# Patient Record
Sex: FEMALE | Race: WHITE | Employment: OTHER | URBAN - METROPOLITAN AREA
[De-identification: names, ages, dates, MRNs, and addresses within clinical notes are randomized per-mention and may not be internally consistent; named-entity substitution may affect disease eponyms.]

---

## 2020-01-14 ENCOUNTER — HOSPITAL ENCOUNTER (OUTPATIENT)
Dept: GENERAL RADIOLOGY | Age: 65
Discharge: HOME | End: 2020-01-14
Payer: MEDICARE

## 2020-01-14 DIAGNOSIS — R52 PAIN OF RIGHT SIDE OF BODY: ICD-10-CM

## 2020-01-14 DIAGNOSIS — R06.2 WHEEZING: ICD-10-CM

## 2020-01-14 DIAGNOSIS — R06.02 SOB (SHORTNESS OF BREATH): ICD-10-CM

## 2020-01-14 PROCEDURE — 71046 X-RAY EXAM CHEST 2 VIEWS: CPT

## 2020-01-14 NOTE — PROGRESS NOTES
Chest x-ray did not show pneumonia or pleurisy. I am sending her in prednisone to take since she had wheezing and crackling in her lungs. It could be bronchitis.

## 2020-03-03 LAB — HBA1C MFR BLD HPLC: 6.7 %

## 2020-11-12 ENCOUNTER — TELEPHONE (OUTPATIENT)
Dept: PHARMACY | Age: 65
End: 2020-11-12

## 2020-11-12 NOTE — TELEPHONE ENCOUNTER
Dominick Dorsey MD - would patient benefit from statin therapy?  - Diabetic patient high risk ASCVD risk 26%  - consider moderate-high intensity statin per ADA/AHA guidelines? Thank you,  Rebeca Chamberlain, PharmD, [address] [facility] Pharmacist  Direct: [phone], Ext [phone]  ==============================================================  CLINICAL PHARMACY: STATIN REVIEW    SUBJECTIVE:   Identified as DM care gap for [employer]: statin therapy. OBJECTIVE:  Allergies   Allergen Reactions    No Known Drug Allergies Not Reported This Time       Medications per current medication list:  Current Outpatient Medications   Medication Sig Dispense Refill    metFORMIN (GLUCOPHAGE) 500 mg tablet Take 1 Tab by mouth two (2) times daily (with meals). 180 Tab 1    canagliflozin (Invokana) 300 mg tablet Take 1 Tab by mouth Daily (before breakfast). 90 Tab 1    albuterol sulfate (PROVENTIL;VENTOLIN) 2.5 mg/0.5 mL nebu nebulizer solution 0.5 mL by Nebulization route five (5) times daily. 20 mL 5    glimepiride (AMARYL) 2 mg tablet Take 1 Tab by mouth every morning. 90 Tab 1    levothyroxine (SYNTHROID) 150 mcg tablet Take 1 Tab by mouth daily. 90 Tab 1    escitalopram oxalate (LEXAPRO) 20 mg tablet Take 1 Tab by mouth daily. 90 Tab 1    methocarbamoL (ROBAXIN) 500 mg tablet Take 1 Tab by mouth three (3) times daily. 30 Tab 0    montelukast (SINGULAIR) 10 mg tablet Take 1 Tab by mouth daily. 30 Tab 5    fluticasone (FLONASE) 50 mcg/actuation nasal spray 2 Sprays by Both Nostrils route daily. 1 Bottle 3    aspirin delayed-release 81 mg tablet Take  by mouth daily.  albuterol (PROVENTIL, VENTOLIN) 90 mcg/Actuation inhaler Take 2 Puffs by inhalation every six (6) hours as needed.        Labs:  Lab Results   Component Value Date/Time    Cholesterol, total 192 12/04/2019 02:24 PM    HDL Cholesterol 56 12/04/2019 02:24 PM    LDL, calculated 114 (H) 12/04/2019 02:24 PM    VLDL, calculated 22 12/04/2019 02:24 PM    Triglyceride 109 12/04/2019 02:24 PM     ALT (SGPT)   Date Value Ref Range Status   12/04/2019 28 0 - 32 IU/L Final     AST (SGOT)   Date Value Ref Range Status   12/04/2019 34 0 - 40 IU/L Final       ASCVD risk:  WHITE OR     BP Readings from Last 1 Encounters:   02/04/20 144/72       Social History     Tobacco Use    Smoking status: Current Every Day Smoker     Packs/day: 1.00     Years: 30.00     Pack years: 30.00     Types: Cigarettes    Smokeless tobacco: Never Used   Substance Use Topics    Alcohol use: No     ASSESSMENT:    ADA Guidelines:    >/= 36years old:   o Calculated ASCVD risk 26%  o History of ASCVD or 10-year ASCVD risk > 20% - high-intensity statin is recommended.      PLAN:  Consider moderate-high intensity statin     Thank you,    Jabari Luna, PharmD, [address] [facility] Clinical Pharmacist  Direct: [phone], Ext 7

## 2020-11-18 NOTE — TELEPHONE ENCOUNTER
No response from provider.      Chinedu Zafar, PharmD, [address] [facility] Clinical Pharmacist  Direct: [phone], Ext [phone]  ===============================================  CLINICAL PHARMACY CONSULT: MED RECONCILIATION/REVIEW ADDENDUM    For Pharmacy Admin Tracking Only    PHSO: PHSO Patient?: Yes  Total # of Interventions Recommended: Count: 1  - New Order #: 1 New Medication Order Reason(s): Needs Additional Medication Therapy  Recommended intervention potential cost savings: 0  Total Interventions Accepted: 0  Time Spent (min): 15    Cleopatra Subramanian, [facility], PharmD  [address]

## 2021-01-01 ENCOUNTER — TELEPHONE (OUTPATIENT)
Dept: PHARMACY | Age: 66
End: 2021-01-01

## 2021-05-19 NOTE — TELEPHONE ENCOUNTER
Carlos Singh MD - would patient benefit from statin therapy?  - identified has having DM and not on statin by her insurance  - DM with ASCVD risk over 20%, LDL above 70 mg/dL  - consider moderate intensity statin such as atorvastatin 20 mg or rosuvastatin 10 mg? Please discuss with patient at [address] later today or advise as appropriate. Thank you,  Kalee Mcginnis, PharmD, [facility]  Department, toll free: [phone], option 7  ==============================================================  POPULATION HEALTH CLINICAL PHARMACY: STATIN THERAPY REVIEW  Identified statin use in persons with diabetes care gap per [facility] IDENTIFIED    Medication history per chart review:  - no statins noted in this EMR med list    Component      Latest Ref Rng & Units 5/3/2021 12/4/2019 2/5/2019          11:41 AM  2:24 PM  2:42 PM   Cholesterol, total      100 - 199 mg/dL 161 192 193   Triglyceride      0 - 149 mg/dL 68 109 107   HDL Cholesterol      >39 mg/dL 66 56 54   VLDL, calculated      5 - 40 mg/dL 13 22 21   LDL, calculated      0 - 99 mg/dL 82 114 (H) 118 (H)     Component      Latest Ref Rng & Units 5/3/2021 12/4/2019 2/5/2019          11:41 AM  2:24 PM  2:42 PM   AST      0 - 40 IU/L 34 34 24   ALT      0 - 32 IU/L 30 28 26     The 10-year ASCVD risk score (Gisele Shone., et al., 2013) is: 25.4%    Values used to calculate the score:      Age: 74 years      Sex: Female      Is Non- : No      Diabetic: Yes      Tobacco smoker: Yes      Systolic Blood Pressure: 001 mmHg      Is BP treated: No      HDL Cholesterol: 66 mg/dL      Total Cholesterol: 161 mg/dL       ASSESSMENT/CONSIDERATIONS  Hyperlipidemia Goal: Patient has a 10-yr ASCVD risk of >20% with DM and is therefore a candidate for moderate- to high-intensity statin therapy based on updated guidelines.  Noted last LDL less than 100 mg/dL but baseline over 70 mg/dL with high cardiovascular risk and may benefit from statin for primary prevention  - consider atorvastatin 20 mg daily or rosuvastatin 10 mg daily, can adjust as needed/tolerated      Future Appointments   Date Time Provider Esperanza Card   5/19/2021  2:40 PM Padmini Hall MD [facility] P[facility]       René Brown, PharmD, [facility]  Department, toll free: [phone], option 7

## 2021-05-26 NOTE — TELEPHONE ENCOUNTER
No response noted, does not appear statin started at visit (at time of writing).     For Josh Mendes in place: No   Recommendation Provided To: Provider: 1 via Note to Provider    Gap Closed?: No   Total # of Interventions Recommended: 1   Total # of Interventions Accepted: 0   Intervention Accepted By: Provider: 0   Time Spent (min): 15

## 2022-01-01 ENCOUNTER — HOME CARE VISIT (OUTPATIENT)
Dept: SCHEDULING | Facility: HOME HEALTH | Age: 67
End: 2022-01-01
Payer: MEDICARE

## 2022-01-01 ENCOUNTER — NURSE TRIAGE (OUTPATIENT)
Dept: OTHER | Facility: CLINIC | Age: 67
End: 2022-01-01

## 2022-01-01 ENCOUNTER — HOSPITAL ENCOUNTER (OUTPATIENT)
Dept: CT IMAGING | Age: 67
Discharge: HOME | End: 2022-02-11
Attending: STUDENT IN AN ORGANIZED HEALTH CARE EDUCATION/TRAINING PROGRAM
Payer: MEDICARE

## 2022-01-01 ENCOUNTER — HOSPITAL ENCOUNTER (INPATIENT)
Age: 67
LOS: 4 days | Discharge: HOME HEALTH | DRG: 682 | End: 2022-03-28
Attending: EMERGENCY MEDICINE | Admitting: INTERNAL MEDICINE
Payer: MEDICARE

## 2022-01-01 ENCOUNTER — APPOINTMENT (OUTPATIENT)
Dept: INTERVENTIONAL RADIOLOGY/VASCULAR | Age: 67
DRG: 682 | End: 2022-01-01
Attending: INTERNAL MEDICINE
Payer: MEDICARE

## 2022-01-01 ENCOUNTER — HOME CARE VISIT (OUTPATIENT)
Dept: HOSPICE | Facility: HOSPICE | Age: 67
End: 2022-01-01
Payer: MEDICARE

## 2022-01-01 ENCOUNTER — HOSPITAL ENCOUNTER (OUTPATIENT)
Dept: CT IMAGING | Age: 67
Discharge: HOME | DRG: 682 | End: 2022-03-23
Attending: STUDENT IN AN ORGANIZED HEALTH CARE EDUCATION/TRAINING PROGRAM
Payer: MEDICARE

## 2022-01-01 ENCOUNTER — APPOINTMENT (OUTPATIENT)
Dept: GENERAL RADIOLOGY | Age: 67
DRG: 682 | End: 2022-01-01
Attending: INTERNAL MEDICINE
Payer: MEDICARE

## 2022-01-01 ENCOUNTER — APPOINTMENT (OUTPATIENT)
Dept: GENERAL RADIOLOGY | Age: 67
DRG: 682 | End: 2022-01-01
Attending: EMERGENCY MEDICINE
Payer: MEDICARE

## 2022-01-01 ENCOUNTER — APPOINTMENT (OUTPATIENT)
Dept: NON INVASIVE DIAGNOSTICS | Age: 67
DRG: 682 | End: 2022-01-01
Attending: FAMILY MEDICINE
Payer: MEDICARE

## 2022-01-01 ENCOUNTER — ANESTHESIA EVENT (OUTPATIENT)
Dept: ENDOSCOPY | Age: 67
DRG: 682 | End: 2022-01-01
Payer: MEDICARE

## 2022-01-01 ENCOUNTER — ANESTHESIA (OUTPATIENT)
Dept: ENDOSCOPY | Age: 67
DRG: 682 | End: 2022-01-01
Payer: MEDICARE

## 2022-01-01 ENCOUNTER — APPOINTMENT (OUTPATIENT)
Dept: ULTRASOUND IMAGING | Age: 67
DRG: 682 | End: 2022-01-01
Attending: STUDENT IN AN ORGANIZED HEALTH CARE EDUCATION/TRAINING PROGRAM
Payer: MEDICARE

## 2022-01-01 ENCOUNTER — HOSPITAL ENCOUNTER (OUTPATIENT)
Dept: LAB | Age: 67
Discharge: HOME | DRG: 682 | End: 2022-03-24
Payer: MEDICARE

## 2022-01-01 ENCOUNTER — APPOINTMENT (OUTPATIENT)
Dept: ULTRASOUND IMAGING | Age: 67
DRG: 682 | End: 2022-01-01
Attending: INTERNAL MEDICINE
Payer: MEDICARE

## 2022-01-01 ENCOUNTER — APPOINTMENT (OUTPATIENT)
Dept: GENERAL RADIOLOGY | Age: 67
DRG: 682 | End: 2022-01-01
Attending: STUDENT IN AN ORGANIZED HEALTH CARE EDUCATION/TRAINING PROGRAM
Payer: MEDICARE

## 2022-01-01 ENCOUNTER — HOSPICE ADMISSION (OUTPATIENT)
Dept: HOSPICE | Facility: HOSPICE | Age: 67
End: 2022-01-01
Payer: MEDICARE

## 2022-01-01 ENCOUNTER — HOSPITAL ENCOUNTER (INPATIENT)
Age: 67
LOS: 7 days | Discharge: HOSPICE | DRG: 682 | End: 2022-04-12
Attending: EMERGENCY MEDICINE | Admitting: INTERNAL MEDICINE
Payer: MEDICARE

## 2022-01-01 VITALS
TEMPERATURE: 97.5 F | WEIGHT: 271 LBS | BODY MASS INDEX: 46.26 KG/M2 | OXYGEN SATURATION: 94 % | HEART RATE: 80 BPM | HEIGHT: 64 IN | RESPIRATION RATE: 20 BRPM | DIASTOLIC BLOOD PRESSURE: 71 MMHG | SYSTOLIC BLOOD PRESSURE: 142 MMHG

## 2022-01-01 VITALS
TEMPERATURE: 97.9 F | HEART RATE: 76 BPM | BODY MASS INDEX: 42.99 KG/M2 | HEIGHT: 65 IN | SYSTOLIC BLOOD PRESSURE: 106 MMHG | WEIGHT: 258 LBS | RESPIRATION RATE: 20 BRPM | DIASTOLIC BLOOD PRESSURE: 60 MMHG

## 2022-01-01 VITALS
WEIGHT: 267.1 LBS | SYSTOLIC BLOOD PRESSURE: 132 MMHG | OXYGEN SATURATION: 98 % | BODY MASS INDEX: 45.6 KG/M2 | TEMPERATURE: 98 F | DIASTOLIC BLOOD PRESSURE: 70 MMHG | HEIGHT: 64 IN | RESPIRATION RATE: 16 BRPM | HEART RATE: 91 BPM

## 2022-01-01 DIAGNOSIS — J96.22 ACUTE ON CHRONIC RESPIRATORY FAILURE WITH HYPOXIA AND HYPERCAPNIA (HCC): ICD-10-CM

## 2022-01-01 DIAGNOSIS — J44.9 CHRONIC OBSTRUCTIVE PULMONARY DISEASE, UNSPECIFIED COPD TYPE (HCC): Chronic | ICD-10-CM

## 2022-01-01 DIAGNOSIS — R42 DIZZINESS: ICD-10-CM

## 2022-01-01 DIAGNOSIS — I34.0 NONRHEUMATIC MITRAL VALVE REGURGITATION: Chronic | ICD-10-CM

## 2022-01-01 DIAGNOSIS — H21.569 ABNORMAL SIZE OF PUPIL: ICD-10-CM

## 2022-01-01 DIAGNOSIS — K74.3 PRIMARY BILIARY CHOLANGITIS (HCC): ICD-10-CM

## 2022-01-01 DIAGNOSIS — Z51.5 ENCOUNTER FOR PALLIATIVE CARE: ICD-10-CM

## 2022-01-01 DIAGNOSIS — K75.81 LIVER CIRRHOSIS SECONDARY TO NASH (HCC): Chronic | ICD-10-CM

## 2022-01-01 DIAGNOSIS — R53.83 FATIGUE, UNSPECIFIED TYPE: ICD-10-CM

## 2022-01-01 DIAGNOSIS — K74.60 LIVER CIRRHOSIS SECONDARY TO NASH (HCC): Chronic | ICD-10-CM

## 2022-01-01 DIAGNOSIS — R93.1 ABNORMAL ECHOCARDIOGRAM: ICD-10-CM

## 2022-01-01 DIAGNOSIS — N17.9 AKI (ACUTE KIDNEY INJURY) (HCC): ICD-10-CM

## 2022-01-01 DIAGNOSIS — Z51.5 END OF LIFE CARE: ICD-10-CM

## 2022-01-01 DIAGNOSIS — H57.02 ANISOCORIA: ICD-10-CM

## 2022-01-01 DIAGNOSIS — J96.21 ACUTE ON CHRONIC RESPIRATORY FAILURE WITH HYPOXIA AND HYPERCAPNIA (HCC): ICD-10-CM

## 2022-01-01 DIAGNOSIS — Z71.89 ADVANCED CARE PLANNING/COUNSELING DISCUSSION: ICD-10-CM

## 2022-01-01 DIAGNOSIS — R11.2 NON-INTRACTABLE VOMITING WITH NAUSEA, UNSPECIFIED VOMITING TYPE: ICD-10-CM

## 2022-01-01 DIAGNOSIS — D62 ABLA (ACUTE BLOOD LOSS ANEMIA): ICD-10-CM

## 2022-01-01 DIAGNOSIS — R17 JAUNDICE: ICD-10-CM

## 2022-01-01 DIAGNOSIS — R06.00 DYSPNEA, UNSPECIFIED TYPE: ICD-10-CM

## 2022-01-01 DIAGNOSIS — N17.9 AKI (ACUTE KIDNEY INJURY) (HCC): Primary | ICD-10-CM

## 2022-01-01 DIAGNOSIS — I50.32 CHRONIC DIASTOLIC CONGESTIVE HEART FAILURE (HCC): Chronic | ICD-10-CM

## 2022-01-01 DIAGNOSIS — I85.10 SECONDARY ESOPHAGEAL VARICES WITHOUT BLEEDING (HCC): Chronic | ICD-10-CM

## 2022-01-01 DIAGNOSIS — R11.0 NAUSEA: ICD-10-CM

## 2022-01-01 DIAGNOSIS — R74.8 ELEVATED LIVER ENZYMES: ICD-10-CM

## 2022-01-01 DIAGNOSIS — I25.2 HISTORY OF ST ELEVATION MYOCARDIAL INFARCTION (STEMI): Chronic | ICD-10-CM

## 2022-01-01 DIAGNOSIS — I25.10 ATHEROSCLEROSIS OF NATIVE CORONARY ARTERY OF NATIVE HEART WITHOUT ANGINA PECTORIS: ICD-10-CM

## 2022-01-01 DIAGNOSIS — I05.8 MITRAL VALVE MASS: ICD-10-CM

## 2022-01-01 LAB
ABO + RH BLD: NORMAL
ABO + RH BLD: NORMAL
ALBUMIN SERPL-MCNC: 1.6 G/DL (ref 3.2–4.6)
ALBUMIN SERPL-MCNC: 1.7 G/DL (ref 3.2–4.6)
ALBUMIN SERPL-MCNC: 2.2 G/DL (ref 3.2–4.6)
ALBUMIN SERPL-MCNC: 2.4 G/DL (ref 3.2–4.6)
ALBUMIN SERPL-MCNC: 2.6 G/DL (ref 3.2–4.6)
ALBUMIN SERPL-MCNC: 2.6 G/DL (ref 3.2–4.6)
ALBUMIN SERPL-MCNC: 2.9 G/DL (ref 3.2–4.6)
ALBUMIN/GLOB SERPL: 0.3 {RATIO} (ref 1.2–3.5)
ALBUMIN/GLOB SERPL: 0.4 {RATIO} (ref 1.2–3.5)
ALBUMIN/GLOB SERPL: 0.5 {RATIO} (ref 1.2–3.5)
ALBUMIN/GLOB SERPL: 0.5 {RATIO} (ref 1.2–3.5)
ALBUMIN/GLOB SERPL: 0.6 {RATIO} (ref 1.2–3.5)
ALBUMIN/GLOB SERPL: 0.6 {RATIO} (ref 1.2–3.5)
ALP SERPL-CCNC: 351 U/L (ref 50–136)
ALP SERPL-CCNC: 354 U/L (ref 50–136)
ALP SERPL-CCNC: 363 U/L (ref 50–136)
ALP SERPL-CCNC: 383 U/L (ref 50–136)
ALP SERPL-CCNC: 488 U/L (ref 50–136)
ALP SERPL-CCNC: 499 U/L (ref 50–136)
ALP SERPL-CCNC: 534 U/L (ref 50–136)
ALP SERPL-CCNC: 544 U/L (ref 50–136)
ALP SERPL-CCNC: 559 U/L (ref 50–136)
ALP SERPL-CCNC: 561 U/L (ref 50–136)
ALP SERPL-CCNC: 568 U/L (ref 50–136)
ALP SERPL-CCNC: 602 U/L (ref 50–136)
ALT SERPL-CCNC: 117 U/L (ref 12–65)
ALT SERPL-CCNC: 117 U/L (ref 12–65)
ALT SERPL-CCNC: 123 U/L (ref 12–65)
ALT SERPL-CCNC: 130 U/L (ref 12–65)
ALT SERPL-CCNC: 136 U/L (ref 12–65)
ALT SERPL-CCNC: 137 U/L (ref 12–65)
ALT SERPL-CCNC: 144 U/L (ref 12–65)
ALT SERPL-CCNC: 144 U/L (ref 12–65)
ALT SERPL-CCNC: 146 U/L (ref 12–65)
ALT SERPL-CCNC: 148 U/L (ref 12–65)
ALT SERPL-CCNC: 151 U/L (ref 12–65)
ALT SERPL-CCNC: 159 U/L (ref 12–65)
ANA SER QL: NEGATIVE
ANION GAP SERPL CALC-SCNC: 10 MMOL/L (ref 7–16)
ANION GAP SERPL CALC-SCNC: 11 MMOL/L (ref 7–16)
ANION GAP SERPL CALC-SCNC: 6 MMOL/L (ref 7–16)
ANION GAP SERPL CALC-SCNC: 7 MMOL/L (ref 7–16)
ANION GAP SERPL CALC-SCNC: 7 MMOL/L (ref 7–16)
ANION GAP SERPL CALC-SCNC: 8 MMOL/L (ref 7–16)
ANION GAP SERPL CALC-SCNC: 9 MMOL/L (ref 7–16)
ANION GAP SERPL CALC-SCNC: 9 MMOL/L (ref 7–16)
APPEARANCE UR: ABNORMAL
APPEARANCE UR: ABNORMAL
APTT PPP: 39.1 SEC (ref 24.1–35.1)
ARTERIAL PATENCY WRIST A: POSITIVE
ARTERIAL PATENCY WRIST A: POSITIVE
AST SERPL-CCNC: 206 U/L (ref 15–37)
AST SERPL-CCNC: 214 U/L (ref 15–37)
AST SERPL-CCNC: 221 U/L (ref 15–37)
AST SERPL-CCNC: 222 U/L (ref 15–37)
AST SERPL-CCNC: 236 U/L (ref 15–37)
AST SERPL-CCNC: 239 U/L (ref 15–37)
AST SERPL-CCNC: 255 U/L (ref 15–37)
AST SERPL-CCNC: 263 U/L (ref 15–37)
AST SERPL-CCNC: 275 U/L (ref 15–37)
AST SERPL-CCNC: 287 U/L (ref 15–37)
AST SERPL-CCNC: 305 U/L (ref 15–37)
AST SERPL-CCNC: ABNORMAL U/L (ref 15–37)
BACTERIA SPEC CULT: ABNORMAL
BACTERIA SPEC CULT: ABNORMAL
BACTERIA SPEC CULT: NORMAL
BACTERIA SPEC CULT: NORMAL
BACTERIA URNS QL MICRO: ABNORMAL /HPF
BACTERIA URNS QL MICRO: ABNORMAL /HPF
BACTERIA URNS QL MICRO: NORMAL /HPF
BASE DEFICIT BLD-SCNC: 5.8 MMOL/L
BASE EXCESS BLD CALC-SCNC: 4.5 MMOL/L
BASOPHILS # BLD: 0 K/UL (ref 0–0.2)
BASOPHILS NFR BLD: 0 % (ref 0–2)
BDY SITE: ABNORMAL
BDY SITE: ABNORMAL
BILIRUB DIRECT SERPL-MCNC: 2.4 MG/DL
BILIRUB SERPL-MCNC: 2.5 MG/DL (ref 0.2–1.1)
BILIRUB SERPL-MCNC: 2.5 MG/DL (ref 0.2–1.1)
BILIRUB SERPL-MCNC: 2.7 MG/DL (ref 0.2–1.1)
BILIRUB SERPL-MCNC: 2.8 MG/DL (ref 0.2–1.1)
BILIRUB SERPL-MCNC: 2.9 MG/DL (ref 0.2–1.1)
BILIRUB SERPL-MCNC: 3 MG/DL (ref 0.2–1.1)
BILIRUB SERPL-MCNC: 3 MG/DL (ref 0.2–1.1)
BILIRUB SERPL-MCNC: 3.1 MG/DL (ref 0.2–1.1)
BILIRUB SERPL-MCNC: 3.3 MG/DL (ref 0.2–1.1)
BILIRUB SERPL-MCNC: 3.6 MG/DL (ref 0.2–1.1)
BILIRUB UR QL: ABNORMAL
BILIRUB UR QL: NEGATIVE
BILIRUB UR QL: NEGATIVE
BLD PROD TYP BPU: NORMAL
BLOOD GROUP ANTIBODIES SERPL: NORMAL
BLOOD GROUP ANTIBODIES SERPL: NORMAL
BNP SERPL-MCNC: 2796 PG/ML (ref 5–125)
BNP SERPL-MCNC: ABNORMAL PG/ML (ref 5–125)
BPU ID: NORMAL
BUN SERPL-MCNC: 23 MG/DL (ref 8–23)
BUN SERPL-MCNC: 30 MG/DL (ref 8–23)
BUN SERPL-MCNC: 31 MG/DL (ref 8–23)
BUN SERPL-MCNC: 31 MG/DL (ref 8–23)
BUN SERPL-MCNC: 32 MG/DL (ref 8–23)
BUN SERPL-MCNC: 33 MG/DL (ref 8–23)
BUN SERPL-MCNC: 34 MG/DL (ref 8–23)
BUN SERPL-MCNC: 37 MG/DL (ref 8–23)
BUN SERPL-MCNC: 38 MG/DL (ref 8–23)
BUN SERPL-MCNC: 78 MG/DL (ref 8–23)
BUN SERPL-MCNC: 80 MG/DL (ref 8–23)
BUN SERPL-MCNC: 80 MG/DL (ref 8–23)
BUN SERPL-MCNC: 81 MG/DL (ref 8–23)
BUN SERPL-MCNC: 82 MG/DL (ref 8–23)
CALCIUM SERPL-MCNC: 8.2 MG/DL (ref 8.3–10.4)
CALCIUM SERPL-MCNC: 8.3 MG/DL (ref 8.3–10.4)
CALCIUM SERPL-MCNC: 8.3 MG/DL (ref 8.3–10.4)
CALCIUM SERPL-MCNC: 8.4 MG/DL (ref 8.3–10.4)
CALCIUM SERPL-MCNC: 8.6 MG/DL (ref 8.3–10.4)
CALCIUM SERPL-MCNC: 8.6 MG/DL (ref 8.3–10.4)
CALCIUM SERPL-MCNC: 8.7 MG/DL (ref 8.3–10.4)
CALCIUM SERPL-MCNC: 8.8 MG/DL (ref 8.3–10.4)
CALCIUM SERPL-MCNC: 8.9 MG/DL (ref 8.3–10.4)
CALCIUM SERPL-MCNC: 9.1 MG/DL (ref 8.3–10.4)
CALCIUM SERPL-MCNC: 9.2 MG/DL (ref 8.3–10.4)
CALCIUM SERPL-MCNC: 9.4 MG/DL (ref 8.3–10.4)
CASTS URNS QL MICRO: ABNORMAL /LPF
CASTS URNS QL MICRO: ABNORMAL /LPF
CASTS URNS QL MICRO: NORMAL /LPF
CHLORIDE SERPL-SCNC: 100 MMOL/L (ref 98–107)
CHLORIDE SERPL-SCNC: 100 MMOL/L (ref 98–107)
CHLORIDE SERPL-SCNC: 101 MMOL/L (ref 98–107)
CHLORIDE SERPL-SCNC: 101 MMOL/L (ref 98–107)
CHLORIDE SERPL-SCNC: 103 MMOL/L (ref 98–107)
CHLORIDE SERPL-SCNC: 104 MMOL/L (ref 98–107)
CHLORIDE SERPL-SCNC: 105 MMOL/L (ref 98–107)
CHLORIDE SERPL-SCNC: 96 MMOL/L (ref 98–107)
CHLORIDE SERPL-SCNC: 97 MMOL/L (ref 98–107)
CHLORIDE UR-SCNC: 12 MMOL/L
CO2 SERPL-SCNC: 23 MMOL/L (ref 21–32)
CO2 SERPL-SCNC: 24 MMOL/L (ref 21–32)
CO2 SERPL-SCNC: 25 MMOL/L (ref 21–32)
CO2 SERPL-SCNC: 25 MMOL/L (ref 21–32)
CO2 SERPL-SCNC: 26 MMOL/L (ref 21–32)
CO2 SERPL-SCNC: 26 MMOL/L (ref 21–32)
CO2 SERPL-SCNC: 27 MMOL/L (ref 21–32)
CO2 SERPL-SCNC: 29 MMOL/L (ref 21–32)
COLOR UR: ABNORMAL
COLOR UR: YELLOW
CREAT BLD-MCNC: 1.63 MG/DL (ref 0.8–1.5)
CREAT SERPL-MCNC: 1.3 MG/DL (ref 0.6–1)
CREAT SERPL-MCNC: 1.5 MG/DL (ref 0.6–1)
CREAT SERPL-MCNC: 1.5 MG/DL (ref 0.6–1)
CREAT SERPL-MCNC: 1.6 MG/DL (ref 0.6–1)
CREAT SERPL-MCNC: 1.6 MG/DL (ref 0.6–1)
CREAT SERPL-MCNC: 1.7 MG/DL (ref 0.6–1)
CREAT SERPL-MCNC: 1.9 MG/DL (ref 0.6–1)
CREAT SERPL-MCNC: 2.1 MG/DL (ref 0.6–1)
CREAT SERPL-MCNC: 2.2 MG/DL (ref 0.6–1)
CREAT SERPL-MCNC: 2.5 MG/DL (ref 0.6–1)
CREAT SERPL-MCNC: 2.5 MG/DL (ref 0.6–1)
CREAT SERPL-MCNC: 2.6 MG/DL (ref 0.6–1)
CREAT SERPL-MCNC: 2.7 MG/DL (ref 0.6–1)
CREAT SERPL-MCNC: 2.8 MG/DL (ref 0.6–1)
CREAT UR-MCNC: 102 MG/DL
CROSSMATCH RESULT,%XM: NORMAL
CRP SERPL-MCNC: 10.8 MG/DL (ref 0–0.9)
CRYSTALS URNS QL MICRO: 0 /LPF
CRYSTALS URNS QL MICRO: 0 /LPF
DIFFERENTIAL METHOD BLD: ABNORMAL
ECHO AO ASC DIAM: 3.5 CM
ECHO AO ASCENDING AORTA INDEX: 1.57 CM/M2
ECHO AO ROOT DIAM: 3.1 CM
ECHO AO ROOT INDEX: 1.39 CM/M2
ECHO AV AREA PEAK VELOCITY: 2.7 CM2
ECHO AV AREA VTI: 3 CM2
ECHO AV AREA/BSA PEAK VELOCITY: 1.2 CM2/M2
ECHO AV AREA/BSA VTI: 1.3 CM2/M2
ECHO AV MEAN GRADIENT: 7 MMHG
ECHO AV MEAN VELOCITY: 1.2 M/S
ECHO AV PEAK GRADIENT: 16 MMHG
ECHO AV PEAK VELOCITY: 2 M/S
ECHO AV VELOCITY RATIO: 0.75
ECHO AV VTI: 35.4 CM
ECHO EST RA PRESSURE: 15 MMHG
ECHO LA AREA 2C: 17.9 CM2
ECHO LA AREA 4C: 23.3 CM2
ECHO LA DIAMETER INDEX: 2.06 CM/M2
ECHO LA DIAMETER: 4.6 CM
ECHO LA MAJOR AXIS: 6.1 CM
ECHO LA MINOR AXIS: 5.6 CM
ECHO LA TO AORTIC ROOT RATIO: 1.48
ECHO LA VOL 2C: 47 ML (ref 22–52)
ECHO LA VOL 4C: 71 ML (ref 22–52)
ECHO LA VOL BP: 61 ML (ref 22–52)
ECHO LA VOL/BSA BIPLANE: 27 ML/M2 (ref 16–34)
ECHO LA VOLUME INDEX A2C: 21 ML/M2 (ref 16–34)
ECHO LA VOLUME INDEX A4C: 32 ML/M2 (ref 16–34)
ECHO LV E' LATERAL VELOCITY: 7 CM/S
ECHO LV E' SEPTAL VELOCITY: 6 CM/S
ECHO LV EDV A2C: 77 ML
ECHO LV EDV A4C: 87 ML
ECHO LV EDV INDEX A4C: 39 ML/M2
ECHO LV EDV NDEX A2C: 35 ML/M2
ECHO LV EJECTION FRACTION A2C: 53 %
ECHO LV EJECTION FRACTION A4C: 67 %
ECHO LV EJECTION FRACTION BIPLANE: 61 % (ref 55–100)
ECHO LV ESV A2C: 37 ML
ECHO LV ESV A4C: 29 ML
ECHO LV ESV INDEX A2C: 17 ML/M2
ECHO LV ESV INDEX A4C: 13 ML/M2
ECHO LV FRACTIONAL SHORTENING: 30 % (ref 28–44)
ECHO LV INTERNAL DIMENSION DIASTOLE INDEX: 2.38 CM/M2
ECHO LV INTERNAL DIMENSION DIASTOLIC: 5.3 CM (ref 3.9–5.3)
ECHO LV INTERNAL DIMENSION SYSTOLIC INDEX: 1.66 CM/M2
ECHO LV INTERNAL DIMENSION SYSTOLIC: 3.7 CM
ECHO LV IVSD: 1.2 CM (ref 0.6–0.9)
ECHO LV MASS 2D: 256.6 G (ref 67–162)
ECHO LV MASS INDEX 2D: 115.1 G/M2 (ref 43–95)
ECHO LV POSTERIOR WALL DIASTOLIC: 1.2 CM (ref 0.6–0.9)
ECHO LV RELATIVE WALL THICKNESS RATIO: 0.45
ECHO LVOT AREA: 3.5 CM2
ECHO LVOT AV VTI INDEX: 0.86
ECHO LVOT DIAM: 2.1 CM
ECHO LVOT MEAN GRADIENT: 4 MMHG
ECHO LVOT PEAK GRADIENT: 9 MMHG
ECHO LVOT PEAK VELOCITY: 1.5 M/S
ECHO LVOT STROKE VOLUME INDEX: 47.2 ML/M2
ECHO LVOT SV: 105.2 ML
ECHO LVOT VTI: 30.4 CM
ECHO MV A VELOCITY: 1.78 M/S
ECHO MV AREA VTI: 2.2 CM2
ECHO MV E DECELERATION TIME (DT): 243 MS
ECHO MV E VELOCITY: 1.9 M/S
ECHO MV E/A RATIO: 1.07
ECHO MV E/E' LATERAL: 27.14
ECHO MV E/E' RATIO (AVERAGED): 29.4
ECHO MV E/E' SEPTAL: 31.67
ECHO MV EROA PISA: 14.6 CM2
ECHO MV LVOT VTI INDEX: 1.57
ECHO MV MAX VELOCITY: 2.3 M/S
ECHO MV MEAN GRADIENT: 12 MMHG
ECHO MV MEAN VELOCITY: 1.6 M/S
ECHO MV PEAK GRADIENT: 22 MMHG
ECHO MV REGURGITANT ALIASING (NYQUIST) VELOCITY: 31 CM/S
ECHO MV REGURGITANT PEAK GRADIENT: 92 MMHG
ECHO MV REGURGITANT PEAK VELOCITY: 4.8 M/S
ECHO MV REGURGITANT RADIUS PISA: 0.6 CM
ECHO MV REGURGITANT VOLUME PISA: 2014.94 ML
ECHO MV REGURGITANT VTIA: 138 CM
ECHO MV VTI: 47.8 CM
ECHO RIGHT VENTRICULAR SYSTOLIC PRESSURE (RVSP): 50 MMHG
ECHO RV INTERNAL DIMENSION: 3.1 CM
ECHO RV TAPSE: 2.5 CM (ref 1.7–?)
ECHO TV REGURGITANT MAX VELOCITY: 2.94 M/S
ECHO TV REGURGITANT PEAK GRADIENT: 35 MMHG
EOSINOPHIL # BLD: 0 K/UL (ref 0–0.8)
EOSINOPHIL NFR BLD: 0 % (ref 0.5–7.8)
EPI CELLS #/AREA URNS HPF: ABNORMAL /HPF
EPI CELLS #/AREA URNS HPF: ABNORMAL /HPF
EPI CELLS #/AREA URNS HPF: NORMAL /HPF
ERYTHROCYTE [DISTWIDTH] IN BLOOD BY AUTOMATED COUNT: 17.5 % (ref 11.9–14.6)
ERYTHROCYTE [DISTWIDTH] IN BLOOD BY AUTOMATED COUNT: 17.5 % (ref 11.9–14.6)
ERYTHROCYTE [DISTWIDTH] IN BLOOD BY AUTOMATED COUNT: 17.6 % (ref 11.9–14.6)
ERYTHROCYTE [DISTWIDTH] IN BLOOD BY AUTOMATED COUNT: 17.7 % (ref 11.9–14.6)
ERYTHROCYTE [DISTWIDTH] IN BLOOD BY AUTOMATED COUNT: 17.8 % (ref 11.9–14.6)
ERYTHROCYTE [DISTWIDTH] IN BLOOD BY AUTOMATED COUNT: 19.9 % (ref 11.9–14.6)
ERYTHROCYTE [DISTWIDTH] IN BLOOD BY AUTOMATED COUNT: 20.5 % (ref 11.9–14.6)
ERYTHROCYTE [DISTWIDTH] IN BLOOD BY AUTOMATED COUNT: 20.5 % (ref 11.9–14.6)
ERYTHROCYTE [DISTWIDTH] IN BLOOD BY AUTOMATED COUNT: 20.7 % (ref 11.9–14.6)
ERYTHROCYTE [DISTWIDTH] IN BLOOD BY AUTOMATED COUNT: 20.7 % (ref 11.9–14.6)
ERYTHROCYTE [DISTWIDTH] IN BLOOD BY AUTOMATED COUNT: 20.9 % (ref 11.9–14.6)
ERYTHROCYTE [DISTWIDTH] IN BLOOD BY AUTOMATED COUNT: 20.9 % (ref 11.9–14.6)
ERYTHROCYTE [DISTWIDTH] IN BLOOD BY AUTOMATED COUNT: 21.2 % (ref 11.9–14.6)
ERYTHROCYTE [DISTWIDTH] IN BLOOD BY AUTOMATED COUNT: 21.5 % (ref 11.9–14.6)
EST. AVERAGE GLUCOSE BLD GHB EST-MCNC: 126 MG/DL
FERRITIN SERPL-MCNC: 300 NG/ML (ref 8–388)
FERRITIN SERPL-MCNC: 471 NG/ML (ref 8–388)
FIBRINOGEN PPP-MCNC: 264 MG/DL (ref 190–501)
FIO2, L/MIN - FIO2P: 6
FIO2, L/MIN - FIO2P: 8
GAS FLOW.O2 O2 DELIVERY SYS: ABNORMAL L/MIN
GAS FLOW.O2 O2 DELIVERY SYS: ABNORMAL L/MIN
GLOBULIN SER CALC-MCNC: 4.3 G/DL (ref 2.3–3.5)
GLOBULIN SER CALC-MCNC: 4.4 G/DL (ref 2.3–3.5)
GLOBULIN SER CALC-MCNC: 4.5 G/DL (ref 2.3–3.5)
GLOBULIN SER CALC-MCNC: 5 G/DL (ref 2.3–3.5)
GLOBULIN SER CALC-MCNC: 5 G/DL (ref 2.3–3.5)
GLOBULIN SER CALC-MCNC: 5.2 G/DL (ref 2.3–3.5)
GLOBULIN SER CALC-MCNC: 5.3 G/DL (ref 2.3–3.5)
GLOBULIN SER CALC-MCNC: 5.4 G/DL (ref 2.3–3.5)
GLOBULIN SER CALC-MCNC: 5.5 G/DL (ref 2.3–3.5)
GLOBULIN SER CALC-MCNC: 5.5 G/DL (ref 2.3–3.5)
GLOBULIN SER CALC-MCNC: 5.7 G/DL (ref 2.3–3.5)
GLOBULIN SER CALC-MCNC: 5.8 G/DL (ref 2.3–3.5)
GLUCOSE BLD STRIP.AUTO-MCNC: 111 MG/DL (ref 65–100)
GLUCOSE BLD STRIP.AUTO-MCNC: 118 MG/DL (ref 65–100)
GLUCOSE BLD STRIP.AUTO-MCNC: 118 MG/DL (ref 65–100)
GLUCOSE BLD STRIP.AUTO-MCNC: 119 MG/DL (ref 65–100)
GLUCOSE BLD STRIP.AUTO-MCNC: 120 MG/DL (ref 65–100)
GLUCOSE BLD STRIP.AUTO-MCNC: 124 MG/DL (ref 65–100)
GLUCOSE BLD STRIP.AUTO-MCNC: 124 MG/DL (ref 65–100)
GLUCOSE BLD STRIP.AUTO-MCNC: 126 MG/DL (ref 65–100)
GLUCOSE BLD STRIP.AUTO-MCNC: 128 MG/DL (ref 65–100)
GLUCOSE BLD STRIP.AUTO-MCNC: 129 MG/DL (ref 65–100)
GLUCOSE BLD STRIP.AUTO-MCNC: 131 MG/DL (ref 65–100)
GLUCOSE BLD STRIP.AUTO-MCNC: 133 MG/DL (ref 65–100)
GLUCOSE BLD STRIP.AUTO-MCNC: 135 MG/DL (ref 65–100)
GLUCOSE BLD STRIP.AUTO-MCNC: 135 MG/DL (ref 65–100)
GLUCOSE BLD STRIP.AUTO-MCNC: 138 MG/DL (ref 65–100)
GLUCOSE BLD STRIP.AUTO-MCNC: 138 MG/DL (ref 65–100)
GLUCOSE BLD STRIP.AUTO-MCNC: 144 MG/DL (ref 65–100)
GLUCOSE BLD STRIP.AUTO-MCNC: 150 MG/DL (ref 65–100)
GLUCOSE BLD STRIP.AUTO-MCNC: 151 MG/DL (ref 65–100)
GLUCOSE BLD STRIP.AUTO-MCNC: 154 MG/DL (ref 65–100)
GLUCOSE BLD STRIP.AUTO-MCNC: 155 MG/DL (ref 65–100)
GLUCOSE BLD STRIP.AUTO-MCNC: 155 MG/DL (ref 65–100)
GLUCOSE BLD STRIP.AUTO-MCNC: 159 MG/DL (ref 65–100)
GLUCOSE BLD STRIP.AUTO-MCNC: 161 MG/DL (ref 65–100)
GLUCOSE BLD STRIP.AUTO-MCNC: 162 MG/DL (ref 65–100)
GLUCOSE BLD STRIP.AUTO-MCNC: 164 MG/DL (ref 65–100)
GLUCOSE BLD STRIP.AUTO-MCNC: 169 MG/DL (ref 65–100)
GLUCOSE BLD STRIP.AUTO-MCNC: 174 MG/DL (ref 65–100)
GLUCOSE BLD STRIP.AUTO-MCNC: 179 MG/DL (ref 65–100)
GLUCOSE BLD STRIP.AUTO-MCNC: 184 MG/DL (ref 65–100)
GLUCOSE BLD STRIP.AUTO-MCNC: 187 MG/DL (ref 65–100)
GLUCOSE BLD STRIP.AUTO-MCNC: 189 MG/DL (ref 65–100)
GLUCOSE BLD STRIP.AUTO-MCNC: 204 MG/DL (ref 65–100)
GLUCOSE BLD STRIP.AUTO-MCNC: 223 MG/DL (ref 65–100)
GLUCOSE BLD STRIP.AUTO-MCNC: 95 MG/DL (ref 65–100)
GLUCOSE SERPL-MCNC: 105 MG/DL (ref 65–100)
GLUCOSE SERPL-MCNC: 109 MG/DL (ref 65–100)
GLUCOSE SERPL-MCNC: 114 MG/DL (ref 65–100)
GLUCOSE SERPL-MCNC: 115 MG/DL (ref 65–100)
GLUCOSE SERPL-MCNC: 122 MG/DL (ref 65–100)
GLUCOSE SERPL-MCNC: 123 MG/DL (ref 65–100)
GLUCOSE SERPL-MCNC: 132 MG/DL (ref 65–100)
GLUCOSE SERPL-MCNC: 133 MG/DL (ref 65–100)
GLUCOSE SERPL-MCNC: 148 MG/DL (ref 65–100)
GLUCOSE SERPL-MCNC: 148 MG/DL (ref 65–100)
GLUCOSE SERPL-MCNC: 155 MG/DL (ref 65–100)
GLUCOSE SERPL-MCNC: 162 MG/DL (ref 65–100)
GLUCOSE SERPL-MCNC: 167 MG/DL (ref 65–100)
GLUCOSE SERPL-MCNC: 176 MG/DL (ref 65–100)
GLUCOSE UR QL STRIP.AUTO: NEGATIVE MG/DL
GLUCOSE UR STRIP.AUTO-MCNC: NEGATIVE MG/DL
GLUCOSE UR STRIP.AUTO-MCNC: NEGATIVE MG/DL
HAV IGM SER QL: NONREACTIVE
HBA1C MFR BLD: 6 % (ref 4.2–6.3)
HBV CORE IGM SER QL: NONREACTIVE
HBV SURFACE AG SER QL: NONREACTIVE
HCO3 BLD-SCNC: 22.1 MMOL/L (ref 22–26)
HCO3 BLD-SCNC: 29.4 MMOL/L (ref 22–26)
HCT VFR BLD AUTO: 22.2 % (ref 35.8–46.3)
HCT VFR BLD AUTO: 22.4 % (ref 35.8–46.3)
HCT VFR BLD AUTO: 22.4 % (ref 35.8–46.3)
HCT VFR BLD AUTO: 22.6 % (ref 35.8–46.3)
HCT VFR BLD AUTO: 23.6 % (ref 35.8–46.3)
HCT VFR BLD AUTO: 24.4 % (ref 35.8–46.3)
HCT VFR BLD AUTO: 24.6 % (ref 35.8–46.3)
HCT VFR BLD AUTO: 25.2 % (ref 35.8–46.3)
HCT VFR BLD AUTO: 25.5 % (ref 35.8–46.3)
HCT VFR BLD AUTO: 25.6 % (ref 35.8–46.3)
HCT VFR BLD AUTO: 25.6 % (ref 35.8–46.3)
HCT VFR BLD AUTO: 25.7 % (ref 35.8–46.3)
HCT VFR BLD AUTO: 26.2 % (ref 35.8–46.3)
HCT VFR BLD AUTO: 27.4 % (ref 35.8–46.3)
HCT VFR BLD AUTO: 28.1 % (ref 35.8–46.3)
HCT VFR BLD AUTO: 28.2 % (ref 35.8–46.3)
HCT VFR BLD AUTO: 29.8 % (ref 35.8–46.3)
HCV AB SER QL: NONREACTIVE
HGB BLD-MCNC: 6.8 G/DL (ref 11.7–15.4)
HGB BLD-MCNC: 7 G/DL (ref 11.7–15.4)
HGB BLD-MCNC: 7.3 G/DL (ref 11.7–15.4)
HGB BLD-MCNC: 7.4 G/DL (ref 11.7–15.4)
HGB BLD-MCNC: 7.8 G/DL (ref 11.7–15.4)
HGB BLD-MCNC: 7.8 G/DL (ref 11.7–15.4)
HGB BLD-MCNC: 8 G/DL (ref 11.7–15.4)
HGB BLD-MCNC: 8 G/DL (ref 11.7–15.4)
HGB BLD-MCNC: 8.1 G/DL (ref 11.7–15.4)
HGB BLD-MCNC: 8.1 G/DL (ref 11.7–15.4)
HGB BLD-MCNC: 8.2 G/DL (ref 11.7–15.4)
HGB BLD-MCNC: 8.6 G/DL (ref 11.7–15.4)
HGB BLD-MCNC: 8.7 G/DL (ref 11.7–15.4)
HGB BLD-MCNC: 8.8 G/DL (ref 11.7–15.4)
HGB BLD-MCNC: 9.2 G/DL (ref 11.7–15.4)
HGB UR QL STRIP: ABNORMAL
HGB UR QL STRIP: ABNORMAL
HISTORY CHECKED?,CKHIST: NORMAL
HIV 1+2 AB+HIV1 P24 AG SERPL QL IA: NONREACTIVE
HIV12 RESULT COMMENT, HHIVC: ABNORMAL
IGG SERPL-MCNC: 1838 MG/DL (ref 586–1602)
IMM GRANULOCYTES # BLD AUTO: 0 K/UL (ref 0–0.5)
IMM GRANULOCYTES # BLD AUTO: 0.1 K/UL (ref 0–0.5)
IMM GRANULOCYTES # BLD AUTO: 0.2 K/UL (ref 0–0.5)
IMM GRANULOCYTES # BLD AUTO: 0.4 K/UL (ref 0–0.5)
IMM GRANULOCYTES NFR BLD AUTO: 0 % (ref 0–5)
IMM GRANULOCYTES NFR BLD AUTO: 1 % (ref 0–5)
IMM GRANULOCYTES NFR BLD AUTO: 2 % (ref 0–5)
INR PPP: 1
INR PPP: 1.2
INR PPP: 1.3
IRON SATN MFR SERPL: 26 %
IRON SATN MFR SERPL: 36 %
IRON SERPL-MCNC: 49 UG/DL (ref 35–150)
IRON SERPL-MCNC: 79 UG/DL (ref 35–150)
IRON SERPL-MCNC: 85 UG/DL (ref 35–150)
KETONES UR QL STRIP.AUTO: NEGATIVE MG/DL
KETONES UR QL STRIP.AUTO: NEGATIVE MG/DL
KETONES UR-MCNC: NEGATIVE MG/DL
LACTATE SERPL-SCNC: 1.4 MMOL/L (ref 0.4–2)
LACTATE SERPL-SCNC: 2.1 MMOL/L (ref 0.4–2)
LEUKOCYTE ESTERASE UR QL STRIP.AUTO: ABNORMAL
LEUKOCYTE ESTERASE UR QL STRIP.AUTO: NEGATIVE
LEUKOCYTE ESTERASE UR QL STRIP: NEGATIVE
LIPASE SERPL-CCNC: 360 U/L (ref 73–393)
LKM-1 AB SER-ACNC: 1.6 UNITS (ref 0–20)
LYMPHOCYTES # BLD: 0.5 K/UL (ref 0.5–4.6)
LYMPHOCYTES # BLD: 0.5 K/UL (ref 0.5–4.6)
LYMPHOCYTES # BLD: 0.6 K/UL (ref 0.5–4.6)
LYMPHOCYTES # BLD: 0.7 K/UL (ref 0.5–4.6)
LYMPHOCYTES # BLD: 0.8 K/UL (ref 0.5–4.6)
LYMPHOCYTES NFR BLD: 12 % (ref 13–44)
LYMPHOCYTES NFR BLD: 3 % (ref 13–44)
LYMPHOCYTES NFR BLD: 4 % (ref 13–44)
LYMPHOCYTES NFR BLD: 4 % (ref 13–44)
LYMPHOCYTES NFR BLD: 5 % (ref 13–44)
LYMPHOCYTES NFR BLD: 6 % (ref 13–44)
LYMPHOCYTES NFR BLD: 6 % (ref 13–44)
LYMPHOCYTES NFR BLD: 7 % (ref 13–44)
MAGNESIUM SERPL-MCNC: 1.7 MG/DL (ref 1.8–2.4)
MAGNESIUM SERPL-MCNC: 1.9 MG/DL (ref 1.8–2.4)
MAGNESIUM SERPL-MCNC: 2 MG/DL (ref 1.8–2.4)
MAGNESIUM SERPL-MCNC: 2.1 MG/DL (ref 1.8–2.4)
MAGNESIUM SERPL-MCNC: 2.1 MG/DL (ref 1.8–2.4)
MAGNESIUM SERPL-MCNC: 2.2 MG/DL (ref 1.8–2.4)
MAGNESIUM SERPL-MCNC: 2.7 MG/DL (ref 1.8–2.4)
MCH RBC QN AUTO: 26.7 PG (ref 26.1–32.9)
MCH RBC QN AUTO: 27.4 PG (ref 26.1–32.9)
MCH RBC QN AUTO: 27.5 PG (ref 26.1–32.9)
MCH RBC QN AUTO: 27.7 PG (ref 26.1–32.9)
MCH RBC QN AUTO: 27.8 PG (ref 26.1–32.9)
MCH RBC QN AUTO: 27.8 PG (ref 26.1–32.9)
MCH RBC QN AUTO: 28 PG (ref 26.1–32.9)
MCH RBC QN AUTO: 28.1 PG (ref 26.1–32.9)
MCH RBC QN AUTO: 28.2 PG (ref 26.1–32.9)
MCH RBC QN AUTO: 28.4 PG (ref 26.1–32.9)
MCHC RBC AUTO-ENTMCNC: 30.3 G/DL (ref 31.4–35)
MCHC RBC AUTO-ENTMCNC: 30.5 G/DL (ref 31.4–35)
MCHC RBC AUTO-ENTMCNC: 30.9 G/DL (ref 31.4–35)
MCHC RBC AUTO-ENTMCNC: 30.9 G/DL (ref 31.4–35)
MCHC RBC AUTO-ENTMCNC: 31 G/DL (ref 31.4–35)
MCHC RBC AUTO-ENTMCNC: 31 G/DL (ref 31.4–35)
MCHC RBC AUTO-ENTMCNC: 31.3 G/DL (ref 31.4–35)
MCHC RBC AUTO-ENTMCNC: 31.4 G/DL (ref 31.4–35)
MCHC RBC AUTO-ENTMCNC: 31.5 G/DL (ref 31.4–35)
MCHC RBC AUTO-ENTMCNC: 31.5 G/DL (ref 31.4–35)
MCHC RBC AUTO-ENTMCNC: 32.5 G/DL (ref 31.4–35)
MCV RBC AUTO: 85.4 FL (ref 79.6–97.8)
MCV RBC AUTO: 87.1 FL (ref 79.6–97.8)
MCV RBC AUTO: 87.5 FL (ref 79.6–97.8)
MCV RBC AUTO: 87.7 FL (ref 79.6–97.8)
MCV RBC AUTO: 87.7 FL (ref 79.6–97.8)
MCV RBC AUTO: 87.8 FL (ref 79.6–97.8)
MCV RBC AUTO: 89.3 FL (ref 79.6–97.8)
MCV RBC AUTO: 89.5 FL (ref 79.6–97.8)
MCV RBC AUTO: 89.6 FL (ref 79.6–97.8)
MCV RBC AUTO: 90 FL (ref 79.6–97.8)
MCV RBC AUTO: 91 FL (ref 79.6–97.8)
MCV RBC AUTO: 91.1 FL (ref 79.6–97.8)
MCV RBC AUTO: 91.7 FL (ref 79.6–97.8)
MCV RBC AUTO: 91.8 FL (ref 79.6–97.8)
MITOCHONDRIA M2 IGG SER-ACNC: 157.3 UNITS (ref 0–20)
MM INDURATION POC: 0 MM (ref 0–5)
MONOCYTES # BLD: 0.6 K/UL (ref 0.1–1.3)
MONOCYTES # BLD: 0.7 K/UL (ref 0.1–1.3)
MONOCYTES # BLD: 0.7 K/UL (ref 0.1–1.3)
MONOCYTES # BLD: 0.8 K/UL (ref 0.1–1.3)
MONOCYTES # BLD: 0.9 K/UL (ref 0.1–1.3)
MONOCYTES # BLD: 0.9 K/UL (ref 0.1–1.3)
MONOCYTES # BLD: 1 K/UL (ref 0.1–1.3)
MONOCYTES # BLD: 1.2 K/UL (ref 0.1–1.3)
MONOCYTES # BLD: 1.3 K/UL (ref 0.1–1.3)
MONOCYTES # BLD: 1.9 K/UL (ref 0.1–1.3)
MONOCYTES NFR BLD: 15 % (ref 4–12)
MONOCYTES NFR BLD: 4 % (ref 4–12)
MONOCYTES NFR BLD: 6 % (ref 4–12)
MONOCYTES NFR BLD: 8 % (ref 4–12)
MONOCYTES NFR BLD: 9 % (ref 4–12)
MUCOUS THREADS URNS QL MICRO: 0 /LPF
MUCOUS THREADS URNS QL MICRO: 0 /LPF
NEUTS SEG # BLD: 10.6 K/UL (ref 1.7–8.2)
NEUTS SEG # BLD: 12 K/UL (ref 1.7–8.2)
NEUTS SEG # BLD: 14 K/UL (ref 1.7–8.2)
NEUTS SEG # BLD: 16 K/UL (ref 1.7–8.2)
NEUTS SEG # BLD: 16 K/UL (ref 1.7–8.2)
NEUTS SEG # BLD: 20.8 K/UL (ref 1.7–8.2)
NEUTS SEG # BLD: 3.5 K/UL (ref 1.7–8.2)
NEUTS SEG # BLD: 8.8 K/UL (ref 1.7–8.2)
NEUTS SEG # BLD: 8.9 K/UL (ref 1.7–8.2)
NEUTS SEG # BLD: 9.8 K/UL (ref 1.7–8.2)
NEUTS SEG NFR BLD: 73 % (ref 43–78)
NEUTS SEG NFR BLD: 84 % (ref 43–78)
NEUTS SEG NFR BLD: 84 % (ref 43–78)
NEUTS SEG NFR BLD: 86 % (ref 43–78)
NEUTS SEG NFR BLD: 87 % (ref 43–78)
NEUTS SEG NFR BLD: 88 % (ref 43–78)
NEUTS SEG NFR BLD: 90 % (ref 43–78)
NEUTS SEG NFR BLD: 92 % (ref 43–78)
NITRITE UR QL STRIP.AUTO: NEGATIVE
NITRITE UR QL STRIP.AUTO: NEGATIVE
NITRITE UR QL: NEGATIVE
NRBC # BLD: 0 K/UL (ref 0–0.2)
NRBC # BLD: 0.02 K/UL (ref 0–0.2)
NRBC # BLD: 0.02 K/UL (ref 0–0.2)
NRBC # BLD: 0.03 K/UL (ref 0–0.2)
NRBC # BLD: 0.04 K/UL (ref 0–0.2)
NRBC # BLD: 0.05 K/UL (ref 0–0.2)
OSMOLALITY UR: 406 MOSM/KG H2O (ref 50–1400)
OTHER OBSERVATIONS,UCOM: ABNORMAL
OTHER OBSERVATIONS,UCOM: NORMAL
OTHER OBSERVATIONS,UCOM: NORMAL
PATH REV BLD -IMP: NORMAL
PCO2 BLD: 44.8 MMHG (ref 35–45)
PCO2 BLD: 55.9 MMHG (ref 35–45)
PH BLD: 7.21 [PH] (ref 7.35–7.45)
PH BLD: 7.43 [PH] (ref 7.35–7.45)
PH UR STRIP: 5.5 [PH] (ref 5–9)
PH UR STRIP: 6 [PH] (ref 5–9)
PH UR: 5.5 [PH] (ref 5–9)
PHOSPHATE SERPL-MCNC: 2.7 MG/DL (ref 2.3–3.7)
PHOSPHATE SERPL-MCNC: 3.1 MG/DL (ref 2.3–3.7)
PHOSPHATE SERPL-MCNC: 3.3 MG/DL (ref 2.3–3.7)
PHOSPHATE SERPL-MCNC: 3.4 MG/DL (ref 2.3–3.7)
PHOSPHATE SERPL-MCNC: 4.2 MG/DL (ref 2.3–3.7)
PHOSPHATE SERPL-MCNC: 4.4 MG/DL (ref 2.3–3.7)
PLATELET # BLD AUTO: 101 K/UL (ref 150–450)
PLATELET # BLD AUTO: 107 K/UL (ref 150–450)
PLATELET # BLD AUTO: 110 K/UL (ref 150–450)
PLATELET # BLD AUTO: 118 K/UL (ref 150–450)
PLATELET # BLD AUTO: 126 K/UL (ref 150–450)
PLATELET # BLD AUTO: 129 K/UL (ref 150–450)
PLATELET # BLD AUTO: 130 K/UL (ref 150–450)
PLATELET # BLD AUTO: 137 K/UL (ref 150–450)
PLATELET # BLD AUTO: 144 K/UL (ref 150–450)
PLATELET # BLD AUTO: 61 K/UL (ref 150–450)
PLATELET # BLD AUTO: 69 K/UL (ref 150–450)
PLATELET # BLD AUTO: 93 K/UL (ref 150–450)
PLATELET # BLD AUTO: 95 K/UL (ref 150–450)
PLATELET # BLD AUTO: 95 K/UL (ref 150–450)
PLATELET COMMENTS,PCOM: ADEQUATE
PMV BLD AUTO: 10 FL (ref 9.4–12.3)
PMV BLD AUTO: 10 FL (ref 9.4–12.3)
PMV BLD AUTO: 10.1 FL (ref 9.4–12.3)
PMV BLD AUTO: 10.2 FL (ref 9.4–12.3)
PMV BLD AUTO: 10.2 FL (ref 9.4–12.3)
PMV BLD AUTO: 10.3 FL (ref 9.4–12.3)
PMV BLD AUTO: 10.3 FL (ref 9.4–12.3)
PMV BLD AUTO: 10.8 FL (ref 9.4–12.3)
PMV BLD AUTO: 11 FL (ref 9.4–12.3)
PMV BLD AUTO: 11.4 FL (ref 9.4–12.3)
PMV BLD AUTO: 9.3 FL (ref 9.4–12.3)
PMV BLD AUTO: 9.6 FL (ref 9.4–12.3)
PMV BLD AUTO: 9.8 FL (ref 9.4–12.3)
PMV BLD AUTO: 9.9 FL (ref 9.4–12.3)
PO2 BLD: 107 MMHG (ref 75–100)
PO2 BLD: 72 MMHG (ref 75–100)
POTASSIUM SERPL-SCNC: 2.9 MMOL/L (ref 3.5–5.1)
POTASSIUM SERPL-SCNC: 3 MMOL/L (ref 3.5–5.1)
POTASSIUM SERPL-SCNC: 3.2 MMOL/L (ref 3.5–5.1)
POTASSIUM SERPL-SCNC: 3.3 MMOL/L (ref 3.5–5.1)
POTASSIUM SERPL-SCNC: 3.8 MMOL/L (ref 3.5–5.1)
POTASSIUM SERPL-SCNC: 3.9 MMOL/L (ref 3.5–5.1)
POTASSIUM SERPL-SCNC: 3.9 MMOL/L (ref 3.5–5.1)
POTASSIUM SERPL-SCNC: 4 MMOL/L (ref 3.5–5.1)
POTASSIUM SERPL-SCNC: 4.3 MMOL/L (ref 3.5–5.1)
POTASSIUM SERPL-SCNC: 4.3 MMOL/L (ref 3.5–5.1)
POTASSIUM SERPL-SCNC: 4.7 MMOL/L (ref 3.5–5.1)
POTASSIUM SERPL-SCNC: ABNORMAL MMOL/L (ref 3.5–5.1)
PPD POC: NEGATIVE NEGATIVE
PROCALCITONIN SERPL-MCNC: 2.31 NG/ML (ref 0–0.49)
PROT SERPL-MCNC: 6.6 G/DL (ref 6.3–8.2)
PROT SERPL-MCNC: 6.8 G/DL (ref 6.3–8.2)
PROT SERPL-MCNC: 6.9 G/DL (ref 6.3–8.2)
PROT SERPL-MCNC: 7 G/DL (ref 6.3–8.2)
PROT SERPL-MCNC: 7.1 G/DL (ref 6.3–8.2)
PROT SERPL-MCNC: 7.2 G/DL (ref 6.3–8.2)
PROT SERPL-MCNC: 7.2 G/DL (ref 6.3–8.2)
PROT SERPL-MCNC: 7.4 G/DL (ref 6.3–8.2)
PROT SERPL-MCNC: 7.5 G/DL (ref 6.3–8.2)
PROT SERPL-MCNC: 7.6 G/DL (ref 6.3–8.2)
PROT UR QL: ABNORMAL MG/DL
PROT UR STRIP-MCNC: 30 MG/DL
PROT UR STRIP-MCNC: ABNORMAL MG/DL
PROT UR-MCNC: 85 MG/DL
PROT/CREAT UR-RTO: 0.8
PROTHROMBIN TIME: 13.6 SEC (ref 12.6–14.5)
PROTHROMBIN TIME: 15.4 SEC (ref 12.6–14.5)
PROTHROMBIN TIME: 15.9 SEC (ref 12.6–14.5)
PROTHROMBIN TIME: 16 SEC (ref 12.6–14.5)
PROTHROMBIN TIME: 16.2 SEC (ref 12.6–14.5)
RBC # BLD AUTO: 2.48 M/UL (ref 4.05–5.2)
RBC # BLD AUTO: 2.5 M/UL (ref 4.05–5.2)
RBC # BLD AUTO: 2.53 M/UL (ref 4.05–5.2)
RBC # BLD AUTO: 2.57 M/UL (ref 4.05–5.2)
RBC # BLD AUTO: 2.66 M/UL (ref 4.05–5.2)
RBC # BLD AUTO: 2.77 M/UL (ref 4.05–5.2)
RBC # BLD AUTO: 2.88 M/UL (ref 4.05–5.2)
RBC # BLD AUTO: 2.91 M/UL (ref 4.05–5.2)
RBC # BLD AUTO: 2.92 M/UL (ref 4.05–5.2)
RBC # BLD AUTO: 2.92 M/UL (ref 4.05–5.2)
RBC # BLD AUTO: 2.95 M/UL (ref 4.05–5.2)
RBC # BLD AUTO: 3.13 M/UL (ref 4.05–5.2)
RBC # BLD AUTO: 3.2 M/UL (ref 4.05–5.2)
RBC # BLD AUTO: 3.27 M/UL (ref 4.05–5.2)
RBC # UR STRIP: ABNORMAL /UL
RBC #/AREA URNS HPF: 0 /HPF
RBC #/AREA URNS HPF: ABNORMAL /HPF
RBC #/AREA URNS HPF: ABNORMAL /HPF
RBC MORPH BLD: ABNORMAL
RBC MORPH BLD: ABNORMAL
SAO2 % BLD: 90 % (ref 95–98)
SAO2 % BLD: 98.2 % (ref 95–98)
SERVICE CMNT-IMP: ABNORMAL
SERVICE CMNT-IMP: NORMAL
SMA IGG SER-ACNC: 36 UNITS (ref 0–19)
SODIUM SERPL-SCNC: 131 MMOL/L (ref 136–145)
SODIUM SERPL-SCNC: 133 MMOL/L (ref 136–145)
SODIUM SERPL-SCNC: 134 MMOL/L (ref 136–145)
SODIUM SERPL-SCNC: 135 MMOL/L (ref 136–145)
SODIUM SERPL-SCNC: 135 MMOL/L (ref 136–145)
SODIUM SERPL-SCNC: 136 MMOL/L (ref 136–145)
SODIUM SERPL-SCNC: 137 MMOL/L (ref 136–145)
SODIUM SERPL-SCNC: 138 MMOL/L (ref 136–145)
SODIUM SERPL-SCNC: 139 MMOL/L (ref 136–145)
SODIUM SERPL-SCNC: 139 MMOL/L (ref 136–145)
SODIUM UR-SCNC: 6 MMOL/L
SP GR UR REFRACTOMETRY: 1.01 (ref 1–1.02)
SP GR UR REFRACTOMETRY: 1.02 (ref 1–1.02)
SP GR UR: 1.02 (ref 1–1.02)
SPECIMEN EXP DATE BLD: NORMAL
SPECIMEN EXP DATE BLD: NORMAL
SPECIMEN TYPE: ABNORMAL
SPECIMEN TYPE: ABNORMAL
STATUS OF UNIT,%ST: NORMAL
TIBC SERPL-MCNC: 189 UG/DL (ref 250–450)
TIBC SERPL-MCNC: 217 UG/DL (ref 250–450)
TSH SERPL DL<=0.005 MIU/L-ACNC: 3.75 UIU/ML (ref 0.36–3.74)
UNIT DIVISION, %UDIV: 0
UROBILINOGEN UR QL STRIP.AUTO: 1 EU/DL (ref 0.2–1)
UROBILINOGEN UR QL STRIP.AUTO: 1 EU/DL (ref 0.2–1)
UROBILINOGEN UR QL: 1 EU/DL (ref 0.2–1)
WBC # BLD AUTO: 10.2 K/UL (ref 4.3–11.1)
WBC # BLD AUTO: 10.5 K/UL (ref 4.3–11.1)
WBC # BLD AUTO: 11.7 K/UL (ref 4.3–11.1)
WBC # BLD AUTO: 12.2 K/UL (ref 4.3–11.1)
WBC # BLD AUTO: 13.4 K/UL (ref 4.3–11.1)
WBC # BLD AUTO: 16.3 K/UL (ref 4.3–11.1)
WBC # BLD AUTO: 17.4 K/UL (ref 4.3–11.1)
WBC # BLD AUTO: 18.1 K/UL (ref 4.3–11.1)
WBC # BLD AUTO: 23.9 K/UL (ref 4.3–11.1)
WBC # BLD AUTO: 3.3 K/UL (ref 4.3–11.1)
WBC # BLD AUTO: 4.1 K/UL (ref 4.3–11.1)
WBC # BLD AUTO: 4.4 K/UL (ref 4.3–11.1)
WBC # BLD AUTO: 4.8 K/UL (ref 4.3–11.1)
WBC # BLD AUTO: 4.8 K/UL (ref 4.3–11.1)
WBC MORPH BLD: ABNORMAL
WBC URNS QL MICRO: ABNORMAL /HPF
WBC URNS QL MICRO: ABNORMAL /HPF
WBC URNS QL MICRO: NORMAL /HPF

## 2022-01-01 PROCEDURE — 82728 ASSAY OF FERRITIN: CPT

## 2022-01-01 PROCEDURE — 74011250636 HC RX REV CODE- 250/636: Performed by: NURSE PRACTITIONER

## 2022-01-01 PROCEDURE — 86038 ANTINUCLEAR ANTIBODIES: CPT

## 2022-01-01 PROCEDURE — 74011000250 HC RX REV CODE- 250: Performed by: EMERGENCY MEDICINE

## 2022-01-01 PROCEDURE — 84300 ASSAY OF URINE SODIUM: CPT

## 2022-01-01 PROCEDURE — 82962 GLUCOSE BLOOD TEST: CPT

## 2022-01-01 PROCEDURE — 74011636637 HC RX REV CODE- 636/637: Performed by: INTERNAL MEDICINE

## 2022-01-01 PROCEDURE — 74011250636 HC RX REV CODE- 250/636: Performed by: STUDENT IN AN ORGANIZED HEALTH CARE EDUCATION/TRAINING PROGRAM

## 2022-01-01 PROCEDURE — 76040000026: Performed by: INTERNAL MEDICINE

## 2022-01-01 PROCEDURE — 74011250636 HC RX REV CODE- 250/636: Performed by: INTERNAL MEDICINE

## 2022-01-01 PROCEDURE — 36415 COLL VENOUS BLD VENIPUNCTURE: CPT

## 2022-01-01 PROCEDURE — 77010033678 HC OXYGEN DAILY

## 2022-01-01 PROCEDURE — 85018 HEMOGLOBIN: CPT

## 2022-01-01 PROCEDURE — 80053 COMPREHEN METABOLIC PANEL: CPT

## 2022-01-01 PROCEDURE — 71045 X-RAY EXAM CHEST 1 VIEW: CPT

## 2022-01-01 PROCEDURE — 83690 ASSAY OF LIPASE: CPT

## 2022-01-01 PROCEDURE — 74011000250 HC RX REV CODE- 250: Performed by: STUDENT IN AN ORGANIZED HEALTH CARE EDUCATION/TRAINING PROGRAM

## 2022-01-01 PROCEDURE — 74011250637 HC RX REV CODE- 250/637: Performed by: NURSE PRACTITIONER

## 2022-01-01 PROCEDURE — 74011000250 HC RX REV CODE- 250: Performed by: HOSPITALIST

## 2022-01-01 PROCEDURE — 36600 WITHDRAWAL OF ARTERIAL BLOOD: CPT

## 2022-01-01 PROCEDURE — 74011636637 HC RX REV CODE- 636/637: Performed by: STUDENT IN AN ORGANIZED HEALTH CARE EDUCATION/TRAINING PROGRAM

## 2022-01-01 PROCEDURE — 96365 THER/PROPH/DIAG IV INF INIT: CPT

## 2022-01-01 PROCEDURE — 86022 PLATELET ANTIBODIES: CPT

## 2022-01-01 PROCEDURE — 97162 PT EVAL MOD COMPLEX 30 MIN: CPT

## 2022-01-01 PROCEDURE — 74011000258 HC RX REV CODE- 258: Performed by: STUDENT IN AN ORGANIZED HEALTH CARE EDUCATION/TRAINING PROGRAM

## 2022-01-01 PROCEDURE — 94640 AIRWAY INHALATION TREATMENT: CPT

## 2022-01-01 PROCEDURE — 82803 BLOOD GASES ANY COMBINATION: CPT

## 2022-01-01 PROCEDURE — 82565 ASSAY OF CREATININE: CPT

## 2022-01-01 PROCEDURE — 74011250637 HC RX REV CODE- 250/637: Performed by: INTERNAL MEDICINE

## 2022-01-01 PROCEDURE — 99232 SBSQ HOSP IP/OBS MODERATE 35: CPT | Performed by: INTERNAL MEDICINE

## 2022-01-01 PROCEDURE — 83735 ASSAY OF MAGNESIUM: CPT

## 2022-01-01 PROCEDURE — P9016 RBC LEUKOCYTES REDUCED: HCPCS

## 2022-01-01 PROCEDURE — 76775 US EXAM ABDO BACK WALL LIM: CPT

## 2022-01-01 PROCEDURE — 74011000250 HC RX REV CODE- 250: Performed by: INTERNAL MEDICINE

## 2022-01-01 PROCEDURE — 81015 MICROSCOPIC EXAM OF URINE: CPT

## 2022-01-01 PROCEDURE — 97168 OT RE-EVAL EST PLAN CARE: CPT

## 2022-01-01 PROCEDURE — 97535 SELF CARE MNGMENT TRAINING: CPT

## 2022-01-01 PROCEDURE — P9047 ALBUMIN (HUMAN), 25%, 50ML: HCPCS | Performed by: NURSE PRACTITIONER

## 2022-01-01 PROCEDURE — 85027 COMPLETE CBC AUTOMATED: CPT

## 2022-01-01 PROCEDURE — 74011250637 HC RX REV CODE- 250/637: Performed by: STUDENT IN AN ORGANIZED HEALTH CARE EDUCATION/TRAINING PROGRAM

## 2022-01-01 PROCEDURE — 2709999900 HC NON-CHARGEABLE SUPPLY

## 2022-01-01 PROCEDURE — 97164 PT RE-EVAL EST PLAN CARE: CPT

## 2022-01-01 PROCEDURE — HOSPICE MEDICATION HC HH HOSPICE MEDICATION

## 2022-01-01 PROCEDURE — 77030014008 HC SPNG HEMSTAT J&J -C

## 2022-01-01 PROCEDURE — 74011636637 HC RX REV CODE- 636/637: Performed by: NURSE PRACTITIONER

## 2022-01-01 PROCEDURE — 74011250636 HC RX REV CODE- 250/636: Performed by: EMERGENCY MEDICINE

## 2022-01-01 PROCEDURE — 74177 CT ABD & PELVIS W/CONTRAST: CPT

## 2022-01-01 PROCEDURE — 74011000258 HC RX REV CODE- 258: Performed by: INTERNAL MEDICINE

## 2022-01-01 PROCEDURE — 77030040393 HC DRSG OPTIFOAM GENT MDII -B

## 2022-01-01 PROCEDURE — 36430 TRANSFUSION BLD/BLD COMPNT: CPT

## 2022-01-01 PROCEDURE — 5A09457 ASSISTANCE WITH RESPIRATORY VENTILATION, 24-96 CONSECUTIVE HOURS, CONTINUOUS POSITIVE AIRWAY PRESSURE: ICD-10-PCS | Performed by: INTERNAL MEDICINE

## 2022-01-01 PROCEDURE — 94760 N-INVAS EAR/PLS OXIMETRY 1: CPT

## 2022-01-01 PROCEDURE — 83036 HEMOGLOBIN GLYCOSYLATED A1C: CPT

## 2022-01-01 PROCEDURE — 85025 COMPLETE CBC W/AUTO DIFF WBC: CPT

## 2022-01-01 PROCEDURE — 0651 HSPC ROUTINE HOME CARE

## 2022-01-01 PROCEDURE — 83880 ASSAY OF NATRIURETIC PEPTIDE: CPT

## 2022-01-01 PROCEDURE — 90945 DIALYSIS ONE EVALUATION: CPT

## 2022-01-01 PROCEDURE — C1894 INTRO/SHEATH, NON-LASER: HCPCS

## 2022-01-01 PROCEDURE — G0299 HHS/HOSPICE OF RN EA 15 MIN: HCPCS

## 2022-01-01 PROCEDURE — 97530 THERAPEUTIC ACTIVITIES: CPT

## 2022-01-01 PROCEDURE — 36558 INSERT TUNNELED CV CATH: CPT

## 2022-01-01 PROCEDURE — 83540 ASSAY OF IRON: CPT

## 2022-01-01 PROCEDURE — 65270000029 HC RM PRIVATE

## 2022-01-01 PROCEDURE — 99285 EMERGENCY DEPT VISIT HI MDM: CPT

## 2022-01-01 PROCEDURE — C9113 INJ PANTOPRAZOLE SODIUM, VIA: HCPCS | Performed by: STUDENT IN AN ORGANIZED HEALTH CARE EDUCATION/TRAINING PROGRAM

## 2022-01-01 PROCEDURE — 77030002916 HC SUT ETHLN J&J -A

## 2022-01-01 PROCEDURE — 84100 ASSAY OF PHOSPHORUS: CPT

## 2022-01-01 PROCEDURE — 85730 THROMBOPLASTIN TIME PARTIAL: CPT

## 2022-01-01 PROCEDURE — 65660000000 HC RM CCU STEPDOWN

## 2022-01-01 PROCEDURE — C1752 CATH,HEMODIALYSIS,SHORT-TERM: HCPCS

## 2022-01-01 PROCEDURE — 86900 BLOOD TYPING SEROLOGIC ABO: CPT

## 2022-01-01 PROCEDURE — 87088 URINE BACTERIA CULTURE: CPT

## 2022-01-01 PROCEDURE — 80076 HEPATIC FUNCTION PANEL: CPT

## 2022-01-01 PROCEDURE — 99233 SBSQ HOSP IP/OBS HIGH 50: CPT | Performed by: INTERNAL MEDICINE

## 2022-01-01 PROCEDURE — 65610000001 HC ROOM ICU GENERAL

## 2022-01-01 PROCEDURE — 94660 CPAP INITIATION&MGMT: CPT

## 2022-01-01 PROCEDURE — 86580 TB INTRADERMAL TEST: CPT | Performed by: INTERNAL MEDICINE

## 2022-01-01 PROCEDURE — 81003 URINALYSIS AUTO W/O SCOPE: CPT

## 2022-01-01 PROCEDURE — 51798 US URINE CAPACITY MEASURE: CPT

## 2022-01-01 PROCEDURE — 83605 ASSAY OF LACTIC ACID: CPT

## 2022-01-01 PROCEDURE — 74011250636 HC RX REV CODE- 250/636: Performed by: REGISTERED NURSE

## 2022-01-01 PROCEDURE — 77030040361 HC SLV COMPR DVT MDII -B

## 2022-01-01 PROCEDURE — 86376 MICROSOMAL ANTIBODY EACH: CPT

## 2022-01-01 PROCEDURE — 74011000250 HC RX REV CODE- 250: Performed by: REGISTERED NURSE

## 2022-01-01 PROCEDURE — 86381 MITOCHONDRIAL ANTIBODY EACH: CPT

## 2022-01-01 PROCEDURE — 30233N1 TRANSFUSION OF NONAUTOLOGOUS RED BLOOD CELLS INTO PERIPHERAL VEIN, PERCUTANEOUS APPROACH: ICD-10-PCS | Performed by: INTERNAL MEDICINE

## 2022-01-01 PROCEDURE — G0156 HHCP-SVS OF AIDE,EA 15 MIN: HCPCS

## 2022-01-01 PROCEDURE — 82784 ASSAY IGA/IGD/IGG/IGM EACH: CPT

## 2022-01-01 PROCEDURE — 87389 HIV-1 AG W/HIV-1&-2 AB AG IA: CPT

## 2022-01-01 PROCEDURE — 3336500001 HSPC ELECTION

## 2022-01-01 PROCEDURE — 74011250636 HC RX REV CODE- 250/636: Performed by: FAMILY MEDICINE

## 2022-01-01 PROCEDURE — 99497 ADVNCD CARE PLAN 30 MIN: CPT | Performed by: INTERNAL MEDICINE

## 2022-01-01 PROCEDURE — 76937 US GUIDE VASCULAR ACCESS: CPT

## 2022-01-01 PROCEDURE — 97166 OT EVAL MOD COMPLEX 45 MIN: CPT

## 2022-01-01 PROCEDURE — 84145 PROCALCITONIN (PCT): CPT

## 2022-01-01 PROCEDURE — 87186 SC STD MICRODIL/AGAR DIL: CPT

## 2022-01-01 PROCEDURE — 80048 BASIC METABOLIC PNL TOTAL CA: CPT

## 2022-01-01 PROCEDURE — 84156 ASSAY OF PROTEIN URINE: CPT

## 2022-01-01 PROCEDURE — 94664 DEMO&/EVAL PT USE INHALER: CPT

## 2022-01-01 PROCEDURE — 74011250637 HC RX REV CODE- 250/637: Performed by: EMERGENCY MEDICINE

## 2022-01-01 PROCEDURE — G0155 HHCP-SVS OF CSW,EA 15 MIN: HCPCS

## 2022-01-01 PROCEDURE — 99223 1ST HOSP IP/OBS HIGH 75: CPT | Performed by: INTERNAL MEDICINE

## 2022-01-01 PROCEDURE — 97112 NEUROMUSCULAR REEDUCATION: CPT

## 2022-01-01 PROCEDURE — 85610 PROTHROMBIN TIME: CPT

## 2022-01-01 PROCEDURE — 77030018719 HC DRSG PTCH ANTIMIC J&J -A

## 2022-01-01 PROCEDURE — 97165 OT EVAL LOW COMPLEX 30 MIN: CPT

## 2022-01-01 PROCEDURE — 74011000636 HC RX REV CODE- 636: Performed by: STUDENT IN AN ORGANIZED HEALTH CARE EDUCATION/TRAINING PROGRAM

## 2022-01-01 PROCEDURE — 80074 ACUTE HEPATITIS PANEL: CPT

## 2022-01-01 PROCEDURE — 3331090004 HSPC SERVICE INTENSITY ADD-ON

## 2022-01-01 PROCEDURE — 87086 URINE CULTURE/COLONY COUNT: CPT

## 2022-01-01 PROCEDURE — 82436 ASSAY OF URINE CHLORIDE: CPT

## 2022-01-01 PROCEDURE — 2709999900 HC NON-CHARGEABLE SUPPLY: Performed by: INTERNAL MEDICINE

## 2022-01-01 PROCEDURE — 85384 FIBRINOGEN ACTIVITY: CPT

## 2022-01-01 PROCEDURE — 93306 TTE W/DOPPLER COMPLETE: CPT

## 2022-01-01 PROCEDURE — 05HM33Z INSERTION OF INFUSION DEVICE INTO RIGHT INTERNAL JUGULAR VEIN, PERCUTANEOUS APPROACH: ICD-10-PCS | Performed by: RADIOLOGY

## 2022-01-01 PROCEDURE — 86015 ACTIN ANTIBODY EACH: CPT

## 2022-01-01 PROCEDURE — 0DJ08ZZ INSPECTION OF UPPER INTESTINAL TRACT, VIA NATURAL OR ARTIFICIAL OPENING ENDOSCOPIC: ICD-10-PCS | Performed by: INTERNAL MEDICINE

## 2022-01-01 PROCEDURE — 93005 ELECTROCARDIOGRAM TRACING: CPT | Performed by: EMERGENCY MEDICINE

## 2022-01-01 PROCEDURE — 76450000000

## 2022-01-01 PROCEDURE — 86923 COMPATIBILITY TEST ELECTRIC: CPT

## 2022-01-01 PROCEDURE — 70450 CT HEAD/BRAIN W/O DYE: CPT

## 2022-01-01 PROCEDURE — 84443 ASSAY THYROID STIM HORMONE: CPT

## 2022-01-01 PROCEDURE — 0DJD8ZZ INSPECTION OF LOWER INTESTINAL TRACT, VIA NATURAL OR ARTIFICIAL OPENING ENDOSCOPIC: ICD-10-PCS | Performed by: INTERNAL MEDICINE

## 2022-01-01 PROCEDURE — 71046 X-RAY EXAM CHEST 2 VIEWS: CPT

## 2022-01-01 PROCEDURE — 83935 ASSAY OF URINE OSMOLALITY: CPT

## 2022-01-01 PROCEDURE — 87040 BLOOD CULTURE FOR BACTERIA: CPT

## 2022-01-01 PROCEDURE — 76770 US EXAM ABDO BACK WALL COMP: CPT

## 2022-01-01 PROCEDURE — 76060000032 HC ANESTHESIA 0.5 TO 1 HR: Performed by: INTERNAL MEDICINE

## 2022-01-01 PROCEDURE — 74011000258 HC RX REV CODE- 258: Performed by: EMERGENCY MEDICINE

## 2022-01-01 PROCEDURE — 86140 C-REACTIVE PROTEIN: CPT

## 2022-01-01 RX ORDER — SODIUM CHLORIDE 0.9 % (FLUSH) 0.9 %
5-40 SYRINGE (ML) INJECTION AS NEEDED
Status: DISCONTINUED | OUTPATIENT
Start: 2022-01-01 | End: 2022-01-01 | Stop reason: HOSPADM

## 2022-01-01 RX ORDER — AMOXICILLIN 250 MG
2 CAPSULE ORAL
Status: DISCONTINUED | OUTPATIENT
Start: 2022-01-01 | End: 2022-01-01 | Stop reason: HOSPADM

## 2022-01-01 RX ORDER — DULOXETIN HYDROCHLORIDE 30 MG/1
30 CAPSULE, DELAYED RELEASE ORAL DAILY
Status: DISCONTINUED | OUTPATIENT
Start: 2022-01-01 | End: 2022-01-01

## 2022-01-01 RX ORDER — ACETAMINOPHEN 325 MG/1
650 TABLET ORAL
Status: DISCONTINUED | OUTPATIENT
Start: 2022-01-01 | End: 2022-01-01 | Stop reason: HOSPADM

## 2022-01-01 RX ORDER — SODIUM CHLORIDE 0.9 % (FLUSH) 0.9 %
5-40 SYRINGE (ML) INJECTION EVERY 8 HOURS
Status: DISCONTINUED | OUTPATIENT
Start: 2022-01-01 | End: 2022-01-01 | Stop reason: HOSPADM

## 2022-01-01 RX ORDER — ALBUTEROL SULFATE 0.83 MG/ML
2.5 SOLUTION RESPIRATORY (INHALATION)
Status: DISCONTINUED | OUTPATIENT
Start: 2022-01-01 | End: 2022-01-01 | Stop reason: HOSPADM

## 2022-01-01 RX ORDER — URSODIOL 300 MG/1
900 CAPSULE ORAL 2 TIMES DAILY WITH MEALS
Status: DISCONTINUED | OUTPATIENT
Start: 2022-01-01 | End: 2022-01-01

## 2022-01-01 RX ORDER — POTASSIUM CHLORIDE 20 MEQ/1
40 TABLET, EXTENDED RELEASE ORAL 2 TIMES DAILY
Status: COMPLETED | OUTPATIENT
Start: 2022-01-01 | End: 2022-01-01

## 2022-01-01 RX ORDER — SODIUM CHLORIDE 0.9 % (FLUSH) 0.9 %
5-10 SYRINGE (ML) INJECTION AS NEEDED
Status: DISCONTINUED | OUTPATIENT
Start: 2022-01-01 | End: 2022-01-01 | Stop reason: HOSPADM

## 2022-01-01 RX ORDER — MIDODRINE HYDROCHLORIDE 5 MG/1
5 TABLET ORAL
Status: DISCONTINUED | OUTPATIENT
Start: 2022-01-01 | End: 2022-01-01

## 2022-01-01 RX ORDER — SODIUM CHLORIDE 9 MG/ML
250 INJECTION, SOLUTION INTRAVENOUS AS NEEDED
Status: DISCONTINUED | OUTPATIENT
Start: 2022-01-01 | End: 2022-01-01 | Stop reason: HOSPADM

## 2022-01-01 RX ORDER — PROPOFOL 10 MG/ML
INJECTION, EMULSION INTRAVENOUS AS NEEDED
Status: DISCONTINUED | OUTPATIENT
Start: 2022-01-01 | End: 2022-01-01 | Stop reason: HOSPADM

## 2022-01-01 RX ORDER — MAGNESIUM SULFATE HEPTAHYDRATE 40 MG/ML
2 INJECTION, SOLUTION INTRAVENOUS 2 TIMES DAILY
Status: COMPLETED | OUTPATIENT
Start: 2022-01-01 | End: 2022-01-01

## 2022-01-01 RX ORDER — SUCRALFATE 1 G/1
1 TABLET ORAL
Status: DISCONTINUED | OUTPATIENT
Start: 2022-01-01 | End: 2022-01-01 | Stop reason: HOSPADM

## 2022-01-01 RX ORDER — HYDRALAZINE HYDROCHLORIDE 20 MG/ML
20 INJECTION INTRAMUSCULAR; INTRAVENOUS
Status: DISCONTINUED | OUTPATIENT
Start: 2022-01-01 | End: 2022-01-01 | Stop reason: HOSPADM

## 2022-01-01 RX ORDER — SODIUM CHLORIDE 9 MG/ML
75 INJECTION, SOLUTION INTRAVENOUS CONTINUOUS
Status: DISPENSED | OUTPATIENT
Start: 2022-01-01 | End: 2022-01-01

## 2022-01-01 RX ORDER — FACIAL-BODY WIPES
10 EACH TOPICAL DAILY PRN
Status: DISCONTINUED | OUTPATIENT
Start: 2022-01-01 | End: 2022-01-01 | Stop reason: HOSPADM

## 2022-01-01 RX ORDER — METOPROLOL TARTRATE 25 MG/1
12.5 TABLET, FILM COATED ORAL 2 TIMES DAILY
Status: DISCONTINUED | OUTPATIENT
Start: 2022-01-01 | End: 2022-01-01 | Stop reason: HOSPADM

## 2022-01-01 RX ORDER — POLYETHYLENE GLYCOL 3350 17 G/17G
238 POWDER, FOR SOLUTION ORAL ONCE
Status: COMPLETED | OUTPATIENT
Start: 2022-01-01 | End: 2022-01-01

## 2022-01-01 RX ORDER — SODIUM CHLORIDE 9 MG/ML
250 INJECTION, SOLUTION INTRAVENOUS AS NEEDED
Status: DISCONTINUED | OUTPATIENT
Start: 2022-01-01 | End: 2022-01-01

## 2022-01-01 RX ORDER — HYDROMORPHONE HYDROCHLORIDE 1 MG/ML
0.5 INJECTION, SOLUTION INTRAMUSCULAR; INTRAVENOUS; SUBCUTANEOUS
Status: DISCONTINUED | OUTPATIENT
Start: 2022-01-01 | End: 2022-01-01 | Stop reason: HOSPADM

## 2022-01-01 RX ORDER — ASPIRIN 81 MG/1
81 TABLET ORAL DAILY
Status: DISCONTINUED | OUTPATIENT
Start: 2022-01-01 | End: 2022-01-01 | Stop reason: HOSPADM

## 2022-01-01 RX ORDER — SODIUM CHLORIDE 0.9 % (FLUSH) 0.9 %
5-10 SYRINGE (ML) INJECTION EVERY 8 HOURS
Status: DISCONTINUED | OUTPATIENT
Start: 2022-01-01 | End: 2022-01-01 | Stop reason: HOSPADM

## 2022-01-01 RX ORDER — FERROUS GLUCONATE 324(38)MG
1 TABLET ORAL
Status: DISCONTINUED | OUTPATIENT
Start: 2022-01-01 | End: 2022-01-01 | Stop reason: HOSPADM

## 2022-01-01 RX ORDER — ONDANSETRON 4 MG/1
8 TABLET, ORALLY DISINTEGRATING ORAL
Status: DISCONTINUED | OUTPATIENT
Start: 2022-01-01 | End: 2022-01-01 | Stop reason: HOSPADM

## 2022-01-01 RX ORDER — SODIUM CHLORIDE 9 MG/ML
100 INJECTION, SOLUTION INTRAVENOUS CONTINUOUS
Status: DISPENSED | OUTPATIENT
Start: 2022-01-01 | End: 2022-01-01

## 2022-01-01 RX ORDER — SODIUM CHLORIDE FOR INHALATION 3 %
4 VIAL, NEBULIZER (ML) INHALATION
Status: DISCONTINUED | OUTPATIENT
Start: 2022-01-01 | End: 2022-01-01

## 2022-01-01 RX ORDER — LORAZEPAM 2 MG/ML
1 INJECTION INTRAMUSCULAR
Status: DISCONTINUED | OUTPATIENT
Start: 2022-01-01 | End: 2022-01-01 | Stop reason: HOSPADM

## 2022-01-01 RX ORDER — INSULIN LISPRO 100 [IU]/ML
INJECTION, SOLUTION INTRAVENOUS; SUBCUTANEOUS
Status: DISCONTINUED | OUTPATIENT
Start: 2022-01-01 | End: 2022-01-01 | Stop reason: HOSPADM

## 2022-01-01 RX ORDER — FAMOTIDINE 20 MG/1
20 TABLET, FILM COATED ORAL DAILY PRN
Status: DISCONTINUED | OUTPATIENT
Start: 2022-01-01 | End: 2022-01-01 | Stop reason: HOSPADM

## 2022-01-01 RX ORDER — GUAIFENESIN 600 MG/1
1200 TABLET, EXTENDED RELEASE ORAL 2 TIMES DAILY
Status: DISCONTINUED | OUTPATIENT
Start: 2022-01-01 | End: 2022-01-01 | Stop reason: HOSPADM

## 2022-01-01 RX ORDER — SODIUM CHLORIDE, SODIUM LACTATE, POTASSIUM CHLORIDE, CALCIUM CHLORIDE 600; 310; 30; 20 MG/100ML; MG/100ML; MG/100ML; MG/100ML
100 INJECTION, SOLUTION INTRAVENOUS CONTINUOUS
Status: DISCONTINUED | OUTPATIENT
Start: 2022-01-01 | End: 2022-01-01

## 2022-01-01 RX ORDER — FERROUS SULFATE 325(65) MG
325 TABLET, DELAYED RELEASE (ENTERIC COATED) ORAL
Qty: 30 TABLET | Refills: 2 | Status: SHIPPED | OUTPATIENT
Start: 2022-01-01 | End: 2022-01-01

## 2022-01-01 RX ORDER — CHOLECALCIFEROL (VITAMIN D3) 125 MCG
5 CAPSULE ORAL
Status: DISCONTINUED | OUTPATIENT
Start: 2022-01-01 | End: 2022-01-01 | Stop reason: HOSPADM

## 2022-01-01 RX ORDER — ALBUMIN HUMAN 250 G/1000ML
12.5 SOLUTION INTRAVENOUS EVERY 6 HOURS
Status: COMPLETED | OUTPATIENT
Start: 2022-01-01 | End: 2022-01-01

## 2022-01-01 RX ORDER — DULOXETIN HYDROCHLORIDE 30 MG/1
30 CAPSULE, DELAYED RELEASE ORAL DAILY
Status: DISCONTINUED | OUTPATIENT
Start: 2022-01-01 | End: 2022-01-01 | Stop reason: HOSPADM

## 2022-01-01 RX ORDER — ALBUTEROL SULFATE 0.83 MG/ML
2.5 SOLUTION RESPIRATORY (INHALATION)
Status: DISCONTINUED | OUTPATIENT
Start: 2022-01-01 | End: 2022-01-01

## 2022-01-01 RX ORDER — ACETAMINOPHEN 650 MG/1
650 SUPPOSITORY RECTAL
Status: DISCONTINUED | OUTPATIENT
Start: 2022-01-01 | End: 2022-01-01 | Stop reason: HOSPADM

## 2022-01-01 RX ORDER — OCTREOTIDE ACETATE 100 UG/ML
100 INJECTION, SOLUTION INTRAVENOUS; SUBCUTANEOUS 3 TIMES DAILY
Status: DISCONTINUED | OUTPATIENT
Start: 2022-01-01 | End: 2022-01-01

## 2022-01-01 RX ORDER — ALPRAZOLAM 0.5 MG/1
0.5 TABLET ORAL ONCE
Status: ACTIVE | OUTPATIENT
Start: 2022-01-01 | End: 2022-01-01

## 2022-01-01 RX ORDER — SUCRALFATE 1 G/1
1 TABLET ORAL
Status: DISCONTINUED | OUTPATIENT
Start: 2022-01-01 | End: 2022-01-01

## 2022-01-01 RX ORDER — PROPOFOL 10 MG/ML
INJECTION, EMULSION INTRAVENOUS
Status: DISCONTINUED | OUTPATIENT
Start: 2022-01-01 | End: 2022-01-01 | Stop reason: HOSPADM

## 2022-01-01 RX ORDER — METOPROLOL TARTRATE 25 MG/1
12.5 TABLET, FILM COATED ORAL 2 TIMES DAILY
Status: DISCONTINUED | OUTPATIENT
Start: 2022-01-01 | End: 2022-01-01

## 2022-01-01 RX ORDER — FUROSEMIDE 40 MG/1
40 TABLET ORAL DAILY
Status: DISCONTINUED | OUTPATIENT
Start: 2022-01-01 | End: 2022-01-01

## 2022-01-01 RX ORDER — OXYCODONE HYDROCHLORIDE 5 MG/1
5 TABLET ORAL
Status: DISCONTINUED | OUTPATIENT
Start: 2022-01-01 | End: 2022-01-01 | Stop reason: HOSPADM

## 2022-01-01 RX ORDER — MIDODRINE HYDROCHLORIDE 5 MG/1
10 TABLET ORAL
Status: DISCONTINUED | OUTPATIENT
Start: 2022-01-01 | End: 2022-01-01

## 2022-01-01 RX ORDER — FUROSEMIDE 10 MG/ML
40 INJECTION INTRAMUSCULAR; INTRAVENOUS ONCE
Status: COMPLETED | OUTPATIENT
Start: 2022-01-01 | End: 2022-01-01

## 2022-01-01 RX ORDER — OXYCODONE HYDROCHLORIDE 5 MG/1
5 TABLET ORAL
Qty: 18 TABLET | Refills: 0 | Status: SHIPPED | OUTPATIENT
Start: 2022-01-01 | End: 2022-01-01

## 2022-01-01 RX ORDER — SODIUM CHLORIDE 0.9 % (FLUSH) 0.9 %
10 SYRINGE (ML) INJECTION
Status: COMPLETED | OUTPATIENT
Start: 2022-01-01 | End: 2022-01-01

## 2022-01-01 RX ORDER — GUAIFENESIN/DEXTROMETHORPHAN 100-10MG/5
10 SYRUP ORAL
Status: DISCONTINUED | OUTPATIENT
Start: 2022-01-01 | End: 2022-01-01 | Stop reason: HOSPADM

## 2022-01-01 RX ORDER — LANOLIN ALCOHOL/MO/W.PET/CERES
1 CREAM (GRAM) TOPICAL 2 TIMES DAILY WITH MEALS
Status: DISCONTINUED | OUTPATIENT
Start: 2022-01-01 | End: 2022-01-01

## 2022-01-01 RX ORDER — PANTOPRAZOLE SODIUM 40 MG/1
40 TABLET, DELAYED RELEASE ORAL
Status: DISCONTINUED | OUTPATIENT
Start: 2022-01-01 | End: 2022-01-01 | Stop reason: HOSPADM

## 2022-01-01 RX ORDER — HYDROXYZINE PAMOATE 25 MG/1
25 CAPSULE ORAL
Status: DISCONTINUED | OUTPATIENT
Start: 2022-01-01 | End: 2022-01-01 | Stop reason: HOSPADM

## 2022-01-01 RX ORDER — LIDOCAINE HYDROCHLORIDE 20 MG/ML
INJECTION, SOLUTION EPIDURAL; INFILTRATION; INTRACAUDAL; PERINEURAL AS NEEDED
Status: DISCONTINUED | OUTPATIENT
Start: 2022-01-01 | End: 2022-01-01 | Stop reason: HOSPADM

## 2022-01-01 RX ORDER — PANTOPRAZOLE SODIUM 40 MG/1
40 TABLET, DELAYED RELEASE ORAL
Status: DISCONTINUED | OUTPATIENT
Start: 2022-01-01 | End: 2022-01-01

## 2022-01-01 RX ORDER — FUROSEMIDE 10 MG/ML
40 INJECTION INTRAMUSCULAR; INTRAVENOUS 2 TIMES DAILY
Status: DISCONTINUED | OUTPATIENT
Start: 2022-01-01 | End: 2022-01-01

## 2022-01-01 RX ORDER — PANTOPRAZOLE SODIUM 40 MG/1
40 TABLET, DELAYED RELEASE ORAL
Qty: 90 TABLET | Refills: 1 | Status: SHIPPED | OUTPATIENT
Start: 2022-01-01 | End: 2022-01-01

## 2022-01-01 RX ORDER — ONDANSETRON 2 MG/ML
4 INJECTION INTRAMUSCULAR; INTRAVENOUS
Status: DISCONTINUED | OUTPATIENT
Start: 2022-01-01 | End: 2022-01-01 | Stop reason: HOSPADM

## 2022-01-01 RX ORDER — SODIUM CHLORIDE FOR INHALATION 3 %
4 VIAL, NEBULIZER (ML) INHALATION 2 TIMES DAILY
Status: DISCONTINUED | OUTPATIENT
Start: 2022-01-01 | End: 2022-01-01

## 2022-01-01 RX ORDER — POLYETHYLENE GLYCOL 3350 17 G/17G
17 POWDER, FOR SOLUTION ORAL DAILY PRN
Status: DISCONTINUED | OUTPATIENT
Start: 2022-01-01 | End: 2022-01-01 | Stop reason: HOSPADM

## 2022-01-01 RX ORDER — SODIUM CHLORIDE 9 MG/ML
75 INJECTION, SOLUTION INTRAVENOUS CONTINUOUS
Status: DISCONTINUED | OUTPATIENT
Start: 2022-01-01 | End: 2022-01-01

## 2022-01-01 RX ORDER — ONDANSETRON 4 MG/1
4 TABLET, ORALLY DISINTEGRATING ORAL
Status: CANCELLED | OUTPATIENT
Start: 2022-01-01

## 2022-01-01 RX ORDER — LORAZEPAM 0.5 MG/1
0.5 TABLET ORAL
Status: DISCONTINUED | OUTPATIENT
Start: 2022-01-01 | End: 2022-01-01 | Stop reason: HOSPADM

## 2022-01-01 RX ORDER — ATORVASTATIN CALCIUM 40 MG/1
80 TABLET, FILM COATED ORAL
Status: DISCONTINUED | OUTPATIENT
Start: 2022-01-01 | End: 2022-01-01

## 2022-01-01 RX ORDER — FAMOTIDINE 20 MG/1
20 TABLET, FILM COATED ORAL
Status: DISCONTINUED | OUTPATIENT
Start: 2022-01-01 | End: 2022-01-01

## 2022-01-01 RX ORDER — NYSTATIN 100000 [USP'U]/G
POWDER TOPICAL 2 TIMES DAILY
Status: DISCONTINUED | OUTPATIENT
Start: 2022-01-01 | End: 2022-01-01 | Stop reason: HOSPADM

## 2022-01-01 RX ORDER — BUDESONIDE 0.5 MG/2ML
500 INHALANT ORAL
Status: DISCONTINUED | OUTPATIENT
Start: 2022-01-01 | End: 2022-01-01

## 2022-01-01 RX ORDER — METOPROLOL TARTRATE 5 MG/5ML
5 INJECTION INTRAVENOUS ONCE
Status: COMPLETED | OUTPATIENT
Start: 2022-01-01 | End: 2022-01-01

## 2022-01-01 RX ORDER — ASPIRIN 81 MG/1
81 TABLET ORAL DAILY
Status: DISCONTINUED | OUTPATIENT
Start: 2022-01-01 | End: 2022-01-01

## 2022-01-01 RX ORDER — ATORVASTATIN CALCIUM 40 MG/1
80 TABLET, FILM COATED ORAL
Status: DISCONTINUED | OUTPATIENT
Start: 2022-01-01 | End: 2022-01-01 | Stop reason: HOSPADM

## 2022-01-01 RX ORDER — FLUTICASONE PROPIONATE 50 MCG
2 SPRAY, SUSPENSION (ML) NASAL DAILY
Status: DISCONTINUED | OUTPATIENT
Start: 2022-01-01 | End: 2022-01-01 | Stop reason: HOSPADM

## 2022-01-01 RX ORDER — ONDANSETRON 2 MG/ML
4 INJECTION INTRAMUSCULAR; INTRAVENOUS
Status: CANCELLED | OUTPATIENT
Start: 2022-01-01

## 2022-01-01 RX ORDER — POTASSIUM CHLORIDE 20 MEQ/1
40 TABLET, EXTENDED RELEASE ORAL
Status: COMPLETED | OUTPATIENT
Start: 2022-01-01 | End: 2022-01-01

## 2022-01-01 RX ORDER — LIDOCAINE HYDROCHLORIDE 20 MG/ML
20-300 INJECTION, SOLUTION INFILTRATION; PERINEURAL
Status: DISCONTINUED | OUTPATIENT
Start: 2022-01-01 | End: 2022-01-01 | Stop reason: HOSPADM

## 2022-01-01 RX ORDER — MAG HYDROX/ALUMINUM HYD/SIMETH 200-200-20
15 SUSPENSION, ORAL (FINAL DOSE FORM) ORAL
Status: DISCONTINUED | OUTPATIENT
Start: 2022-01-01 | End: 2022-01-01 | Stop reason: HOSPADM

## 2022-01-01 RX ORDER — HEPARIN SODIUM 1000 [USP'U]/ML
5000 INJECTION, SOLUTION INTRAVENOUS; SUBCUTANEOUS
Status: DISCONTINUED | OUTPATIENT
Start: 2022-01-01 | End: 2022-01-01 | Stop reason: HOSPADM

## 2022-01-01 RX ORDER — ONDANSETRON 4 MG/1
4 TABLET, ORALLY DISINTEGRATING ORAL
Qty: 8 TABLET | Refills: 0 | Status: SHIPPED | OUTPATIENT
Start: 2022-01-01 | End: 2022-01-01

## 2022-01-01 RX ORDER — IPRATROPIUM BROMIDE AND ALBUTEROL SULFATE 2.5; .5 MG/3ML; MG/3ML
3 SOLUTION RESPIRATORY (INHALATION)
Status: ACTIVE | OUTPATIENT
Start: 2022-01-01 | End: 2022-01-01

## 2022-01-01 RX ORDER — SODIUM CHLORIDE 9 MG/ML
500 INJECTION, SOLUTION INTRAVENOUS ONCE
Status: COMPLETED | OUTPATIENT
Start: 2022-01-01 | End: 2022-01-01

## 2022-01-01 RX ORDER — INSULIN LISPRO 100 [IU]/ML
INJECTION, SOLUTION INTRAVENOUS; SUBCUTANEOUS
Status: DISCONTINUED | OUTPATIENT
Start: 2022-01-01 | End: 2022-01-01

## 2022-01-01 RX ORDER — SODIUM CHLORIDE 9 MG/ML
250 INJECTION, SOLUTION INTRAVENOUS CONTINUOUS
Status: DISCONTINUED | OUTPATIENT
Start: 2022-01-01 | End: 2022-01-01

## 2022-01-01 RX ADMIN — HEPARIN SODIUM 5000 UNITS: 1000 INJECTION, SOLUTION INTRAVENOUS; SUBCUTANEOUS at 16:41

## 2022-01-01 RX ADMIN — ONDANSETRON 8 MG: 4 TABLET, ORALLY DISINTEGRATING ORAL at 13:46

## 2022-01-01 RX ADMIN — SUCRALFATE 1 G: 1 TABLET ORAL at 05:42

## 2022-01-01 RX ADMIN — FUROSEMIDE 40 MG: 10 INJECTION, SOLUTION INTRAMUSCULAR; INTRAVENOUS at 07:46

## 2022-01-01 RX ADMIN — DULOXETINE HYDROCHLORIDE 30 MG: 30 CAPSULE, DELAYED RELEASE ORAL at 08:40

## 2022-01-01 RX ADMIN — SODIUM CHLORIDE 40 MG: 9 INJECTION INTRAMUSCULAR; INTRAVENOUS; SUBCUTANEOUS at 08:55

## 2022-01-01 RX ADMIN — ATORVASTATIN CALCIUM 80 MG: 40 TABLET, FILM COATED ORAL at 21:49

## 2022-01-01 RX ADMIN — SODIUM CHLORIDE, PRESERVATIVE FREE 10 ML: 5 INJECTION INTRAVENOUS at 05:42

## 2022-01-01 RX ADMIN — ALBUTEROL SULFATE 2.5 MG: 2.5 SOLUTION RESPIRATORY (INHALATION) at 08:59

## 2022-01-01 RX ADMIN — SODIUM CHLORIDE, PRESERVATIVE FREE 10 ML: 5 INJECTION INTRAVENOUS at 14:28

## 2022-01-01 RX ADMIN — ALBUMIN (HUMAN) 12.5 G: 0.25 INJECTION, SOLUTION INTRAVENOUS at 13:36

## 2022-01-01 RX ADMIN — GUAIFENESIN 1200 MG: 600 TABLET ORAL at 17:06

## 2022-01-01 RX ADMIN — Medication 10 ML: at 14:32

## 2022-01-01 RX ADMIN — SUCRALFATE 1 G: 1 TABLET ORAL at 17:00

## 2022-01-01 RX ADMIN — SUCRALFATE 1 G: 1 TABLET ORAL at 06:42

## 2022-01-01 RX ADMIN — URSODIOL 900 MG: 300 CAPSULE ORAL at 17:26

## 2022-01-01 RX ADMIN — ATORVASTATIN CALCIUM 80 MG: 40 TABLET, FILM COATED ORAL at 21:02

## 2022-01-01 RX ADMIN — ATORVASTATIN CALCIUM 80 MG: 40 TABLET, FILM COATED ORAL at 21:59

## 2022-01-01 RX ADMIN — NYSTATIN: 100000 POWDER TOPICAL at 08:47

## 2022-01-01 RX ADMIN — PANTOPRAZOLE SODIUM 40 MG: 40 TABLET, DELAYED RELEASE ORAL at 17:59

## 2022-01-01 RX ADMIN — FLUTICASONE PROPIONATE 2 SPRAY: 50 SPRAY, METERED NASAL at 09:00

## 2022-01-01 RX ADMIN — MAGNESIUM SULFATE HEPTAHYDRATE 2 G: 40 INJECTION, SOLUTION INTRAVENOUS at 17:36

## 2022-01-01 RX ADMIN — TICAGRELOR 90 MG: 90 TABLET ORAL at 09:19

## 2022-01-01 RX ADMIN — METHYLPREDNISOLONE SODIUM SUCCINATE 40 MG: 40 INJECTION, POWDER, FOR SOLUTION INTRAMUSCULAR; INTRAVENOUS at 22:20

## 2022-01-01 RX ADMIN — DULOXETINE HYDROCHLORIDE 30 MG: 30 CAPSULE, DELAYED RELEASE ORAL at 09:31

## 2022-01-01 RX ADMIN — ALBUTEROL SULFATE 2.5 MG: 2.5 SOLUTION RESPIRATORY (INHALATION) at 14:17

## 2022-01-01 RX ADMIN — ALBUTEROL SULFATE 2.5 MG: 2.5 SOLUTION RESPIRATORY (INHALATION) at 13:36

## 2022-01-01 RX ADMIN — ALBUTEROL SULFATE 2.5 MG: 2.5 SOLUTION RESPIRATORY (INHALATION) at 02:45

## 2022-01-01 RX ADMIN — Medication 2 UNITS: at 11:31

## 2022-01-01 RX ADMIN — SODIUM CHLORIDE 40 MG: 9 INJECTION INTRAMUSCULAR; INTRAVENOUS; SUBCUTANEOUS at 08:01

## 2022-01-01 RX ADMIN — LIDOCAINE HYDROCHLORIDE 100 MG: 20 INJECTION, SOLUTION EPIDURAL; INFILTRATION; INTRACAUDAL; PERINEURAL at 08:26

## 2022-01-01 RX ADMIN — ALBUTEROL SULFATE 2.5 MG: 2.5 SOLUTION RESPIRATORY (INHALATION) at 07:42

## 2022-01-01 RX ADMIN — ALBUMIN (HUMAN) 12.5 G: 0.25 INJECTION, SOLUTION INTRAVENOUS at 23:49

## 2022-01-01 RX ADMIN — METHYLPREDNISOLONE SODIUM SUCCINATE 20 MG: 40 INJECTION, POWDER, FOR SOLUTION INTRAMUSCULAR; INTRAVENOUS at 05:16

## 2022-01-01 RX ADMIN — METOPROLOL TARTRATE 12.5 MG: 25 TABLET, FILM COATED ORAL at 18:00

## 2022-01-01 RX ADMIN — SODIUM CHLORIDE, PRESERVATIVE FREE 10 ML: 5 INJECTION INTRAVENOUS at 22:19

## 2022-01-01 RX ADMIN — MIDODRINE HYDROCHLORIDE 10 MG: 5 TABLET ORAL at 12:34

## 2022-01-01 RX ADMIN — SUCRALFATE 1 G: 1 TABLET ORAL at 17:26

## 2022-01-01 RX ADMIN — PANTOPRAZOLE SODIUM 40 MG: 40 TABLET, DELAYED RELEASE ORAL at 06:42

## 2022-01-01 RX ADMIN — SUCRALFATE 1 G: 1 TABLET ORAL at 21:49

## 2022-01-01 RX ADMIN — FERROUS SULFATE TAB 325 MG (65 MG ELEMENTAL FE) 325 MG: 325 (65 FE) TAB at 08:01

## 2022-01-01 RX ADMIN — PANTOPRAZOLE SODIUM 40 MG: 40 TABLET, DELAYED RELEASE ORAL at 16:58

## 2022-01-01 RX ADMIN — METOPROLOL TARTRATE 12.5 MG: 25 TABLET, FILM COATED ORAL at 09:29

## 2022-01-01 RX ADMIN — BUDESONIDE 500 MCG: 0.5 INHALANT RESPIRATORY (INHALATION) at 19:37

## 2022-01-01 RX ADMIN — TICAGRELOR 90 MG: 90 TABLET ORAL at 08:40

## 2022-01-01 RX ADMIN — SODIUM CHLORIDE, PRESERVATIVE FREE 10 ML: 5 INJECTION INTRAVENOUS at 21:41

## 2022-01-01 RX ADMIN — URSODIOL 900 MG: 300 CAPSULE ORAL at 08:40

## 2022-01-01 RX ADMIN — ALBUTEROL SULFATE 2.5 MG: 2.5 SOLUTION RESPIRATORY (INHALATION) at 13:25

## 2022-01-01 RX ADMIN — OXYCODONE 5 MG: 5 TABLET ORAL at 13:54

## 2022-01-01 RX ADMIN — ATORVASTATIN CALCIUM 80 MG: 40 TABLET, FILM COATED ORAL at 21:08

## 2022-01-01 RX ADMIN — TUBERCULIN PURIFIED PROTEIN DERIVATIVE 5 UNITS: 5 INJECTION, SOLUTION INTRADERMAL at 23:04

## 2022-01-01 RX ADMIN — SUCRALFATE 1 G: 1 TABLET ORAL at 12:34

## 2022-01-01 RX ADMIN — ALBUTEROL SULFATE 2.5 MG: 2.5 SOLUTION RESPIRATORY (INHALATION) at 01:27

## 2022-01-01 RX ADMIN — Medication 2 UNITS: at 11:30

## 2022-01-01 RX ADMIN — SODIUM CHLORIDE, PRESERVATIVE FREE 10 ML: 5 INJECTION INTRAVENOUS at 21:09

## 2022-01-01 RX ADMIN — SUCRALFATE 1 G: 1 TABLET ORAL at 17:59

## 2022-01-01 RX ADMIN — CEFTRIAXONE 1 G: 1 INJECTION, POWDER, FOR SOLUTION INTRAMUSCULAR; INTRAVENOUS at 08:23

## 2022-01-01 RX ADMIN — Medication 2 UNITS: at 16:30

## 2022-01-01 RX ADMIN — MIDODRINE HYDROCHLORIDE 5 MG: 5 TABLET ORAL at 12:11

## 2022-01-01 RX ADMIN — Medication 2 UNITS: at 21:50

## 2022-01-01 RX ADMIN — PANTOPRAZOLE SODIUM 40 MG: 40 TABLET, DELAYED RELEASE ORAL at 16:46

## 2022-01-01 RX ADMIN — POTASSIUM CHLORIDE 40 MEQ: 20 TABLET, EXTENDED RELEASE ORAL at 13:36

## 2022-01-01 RX ADMIN — FERROUS SULFATE TAB 325 MG (65 MG ELEMENTAL FE) 325 MG: 325 (65 FE) TAB at 17:26

## 2022-01-01 RX ADMIN — PANTOPRAZOLE SODIUM 40 MG: 40 TABLET, DELAYED RELEASE ORAL at 06:08

## 2022-01-01 RX ADMIN — ASPIRIN 81 MG: 81 TABLET ORAL at 08:49

## 2022-01-01 RX ADMIN — ONDANSETRON 4 MG: 2 INJECTION INTRAMUSCULAR; INTRAVENOUS at 09:38

## 2022-01-01 RX ADMIN — SUCRALFATE 1 G: 1 TABLET ORAL at 11:21

## 2022-01-01 RX ADMIN — ALBUTEROL SULFATE 2.5 MG: 2.5 SOLUTION RESPIRATORY (INHALATION) at 16:48

## 2022-01-01 RX ADMIN — LEVOTHYROXINE SODIUM 175 MCG: 0.1 TABLET ORAL at 06:23

## 2022-01-01 RX ADMIN — SUCRALFATE 1 G: 1 TABLET ORAL at 11:36

## 2022-01-01 RX ADMIN — SUCRALFATE 1 G: 1 TABLET ORAL at 16:57

## 2022-01-01 RX ADMIN — NYSTATIN: 100000 POWDER TOPICAL at 09:28

## 2022-01-01 RX ADMIN — PROPOFOL 30 MG: 10 INJECTION, EMULSION INTRAVENOUS at 08:26

## 2022-01-01 RX ADMIN — ALBUTEROL SULFATE 2.5 MG: 2.5 SOLUTION RESPIRATORY (INHALATION) at 19:44

## 2022-01-01 RX ADMIN — NYSTATIN: 100000 POWDER TOPICAL at 18:45

## 2022-01-01 RX ADMIN — MIDODRINE HYDROCHLORIDE 10 MG: 5 TABLET ORAL at 16:22

## 2022-01-01 RX ADMIN — PHENYLEPHRINE HYDROCHLORIDE 100 MCG: 10 INJECTION INTRAVENOUS at 08:59

## 2022-01-01 RX ADMIN — SODIUM CHLORIDE, PRESERVATIVE FREE 5 ML: 5 INJECTION INTRAVENOUS at 05:52

## 2022-01-01 RX ADMIN — DULOXETINE HYDROCHLORIDE 30 MG: 30 CAPSULE, DELAYED RELEASE ORAL at 09:23

## 2022-01-01 RX ADMIN — PANTOPRAZOLE SODIUM 40 MG: 40 TABLET, DELAYED RELEASE ORAL at 06:22

## 2022-01-01 RX ADMIN — SUCRALFATE 1 G: 1 TABLET ORAL at 21:27

## 2022-01-01 RX ADMIN — TICAGRELOR 90 MG: 90 TABLET ORAL at 21:41

## 2022-01-01 RX ADMIN — SODIUM CHLORIDE, PRESERVATIVE FREE 10 ML: 5 INJECTION INTRAVENOUS at 21:13

## 2022-01-01 RX ADMIN — SODIUM CHLORIDE, PRESERVATIVE FREE 5 ML: 5 INJECTION INTRAVENOUS at 21:14

## 2022-01-01 RX ADMIN — PANTOPRAZOLE SODIUM 40 MG: 40 TABLET, DELAYED RELEASE ORAL at 05:43

## 2022-01-01 RX ADMIN — FERROUS SULFATE TAB 325 MG (65 MG ELEMENTAL FE) 325 MG: 325 (65 FE) TAB at 09:23

## 2022-01-01 RX ADMIN — POLYETHYLENE GLYCOL 3350 238 G: 17 POWDER, FOR SOLUTION ORAL at 18:00

## 2022-01-01 RX ADMIN — SUCRALFATE 1 G: 1 TABLET ORAL at 08:40

## 2022-01-01 RX ADMIN — ALBUTEROL SULFATE 2.5 MG: 2.5 SOLUTION RESPIRATORY (INHALATION) at 08:01

## 2022-01-01 RX ADMIN — SUCRALFATE 1 G: 1 TABLET ORAL at 08:54

## 2022-01-01 RX ADMIN — NYSTATIN: 100000 POWDER TOPICAL at 08:55

## 2022-01-01 RX ADMIN — OCTREOTIDE ACETATE 100 MCG: 100 INJECTION, SOLUTION INTRAVENOUS; SUBCUTANEOUS at 09:28

## 2022-01-01 RX ADMIN — OCTREOTIDE ACETATE 100 MCG: 100 INJECTION, SOLUTION INTRAVENOUS; SUBCUTANEOUS at 08:03

## 2022-01-01 RX ADMIN — SODIUM CHLORIDE, PRESERVATIVE FREE 10 ML: 5 INJECTION INTRAVENOUS at 06:08

## 2022-01-01 RX ADMIN — ALBUTEROL SULFATE 2.5 MG: 2.5 SOLUTION RESPIRATORY (INHALATION) at 20:31

## 2022-01-01 RX ADMIN — METOPROLOL TARTRATE 5 MG: 1 INJECTION, SOLUTION INTRAVENOUS at 23:50

## 2022-01-01 RX ADMIN — SODIUM CHLORIDE, PRESERVATIVE FREE 10 ML: 5 INJECTION INTRAVENOUS at 13:56

## 2022-01-01 RX ADMIN — SUCRALFATE 1 G: 1 TABLET ORAL at 21:14

## 2022-01-01 RX ADMIN — ALBUTEROL SULFATE 2.5 MG: 2.5 SOLUTION RESPIRATORY (INHALATION) at 14:21

## 2022-01-01 RX ADMIN — FERROUS SULFATE TAB 325 MG (65 MG ELEMENTAL FE) 325 MG: 325 (65 FE) TAB at 08:40

## 2022-01-01 RX ADMIN — FUROSEMIDE 40 MG: 10 INJECTION, SOLUTION INTRAMUSCULAR; INTRAVENOUS at 17:06

## 2022-01-01 RX ADMIN — HYDROMORPHONE HYDROCHLORIDE 0.5 MG: 1 INJECTION, SOLUTION INTRAMUSCULAR; INTRAVENOUS; SUBCUTANEOUS at 13:38

## 2022-01-01 RX ADMIN — ALBUTEROL SULFATE 2.5 MG: 2.5 SOLUTION RESPIRATORY (INHALATION) at 03:30

## 2022-01-01 RX ADMIN — METHYLPREDNISOLONE SODIUM SUCCINATE 20 MG: 40 INJECTION, POWDER, FOR SOLUTION INTRAMUSCULAR; INTRAVENOUS at 13:55

## 2022-01-01 RX ADMIN — OCTREOTIDE ACETATE 100 MCG: 100 INJECTION, SOLUTION INTRAVENOUS; SUBCUTANEOUS at 15:45

## 2022-01-01 RX ADMIN — SODIUM CHLORIDE, PRESERVATIVE FREE 10 ML: 5 INJECTION INTRAVENOUS at 21:03

## 2022-01-01 RX ADMIN — ALBUTEROL SULFATE 2.5 MG: 2.5 SOLUTION RESPIRATORY (INHALATION) at 01:55

## 2022-01-01 RX ADMIN — NYSTATIN: 100000 POWDER TOPICAL at 17:26

## 2022-01-01 RX ADMIN — BUDESONIDE 500 MCG: 0.5 INHALANT RESPIRATORY (INHALATION) at 07:10

## 2022-01-01 RX ADMIN — LEVOTHYROXINE SODIUM 175 MCG: 0.1 TABLET ORAL at 05:52

## 2022-01-01 RX ADMIN — SODIUM CHLORIDE 40 MG: 9 INJECTION INTRAMUSCULAR; INTRAVENOUS; SUBCUTANEOUS at 08:48

## 2022-01-01 RX ADMIN — SUCRALFATE 1 G: 1 TABLET ORAL at 09:23

## 2022-01-01 RX ADMIN — SODIUM CHLORIDE, PRESERVATIVE FREE 5 ML: 5 INJECTION INTRAVENOUS at 21:03

## 2022-01-01 RX ADMIN — ALBUTEROL SULFATE 2.5 MG: 2.5 SOLUTION RESPIRATORY (INHALATION) at 09:46

## 2022-01-01 RX ADMIN — OCTREOTIDE ACETATE 100 MCG: 100 INJECTION, SOLUTION INTRAVENOUS; SUBCUTANEOUS at 22:20

## 2022-01-01 RX ADMIN — BUDESONIDE 500 MCG: 0.5 INHALANT RESPIRATORY (INHALATION) at 07:59

## 2022-01-01 RX ADMIN — SODIUM CHLORIDE 500 ML: 900 INJECTION, SOLUTION INTRAVENOUS at 17:59

## 2022-01-01 RX ADMIN — FERROUS SULFATE TAB 325 MG (65 MG ELEMENTAL FE) 325 MG: 325 (65 FE) TAB at 08:55

## 2022-01-01 RX ADMIN — CEFEPIME HYDROCHLORIDE 2 G: 2 INJECTION, POWDER, FOR SOLUTION INTRAVENOUS at 16:51

## 2022-01-01 RX ADMIN — SODIUM CHLORIDE 500 ML: 9 INJECTION, SOLUTION INTRAVENOUS at 17:28

## 2022-01-01 RX ADMIN — ALBUTEROL SULFATE 2.5 MG: 2.5 SOLUTION RESPIRATORY (INHALATION) at 20:08

## 2022-01-01 RX ADMIN — SODIUM CHLORIDE, PRESERVATIVE FREE 10 ML: 5 INJECTION INTRAVENOUS at 05:20

## 2022-01-01 RX ADMIN — MIDODRINE HYDROCHLORIDE 5 MG: 5 TABLET ORAL at 09:23

## 2022-01-01 RX ADMIN — FERROUS SULFATE TAB 325 MG (65 MG ELEMENTAL FE) 325 MG: 325 (65 FE) TAB at 16:46

## 2022-01-01 RX ADMIN — SODIUM CHLORIDE 100 ML/HR: 9 INJECTION, SOLUTION INTRAVENOUS at 21:00

## 2022-01-01 RX ADMIN — SODIUM CHLORIDE, SODIUM LACTATE, POTASSIUM CHLORIDE, AND CALCIUM CHLORIDE 100 ML/HR: 600; 310; 30; 20 INJECTION, SOLUTION INTRAVENOUS at 07:28

## 2022-01-01 RX ADMIN — NYSTATIN: 100000 POWDER TOPICAL at 17:06

## 2022-01-01 RX ADMIN — OXYCODONE 5 MG: 5 TABLET ORAL at 22:02

## 2022-01-01 RX ADMIN — SODIUM CHLORIDE 75 ML/HR: 9 INJECTION, SOLUTION INTRAVENOUS at 15:45

## 2022-01-01 RX ADMIN — SODIUM CHLORIDE, PRESERVATIVE FREE 10 ML: 5 INJECTION INTRAVENOUS at 05:53

## 2022-01-01 RX ADMIN — Medication 2 UNITS: at 07:30

## 2022-01-01 RX ADMIN — CEFEPIME HYDROCHLORIDE 2 G: 2 INJECTION, POWDER, FOR SOLUTION INTRAVENOUS at 09:26

## 2022-01-01 RX ADMIN — URSODIOL 900 MG: 300 CAPSULE ORAL at 08:54

## 2022-01-01 RX ADMIN — POTASSIUM CHLORIDE 40 MEQ: 20 TABLET, EXTENDED RELEASE ORAL at 09:29

## 2022-01-01 RX ADMIN — FUROSEMIDE 40 MG: 10 INJECTION, SOLUTION INTRAMUSCULAR; INTRAVENOUS at 17:26

## 2022-01-01 RX ADMIN — ALBUTEROL SULFATE 2.5 MG: 2.5 SOLUTION RESPIRATORY (INHALATION) at 20:25

## 2022-01-01 RX ADMIN — SUCRALFATE 1 G: 1 TABLET ORAL at 16:46

## 2022-01-01 RX ADMIN — METHYLPREDNISOLONE SODIUM SUCCINATE 40 MG: 40 INJECTION, POWDER, FOR SOLUTION INTRAMUSCULAR; INTRAVENOUS at 06:34

## 2022-01-01 RX ADMIN — SUCRALFATE 1 G: 1 TABLET ORAL at 11:30

## 2022-01-01 RX ADMIN — ATORVASTATIN CALCIUM 80 MG: 40 TABLET, FILM COATED ORAL at 21:41

## 2022-01-01 RX ADMIN — SODIUM CHLORIDE, PRESERVATIVE FREE 10 ML: 5 INJECTION INTRAVENOUS at 19:15

## 2022-01-01 RX ADMIN — OCTREOTIDE ACETATE 100 MCG: 100 INJECTION, SOLUTION INTRAVENOUS; SUBCUTANEOUS at 22:04

## 2022-01-01 RX ADMIN — ALBUTEROL SULFATE 2.5 MG: 2.5 SOLUTION RESPIRATORY (INHALATION) at 07:17

## 2022-01-01 RX ADMIN — ALBUTEROL SULFATE 2.5 MG: 2.5 SOLUTION RESPIRATORY (INHALATION) at 13:17

## 2022-01-01 RX ADMIN — SODIUM CHLORIDE, PRESERVATIVE FREE 10 ML: 5 INJECTION INTRAVENOUS at 05:17

## 2022-01-01 RX ADMIN — PROPOFOL 100 MCG/KG/MIN: 10 INJECTION, EMULSION INTRAVENOUS at 08:42

## 2022-01-01 RX ADMIN — ALBUTEROL SULFATE 2.5 MG: 2.5 SOLUTION RESPIRATORY (INHALATION) at 07:28

## 2022-01-01 RX ADMIN — LEVOTHYROXINE SODIUM 175 MCG: 0.1 TABLET ORAL at 05:07

## 2022-01-01 RX ADMIN — METOPROLOL TARTRATE 12.5 MG: 25 TABLET, FILM COATED ORAL at 17:00

## 2022-01-01 RX ADMIN — MIDODRINE HYDROCHLORIDE 10 MG: 5 TABLET ORAL at 08:01

## 2022-01-01 RX ADMIN — URSODIOL 900 MG: 300 CAPSULE ORAL at 09:23

## 2022-01-01 RX ADMIN — ONDANSETRON 4 MG: 2 INJECTION INTRAMUSCULAR; INTRAVENOUS at 14:27

## 2022-01-01 RX ADMIN — ALBUMIN (HUMAN) 12.5 G: 0.25 INJECTION, SOLUTION INTRAVENOUS at 05:52

## 2022-01-01 RX ADMIN — SUCRALFATE 1 G: 1 TABLET ORAL at 10:40

## 2022-01-01 RX ADMIN — DULOXETINE HYDROCHLORIDE 30 MG: 30 CAPSULE, DELAYED RELEASE ORAL at 09:29

## 2022-01-01 RX ADMIN — TICAGRELOR 90 MG: 90 TABLET ORAL at 08:23

## 2022-01-01 RX ADMIN — ASPIRIN 81 MG: 81 TABLET ORAL at 09:19

## 2022-01-01 RX ADMIN — SUCRALFATE 1 G: 1 TABLET ORAL at 12:20

## 2022-01-01 RX ADMIN — LEVOTHYROXINE SODIUM 175 MCG: 0.1 TABLET ORAL at 05:44

## 2022-01-01 RX ADMIN — SODIUM CHLORIDE, PRESERVATIVE FREE 10 ML: 5 INJECTION INTRAVENOUS at 13:36

## 2022-01-01 RX ADMIN — SODIUM CHLORIDE, PRESERVATIVE FREE 10 ML: 5 INJECTION INTRAVENOUS at 22:04

## 2022-01-01 RX ADMIN — SODIUM CHLORIDE 100 ML: 9 INJECTION, SOLUTION INTRAVENOUS at 14:32

## 2022-01-01 RX ADMIN — METHYLPREDNISOLONE SODIUM SUCCINATE 40 MG: 40 INJECTION, POWDER, FOR SOLUTION INTRAMUSCULAR; INTRAVENOUS at 07:47

## 2022-01-01 RX ADMIN — SODIUM CHLORIDE, PRESERVATIVE FREE 10 ML: 5 INJECTION INTRAVENOUS at 05:44

## 2022-01-01 RX ADMIN — SUCRALFATE 1 G: 1 TABLET ORAL at 16:22

## 2022-01-01 RX ADMIN — FERROUS SULFATE TAB 325 MG (65 MG ELEMENTAL FE) 325 MG: 325 (65 FE) TAB at 16:13

## 2022-01-01 RX ADMIN — FLUTICASONE PROPIONATE 2 SPRAY: 50 SPRAY, METERED NASAL at 09:19

## 2022-01-01 RX ADMIN — OCTREOTIDE ACETATE 100 MCG: 100 INJECTION, SOLUTION INTRAVENOUS; SUBCUTANEOUS at 16:47

## 2022-01-01 RX ADMIN — LEVOTHYROXINE SODIUM 175 MCG: 0.1 TABLET ORAL at 05:59

## 2022-01-01 RX ADMIN — URSODIOL 900 MG: 300 CAPSULE ORAL at 16:46

## 2022-01-01 RX ADMIN — ATORVASTATIN CALCIUM 80 MG: 40 TABLET, FILM COATED ORAL at 21:14

## 2022-01-01 RX ADMIN — SUCRALFATE 1 G: 1 TABLET ORAL at 11:27

## 2022-01-01 RX ADMIN — SODIUM CHLORIDE, PRESERVATIVE FREE 10 ML: 5 INJECTION INTRAVENOUS at 21:59

## 2022-01-01 RX ADMIN — ALBUMIN (HUMAN) 12.5 G: 0.25 INJECTION, SOLUTION INTRAVENOUS at 00:27

## 2022-01-01 RX ADMIN — FUROSEMIDE 40 MG: 10 INJECTION, SOLUTION INTRAMUSCULAR; INTRAVENOUS at 19:43

## 2022-01-01 RX ADMIN — OCTREOTIDE ACETATE 100 MCG: 100 INJECTION, SOLUTION INTRAVENOUS; SUBCUTANEOUS at 21:08

## 2022-01-01 RX ADMIN — NYSTATIN: 100000 POWDER TOPICAL at 08:23

## 2022-01-01 RX ADMIN — ASPIRIN 81 MG: 81 TABLET ORAL at 08:23

## 2022-01-01 RX ADMIN — MIDODRINE HYDROCHLORIDE 10 MG: 5 TABLET ORAL at 17:26

## 2022-01-01 RX ADMIN — ALBUMIN (HUMAN) 12.5 G: 0.25 INJECTION, SOLUTION INTRAVENOUS at 12:11

## 2022-01-01 RX ADMIN — FUROSEMIDE 40 MG: 10 INJECTION, SOLUTION INTRAMUSCULAR; INTRAVENOUS at 11:15

## 2022-01-01 RX ADMIN — SODIUM CHLORIDE, PRESERVATIVE FREE 10 ML: 5 INJECTION INTRAVENOUS at 05:36

## 2022-01-01 RX ADMIN — LEVOTHYROXINE SODIUM 175 MCG: 0.1 TABLET ORAL at 05:42

## 2022-01-01 RX ADMIN — NYSTATIN: 100000 POWDER TOPICAL at 17:00

## 2022-01-01 RX ADMIN — MAGNESIUM SULFATE HEPTAHYDRATE 2 G: 40 INJECTION, SOLUTION INTRAVENOUS at 09:28

## 2022-01-01 RX ADMIN — ALBUTEROL SULFATE 2.5 MG: 2.5 SOLUTION RESPIRATORY (INHALATION) at 07:59

## 2022-01-01 RX ADMIN — SODIUM CHLORIDE, PRESERVATIVE FREE 5 ML: 5 INJECTION INTRAVENOUS at 15:45

## 2022-01-01 RX ADMIN — TICAGRELOR 90 MG: 90 TABLET ORAL at 21:27

## 2022-01-01 RX ADMIN — SUCRALFATE 1 G: 1 TABLET ORAL at 16:13

## 2022-01-01 RX ADMIN — DULOXETINE HYDROCHLORIDE 30 MG: 30 CAPSULE, DELAYED RELEASE ORAL at 08:54

## 2022-01-01 RX ADMIN — TICAGRELOR 90 MG: 90 TABLET ORAL at 23:02

## 2022-01-01 RX ADMIN — SUCRALFATE 1 G: 1 TABLET ORAL at 08:23

## 2022-01-01 RX ADMIN — PANTOPRAZOLE SODIUM 40 MG: 40 TABLET, DELAYED RELEASE ORAL at 16:30

## 2022-01-01 RX ADMIN — IOPAMIDOL 100 ML: 755 INJECTION, SOLUTION INTRAVENOUS at 14:31

## 2022-01-01 RX ADMIN — ALBUMIN (HUMAN) 12.5 G: 0.25 INJECTION, SOLUTION INTRAVENOUS at 18:32

## 2022-01-01 RX ADMIN — DULOXETINE HYDROCHLORIDE 30 MG: 30 CAPSULE, DELAYED RELEASE ORAL at 10:40

## 2022-01-01 RX ADMIN — SODIUM CHLORIDE 10 MG: 9 INJECTION INTRAMUSCULAR; INTRAVENOUS; SUBCUTANEOUS at 22:33

## 2022-01-01 RX ADMIN — ATORVASTATIN CALCIUM 80 MG: 40 TABLET, FILM COATED ORAL at 23:02

## 2022-01-01 RX ADMIN — SUCRALFATE 1 G: 1 TABLET ORAL at 08:02

## 2022-01-01 RX ADMIN — FUROSEMIDE 40 MG: 10 INJECTION, SOLUTION INTRAMUSCULAR; INTRAVENOUS at 17:08

## 2022-01-01 RX ADMIN — BUDESONIDE 500 MCG: 0.5 INHALANT RESPIRATORY (INHALATION) at 20:43

## 2022-01-01 RX ADMIN — OCTREOTIDE ACETATE 100 MCG: 100 INJECTION, SOLUTION INTRAVENOUS; SUBCUTANEOUS at 17:26

## 2022-01-01 RX ADMIN — SODIUM CHLORIDE, PRESERVATIVE FREE 10 ML: 5 INJECTION INTRAVENOUS at 05:12

## 2022-01-01 RX ADMIN — Medication 2 UNITS: at 17:08

## 2022-01-01 RX ADMIN — METOPROLOL TARTRATE 12.5 MG: 25 TABLET, FILM COATED ORAL at 10:40

## 2022-01-01 RX ADMIN — SODIUM CHLORIDE, PRESERVATIVE FREE 10 ML: 5 INJECTION INTRAVENOUS at 16:50

## 2022-01-01 RX ADMIN — URSODIOL 900 MG: 300 CAPSULE ORAL at 17:00

## 2022-01-01 RX ADMIN — ONDANSETRON 4 MG: 2 INJECTION INTRAMUSCULAR; INTRAVENOUS at 17:08

## 2022-01-01 RX ADMIN — GUAIFENESIN 1200 MG: 600 TABLET ORAL at 08:54

## 2022-01-01 RX ADMIN — Medication 2 UNITS: at 21:41

## 2022-01-01 RX ADMIN — SODIUM CHLORIDE, PRESERVATIVE FREE 10 ML: 5 INJECTION INTRAVENOUS at 05:52

## 2022-01-01 RX ADMIN — TICAGRELOR 90 MG: 90 TABLET ORAL at 12:29

## 2022-01-01 RX ADMIN — ASPIRIN 81 MG: 81 TABLET ORAL at 08:40

## 2022-01-01 RX ADMIN — OXYCODONE 5 MG: 5 TABLET ORAL at 03:49

## 2022-01-01 RX ADMIN — SODIUM CHLORIDE, PRESERVATIVE FREE 10 ML: 5 INJECTION INTRAVENOUS at 05:15

## 2022-01-01 RX ADMIN — ALBUTEROL SULFATE 2.5 MG: 2.5 SOLUTION RESPIRATORY (INHALATION) at 02:16

## 2022-01-01 RX ADMIN — SODIUM CHLORIDE, PRESERVATIVE FREE 10 ML: 5 INJECTION INTRAVENOUS at 22:16

## 2022-01-01 RX ADMIN — SODIUM CHLORIDE 10 MG: 9 INJECTION INTRAMUSCULAR; INTRAVENOUS; SUBCUTANEOUS at 12:55

## 2022-01-01 RX ADMIN — SODIUM CHLORIDE, PRESERVATIVE FREE 10 ML: 5 INJECTION INTRAVENOUS at 21:50

## 2022-01-01 RX ADMIN — FUROSEMIDE 40 MG: 10 INJECTION, SOLUTION INTRAMUSCULAR; INTRAVENOUS at 08:01

## 2022-01-01 RX ADMIN — SODIUM CHLORIDE SOLN NEBU 3% 4 ML: 3 NEBU SOLN at 09:38

## 2022-01-01 RX ADMIN — ALBUTEROL SULFATE 2.5 MG: 2.5 SOLUTION RESPIRATORY (INHALATION) at 19:37

## 2022-01-01 RX ADMIN — DULOXETINE HYDROCHLORIDE 30 MG: 30 CAPSULE, DELAYED RELEASE ORAL at 09:19

## 2022-01-01 RX ADMIN — ALBUTEROL SULFATE 2.5 MG: 2.5 SOLUTION RESPIRATORY (INHALATION) at 05:16

## 2022-01-01 RX ADMIN — SODIUM CHLORIDE 10 MG: 9 INJECTION INTRAMUSCULAR; INTRAVENOUS; SUBCUTANEOUS at 09:58

## 2022-01-01 RX ADMIN — ASPIRIN 81 MG: 81 TABLET ORAL at 08:01

## 2022-01-01 RX ADMIN — LEVOTHYROXINE SODIUM 175 MCG: 0.1 TABLET ORAL at 05:38

## 2022-01-01 RX ADMIN — METHYLPREDNISOLONE SODIUM SUCCINATE 40 MG: 40 INJECTION, POWDER, FOR SOLUTION INTRAMUSCULAR; INTRAVENOUS at 13:30

## 2022-01-01 RX ADMIN — SODIUM CHLORIDE, PRESERVATIVE FREE 10 ML: 5 INJECTION INTRAVENOUS at 13:52

## 2022-01-01 RX ADMIN — OCTREOTIDE ACETATE 100 MCG: 100 INJECTION, SOLUTION INTRAVENOUS; SUBCUTANEOUS at 08:54

## 2022-01-01 RX ADMIN — URSODIOL 900 MG: 300 CAPSULE ORAL at 16:22

## 2022-01-01 RX ADMIN — NYSTATIN: 100000 POWDER TOPICAL at 08:03

## 2022-01-01 RX ADMIN — PANTOPRAZOLE SODIUM 40 MG: 40 TABLET, DELAYED RELEASE ORAL at 05:56

## 2022-01-01 RX ADMIN — PANTOPRAZOLE SODIUM 40 MG: 40 TABLET, DELAYED RELEASE ORAL at 05:52

## 2022-01-01 RX ADMIN — ALBUTEROL SULFATE 2.5 MG: 2.5 SOLUTION RESPIRATORY (INHALATION) at 07:01

## 2022-01-01 RX ADMIN — ONDANSETRON 4 MG: 2 INJECTION INTRAMUSCULAR; INTRAVENOUS at 13:47

## 2022-01-01 RX ADMIN — ASPIRIN 81 MG: 81 TABLET ORAL at 09:22

## 2022-01-01 RX ADMIN — FUROSEMIDE 40 MG: 10 INJECTION, SOLUTION INTRAMUSCULAR; INTRAVENOUS at 08:49

## 2022-01-01 RX ADMIN — MIDODRINE HYDROCHLORIDE 5 MG: 5 TABLET ORAL at 08:39

## 2022-01-01 RX ADMIN — MIDODRINE HYDROCHLORIDE 10 MG: 5 TABLET ORAL at 16:13

## 2022-01-01 RX ADMIN — PANTOPRAZOLE SODIUM 40 MG: 40 TABLET, DELAYED RELEASE ORAL at 15:41

## 2022-01-01 RX ADMIN — METOPROLOL TARTRATE 12.5 MG: 25 TABLET, FILM COATED ORAL at 21:27

## 2022-01-01 RX ADMIN — DULOXETINE HYDROCHLORIDE 30 MG: 30 CAPSULE, DELAYED RELEASE ORAL at 08:01

## 2022-01-01 RX ADMIN — SODIUM CHLORIDE, PRESERVATIVE FREE 10 ML: 5 INJECTION INTRAVENOUS at 14:00

## 2022-01-01 RX ADMIN — ONDANSETRON 4 MG: 2 INJECTION INTRAMUSCULAR; INTRAVENOUS at 08:24

## 2022-01-01 RX ADMIN — METOPROLOL TARTRATE 12.5 MG: 25 TABLET, FILM COATED ORAL at 09:19

## 2022-01-01 RX ADMIN — MIDODRINE HYDROCHLORIDE 5 MG: 5 TABLET ORAL at 16:46

## 2022-01-01 RX ADMIN — OXYCODONE 5 MG: 5 TABLET ORAL at 10:30

## 2022-01-01 RX ADMIN — PHENYLEPHRINE HYDROCHLORIDE 50 MCG: 10 INJECTION INTRAVENOUS at 08:56

## 2022-01-01 RX ADMIN — SODIUM CHLORIDE, PRESERVATIVE FREE 10 ML: 5 INJECTION INTRAVENOUS at 16:16

## 2022-01-01 RX ADMIN — ALBUMIN (HUMAN) 12.5 G: 0.25 INJECTION, SOLUTION INTRAVENOUS at 17:04

## 2022-01-01 RX ADMIN — SODIUM CHLORIDE, PRESERVATIVE FREE 10 ML: 5 INJECTION INTRAVENOUS at 06:35

## 2022-01-01 RX ADMIN — METHYLPREDNISOLONE SODIUM SUCCINATE 20 MG: 40 INJECTION, POWDER, FOR SOLUTION INTRAMUSCULAR; INTRAVENOUS at 22:15

## 2022-01-01 RX ADMIN — MIDODRINE HYDROCHLORIDE 10 MG: 5 TABLET ORAL at 11:21

## 2022-01-01 RX ADMIN — ALBUTEROL SULFATE 2.5 MG: 2.5 SOLUTION RESPIRATORY (INHALATION) at 23:30

## 2022-01-01 RX ADMIN — SODIUM CHLORIDE, PRESERVATIVE FREE 10 ML: 5 INJECTION INTRAVENOUS at 16:15

## 2022-01-01 RX ADMIN — URSODIOL 900 MG: 300 CAPSULE ORAL at 16:13

## 2022-01-01 RX ADMIN — Medication 2 UNITS: at 21:15

## 2022-01-01 RX ADMIN — MIDODRINE HYDROCHLORIDE 5 MG: 5 TABLET ORAL at 17:00

## 2022-01-01 RX ADMIN — ASPIRIN 81 MG: 81 TABLET ORAL at 09:31

## 2022-01-01 RX ADMIN — BUDESONIDE 500 MCG: 0.5 INHALANT RESPIRATORY (INHALATION) at 07:17

## 2022-01-01 RX ADMIN — ALBUTEROL SULFATE 2.5 MG: 2.5 SOLUTION RESPIRATORY (INHALATION) at 07:09

## 2022-01-01 RX ADMIN — OCTREOTIDE ACETATE 100 MCG: 100 INJECTION, SOLUTION INTRAVENOUS; SUBCUTANEOUS at 21:57

## 2022-01-01 RX ADMIN — ALBUTEROL SULFATE 2.5 MG: 2.5 SOLUTION RESPIRATORY (INHALATION) at 18:42

## 2022-01-01 RX ADMIN — NYSTATIN: 100000 POWDER TOPICAL at 08:41

## 2022-01-01 RX ADMIN — FERROUS SULFATE TAB 325 MG (65 MG ELEMENTAL FE) 325 MG: 325 (65 FE) TAB at 17:00

## 2022-01-01 RX ADMIN — PROPOFOL 160 MCG/KG/MIN: 10 INJECTION, EMULSION INTRAVENOUS at 08:26

## 2022-01-01 RX ADMIN — SUCRALFATE 1 G: 1 TABLET ORAL at 17:36

## 2022-01-01 RX ADMIN — SUCRALFATE 1 G: 1 TABLET ORAL at 05:56

## 2022-01-01 RX ADMIN — SODIUM CHLORIDE, PRESERVATIVE FREE 5 ML: 5 INJECTION INTRAVENOUS at 06:00

## 2022-01-01 RX ADMIN — FERROUS SULFATE TAB 325 MG (65 MG ELEMENTAL FE) 325 MG: 325 (65 FE) TAB at 16:23

## 2022-01-01 RX ADMIN — SUCRALFATE 1 G: 1 TABLET ORAL at 06:23

## 2022-01-01 RX ADMIN — SUCRALFATE 1 G: 1 TABLET ORAL at 21:02

## 2022-01-01 RX ADMIN — OCTREOTIDE ACETATE 100 MCG: 100 INJECTION, SOLUTION INTRAVENOUS; SUBCUTANEOUS at 15:41

## 2022-01-01 RX ADMIN — ALBUTEROL SULFATE 2.5 MG: 2.5 SOLUTION RESPIRATORY (INHALATION) at 22:52

## 2022-01-01 RX ADMIN — ASPIRIN 81 MG: 81 TABLET ORAL at 09:29

## 2022-01-01 RX ADMIN — FLUTICASONE PROPIONATE 2 SPRAY: 50 SPRAY, METERED NASAL at 09:32

## 2022-01-01 RX ADMIN — OCTREOTIDE ACETATE 100 MCG: 100 INJECTION, SOLUTION INTRAVENOUS; SUBCUTANEOUS at 22:35

## 2022-01-01 RX ADMIN — Medication 6 UNITS: at 22:01

## 2022-01-01 RX ADMIN — Medication 4 UNITS: at 21:02

## 2022-01-01 RX ADMIN — OCTREOTIDE ACETATE 100 MCG: 100 INJECTION, SOLUTION INTRAVENOUS; SUBCUTANEOUS at 10:04

## 2022-01-01 RX ADMIN — ALBUTEROL SULFATE 2.5 MG: 2.5 SOLUTION RESPIRATORY (INHALATION) at 19:41

## 2022-01-01 RX ADMIN — URSODIOL 900 MG: 300 CAPSULE ORAL at 08:02

## 2022-01-01 RX ADMIN — SODIUM CHLORIDE, PRESERVATIVE FREE 10 ML: 5 INJECTION INTRAVENOUS at 05:16

## 2022-01-01 RX ADMIN — PANTOPRAZOLE SODIUM 40 MG: 40 TABLET, DELAYED RELEASE ORAL at 05:42

## 2022-01-01 RX ADMIN — DIATRIZOATE MEGLUMINE AND DIATRIZOATE SODIUM 15 ML: 660; 100 LIQUID ORAL; RECTAL at 14:32

## 2022-01-01 RX ADMIN — SODIUM CHLORIDE 75 ML/HR: 9 INJECTION, SOLUTION INTRAVENOUS at 10:00

## 2022-01-01 RX ADMIN — FERROUS SULFATE TAB 325 MG (65 MG ELEMENTAL FE) 325 MG: 325 (65 FE) TAB at 08:23

## 2022-01-01 RX ADMIN — ONDANSETRON 4 MG: 2 INJECTION INTRAMUSCULAR; INTRAVENOUS at 09:15

## 2022-01-01 RX ADMIN — GUAIFENESIN 1200 MG: 600 TABLET ORAL at 17:08

## 2022-01-01 RX ADMIN — SODIUM CHLORIDE SOLN NEBU 3% 4 ML: 3 NEBU SOLN at 08:02

## 2022-01-01 RX ADMIN — SODIUM CHLORIDE, PRESERVATIVE FREE 5 ML: 5 INJECTION INTRAVENOUS at 15:15

## 2022-01-01 RX ADMIN — DULOXETINE HYDROCHLORIDE 30 MG: 30 CAPSULE, DELAYED RELEASE ORAL at 08:23

## 2022-01-01 RX ADMIN — GUAIFENESIN 1200 MG: 600 TABLET ORAL at 11:21

## 2022-01-01 RX ADMIN — BUDESONIDE 500 MCG: 0.5 INHALANT RESPIRATORY (INHALATION) at 19:44

## 2022-01-01 RX ADMIN — TUBERCULIN PURIFIED PROTEIN DERIVATIVE 5 UNITS: 5 INJECTION INTRADERMAL at 21:00

## 2022-01-01 RX ADMIN — Medication 2 UNITS: at 16:58

## 2022-01-01 RX ADMIN — ASPIRIN 81 MG: 81 TABLET ORAL at 08:55

## 2022-01-01 RX ADMIN — ALBUTEROL SULFATE 2.5 MG: 2.5 SOLUTION RESPIRATORY (INHALATION) at 14:51

## 2022-01-01 RX ADMIN — ALBUTEROL SULFATE 2.5 MG: 2.5 SOLUTION RESPIRATORY (INHALATION) at 20:43

## 2022-01-01 RX ADMIN — ALBUMIN (HUMAN) 12.5 G: 0.25 INJECTION, SOLUTION INTRAVENOUS at 06:08

## 2022-01-01 RX ADMIN — ASPIRIN 81 MG: 81 TABLET ORAL at 10:40

## 2022-01-01 RX ADMIN — LEVOTHYROXINE SODIUM 175 MCG: 0.1 TABLET ORAL at 06:08

## 2022-01-01 RX ADMIN — METOPROLOL TARTRATE 12.5 MG: 25 TABLET, FILM COATED ORAL at 09:31

## 2022-01-01 RX ADMIN — ATORVASTATIN CALCIUM 80 MG: 40 TABLET, FILM COATED ORAL at 21:27

## 2022-01-01 RX ADMIN — NYSTATIN: 100000 POWDER TOPICAL at 17:16

## 2022-01-01 RX ADMIN — FUROSEMIDE 40 MG: 10 INJECTION, SOLUTION INTRAMUSCULAR; INTRAVENOUS at 08:55

## 2022-01-01 RX ADMIN — OCTREOTIDE ACETATE 100 MCG: 100 INJECTION, SOLUTION INTRAVENOUS; SUBCUTANEOUS at 08:53

## 2022-01-01 RX ADMIN — MIDODRINE HYDROCHLORIDE 5 MG: 5 TABLET ORAL at 11:36

## 2022-01-01 RX ADMIN — HEPARIN SODIUM 5000 UNITS: 1000 INJECTION, SOLUTION INTRAVENOUS; SUBCUTANEOUS at 12:57

## 2022-01-01 RX ADMIN — POTASSIUM CHLORIDE 40 MEQ: 20 TABLET, EXTENDED RELEASE ORAL at 17:36

## 2022-01-01 RX ADMIN — SODIUM CHLORIDE, PRESERVATIVE FREE 10 ML: 5 INJECTION INTRAVENOUS at 16:51

## 2022-01-01 RX ADMIN — OCTREOTIDE ACETATE 100 MCG: 100 INJECTION, SOLUTION INTRAVENOUS; SUBCUTANEOUS at 16:16

## 2022-01-01 RX ADMIN — CEFTRIAXONE 1 G: 1 INJECTION, POWDER, FOR SOLUTION INTRAMUSCULAR; INTRAVENOUS at 08:39

## 2022-01-01 RX ADMIN — SODIUM CHLORIDE, PRESERVATIVE FREE 10 ML: 5 INJECTION INTRAVENOUS at 23:08

## 2022-01-01 RX ADMIN — MIDODRINE HYDROCHLORIDE 10 MG: 5 TABLET ORAL at 08:55

## 2022-01-01 RX ADMIN — MIDODRINE HYDROCHLORIDE 5 MG: 5 TABLET ORAL at 12:20

## 2022-01-01 RX ADMIN — SODIUM CHLORIDE 125 ML/HR: 900 INJECTION, SOLUTION INTRAVENOUS at 19:14

## 2022-01-01 RX ADMIN — SODIUM CHLORIDE, PRESERVATIVE FREE 10 ML: 5 INJECTION INTRAVENOUS at 23:03

## 2022-02-04 NOTE — TELEPHONE ENCOUNTER
Received call from Chance Onofre at [facility] with [employer] Complaint. Subjective: Caller states had F/U appt scheduled for today but needs to cxl because not feeling well,not sure if has covid    Current Symptoms: nose runny, sinus congestion/ pressure/H/A,feeling rough,lethargic    Onset: Yesterday    Associated Symptoms: NA    Pain Severity: 6/10    Temperature:Denies    What has been tried: Tylenol    LMP: NA Pregnant: NA    Recommended disposition: See PCP today or tomorrow. Care advice provided, patient verbalizes understanding; denies any other questions or concerns; instructed to call back for any new or worsening symptoms. Writer provided warm transfer to  at [facility] for appointment scheduling. Pt. Requesting VV in place of regularly scheduled F/U appt. Today. Attention Provider: Thank you for allowing me to participate in the care of your patient. The patient was connected to triage in response to information provided to the [facility]. Please do not respond through this encounter as the response is not directed to a shared pool.     Reason for Disposition   Patient wants to be seen    Protocols used: SINUS PAIN OR CONGESTION-ADULT-[place]

## 2022-02-11 PROBLEM — I25.10 ATHEROSCLEROSIS OF CORONARY ARTERY: Status: ACTIVE | Noted: 2022-01-01

## 2022-02-11 PROBLEM — I34.0 MITRAL REGURGITATION: Status: ACTIVE | Noted: 2022-01-01

## 2022-02-11 PROBLEM — I21.3 STEMI (ST ELEVATION MYOCARDIAL INFARCTION) (HCC): Status: ACTIVE | Noted: 2022-01-01

## 2022-03-10 NOTE — TELEPHONE ENCOUNTER
Received call from Jennifer at [facility] with [employer] Complaint. Subjective: Caller states \"Weakness and diarrhea\"     Current Symptoms: Nausea and diarrhea with weakness onset over a week ago, seen by PCP via Virtual appointment and was sent to ED on 03-07-22. Referred to GI, next appointment isn't until . No new or worsening sx's, calling to discuss symptom management with PCP until GI appt. Onset: over a week. Recommended disposition: Call PCP When Office is Open Kate Stoddard from [facility] FP will call patient back with further recommendations after speaking with MD. PT made aware of this and is agreeable. Care advice provided, patient verbalizes understanding; denies any other questions or concerns; instructed to call back for any new or worsening symptoms. Patient/caller agrees to follow-up with PCP     Attention Provider: Thank you for allowing me to participate in the care of your patient. The patient was connected to triage in response to information provided to the [facility]. Please do not respond through this encounter as the response is not directed to a shared pool.         Reason for Disposition   [1] Caller requesting NON-URGENT health information AND [2] PCP's office is the best resource    Protocols used: INFORMATION ONLY CALL - NO TRIAGE-ADULT-

## 2022-03-24 PROBLEM — N17.9 AKI (ACUTE KIDNEY INJURY) (HCC): Status: ACTIVE | Noted: 2022-01-01

## 2022-03-24 NOTE — ED PROVIDER NOTES
Chief complaint : Renal insufficiency    HISTORY OF PRESENT ILLNESS :  Location : Glomerular    Quality : Rising kidney functions    Quantity : Constant    Timing : Less than 2 weeks ago, as her kidney function looks good Tali on     Severity : Moderate    Context : Diabetic, newly diagnosed with cirrhosis, just underwent a CT scan of the abdomen a day or 2 ago  Has congestive heart failure and got 2 new heart stents in January, and had a spell of CHF thereafter    Alleviating / exacerbating factors : None    Associated Symptoms : Dyspnea, fatigue, weakness,             Past Medical History:   Diagnosis Date    Asthma     20 yrs    Atherosclerosis of coronary artery 2022    COPD     8 years    Diabetes ([facility] [address]     Endocrine disease     hypothyroid    Thyroid disease        Past Surgical History:   Procedure Laterality Date    HX  SECTION  1977    [address] [facility] [address]         Family History:   Problem Relation Age of Onset    Heart Attack Father     No Known Problems Maternal Grandmother     No Known Problems Maternal Grandfather     No Known Problems Paternal Grandmother     No Known Problems Paternal Grandfather        Social History     Socioeconomic History    Marital status:      Spouse name: Not on file    Number of children: Not on file    Years of education: Not on file    Highest education level: Not on file   Occupational History    Not on file   Tobacco Use    Smoking status: Former Smoker     Packs/day: 1.00     Years: 30.00     Pack years: 30.00     Types: Cigarettes    Smokeless tobacco: Never Used   Vaping Use    Vaping Use: Never used   Substance and Sexual Activity    Alcohol use: No    Drug use: Yes     Types: Prescription, OTC    Sexual activity: Not Currently   Other Topics Concern    Not on file   Social History Narrative    Lives with friends. Works as a nanny.      Social Determinants of Health     Financial Resource Strain:     Difficulty of Paying Living Expenses: Not on file   Food Insecurity:     Worried About Running Out of Food in the Last Year: Not on file    Gela of Food in the Last Year: Not on file   Transportation Needs:     Lack of Transportation (Medical): Not on file    Lack of Transportation (Non-Medical): Not on file   Physical Activity:     Days of Exercise per Week: Not on file    Minutes of Exercise per Session: Not on file   Stress:     Feeling of Stress : Not on file   Social Connections:     Frequency of Communication with Friends and Family: Not on file    Frequency of Social Gatherings with Friends and Family: Not on file    Attends [employer] Services: Not on file    Active Member of [address] [place] [address] [employer] or Organizations: Not on file    Attends Club or Organization Meetings: Not on file    Marital Status: Not on file   Intimate Partner Violence:     Fear of Current or Ex-Partner: Not on file    Emotionally Abused: Not on file    Physically Abused: Not on file    Sexually Abused: Not on file   Housing Stability:     Unable to Pay for Housing in the Last Year: Not on file    Number of [place] in the Last Year: Not on file    Unstable Housing in the Last Year: Not on file         ALLERGIES: No known drug allergies    Review of Systems   Constitutional: Positive for fatigue. Negative for chills and fever. HENT: Negative for rhinorrhea and sore throat. Eyes: Negative for discharge and redness. Respiratory: Positive for shortness of breath. Negative for cough. Cardiovascular: Negative for chest pain, palpitations and leg swelling. Gastrointestinal: Positive for abdominal pain, nausea and vomiting. Negative for diarrhea. Genitourinary: Negative for difficulty urinating and dysuria. Musculoskeletal: Negative for arthralgias and back pain. Skin: Negative for rash. Neurological: Negative for dizziness and headaches.    All other systems reviewed and are negative. Vitals:    03/24/22 1538 03/24/22 1556   BP: (!) 111/58    Pulse: 86    Resp: 16    Temp: 98.4 °F (36.9 °C)    SpO2: 99% 98%   Weight: 109.8 kg (242 lb)    Height: 5' 4\" (1.626 m)             Physical Exam  Vitals and nursing note reviewed. Constitutional:       General: She is not in acute distress. Appearance: Normal appearance. She is well-developed. She is not ill-appearing, toxic-appearing or diaphoretic. HENT:      Head: Normocephalic and atraumatic. Right Ear: External ear normal.      Left Ear: External ear normal.      Mouth/Throat:      Mouth: Mucous membranes are moist.      Pharynx: Oropharynx is clear. No oropharyngeal exudate or posterior oropharyngeal erythema. Eyes:      General: No scleral icterus. Right eye: No discharge. Left eye: No discharge. Extraocular Movements: Extraocular movements intact. Conjunctiva/sclera: Conjunctivae normal.      Pupils: Pupils are equal, round, and reactive to light. Neck:      Thyroid: No thyromegaly. Trachea: Trachea normal.   Cardiovascular:      Rate and Rhythm: Normal rate and regular rhythm. Heart sounds: Normal heart sounds. No murmur heard. No gallop. Pulmonary:      Effort: Pulmonary effort is normal. No respiratory distress. Breath sounds: Normal breath sounds. No wheezing or rales. Abdominal:      General: Bowel sounds are normal.      Palpations: Abdomen is soft. There is hepatomegaly. There is no splenomegaly or pulsatile mass. Tenderness: There is no abdominal tenderness. There is no guarding. Musculoskeletal:         General: Normal range of motion. Cervical back: Normal range of motion and neck supple. Normal range of motion. Lymphadenopathy:      Cervical: No cervical adenopathy. Skin:     General: Skin is warm and dry. Neurological:      General: No focal deficit present. Mental Status: She is alert and oriented to person, place, and time.  Mental status is at baseline. Motor: No abnormal muscle tone. Comments: cni 2-12 grossly   Psychiatric:         Mood and Affect: Mood normal.         Behavior: Behavior normal.          MDM  Number of Diagnoses or Management Options  Diagnosis management comments: Medical decision making note:  64-year-old female with acute kidney injury, has CHF and COPD and somewhat short of breath, suspect some intravascular volume depletion with pulmonary third spacing. We will give half a liter of saline for her kidney sake, admit to the hospitalist for careful rehydration without overhydration. Newly diagnosed cirrhosis, is due to see GI in May, may be able to hopefully get a GI consult sooner, while inpatient for her kidneys. This concludes the \"medical decision making note\" part of this emergency department visit note.          Amount and/or Complexity of Data Reviewed  Clinical lab tests: reviewed and ordered (Results Include:    Recent Results (from the past 24 hour(s))  -METABOLIC PANEL, BASIC:   Collection Time: 03/24/22 10:50 AM       Result                      Value             Ref Range           Sodium                      133 (L)           136 - 145 mm*       Potassium                   3.2 (L)           3.5 - 5.1 mm*       Chloride                    96 (L)            98 - 107 mmo*       CO2                         27                21 - 32 mmol*       Anion gap                   10                7 - 16 mmol/L       Glucose                     162 (H)           65 - 100 mg/*       BUN                         33 (H)            8 - 23 MG/DL        Creatinine                  1.90 (H)          0.6 - 1.0 MG*       GFR est AA                  34 (L)            >60 ml/min/1*       GFR est non-AA              28 (L)            >60 ml/min/1*       Calcium                     8.6               8.3 - 10.4 M*  -CBC WITH AUTOMATED DIFF:   Collection Time: 03/24/22  3:45 PM       Result                      Value Ref Range           WBC                         4.8               4.3 - 11.1 K*       RBC                         3.13 (L)          4.05 - 5.2 M*       HGB                         8.6 (L)           11.7 - 15.4 *       HCT                         27.4 (L)          35.8 - 46.3 %       MCV                         87.5              79.6 - 97.8 *       MCH                         27.5              26.1 - 32.9 *       MCHC                        31.4              31.4 - 35.0 *       RDW                         17.7 (H)          11.9 - 14.6 %       PLATELET                    129 (L)           150 - 450 K/*       MPV                         11.4              9.4 - 12.3 FL       ABSOLUTE NRBC               0.00              0.0 - 0.2 K/*       NEUTROPHILS                 73                43 - 78 %           LYMPHOCYTES                 12 (L)            13 - 44 %           MONOCYTES                   15 (H)            4.0 - 12.0 %        EOSINOPHILS                 0 (L)             0.5 - 7.8 %         BASOPHILS                   0                 0.0 - 2.0 %         IMMATURE GRANULOCYTES       0                 0.0 - 5.0 %         ABS. NEUTROPHILS            3.5               1.7 - 8.2 K/*       ABS. LYMPHOCYTES            0.6               0.5 - 4.6 K/*       ABS. MONOCYTES              0.7               0.1 - 1.3 K/*       ABS. EOSINOPHILS            0.0               0.0 - 0.8 K/*       ABS. BASOPHILS              0.0               0.0 - 0.2 K/*       ABS. IMM.  GRANS.            0.0               0.0 - 0.5 K/*       RBC COMMENTS                                                  SLIGHT ANISOCYTOSIS + POIKILOCYTOSIS        RBC COMMENTS                                                  SLIGHT HYPOCHROMIA        WBC COMMENTS                                                  Result Confirmed By Smear       PLATELET COMMENTS           ADEQUATE                              DF                          AUTOMATED -METABOLIC PANEL, COMPREHENSIVE:   Collection Time: 03/24/22  3:45 PM       Result                      Value             Ref Range           Sodium                      131 (L)           136 - 145 mm*       Potassium                                     3.5 - 5.1 mm*   HEMOLYZED,RECOLLECT REQUESTED       Chloride                    97 (L)            98 - 107 mmo*       CO2                         24                21 - 32 mmol*       Anion gap                   10                7 - 16 mmol/L       Glucose                     176 (H)           65 - 100 mg/*       BUN                         34 (H)            8 - 23 MG/DL        Creatinine                  2.10 (H)          0.6 - 1.0 MG*       GFR est AA                  30 (L)            >60 ml/min/1*       GFR est non-AA              25 (L)            >60 ml/min/1*       Calcium                     8.3               8.3 - 10.4 M*       Bilirubin, total            3.6 (H)           0.2 - 1.1 MG*       ALT (SGPT)                  148 (H)           12 - 65 U/L         AST (SGOT)                                    15 - 37 U/L     HEMOLYZED,RECOLLECT REQUESTED       Alk.  phosphatase            561 (H)           50 - 136 U/L        Protein, total              7.2               6.3 - 8.2 g/*       Albumin                     1.7 (L)           3.2 - 4.6 g/*       Globulin                    5.5 (H)           2.3 - 3.5 g/*       A-G Ratio                   0.3 (L)           1.2 - 3.5      -NT-PRO BNP:   Collection Time: 03/24/22  5:30 PM       Result                      Value             Ref Range           NT pro-BNP                  2,796 (H)         5 - 125 PG/ML  -POC URINE MACROSCOPIC:   Collection Time: 03/24/22  5:51 PM       Result                      Value             Ref Range           Spec. gravity (POC)         1.020             1.001 - 1.02*       pH, urine  (POC)            5.5               5.0 - 9.0           Protein (POC)               TRACE (A) NEG mg/dL           Glucose, urine (POC)        Negative          NEG mg/dL           Ketones (POC)               Negative          NEG mg/dL           Bilirubin (POC)             Negative          NEG                 Blood (POC)                                   NEG             Trace Intact (A)       Urobilinogen (POC)          1.0               0.2 - 1.0 EU*       Nitrite (POC)               Negative          NEG                 Leukocyte esterase (PO*     Negative          NEG                 Performed by                Carol Mejia                      )  Tests in the radiology section of CPT®: (Results Include:    Recent Results (from the past 24 hour(s))  -METABOLIC PANEL, BASIC:   Collection Time: 03/24/22 10:50 AM      Result                      Value             Ref Range           Sodium                      133 (L)           136 - 145 mm*       Potassium                   3.2 (L)           3.5 - 5.1 mm*       Chloride                    96 (L)            98 - 107 mmo*       CO2                         27                21 - 32 mmol*       Anion gap                   10                7 - 16 mmol/L       Glucose                     162 (H)           65 - 100 mg/*       BUN                         33 (H)            8 - 23 MG/DL        Creatinine                  1.90 (H)          0.6 - 1.0 MG*       GFR est AA                  34 (L)            >60 ml/min/1*       GFR est non-AA              28 (L)            >60 ml/min/1*       Calcium                     8.6               8.3 - 10.4 M*  -CBC WITH AUTOMATED DIFF:   Collection Time: 03/24/22  3:45 PM       Result                      Value             Ref Range           WBC                         4.8               4.3 - 11.1 K*       RBC                         3.13 (L)          4.05 - 5.2 M*       HGB                         8.6 (L)           11.7 - 15.4 *       HCT                         27.4 (L)          35.8 - 46.3 %       MCV 87.5              79.6 - 97.8 *       MCH                         27.5              26.1 - 32.9 *       MCHC                        31.4              31.4 - 35.0 *       RDW                         17.7 (H)          11.9 - 14.6 %       PLATELET                    129 (L)           150 - 450 K/*       MPV                         11.4              9.4 - 12.3 FL       ABSOLUTE NRBC               0.00              0.0 - 0.2 K/*       NEUTROPHILS                 73                43 - 78 %           LYMPHOCYTES                 12 (L)            13 - 44 %           MONOCYTES                   15 (H)            4.0 - 12.0 %        EOSINOPHILS                 0 (L)             0.5 - 7.8 %         BASOPHILS                   0                 0.0 - 2.0 %         IMMATURE GRANULOCYTES       0                 0.0 - 5.0 %         ABS. NEUTROPHILS            3.5               1.7 - 8.2 K/*       ABS. LYMPHOCYTES            0.6               0.5 - 4.6 K/*       ABS. MONOCYTES              0.7               0.1 - 1.3 K/*       ABS. EOSINOPHILS            0.0               0.0 - 0.8 K/*       ABS. BASOPHILS              0.0               0.0 - 0.2 K/*       ABS. IMM.  GRANS.            0.0               0.0 - 0.5 K/*       RBC COMMENTS                                                  SLIGHT ANISOCYTOSIS + POIKILOCYTOSIS        RBC COMMENTS                                                  SLIGHT HYPOCHROMIA        WBC COMMENTS                                                  Result Confirmed By Smear       PLATELET COMMENTS           ADEQUATE                              DF                          AUTOMATED                        -METABOLIC PANEL, COMPREHENSIVE:   Collection Time: 03/24/22  3:45 PM       Result                      Value             Ref Range           Sodium                      131 (L)           136 - 145 mm*       Potassium                                     3.5 - 5.1 mm*   HEMOLYZED,RECOLLECT REQUESTED Chloride                    97 (L)            98 - 107 mmo*       CO2                         24                21 - 32 mmol*       Anion gap                   10                7 - 16 mmol/L       Glucose                     176 (H)           65 - 100 mg/*       BUN                         34 (H)            8 - 23 MG/DL        Creatinine                  2.10 (H)          0.6 - 1.0 MG*       GFR est AA                  30 (L)            >60 ml/min/1*       GFR est non-AA              25 (L)            >60 ml/min/1*       Calcium                     8.3               8.3 - 10.4 M*       Bilirubin, total            3.6 (H)           0.2 - 1.1 MG*       ALT (SGPT)                  148 (H)           12 - 65 U/L         AST (SGOT)                                    15 - 37 U/L     HEMOLYZED,RECOLLECT REQUESTED       Alk.  phosphatase            561 (H)           50 - 136 U/L        Protein, total              7.2               6.3 - 8.2 g/*       Albumin                     1.7 (L)           3.2 - 4.6 g/*       Globulin                    5.5 (H)           2.3 - 3.5 g/*       A-G Ratio                   0.3 (L)           1.2 - 3.5      -NT-PRO BNP:   Collection Time: 03/24/22  5:30 PM       Result                      Value             Ref Range           NT pro-BNP                  2,796 (H)         5 - 125 PG/ML  -POC URINE MACROSCOPIC:   Collection Time: 03/24/22  5:51 PM       Result                      Value             Ref Range           Spec. gravity (POC)         1.020             1.001 - 1.02*       pH, urine  (POC)            5.5               5.0 - 9.0           Protein (POC)               TRACE (A)         NEG mg/dL           Glucose, urine (POC)        Negative          NEG mg/dL           Ketones (POC)               Negative          NEG mg/dL           Bilirubin (POC)             Negative          NEG                 Blood (POC)                                   NEG             Trace Intact (A) Urobilinogen (POC)          1.0               0.2 - 1.0 EU*       Nitrite (POC)               Negative          NEG                 Leukocyte esterase (PO*     Negative          NEG                 Performed by                Marlon Mathis                      )  Decide to obtain previous medical records or to obtain history from someone other than the patient: yes  Discuss the patient with other providers: yes (Hospitalist)    Risk of Complications, Morbidity, and/or Mortality  Presenting problems: moderate  Diagnostic procedures: low  Management options: low    Patient Progress  Patient progress: improved         Procedures

## 2022-03-24 NOTE — ED TRIAGE NOTES
Pt arrives via [facility] to triage. Pt reports having blood work done today that showed elevated kidney tests. Recent diagnosis of cirrhosis. Has a CT scan dont yesterday and states creatinine was elevated and had it rechecked today and it was still elevated. States fatigued. NAd. Masked.

## 2022-03-24 NOTE — ED NOTES
Patient soiled brief, changed and clean chux pad placed under patient.  Purewick placed per patient request

## 2022-03-24 NOTE — ED NOTES
Patient returning call.  Would like a call back if patient was unable to speak with nurse  Call connected to acc   Routed to acc pool Patient tolerated straight catheter procedure. Approximately 350ML yellow urine noted.

## 2022-03-24 NOTE — H&P
MANJUL[facility] HOSPITALIST H&P      Patient ID:  NAME:  Martha Jacobson   Age/Sex: 69 y.o. female  :   1955   MRN:   [id number]    Admission Date: 3/24/2022  Chief Complaint: Shortness of Breath and generalized weakness  Reason for Admission: ISABELLA (acute kidney injury) ([facility]    Assessment/Plan (MDM):     Principal Problem:    ISABELLA (acute kidney injury) ([facility] (3/24/2022)  - Patient with rising creatinine over past month  - Likely due to dehydration with N/V and Lasix vs early HRS  - 22 Cr 0.87  - 3/7/22 Cr 0.91  - 3/24/22 (earlier) Cr 1.90  - 3/24/22 (later) Cr 2.10  - BUN 34/Cr 2.1 = 16.1 which could be prerenal or intrarenal  - UA unremarkable  - Check urine sodium  - Check urine prot/creat  - Check renal U/S  - Will give normal saline @ 100 ml/hr x 1L  - Hold Lasix  - Strict I/O  - Check CMP daily    Active Problems:    Cirrhosis of liver ([facility] ()  - Newly diagnosed  - No ETOH use, no know h/o fatty liver  - 3/23 CTAP with cirrhosis and multiple varices present suggesting portal hypertension  - Has GI OP appt on May 31 --> will consult inpatient  - Check hepatitis panel  - Check autoimmune serologies  - Check HIV  - Check INR  - Check anti-alphatrypsin antibody  - May need EGD to assess varices pending Brilinta/ASA status      Hyponatremia (2010)  - Chronic in nature  - May be worse due to cirrhosis  - Check urine sodium  - Check CMP daily      Elevated bilirubin ()  - Due to cirrhosis  - Consult GI for assistance      Normocytic anemia ()  - Chronic but slowly worsening over past month  - Due to kidney failure vs varices vs LILIAN  - Check iron studies  - Consult GI for assistance      Diabetes mellitus ([facility] (2010)  - No acute issues  - Hold home Metformin  - Start Humalog SSI      Thrombocytopenia (HCC) ()  - Slowly dropping  - Likely due to cirrhosis  - Check CBC daily  - Continue Brilinta/ASA but consult Cardiology for recs      Hypoxia, home O2 at night 3L (2010) Hypothyroidism (1/13/2010)  - Continue Levothyroxine      Atherosclerosis of coronary artery (1/12/2022)  - STEMI with OSWALDO to RCA in 1/2022  - Continue Metoprolol  - Continue Brilinta + ASA  - Consult Cardiology to assist with [address] with recent STEMI and possible procedures/thrombocytopenia      Abnormal echocardiogram  - ECHO 1/2022 with 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  - Elevated LVEDP so Lasix was started  - May need Cardiology's clearance prior to procedures      Chronic obstructive pulmonary disease (HCC) ()  - Chronic in nature  - No acute hypoxia or wheezing  - Albuterol PRN  - Check anti-alphatrypsin antibody      Morbid obesity ([facility] (1/14/2010)    Disposition: Home in 2-3 days  Diet: Diabetic, low sodium  VTE ppx: SCDs  GI ppx: PPI  CODE STATUS: FULL CODE    PCP: Zofia Driver MD    Attending MD: Enedelia Weems DO    Treatment Team: Attending Provider: Jerson Rosario DO; Primary Nurse: Maicol Koenig RN; Primary Nurse: Luis Green RN    HPI:     Radha Arnold is a 69year old CF with a PMH of CAD s/p STEMI with OSWALDO to RCA in 1/2022, DM2, COPD with nocturnal hypoxia, hypothyroidism, and recent diagnosis of cirrhosis who presented to the ER with shortness of breath over the previous 24 hours, nausea with occasional vomiting and generalized weakness over the past week to the point of being unable to stand up from a sitting position. She states that she had a heart attack in January 2022 and has had intermittent nausea since that time. Then about 2-3 weeks prior she had persistent nausea and has not been able to eat or drink much. She went to see her PCP who checked some labs and noticed her liver enzymes were off so ordered a CTAP. CTAP on 3/23 showed new cirrhosis with possible varices. He repeated labs and noticed her kidney function had worsened so sent her to the ER to be evaluated. When he called, she had SOB, but this is improved in the ER.  Denies CP. Denies cough. Denies edema. Denies F/C. Denies diarrhea. Denies dysuria/frequency. Complete ROS done and is as stated in HPI or otherwise negative    Past Medical History:   Diagnosis Date    Asthma     20 yrs    Atherosclerosis of coronary artery 2022    Cirrhosis of liver ([facility]     COPD     10 years    Diabetes ([facility] 2005    Endocrine disease     hypothyroid    Thyroid disease         Past Surgical History:   Procedure Laterality Date    HX  SECTION  1975, 0     [facility] [place] [facility]        Prior to Admission Medications   Prescriptions Last Dose Informant Patient Reported? Taking? Brilinta 90 mg tablet   Yes No   Sig: Take 90 mg by mouth every twelve (12) hours. DULoxetine (CYMBALTA) 60 mg capsule   No No   Sig: Take 1 Capsule by mouth daily. albuterol (PROVENTIL HFA, VENTOLIN HFA, PROAIR HFA) 90 mcg/actuation inhaler   No No   Sig: Take 2 Puffs by inhalation every four (4) hours as needed for Wheezing or Shortness of Breath. albuterol (PROVENTIL VENTOLIN) 2.5 mg /3 mL (0.083 %) nebu   No No   Sig: INHALE 1 VIAL VIA NEBULIZER EVERY 6 HOURS AS NEEDED FOR WHEEZING OR SHORTNESS OF BREATH   ascorbic acid (VITAMIN C) 500 mg chewable tablet   Yes No   Sig: Take 500 mg by mouth. aspirin delayed-release 81 mg tablet   Yes No   Sig: Take  by mouth daily. atorvastatin (LIPITOR) 80 mg tablet   Yes No   Sig: Take 80 mg by mouth nightly. fluticasone propionate (FLONASE) 50 mcg/actuation nasal spray   No No   Si Sprays by Both Nostrils route daily. furosemide (LASIX) 40 mg tablet   No No   Sig: Take 1 Tablet by mouth daily. guaiFENesin ER (MUCINEX) 600 mg ER tablet   No No   Sig: Take 1 Tablet by mouth two (2) times a day.   hydrOXYzine pamoate (VISTARIL) 25 mg capsule   No No   Sig: Take 1 Capsule by mouth two (2) times daily as needed for Anxiety.    levothyroxine (SYNTHROID) 175 mcg tablet   No No   Sig: Take 1 Tablet by mouth Daily (before breakfast). metFORMIN (GLUCOPHAGE) 1,000 mg tablet   No No   Sig: Take 1 Tablet by mouth two (2) times daily (with meals). metoprolol tartrate (LOPRESSOR) 25 mg tablet   No No   Sig: Take 0.5 Tablets by mouth two (2) times a day. nitroglycerin (NITROSTAT) 0.4 mg SL tablet   Yes No   Si.4 mg by SubLINGual route. ondansetron (ZOFRAN ODT) 4 mg disintegrating tablet   No No   Sig: Take 1 Tablet by mouth every eight (8) hours as needed for Nausea or Vomiting. pantoprazole (PROTONIX) 40 mg tablet   No No   Sig: Take 1 Tablet by mouth daily. Before breakfast   sucralfate (CARAFATE) 100 mg/mL suspension   No No   Sig: Take 10 mL by mouth three (3) times daily. Facility-Administered Medications: None       Allergies   Allergen Reactions    No Known Drug Allergies Not Reported This Time        Social History     Tobacco Use    Smoking status: Former Smoker     Packs/day: 1.00     Years: 30.00     Pack years: 30.00     Types: Cigarettes    Smokeless tobacco: Never Used   Substance Use Topics    Alcohol use: No        Family History   Problem Relation Age of Onset    Heart Attack Father     No Known Problems Maternal Grandmother     No Known Problems Maternal Grandfather     No Known Problems Paternal Grandmother     No Known Problems Paternal Grandfather         Objective:       Visit Vitals  BP (!) 109/59   Pulse 90   Temp 98.4 °F (36.9 °C)   Resp 18   Ht 5' 4\" (1.626 m)   Wt 109.8 kg (242 lb)   SpO2 98%   BMI 41.54 kg/m²        Temp (24hrs), Av.4 °F (36.9 °C), Min:98.4 °F (36.9 °C), Max:98.4 °F (36.9 °C)      Oxygen Therapy  O2 Sat (%): 98 % (22)  Pulse via Oximetry: 82 beats per minute (22)  O2 Device: None (Room air) (22 913)      Physical Exam:    General:    Alert, cooperative, appears stated age. Eyes:   PERRLA EOMI Anicteric  Head:   Normocephalic, without obvious abnormality, atraumatic. ENT:  Nares normal. No drainage.   Lungs:   Clear to auscultation bilaterally. No Wheezing or Rhonchi. No rales. Heart:   Regular rate and rhythm,  no murmur, rub or gallop. No JVD. Abdomen:   Soft, non-tender. Not distended. Bowel sounds normal.   MSK:  No edema. No clubbing or cyanosis. No deformities/lesions. Skin:     Texture, turgor normal. No rashes or lesions. No Jaundice. Neurologic: Alert and oriented x 3, no focal deficits   Psychiatry:      No depression/anxiety. Mood congruent for context. Heme/Lymph/Immune: No echymoses or overt signs of bleeding. Lab/Data Review:  Recent Results (from the past 24 hour(s))   METABOLIC PANEL, BASIC    Collection Time: 03/24/22 10:50 AM   Result Value Ref Range    Sodium 133 (L) 136 - 145 mmol/L    Potassium 3.2 (L) 3.5 - 5.1 mmol/L    Chloride 96 (L) 98 - 107 mmol/L    CO2 27 21 - 32 mmol/L    Anion gap 10 7 - 16 mmol/L    Glucose 162 (H) 65 - 100 mg/dL    BUN 33 (H) 8 - 23 MG/DL    Creatinine 1.90 (H) 0.6 - 1.0 MG/DL    GFR est AA 34 (L) >60 ml/min/1.73m2    GFR est non-AA 28 (L) >60 ml/min/1.73m2    Calcium 8.6 8.3 - 10.4 MG/DL   CBC WITH AUTOMATED DIFF    Collection Time: 03/24/22  3:45 PM   Result Value Ref Range    WBC 4.8 4.3 - 11.1 K/uL    RBC 3.13 (L) 4.05 - 5.2 M/uL    HGB 8.6 (L) 11.7 - 15.4 g/dL    HCT 27.4 (L) 35.8 - 46.3 %    MCV 87.5 79.6 - 97.8 FL    MCH 27.5 26.1 - 32.9 PG    MCHC 31.4 31.4 - 35.0 g/dL    RDW 17.7 (H) 11.9 - 14.6 %    PLATELET 725 (L) 680 - 450 K/uL    MPV 11.4 9.4 - 12.3 FL    ABSOLUTE NRBC 0.00 0.0 - 0.2 K/uL    NEUTROPHILS 73 43 - 78 %    LYMPHOCYTES 12 (L) 13 - 44 %    MONOCYTES 15 (H) 4.0 - 12.0 %    EOSINOPHILS 0 (L) 0.5 - 7.8 %    BASOPHILS 0 0.0 - 2.0 %    IMMATURE GRANULOCYTES 0 0.0 - 5.0 %    ABS. NEUTROPHILS 3.5 1.7 - 8.2 K/UL    ABS. LYMPHOCYTES 0.6 0.5 - 4.6 K/UL    ABS. MONOCYTES 0.7 0.1 - 1.3 K/UL    ABS. EOSINOPHILS 0.0 0.0 - 0.8 K/UL    ABS. BASOPHILS 0.0 0.0 - 0.2 K/UL    ABS. IMM.  GRANS. 0.0 0.0 - 0.5 K/UL    RBC COMMENTS SLIGHT  ANISOCYTOSIS + POIKILOCYTOSIS        RBC COMMENTS SLIGHT  HYPOCHROMIA        WBC COMMENTS Result Confirmed By Smear      PLATELET COMMENTS ADEQUATE      DF AUTOMATED     METABOLIC PANEL, COMPREHENSIVE    Collection Time: 03/24/22  3:45 PM   Result Value Ref Range    Sodium 131 (L) 136 - 145 mmol/L    Potassium HEMOLYZED,RECOLLECT REQUESTED 3.5 - 5.1 mmol/L    Chloride 97 (L) 98 - 107 mmol/L    CO2 24 21 - 32 mmol/L    Anion gap 10 7 - 16 mmol/L    Glucose 176 (H) 65 - 100 mg/dL    BUN 34 (H) 8 - 23 MG/DL    Creatinine 2.10 (H) 0.6 - 1.0 MG/DL    GFR est AA 30 (L) >60 ml/min/1.73m2    GFR est non-AA 25 (L) >60 ml/min/1.73m2    Calcium 8.3 8.3 - 10.4 MG/DL    Bilirubin, total 3.6 (H) 0.2 - 1.1 MG/DL    ALT (SGPT) 148 (H) 12 - 65 U/L    AST (SGOT) HEMOLYZED,RECOLLECT REQUESTED 15 - 37 U/L    Alk.  phosphatase 561 (H) 50 - 136 U/L    Protein, total 7.2 6.3 - 8.2 g/dL    Albumin 1.7 (L) 3.2 - 4.6 g/dL    Globulin 5.5 (H) 2.3 - 3.5 g/dL    A-G Ratio 0.3 (L) 1.2 - 3.5     NT-PRO BNP    Collection Time: 03/24/22  5:30 PM   Result Value Ref Range    NT pro-BNP 2,796 (H) 5 - 125 PG/ML   POC URINE MACROSCOPIC    Collection Time: 03/24/22  5:51 PM   Result Value Ref Range    Spec. gravity (POC) 1.020 1.001 - 1.023      pH, urine  (POC) 5.5 5.0 - 9.0      Protein (POC) TRACE (A) NEG mg/dL    Glucose, urine (POC) Negative NEG mg/dL    Ketones (POC) Negative NEG mg/dL    Bilirubin (POC) Negative NEG      Blood (POC) Trace Intact (A) NEG      Urobilinogen (POC) 1.0 0.2 - 1.0 EU/dL    Nitrite (POC) Negative NEG      Leukocyte esterase (POC) Negative NEG      Performed by Julia Mckeon    URINALYSIS W/ RFLX MICROSCOPIC    Collection Time: 03/24/22  6:16 PM   Result Value Ref Range    Color YELLOW      Appearance CLOUDY      Specific gravity 1.015 1.001 - 1.023      pH (UA) 5.5 5.0 - 9.0      Protein TRACE (A) NEG mg/dL    Glucose Negative mg/dL    Ketone Negative NEG mg/dL    Bilirubin Negative NEG      Blood TRACE (A) NEG      Urobilinogen 1.0 0.2 - 1.0 EU/dL    Nitrites Negative NEG      Leukocyte Esterase Negative NEG     URINE MICROSCOPIC    Collection Time: 03/24/22  6:16 PM   Result Value Ref Range    WBC 0-3 0 /hpf    RBC 0 0 /hpf    Epithelial cells 0-3 0 /hpf    Bacteria TRACE 0 /hpf    Casts 0-3 0 /lpf    Crystals, urine 0 0 /LPF    Mucus 0 0 /lpf    Other observations RESULTS VERIFIED MANUALLY         Imaging:  CT ABD PELV W CONT    Result Date: 3/23/2022  CT OF THE ABDOMEN AND PELVIS INDICATION: Persistent nausea and vomiting Multiple axial images were obtained through the abdomen and pelvis. Oral contrast was used for bowel opacification. 100mL of Isovue 370 intravenous contrast was used for better evaluation of solid organs and vascular structures. Radiation dose reduction techniques were used for this study. All CT scans performed at this facility use one or all of the following: Automated exposure control, adjustment of the mA and/or kVp according to patient's size, iterative reconstruction. COMPARISON: None FINDINGS: - LUNG BASES: No infiltrates or masses. - LIVER: Liver has a cirrhotic appearance, 16 cm in length. No discrete liver mass. Portal vein is patent. Small gastroesophageal varices are present. - GALLBLADDER/BILE DUCTS: Postcholecystectomy. No bile duct dilatation. - PANCREAS: Normal. - SPLEEN: 18 cm in length. No discrete splenic mass. - ADRENALS: Normal. - KIDNEYS/URETERS: No hydronephrosis or significant mass. - BLADDER: Normal. - REPRODUCTIVE ORGANS: Post hysterectomy. No pelvic masses. - BOWEL: Normal caliber. No inflammatory changes. - LYMPH NODES: Multiple small periportal and gastrohepatic lymph nodes. No significant mesenteric or pelvic adenopathy. - BONES: There are degenerative changes throughout the lumbar spine. No fracture or significant bone lesion. - VASCULATURE: Normal - OTHER: 2 ventral hernias adjacent to the umbilicus with omentum involvements and several enlarged vessels. No bowel involvement.      1. Cirrhosis. Multiple varices present suggesting portal hypertension. 2.  2 ventral hernias with omental involvement. If there are any questions about this report, I can be reached on [employer] or at [phone]       Cardiac Studies:  EKG Results     None          No results found for this visit on 03/24/22. Cultures: All Micro Results     None          Active Problems:     Principal Diagnosis ISABELLA (acute kidney injury) [facility])    Hospital Problems as of 3/24/2022 Date Reviewed: 3/14/2022          Codes Class Noted - Resolved POA    * (Principal) ISABELLA (acute kidney injury) ([facility] ICD-10-CM: N17.9  ICD-9-CM: 584.9  3/24/2022 - Present Yes        Cirrhosis of liver ([facility] ICD-10-CM: K74.60  ICD-9-CM: 571.5  Unknown - Present Yes        Elevated bilirubin ICD-10-CM: R17  ICD-9-CM: 277.4  Unknown - Present Yes        Normocytic anemia ICD-10-CM: D64.9  ICD-9-CM: 275. 9  Unknown - Present Yes        Hyponatremia ICD-10-CM: E87.1  ICD-9-CM: 276.1  1/13/2010 - Present Yes        Thrombocytopenia ([facility] ICD-10-CM: D69.6  ICD-9-CM: 287.5  Unknown - Present Yes        Diabetes mellitus ([facility] (Chronic) ICD-10-CM: E11.9  ICD-9-CM: 250.00  1/13/2010 - Present Yes        Chronic obstructive pulmonary disease ([facility] ICD-10-CM: J44.9  ICD-9-CM: 562  Unknown - Present Yes    Overview Signed 3/24/2022  7:32 PM by Matthew Gamez DO     10 years             Atherosclerosis of coronary artery ICD-10-CM: I25.10  ICD-9-CM: 414.00  1/12/2022 - Present Yes    Overview Signed 2/11/2022  8:13 AM by Stefanie Aquino MD     Last Assessment & Plan:   Formatting of this note is different from the original.  1-PRESENTATION WITH INFERIOR STEMI  2-TOTAL MID VESSEL OCCLUSION OF THE RCA WITH LARGE AMOUNT OF THROMBUS  3-RELATIVELY MILD LEFT SIDED CAD (40-50% proximal LAD)  4-INFERIOR WALL HYPOKINESIS, EF 50% WITH MR  5-SUCCESSFUL ASPIRATION THROMBECTOMY+ OSWALDO RCA (one distal branch fills slowly)     - ASA, Brilinta, Lipitor, lopressor Hypoxia, home O2 at night 3L (Chronic) ICD-10-CM: R09.02  ICD-9-CM: 799.02  1/13/2010 - Present Yes        Hypothyroidism (Chronic) ICD-10-CM: E03.9  ICD-9-CM: 244.9  1/13/2010 - Present Yes        Morbid obesity ([facility] (Chronic) ICD-10-CM: E66.01  ICD-9-CM: 278.01  1/14/2010 - Present Yes              Anticipated discharge: 3-4 days to home vs SNF    Bob Nam DO  [address] [facility]  3/24/2022 7:37 PM

## 2022-03-25 PROBLEM — I50.32 CHRONIC DIASTOLIC CONGESTIVE HEART FAILURE (HCC): Status: ACTIVE | Noted: 2022-01-01

## 2022-03-25 PROBLEM — I25.10 ATHEROSCLEROSIS OF CORONARY ARTERY: Chronic | Status: ACTIVE | Noted: 2022-01-01

## 2022-03-25 PROBLEM — I85.11 SECONDARY ESOPHAGEAL VARICES WITH BLEEDING (HCC): Status: ACTIVE | Noted: 2022-01-01

## 2022-03-25 PROBLEM — I85.10 SECONDARY ESOPHAGEAL VARICES WITHOUT BLEEDING (HCC): Status: ACTIVE | Noted: 2022-01-01

## 2022-03-25 PROBLEM — D61.818 PANCYTOPENIA (HCC): Status: ACTIVE | Noted: 2022-01-01

## 2022-03-25 NOTE — CONSULTS
Nephrology consult    Admission Date:  3/24/2022    Admission Diagnosis  ISABELLA (acute kidney injury) ([facility] [N17.9]    We are asked by Dr. Miracle Carrasco    History of Present Illness: this is a 70 y/o female with nausea and vomiting for several weeks.  She has a h/o RUDD    Past Medical History:   Diagnosis Date    Asthma     20 yrs    Atherosclerosis of coronary artery 2022    Cirrhosis of liver ([facility]     COPD     10 years    Diabetes ([facility]     Endocrine disease     hypothyroid    Thyroid disease       Past Surgical History:   Procedure Laterality Date    HX  SECTION  1977    HX CHOLECYSTECTOMY      HX GYN      Hyst, C Section      Current Facility-Administered Medications   Medication Dose Route Frequency    albuterol (PROVENTIL VENTOLIN) nebulizer solution 2.5 mg  2.5 mg Nebulization Q4H PRN    aspirin delayed-release tablet 81 mg  81 mg Oral DAILY    atorvastatin (LIPITOR) tablet 80 mg  80 mg Oral QHS    ticagrelor (BRILINTA) tablet 90 mg  90 mg Oral Q12H    DULoxetine (CYMBALTA) capsule 30 mg  30 mg Oral DAILY    fluticasone propionate (FLONASE) 50 mcg/actuation nasal spray 2 Spray  2 Spray Both Nostrils DAILY    hydrOXYzine pamoate (VISTARIL) capsule 25 mg  25 mg Oral BID PRN    levothyroxine (SYNTHROID) tablet 175 mcg  175 mcg Oral ACB    metoprolol tartrate (LOPRESSOR) tablet 12.5 mg  12.5 mg Oral BID    pantoprazole (PROTONIX) tablet 40 mg  40 mg Oral ACB&D    sucralfate (CARAFATE) tablet 1 g  1 g Oral AC&HS    sodium chloride (NS) flush 5-40 mL  5-40 mL IntraVENous Q8H    sodium chloride (NS) flush 5-40 mL  5-40 mL IntraVENous PRN    tuberculin injection 5 Units  5 Units IntraDERMal ONCE    insulin lispro (HUMALOG) injection   SubCUTAneous AC&HS     Allergies   Allergen Reactions    No Known Drug Allergies Not Reported This Time      Social History     Tobacco Use    Smoking status: Former Smoker     Packs/day: 1.00     Years: 30.00     Pack years: 30.00 Types: Cigarettes    Smokeless tobacco: Never Used   Substance Use Topics    Alcohol use: No      Family History   Problem Relation Age of Onset    Heart Attack Father     No Known Problems Maternal Grandmother     No Known Problems Maternal Grandfather     No Known Problems Paternal Grandmother     No Known Problems Paternal Grandfather         Review of Systems  Gen - no fever, no chills, appetite poor  HEENT - no sore throat, no decreased vision, no hearing loss  Neck - no neck mass  CV - no chest pain, no palpitation orthopnea  Lung - no shortness of breath,  cough, no hemoptysis  Abd - no tenderness,  nausea/vomiting, no bloody stool  Ext - no edema, no clubbing, no cyanosis  Musculoskeletal - no joint pain, no back pain  Neurologic - no headaches, no dizziness, no seizures  Psychiatric - no anxiety, no depression  Skin - no rashes, no pupura  Genitourinary - no decreased urine output, no hematuria, no foamy urine    Objective:     Vitals:    03/25/22 0400 03/25/22 0743 03/25/22 0946 03/25/22 1100   BP:  (!) 120/52  117/68   Pulse:  78  78   Resp:  20  20   Temp:  97.7 °F (36.5 °C)  97.9 °F (36.6 °C)   SpO2:  98% 99% 99%   Weight: 113.8 kg (250 lb 12.8 oz)      Height:           Intake/Output Summary (Last 24 hours) at 3/25/2022 1120  Last data filed at 3/25/2022 1010  Gross per 24 hour   Intake 120 ml   Output 350 ml   Net -230 ml       Physical Exam  GEN :in no distress, alert and oriented  HEENT: anicteric sclerae, eomi. Oropharynx without lesions. Mucous membranes are moist.  Neck - supple without JVD, no thyromegaly. No lymphadenopathy.   CV - regular rate and rhythm, no murmur, no rub  Lung - clear bilaterally, lungs very little air movement    Chest wall - normal appearance  Abd - soft, nontender, bowel sounds present, no hepatosplenomegaly  Ext - no clubbing, no cyanosis, no edema  Neurologic - nonfocal  Genitourinary - bladder nonpalpable  Skin - no rashes, no purpura, no ecchymoses  Psychiatric: Normal mood and affect. Data Review:   Recent Labs     03/25/22  0515 03/24/22  1545   WBC 3.3* 4.8   HGB 7.8* 8.6*   HCT 25.6* 27.4*   * 129*     Recent Labs     03/25/22  0515 03/24/22  2118 03/24/22  1545 03/24/22  1050     --  131* 133*   K 3.0*  --  HEMOLYZED,RECOLLECT REQUESTED 3.2*     --  97* 96*   CO2 27 --  24 27   BUN 32*  --  34* 33*   CREA 1.70*  --  2.10* 1.90*   *  --  176* 162*   CA 8.2*  --  8.3 8.6   MG  --  1.7*  --   --    PHOS  --  3.3  --   --      No results for input(s): PH, PCO2, PO2, PCO2 in the last 72 hours. Problem List:     Patient Active Problem List    Diagnosis Date Noted    ISABELLA (acute kidney injury) ([facility] 03/24/2022    Chronic obstructive pulmonary disease (HCC)     Cirrhosis of liver (HCC)     Elevated bilirubin     Normocytic anemia     Thrombocytopenia (HCC)     Abnormal echocardiogram     Atherosclerosis of coronary artery 01/12/2022    Mitral regurgitation 01/10/2022    STEMI (ST elevation myocardial infarction) ([address] 01/09/2022    Morbid obesity ([address] 01/14/2010    Pneumonia, Organism Unspecified, right 01/13/2010    COPD exacerbation ([address] 01/13/2010    Hypoxia, home O2 at night 3L 01/13/2010    Diabetes mellitus ([facility] 01/13/2010    Hypothyroidism 01/13/2010    Snoring, likely JANY 01/13/2010    Hyponatremia 01/13/2010       Impression:    Plan: 1. Nausea and Vomiting  2. Volume depletion  3. RUDD with cirrhosis and varices  4. MI and stents in 1/2022  5. ISABELLA prerenal azotemia improving with hydration. I agree with current treatment regimen. Since renal function is reversing with minimal treatment I will sign off, thanks for the consult.

## 2022-03-25 NOTE — PROGRESS NOTES
Bedside report received from night nurse Bessy Alanis. Assessment done as noted  Respiration even and unlabored 20/min; denies pain or nausea at present. Encouraged to call with needs.

## 2022-03-25 NOTE — PROGRESS NOTES
RT requesting to have scheduled inhaler Albuterol 2.5mg  Q12h per pt's request. Spoke with Dr. July Oates. New orders received. Oncoming nurse updated and to assume care.

## 2022-03-25 NOTE — PROGRESS NOTES
ACUTE PHYSICAL THERAPY GOALS:  (Developed with and agreed upon by patient and/or caregiver. )  LTG:  (1.)Ms. Yesenia Solano will move from supine to sit and sit to supine , scoot up and down and roll side to side in bed with INDEPENDENCE within 7 treatment day(s). (2.)Ms. Yesenia Solano will transfer from bed to chair and chair to bed with MODIFIED INDEPENDENCE using the least restrictive device within 7 treatment day(s). (3.)Ms. Yesenia Solano will ambulate with MODIFIED INDEPENDENCE for a minimum of 150 feet with the least restrictive device within 7 treatment day(s). ________________________________________________________________________________________________    PHYSICAL THERAPY ASSESSMENT: Initial Assessment and Daily Note PT Treatment Day # 1      Flower Barrett is a 69 y.o. female   PRIMARY DIAGNOSIS: ISABELLA (acute kidney injury) ([facility] [address]  ISABELLA (acute kidney injury) ([facility] [address] [N17.9]       Reason for Referral:    ICD-10: Treatment Diagnosis: Generalized Muscle Weakness (M62.81)  Other abnormalities of gait and mobility (R26.89)  History of falling (Z91.81)  INPATIENT: Payor: [employer] MEDICARE COMPLETE / Plan: Alexandr Hunter / Product Type: Managed Care Medicare /     ASSESSMENT:     REHAB RECOMMENDATIONS:   Recommendation to date pending progress:  [address] [place] [address] Therapy  Equipment:    To Be Determined     PRIOR LEVEL OF FUNCTION:  (Prior to Hospitalization) INITIAL/CURRENT LEVEL OF FUNCTION:  (Most Recently Demonstrated)   Bed Mobility:   Independent  Sit to Stand:   Independent  Transfers:   Independent  Gait/Mobility:   Modified Independent Bed Mobility:   Not tested  Sit to Stand:  [employer] Assistance  Transfers:   Not tested  Gait/Mobility:  [employer] Assistance     ASSESSMENT:  Ms. Yesenia Solano presents with a diagnosis of ISABELLA and increased weakness. She lives with her family and has recently been having greater difficulty with stairs and has noticed more weakness.  She has been seeing a HHPT since her last hospitalization and states she was getting ready to be d/c when this episode occurred. Today she is able to ambulate 22' with CGA and use of the RW with increased lateral trunk sway and a wide DOUGLSA with a slow gait speed. She is on O2 at baseline for sleeping and has been needing it during the day while in the hospital due to increased SOB. She demonstrates decreased strength and mobility and would continue to benefit from skilled PT to facilitate return to baseline. SUBJECTIVE:   Ms. Mckenna Cotto states, \"I was just about to finish my therapy when all this happened\"    SOCIAL HISTORY/LIVING ENVIRONMENT: Pt lives in a mobile home with her family, 5 steps to enter with RHR, uses SPC in home, RW for community and w/c for longer distances. She is independent with ADLs and transfers and endorses a history of falling. Recent heart attack in January that has impacted her stability.    Home Environment: Trailer/mobile home  One/Two Story Residence: One story  Living Alone: No  Support Systems: Spouse/Significant Other  OBJECTIVE:     PAIN: VITAL SIGNS: LINES/DRAINS:   Pre Treatment: Pain Screen  Pain Scale 1: Numeric (0 - 10)  Pain Intensity 1: 0  Post Treatment: 0   Purewick  O2 Device: Nasal cannula     GROSS EVALUATION:   Within Functional Limits Abnormal/ Functional Abnormal/ Non-Functional (see comments) Not Tested Comments:   AROM [] [x] [] []    PROM [] [] [] []    Strength [] [x] [] []    Balance [] [x] [] []    Posture [] [] [] []    Sensation [] [x] [] [] Neuropathy in toes   Coordination [] [] [] []    Tone [] [] [] []    Edema [] [] [] []    Activity Tolerance [] [x] [] []     [] [] [] []      COGNITION/  PERCEPTION: Intact Impaired   (see comments) Comments:   Orientation [x] []    Vision [x] []    Hearing [x] []    Command Following [x] []    Safety Awareness [x] []     [] []      MOBILITY: I Mod I S SBA CGA Min Mod Max Total  NT x2 Comments:   Bed Mobility    Rolling [] [] [] [] [] [] [] [] [] [x] [] Supine to Sit [] [] [] [] [] [] [] [] [] [x] []    Scooting [] [] [] [] [] [] [] [] [] [x] []    Sit to Supine [] [] [] [] [] [] [] [] [] [x] []    Transfers    Sit to Stand [] [] [] [x] [] [] [] [] [] [] []    Bed to Chair [] [] [] [] [] [] [] [] [] [x] []    Stand to Sit [] [] [] [x] [] [] [] [] [] [] []    I=Independent, Mod I=Modified Independent, S=Supervision, SBA=Standby Assistance, CGA=Contact Guard Assistance,   Min=Minimal Assistance, Mod=Moderate Assistance, Max=Maximal Assistance, Total=Total Assistance, NT=Not Tested  GAIT: I Mod I S SBA CGA Min Mod Max Total  NT x2 Comments:   Level of Assistance [] [] [] [] [x] [] [] [] [] [] []    Distance 25'    DME Rolling Walker    Gait Quality Lateral trunk sway, wide DOUGLAS, decreased step length    Weightbearing Status N/A     I=Independent, Mod I=Modified Independent, S=Supervision, SBA=Standby Assistance, CGA=Contact Guard Assistance,   Min=Minimal Assistance, Mod=Moderate Assistance, Max=Maximal Assistance, Total=Total Assistance, NT=Not Tested    Kr[facility] Form       How much difficulty does the patient currently have. .. Unable A Lot A Little None   1. Turning over in bed (including adjusting bedclothes, sheets and blankets)? [] 1   [] 2   [] 3   [x] 4   2. Sitting down on and standing up from a chair with arms ( e.g., wheelchair, bedside commode, etc.)   [] 1   [] 2   [] 3   [x] 4   3. Moving from lying on back to sitting on the side of the bed? [] 1   [] 2   [x] 3   [] 4   How much help from another person does the patient currently need. .. Total A Lot A Little None   4. Moving to and from a bed to a chair (including a wheelchair)? [] 1   [] 2   [x] 3   [] 4   5. Need to walk in hospital room? [] 1   [] 2   [x] 3   [] 4   6. Climbing 3-5 steps with a railing? [] 1   [x] 2   [] 3   [] 4   © 2007, Trustees of [employer], under license to [employer].  All rights reserved Score:  Initial: 19 Most Recent: X (Date: -- )    Interpretation of Tool:  Represents activities that are increasingly more difficult (i.e. Bed mobility, Transfers, Gait). PLAN:   FREQUENCY/DURATION: PT Plan of Care: 3 times/week for duration of hospital stay or until stated goals are met, whichever comes first.    PROBLEM LIST:   (Skilled intervention is medically necessary to address:)  1. Decreased ADL/Functional Activities  2. Decreased Activity Tolerance  3. Decreased AROM/PROM  4. Decreased Balance  5. Decreased Gait Ability  6. Decreased Strength  7. Decreased Transfer Abilities   INTERVENTIONS PLANNED:   (Benefits and precautions of physical therapy have been discussed with the patient.)  1. Therapeutic Activity  2. Therapeutic Exercise/HEP  3. Neuromuscular Re-education  4. Gait Training  5. Manual Therapy  6. Education     TREATMENT:     EVALUATION: Moderate Complexity : (Untimed Charge)    TREATMENT:   ($$ Therapeutic Activity: 8-22 mins    )  Therapeutic Activity (23 Minutes): Therapeutic activity included Transfer Training, Ambulation on level ground, Sitting balance  and Standing balance to improve functional Mobility, Strength and Activity tolerance.     TREATMENT GRID:  N/A    AFTER TREATMENT POSITION/PRECAUTIONS:  Chair and Needs within reach    INTERDISCIPLINARY COLLABORATION:  RN/PCT and PT/PTA    TOTAL TREATMENT DURATION:  PT Patient Time In/Time Out  Time In: 1057  Time Out: 602 [facility]

## 2022-03-25 NOTE — CONSULTS
Comprehensive Nutrition Assessment    Type and Reason for Visit: Initial,Consult,Patient education  Diet education (Hospitialist)    Nutrition Recommendations/Plan:   Meals and Snacks:  Continue current diet. Diet progression per MD  Nutrition Supplement Therapy:   Medical food supplement therapy:  Deferred due to CHO provisions on CLD   Nutrition education: Provided patient and family with written and verbal instruction on low sodium diet. Handouts provided: Take control of your sodium (6682-6166 mg/d). Patient verbalizes  understanding of diet. Expect good compliance. Malnutrition Assessment:  Malnutrition Status: At risk for malnutrition (specify) (prolonged poor PO d/t N/V)    Nutrition Assessment:   Nutrition History: Patient seen with family. She reports poor PO since heart event in January. She states initially eating okay at home but then developed nausea and vomiting with most PO. She states she has been eating cheerios or yogurt for breakfast and then just drinking the broth from chicken noodle soup for lunch and dinner. She states prior to that she was not really a breakfast eater. She states usual lunch and dinner meals were meat and three, burgers and fries, BLT sandwich. Nutrition Background: Patient with PMH significant for COPD, DM, CAD with STEMI, HTN, HLD, recent cirrhosis per outpt workup. She was sent to ER per PCP with worsening kidney and liver function on repeat labs. Nutrition Interval:  Patient seen up to recliner. She denies current nausea. Education as above. All questions answered.      Nutrition Related Findings:   No javed wasting      Current Nutrition Therapies:  ADULT DIET Clear Liquid    Current Intake:   Average Meal Intake:  (CLD)        Anthropometric Measures:  Height: 5' 4\" (162.6 cm)  Current Body Wt: 113.8 kg (250 lb 14.1 oz) (3/25), Weight source: Bed scale  BMI: 43, Obese class 3 (BMI 40.0 or greater)  Admission Body Weight: 250 lb 14.1 oz  Ideal Body Weight (lbs) (Calculated): 120 lbs (55 kg), 209.1 %  Usual Body Wt: 122 kg (268 lb 15.4 oz) (5/2021), Percent weight change: -6.7        Edema: No data recorded  Estimated Daily Nutrient Needs:  Energy (kcal/day): 1600-2214 (Kcal/kg (12-15), Weight Used: Current (113.8 kg (3/25)))  Protein (g/day): 68-85 (20% kcal) Weight Used: (Current)  Fluid (ml/day):   (1 ml/kcal)    Nutrition Diagnosis:   · Inadequate oral intake related to altered GI function as evidenced by NPO or clear liquid status due to medical condition (recent cirrhosis, GI workup)    · Food & nutrition-related knowledge deficit related to  (low sodium diet) as evidenced by  (no previous exposure to recommendations)    Nutrition Interventions:   Food and/or Nutrient Delivery: Continue current diet  Nutrition Education/Counseling: Education initiated,Education completed  Coordination of Nutrition Care: Continue to monitor while inpatient    Goals:       Active Goal: Advance diet to meet at least 75% needs by RD follow-up    Nutrition Monitoring and Evaluation:   Behavioral-Environmental Outcomes: Knowledge or skill  Food/Nutrient Intake Outcomes: Diet advancement/tolerance,Food and nutrient intake  Physical Signs/Symptoms Outcomes: Nausea/vomiting,Meal time behavior,Weight    Discharge Planning:     (Low sodium diet)    Gillis Babinski RD, , LD on 3/25/2022 at 12:39 PM  Contact: [phone]

## 2022-03-25 NOTE — PROGRESS NOTES
Discussed with cardiology  Will hold anticoagulation starting tomorrow  Will scope on Monday to evaluate varices  If no varices are present, would be ok with resuming anticoagulation until safe to stop   If varices are present, will need to discuss with cards about best option as it will require multiple endoscopies to eradicate varices

## 2022-03-25 NOTE — PROGRESS NOTES
Hospitalist Progress Note   Admit Date:  3/24/2022  3:48 PM   Name:  Karen Marcano   Age:  69 y.o. Sex:  female  :  1955   MRN:  [id number]   Room:  [facility]/    Presenting Complaint: Abnormal Lab Results    Reason(s) for Admission: ISABELLA (acute kidney injury) [facility]) [N17.9]     Hospital Course & Interval History:   Tutu Couch is a 69year old CF with a PMH of CAD s/p STEMI with OSAWLDO to RCA in 2022, DM2, COPD with nocturnal hypoxia, hypothyroidism, and recent diagnosis of cirrhosis who presented to the ER with shortness of breath over the previous 24 hours, nausea with occasional vomiting and generalized weakness over the past week to the point of being unable to stand up from a sitting position. She states that she had a heart attack in 2022 and has had intermittent nausea since that time. Then about 2-3 weeks prior she had persistent nausea and has not been able to eat or drink much. She went to see her PCP who checked some labs and noticed her liver enzymes were off so ordered a CTAP. CTAP on 3/23 showed new cirrhosis with possible varices. He repeated labs and noticed her kidney function had worsened so sent her to the ER to be evaluated. Admitted for cirrhosis, ISABELLA. Nephrology and GI consulted. Subjective/24hr Events (22): Pt feeling OK. Denies complaints currently. Anemia slightly worse. ISABELLA improved after IVF overnight. resp status stable on 3L baseline. DM controlled    Assessment & Plan:       ISABELLA (acute kidney injury) ([facility] [address]  -Cr improved today.    -Cont IVF for 1 more liter, gently.    Appreciate nephro recs  -BMP in AM      Anemia likely from variceal bleeding  -hb down from baseline 10-12 in past  -defer antithrombotics to GI and cardio  -continued chronic blood loss will also put heart at risk, so I would endeavor if at all possible, to hold brilinta for a few days to do endoscopy Monday; correct the varices that need immediate correction, then resume brilinta and repeat endoscopy as outpatient in future when safer to be off brilinta  -CBC in AM  -check iron studies in AM.  IV iron if severe deficiency  -start PO iron when done with inpatient endoscopy  -PPI BID      Cirrhosis of liver (HCC)   -likely RUDD  -hepatitis panel neg  -LFTs in AM      Diastolic CHF  -cont toprol  -home lasix on hold currently; may need to resume at lower dose when kidney function at baseline. May have been overdiuresed as outpt      Hypoxia, home O2 at night 3L   -Charted O2 Flow Rate (L/min): 3 l/min. Baseline is 3L      Diabetes mellitus with hyperglycemia (HCC)   -cont ISS  -check a1c in AM      Hypothyroidism   -cont synthroid  -TSH in AM      Hyponatremia   -resolved. Recheck BMP in AM      Morbid obesity ([facility] [address]   -complicates care      CAD, recent MI and stenting Jan 2022  -cont ASA, brilinta, or as per GI/cardio  -cont toprol      Chronic obstructive pulmonary disease ([facility] [address]   -home PRN albuterol. Pancytopenia ([facility] [address]   -from cirrhosis. -Daily CBC      Dispo/Discharge Planning:    -PT/OT consulted. Home health recommended    I spent 35 minutes of time caring for this patient at bedside or nearby on the unit, more than 50 percent of which was spent on coordination of care and/or counseling regarding the disease process, treatment options, and treatment plan.       Diet:  ADULT DIET Regular; Low Fat/Low Chol/High Fiber/2 gm Na  DVT PPx: SCD  Code status: Full Code    Hospital Problems as of 3/25/2022 Date Reviewed: 3/14/2022          Codes Class Noted - Resolved POA    Pancytopenia ([facility] [address] ICD-10-CM: Q41.949  ICD-9-CM: 284.19  3/25/2022 - Present Yes        Secondary esophageal varices with bleeding ([facility] ICD-10-CM: I85.11  ICD-9-CM: 456.20  3/25/2022 - Present Yes        Chronic diastolic congestive heart failure (HCC) ICD-10-CM: I50.32  ICD-9-CM: 428.32, 428.0  3/25/2022 - Present Yes        * (Principal) ISABELLA (acute kidney injury) ([facility] [address] ICD-10-CM: N17.9  ICD-9-CM: 584.9  3/24/2022 - Present Yes        Chronic obstructive pulmonary disease (HCC) (Chronic) ICD-10-CM: J44.9  ICD-9-CM: 496  Unknown - Present Yes    Overview Signed 3/24/2022  7:32 PM by Zarina Christine,      10 years             Cirrhosis of liver ([address] ICD-10-CM: K65.62  ICD-9-CM: 571.5  Unknown - Present Yes        ABLA (acute blood loss anemia) ICD-10-CM: D62  ICD-9-CM: 285.1  Unknown - Present Yes        Thrombocytopenia (HCC) (Chronic) ICD-10-CM: D69.6  ICD-9-CM: 287.5  Unknown - Present Yes        Atherosclerosis of coronary artery (Chronic) ICD-10-CM: I25.10  ICD-9-CM: 414.00  1/12/2022 - Present Yes    Overview Signed 2/11/2022  8:13 AM by Chasidy Pfeiffer MD     Last Assessment & Plan:   Formatting of this note is different from the original.  1-PRESENTATION WITH INFERIOR STEMI  2-TOTAL MID VESSEL OCCLUSION OF THE RCA WITH LARGE AMOUNT OF THROMBUS  3-RELATIVELY MILD LEFT SIDED CAD (40-50% proximal LAD)  4-INFERIOR WALL HYPOKINESIS, EF 50% WITH MR  5-SUCCESSFUL ASPIRATION THROMBECTOMY+ OSWALDO RCA (one distal branch fills slowly)     - ASA, Brilinta, Lipitor, lopressor             Morbid obesity (HCC) (Chronic) ICD-10-CM: E66.01  ICD-9-CM: 278.01  1/14/2010 - Present Yes        Hypoxia, home O2 at night 3L (Chronic) ICD-10-CM: R09.02  ICD-9-CM: 799.02  1/13/2010 - Present Yes        Diabetes mellitus (HCC) (Chronic) ICD-10-CM: E11.9  ICD-9-CM: 250.00  1/13/2010 - Present Yes        Hypothyroidism (Chronic) ICD-10-CM: E03.9  ICD-9-CM: 244.9  1/13/2010 - Present Yes        Hyponatremia ICD-10-CM: E87.1  ICD-9-CM: 276.1  1/13/2010 - Present Yes              Objective:     Patient Vitals for the past 24 hrs:   Temp Pulse Resp BP SpO2   03/25/22 1100 97.9 °F (36.6 °C) 78 20 117/68 99 %   03/25/22 0946 -- -- -- -- 99 %   03/25/22 0743 97.7 °F (36.5 °C) 78 20 (!) 120/52 98 %   03/25/22 0352 98.3 °F (36.8 °C) 75 18 (!) 109/58 98 %   03/24/22 2352 97.9 °F (36.6 °C) 77 18 111/70 100 %   03/24/22 2024 98.2 °F (36.8 °C) 83 20 (!) 125/57 97 %   03/24/22 1915 -- 90 18 (!) 109/59 98 %   03/24/22 1828 -- 86 18 (!) 100/54 98 %   03/24/22 1813 -- 83 19 94/78 99 %   03/24/22 1748 -- 92 21 117/61 97 %   03/24/22 1556 -- -- -- -- 98 %   03/24/22 1538 98.4 °F (36.9 °C) 86 16 (!) 111/58 99 %     Oxygen Therapy  O2 Sat (%): 99 % (03/25/22 1100)  Pulse via Oximetry: 85 beats per minute (03/25/22 0946)  O2 Device: Nasal cannula (03/25/22 0946)  O2 Flow Rate (L/min): 3 l/min (03/25/22 0946)    Estimated body mass index is 43.05 kg/m² as calculated from the following:    Height as of this encounter: 5' 4\" (1.626 m). Weight as of this encounter: 113.8 kg (250 lb 12.8 oz). Intake/Output Summary (Last 24 hours) at 3/25/2022 1327  Last data filed at 3/25/2022 1010  Gross per 24 hour   Intake 120 ml   Output 350 ml   Net -230 ml         Physical Exam:     Blood pressure 117/68, pulse 78, temperature 97.9 °F (36.6 °C), resp. rate 20, height 5' 4\" (1.626 m), weight 113.8 kg (250 lb 12.8 oz), SpO2 99 %. General:    Well nourished. No overt distress  Head:  Normocephalic, atraumatic  Eyes:  Sclerae appear normal.  Pupils equally round. ENT:  Nares appear normal, no drainage. Moist oral mucosa  Neck:  No restricted ROM. Trachea midline   CV:   RRR. No jugular venous distension. Lungs:   CTAB. Respirations even, unlabored  Abdomen:   nondistended. Extremities: No cyanosis or clubbing. No edema  Skin:     No rashes and normal coloration. Warm and dry. Neuro:  CN II-XII grossly intact. Sensation intact. A&Ox3  Psych:  Normal mood and affect.       I have reviewed ordered lab tests and independently visualized imaging below:    Recent Labs:  Recent Results (from the past 48 hour(s))   CREATININE, POC    Collection Time: 03/23/22  2:17 PM   Result Value Ref Range    Creatinine (POC) 1.63 (H) 0.8 - 1.5 mg/dL    GFRAA, POC 40 (L) >60 ml/min/1.73m2    GFRNA, POC 33 (L) >60 QZ/RTM/0.49T4   METABOLIC PANEL, BASIC Collection Time: 03/24/22 10:50 AM   Result Value Ref Range    Sodium 133 (L) 136 - 145 mmol/L    Potassium 3.2 (L) 3.5 - 5.1 mmol/L    Chloride 96 (L) 98 - 107 mmol/L    CO2 27 21 - 32 mmol/L    Anion gap 10 7 - 16 mmol/L    Glucose 162 (H) 65 - 100 mg/dL    BUN 33 (H) 8 - 23 MG/DL    Creatinine 1.90 (H) 0.6 - 1.0 MG/DL    GFR est AA 34 (L) >60 ml/min/1.73m2    GFR est non-AA 28 (L) >60 ml/min/1.73m2    Calcium 8.6 8.3 - 10.4 MG/DL   CBC WITH AUTOMATED DIFF    Collection Time: 03/24/22  3:45 PM   Result Value Ref Range    WBC 4.8 4.3 - 11.1 K/uL    RBC 3.13 (L) 4.05 - 5.2 M/uL    HGB 8.6 (L) 11.7 - 15.4 g/dL    HCT 27.4 (L) 35.8 - 46.3 %    MCV 87.5 79.6 - 97.8 FL    MCH 27.5 26.1 - 32.9 PG    MCHC 31.4 31.4 - 35.0 g/dL    RDW 17.7 (H) 11.9 - 14.6 %    PLATELET 949 (L) 149 - 450 K/uL    MPV 11.4 9.4 - 12.3 FL    ABSOLUTE NRBC 0.00 0.0 - 0.2 K/uL    NEUTROPHILS 73 43 - 78 %    LYMPHOCYTES 12 (L) 13 - 44 %    MONOCYTES 15 (H) 4.0 - 12.0 %    EOSINOPHILS 0 (L) 0.5 - 7.8 %    BASOPHILS 0 0.0 - 2.0 %    IMMATURE GRANULOCYTES 0 0.0 - 5.0 %    ABS. NEUTROPHILS 3.5 1.7 - 8.2 K/UL    ABS. LYMPHOCYTES 0.6 0.5 - 4.6 K/UL    ABS. MONOCYTES 0.7 0.1 - 1.3 K/UL    ABS. EOSINOPHILS 0.0 0.0 - 0.8 K/UL    ABS. BASOPHILS 0.0 0.0 - 0.2 K/UL    ABS. IMM.  GRANS. 0.0 0.0 - 0.5 K/UL    RBC COMMENTS SLIGHT  ANISOCYTOSIS + POIKILOCYTOSIS        RBC COMMENTS SLIGHT  HYPOCHROMIA        WBC COMMENTS Result Confirmed By Smear      PLATELET COMMENTS ADEQUATE      DF AUTOMATED     METABOLIC PANEL, COMPREHENSIVE    Collection Time: 03/24/22  3:45 PM   Result Value Ref Range    Sodium 131 (L) 136 - 145 mmol/L    Potassium HEMOLYZED,RECOLLECT REQUESTED 3.5 - 5.1 mmol/L    Chloride 97 (L) 98 - 107 mmol/L    CO2 24 21 - 32 mmol/L    Anion gap 10 7 - 16 mmol/L    Glucose 176 (H) 65 - 100 mg/dL    BUN 34 (H) 8 - 23 MG/DL    Creatinine 2.10 (H) 0.6 - 1.0 MG/DL    GFR est AA 30 (L) >60 ml/min/1.73m2    GFR est non-AA 25 (L) >60 ml/min/1.73m2    Calcium 8.3 8.3 - 10.4 MG/DL    Bilirubin, total 3.6 (H) 0.2 - 1.1 MG/DL    ALT (SGPT) 148 (H) 12 - 65 U/L    AST (SGOT) HEMOLYZED,RECOLLECT REQUESTED 15 - 37 U/L    Alk.  phosphatase 561 (H) 50 - 136 U/L    Protein, total 7.2 6.3 - 8.2 g/dL    Albumin 1.7 (L) 3.2 - 4.6 g/dL    Globulin 5.5 (H) 2.3 - 3.5 g/dL    A-G Ratio 0.3 (L) 1.2 - 3.5     NT-PRO BNP    Collection Time: 03/24/22  5:30 PM   Result Value Ref Range    NT pro-BNP 2,796 (H) 5 - 125 PG/ML   POC URINE MACROSCOPIC    Collection Time: 03/24/22  5:51 PM   Result Value Ref Range    Spec. gravity (POC) 1.020 1.001 - 1.023      pH, urine  (POC) 5.5 5.0 - 9.0      Protein (POC) TRACE (A) NEG mg/dL    Glucose, urine (POC) Negative NEG mg/dL    Ketones (POC) Negative NEG mg/dL    Bilirubin (POC) Negative NEG      Blood (POC) Trace Intact (A) NEG      Urobilinogen (POC) 1.0 0.2 - 1.0 EU/dL    Nitrite (POC) Negative NEG      Leukocyte esterase (POC) Negative NEG      Performed by Heike Meléndez    URINALYSIS W/ RFLX MICROSCOPIC    Collection Time: 03/24/22  6:16 PM   Result Value Ref Range    Color YELLOW      Appearance CLOUDY      Specific gravity 1.015 1.001 - 1.023      pH (UA) 5.5 5.0 - 9.0      Protein TRACE (A) NEG mg/dL    Glucose Negative mg/dL    Ketone Negative NEG mg/dL    Bilirubin Negative NEG      Blood TRACE (A) NEG      Urobilinogen 1.0 0.2 - 1.0 EU/dL    Nitrites Negative NEG      Leukocyte Esterase Negative NEG     URINE MICROSCOPIC    Collection Time: 03/24/22  6:16 PM   Result Value Ref Range    WBC 0-3 0 /hpf    RBC 0 0 /hpf    Epithelial cells 0-3 0 /hpf    Bacteria TRACE 0 /hpf    Casts 0-3 0 /lpf    Crystals, urine 0 0 /LPF    Mucus 0 0 /lpf    Other observations RESULTS VERIFIED MANUALLY     GLUCOSE, POC    Collection Time: 03/24/22  8:37 PM   Result Value Ref Range    Glucose (POC) 124 (H) 65 - 100 mg/dL    Performed by Eric    MAGNESIUM    Collection Time: 03/24/22  9:18 PM   Result Value Ref Range    Magnesium 1.7 (L) 1.8 - 2.4 mg/dL PHOSPHORUS    Collection Time: 03/24/22  9:18 PM   Result Value Ref Range    Phosphorus 3.3 2.3 - 3.7 MG/DL   IRON    Collection Time: 03/24/22  9:18 PM   Result Value Ref Range    Iron 85 35 - 150 ug/dL   HIV 1/2 AG/AB, 4TH GENERATION,W RFLX CONFIRM    Collection Time: 03/24/22 10:55 PM   Result Value Ref Range    HIV 1/2 Interpretation NONREACTIVE NR      HIV 1/2 result comment SEE NOTE (A) NR     FERRITIN    Collection Time: 03/24/22 10:55 PM   Result Value Ref Range    Ferritin 300 8 - 388 NG/ML   HEPATITIS PANEL, ACUTE    Collection Time: 03/24/22 10:55 PM   Result Value Ref Range    Hepatitis A, IgM NONREACTIVE NR      Hepatitis B core, IgM NONREACTIVE NR      Hep B Surface Ag NONREACTIVE NR      Hepatitis C virus Ab NONREACTIVE NR     TRANSFERRIN SATURATION    Collection Time: 03/24/22 10:55 PM   Result Value Ref Range    Iron 79 35 - 150 ug/dL    TIBC 217 (L) 250 - 450 ug/dL    Transferrin Saturation 36 >32 %   METABOLIC PANEL, COMPREHENSIVE    Collection Time: 03/25/22  5:15 AM   Result Value Ref Range    Sodium 136 136 - 145 mmol/L    Potassium 3.0 (L) 3.5 - 5.1 mmol/L    Chloride 101 98 - 107 mmol/L    CO2 27 21 - 32 mmol/L    Anion gap 8 7 - 16 mmol/L    Glucose 114 (H) 65 - 100 mg/dL    BUN 32 (H) 8 - 23 MG/DL    Creatinine 1.70 (H) 0.6 - 1.0 MG/DL    GFR est AA 38 (L) >60 ml/min/1.73m2    GFR est non-AA 32 (L) >60 ml/min/1.73m2    Calcium 8.2 (L) 8.3 - 10.4 MG/DL    Bilirubin, total 3.1 (H) 0.2 - 1.1 MG/DL    ALT (SGPT) 130 (H) 12 - 65 U/L    AST (SGOT) 206 (H) 15 - 37 U/L    Alk.  phosphatase 499 (H) 50 - 136 U/L    Protein, total 6.6 6.3 - 8.2 g/dL    Albumin 1.6 (L) 3.2 - 4.6 g/dL    Globulin 5.0 (H) 2.3 - 3.5 g/dL    A-G Ratio 0.3 (L) 1.2 - 3.5     CBC W/O DIFF    Collection Time: 03/25/22  5:15 AM   Result Value Ref Range    WBC 3.3 (L) 4.3 - 11.1 K/uL    RBC 2.92 (L) 4.05 - 5.2 M/uL    HGB 7.8 (L) 11.7 - 15.4 g/dL    HCT 25.6 (L) 35.8 - 46.3 %    MCV 87.7 79.6 - 97.8 FL    MCH 26.7 26.1 - 32.9 PG    MCHC 30.5 (L) 31.4 - 35.0 g/dL    RDW 17.6 (H) 11.9 - 14.6 %    PLATELET 904 (L) 913 - 450 K/uL    MPV 11.0 9.4 - 12.3 FL    ABSOLUTE NRBC 0.00 0.0 - 0.2 K/uL   GLUCOSE, POC    Collection Time: 03/25/22  7:17 AM   Result Value Ref Range    Glucose (POC) 120 (H) 65 - 100 mg/dL    Performed by ToneyRitaPCT    GLUCOSE, POC    Collection Time: 03/25/22 11:10 AM   Result Value Ref Range    Glucose (POC) 118 (H) 65 - 100 mg/dL    Performed by ToneyRitaPCT        All Micro Results     None          Other Studies:  XR CHEST PA LAT    Result Date: 3/24/2022  Chest 2 view CLINICAL INDICATION: Shortness of breath acute on chronic, moderate to severe; history of diabetes, COPD, asthma, cirrhosis COMPARISON: 1/14/2020 TECHNIQUE: Upright PA  and lateral views of the chest FINDINGS: Lung volumes are again hyperinflated, with upper lobe predominant emphysema, COPD changes. Cardiomediastinal silhouette and hilar contours are stable. The lungs are unchanged demonstrating no consolidation, pneumothorax, pleural effusion or CHF. No acute osseous abnormalities are seen. No acute airspace disease. COPD. US RETROPERITONEUM LTD    Result Date: 3/25/2022  Clinical history: Acute kidney injury TECHNIQUE: Grayscale renal ultrasound. FINDINGS: Kidneys are normal in echogenicity, contour and size. Right kidney measures 10 cm and the left measures 10.8 cm. No hydronephrosis. Urinary bladder is normal in contour. Incidental note is made of splenomegaly, measuring up to 19 cm. 1. No hydronephrosis. 2. Incidental note is made of splenomegaly.       Current Meds:  Current Facility-Administered Medications   Medication Dose Route Frequency    0.9% sodium chloride infusion  75 mL/hr IntraVENous CONTINUOUS    albuterol (PROVENTIL VENTOLIN) nebulizer solution 2.5 mg  2.5 mg Nebulization Q4H PRN    aspirin delayed-release tablet 81 mg  81 mg Oral DAILY    atorvastatin (LIPITOR) tablet 80 mg  80 mg Oral QHS    ticagrelor (BRILINTA) tablet 90 mg  90 mg Oral Q12H    DULoxetine (CYMBALTA) capsule 30 mg  30 mg Oral DAILY    fluticasone propionate (FLONASE) 50 mcg/actuation nasal spray 2 Spray  2 Spray Both Nostrils DAILY    hydrOXYzine pamoate (VISTARIL) capsule 25 mg  25 mg Oral BID PRN    levothyroxine (SYNTHROID) tablet 175 mcg  175 mcg Oral ACB    metoprolol tartrate (LOPRESSOR) tablet 12.5 mg  12.5 mg Oral BID    pantoprazole (PROTONIX) tablet 40 mg  40 mg Oral ACB&D    sucralfate (CARAFATE) tablet 1 g  1 g Oral AC&HS    sodium chloride (NS) flush 5-40 mL  5-40 mL IntraVENous Q8H    sodium chloride (NS) flush 5-40 mL  5-40 mL IntraVENous PRN    tuberculin injection 5 Units  5 Units IntraDERMal ONCE    insulin lispro (HUMALOG) injection   SubCUTAneous AC&HS       Signed:  Blaise Timmons MD    Part of this note may have been written by using a voice dictation software. The note has been proof read but may still contain some grammatical/other typographical errors.

## 2022-03-25 NOTE — PROGRESS NOTES
ACUTE OT GOALS:  (Developed with and agreed upon by patient and/or caregiver.)  1. Pt will toilet with SBA   2. Pt will complete functional mobility for ADLs with SBA using AD as needed  3. Pt will complete lower body dressing with SBA using AE as needed  4. Pt will complete grooming and hygiene at sink with SBA  5. Pt will demonstrate independence with HEP to promote increased BUE strength and functional use for ADLs  6. Pt will tolerate 23 minutes functional activity with min or fewer rest breaks to promote increased endurance for ADLs  7. Pt will independently demonstrate/ verbalize 2+ energy conservation techniques to promote increased endurance for ADLs      Timeframe: 7 days      OCCUPATIONAL THERAPY ASSESSMENT: Initial Assessment and Daily Note OT Treatment Day # 1    Flower Perkins Arm is a 69 y.o. female   PRIMARY DIAGNOSIS: ISABELLA (acute kidney injury) ([facility] [address]  ISABELLA (acute kidney injury) ([facility] [N17.9]       Reason for Referral:  Weakness, fatigue, nausea, SOB  ICD-10: Treatment Diagnosis: Generalized Muscle Weakness (M62.81)  INPATIENT: Payor: [facility] MEDICARE COMPLETE / Plan: [facility] MEDICARE COMPLETE / Product Type: Managed Care Medicare /   ASSESSMENT:     REHAB RECOMMENDATIONS:   Recommendation to date pending progress:  Setting:   No further skilled therapy after discharge from hospital  Equipment:    None     PRIOR LEVEL OF FUNCTION:  (Prior to Hospitalization)  INITIAL/CURRENT LEVEL OF FUNCTION:  (Based on today's evaluation)   Bathing:   Independent  Dressing:   Independent  Feeding/Grooming:   Independent  Toileting:   Independent  Functional Mobility:   Modified Independent Bathing:   Contact Guard Assistance  Dressing:   Contact Guard Assistance  Feeding/Grooming:   Contact Guard Assistance  Toileting:   Contact Guard Assistance  Functional Mobility:   Contact Guard Assistance     ASSESSMENT:  Ms. Elinor Thakkar was admitted w/ ISABELLA, has a hx of recent NSTEMI and recently diagnosed cirrhosis. Pt presented generally weak with deficits in endurance, strength, mobility, and balance impacting ADLs. Pt demonstrated ADLs and mobility for ADLs with CGA using RW, was able to tolerate standing only short time d/t decreased activity tolerance. Pt is below her functional baseline and would benefit from skilled OT services to address deficits. SUBJECTIVE:   Ms. Mikayla Truong states, \"I\"m feeling better and stronger than I have been. \"    SOCIAL HISTORY/LIVING ENVIRONMENT: Lives w/ spouse and other family members, independent ADLs, uses cane, has RW. WIS, shower chair, grab bars, BSC. Reports has recently required assistance to stand d/t weakness.    Home Environment: Trailer/mobile home  One/Two Story Residence: One story  Living Alone: No  Support Systems: Spouse/Significant Other    OBJECTIVE:     PAIN: VITAL SIGNS: LINES/DRAINS:   Pre Treatment: Pain Screen  Pain Scale 1: Numeric (0 - 10)  Pain Intensity 1: 0  Post Treatment: 0   O2 Device: Nasal cannula     GROSS EVALUATION:  BUE Within Functional Limits Abnormal/ Functional Abnormal/ Non-Functional (see comments) Not Tested Comments:   AROM [x] [] [] []    PROM [] [] [] []    Strength [] [x] [] []    Balance [] [x] [] []    Posture [] [] [] []    Sensation [] [] [] []    Coordination [x] [] [] []    Tone [] [] [] []    Edema [] [] [] []    Activity Tolerance [] [x] [] []     [] [] [] []      COGNITION/  PERCEPTION: Intact Impaired   (see comments) Comments:   Orientation [x] []    Vision [x] []    Hearing [x] []    Judgment/ Insight [x] []    Attention [x] []    Memory [x] []    Command Following [x] []    Emotional Regulation [x] []     [] []      ACTIVITIES OF DAILY LIVING: I Mod I S SBA CGA Min Mod Max Total NT Comments   BASIC ADLs:              Bathing/ Showering [] [] [] [] [] [] [] [] [] []    Toileting [] [] [] [] [] [] [] [] [] []    Dressing [] [] [] [x] [] [] [] [] [] [] socks   Feeding [] [] [] [] [] [] [] [] [] []    Grooming [] [] [] [] [] [] [] [] [] []    Personal Device Care [] [] [] [] [] [] [] [] [] []    Functional Mobility [] [] [] [] [x] [] [] [] [] [] RW   I=Independent, Mod I=Modified Independent, S=Supervision, SBA=Standby Assistance, CGA=Contact Guard Assistance,   Min=Minimal Assistance, Mod=Moderate Assistance, Max=Maximal Assistance, Total=Total Assistance, NT=Not Tested    MOBILITY: I Mod I S SBA CGA Min Mod Max Total  NT x2 Comments:   Supine to sit [] [] [] [x] [] [] [] [] [] [] []    Sit to supine [] [] [] [] [] [] [] [] [] [] []    Sit to stand [] [] [] [] [x] [] [] [] [] [] []    Bed to chair [] [] [] [] [x] [] [] [] [] [] []    I=Independent, Mod I=Modified Independent, S=Supervision, SBA=Standby Assistance, CGA=Contact Guard Assistance,   Min=Minimal Assistance, Mod=Moderate Assistance, Max=Maximal Assistance, Total=Total Assistance, NT=Not [facility] 6 Clicks   Daily Activity Inpatient Short Form        How much help from another person does the patient currently need. .. Total A Lot A Little None   1. Putting on and taking off regular lower body clothing? [] 1   [] 2   [x] 3   [] 4   2. Bathing (including washing, rinsing, drying)? [] 1   [] 2   [x] 3   [] 4   3. Toileting, which includes using toilet, bedpan or urinal?   [] 1   [] 2   [] 3   [x] 4   4. Putting on and taking off regular upper body clothing? [] 1   [] 2   [] 3   [x] 4   5. Taking care of personal grooming such as brushing teeth? [] 1   [] 2   [] 3   [x] 4   6. Eating meals? [] 1   [] 2   [] 3   [x] 4   © 2007, Trustees of [facility] [employer], under license to [employer]. All rights reserved     Score:  Initial: 22 Most Recent: X (Date: -- )   Interpretation of Tool:  Represents activities that are increasingly more difficult (i.e. Bed mobility, Transfers, Gait).     PLAN:   FREQUENCY/DURATION: OT Plan of Care: 3 times/week for duration of hospital stay or until stated goals are met, whichever comes first.    PROBLEM LIST:   (Skilled intervention is medically necessary to address:)  1. Decreased ADL/Functional Activities  2. Decreased Activity Tolerance  3. Decreased Balance  4. Decreased Strength   INTERVENTIONS PLANNED:   (Benefits and precautions of occupational therapy have been discussed with the patient.)  1. Self Care Training  2. Therapeutic Activity  3. Therapeutic Exercise/HEP  4. Neuromuscular Re-education  5. Education     TREATMENT:     EVALUATION: Low Complexity : (Untimed Charge)    TREATMENT:   ( $$ Therapeutic Activity: 8-22 mins   )  Therapeutic Activity (8 Minutes): Therapeutic activity included Supine to Sit, Transfer Training, Ambulation on level ground and Standing balance to improve functional Mobility, Strength and Activity tolerance.     TREATMENT GRID:  N/A    AFTER TREATMENT POSITION/PRECAUTIONS:  Chair, Needs within reach and RN notified    INTERDISCIPLINARY COLLABORATION:  RN/PCT    TOTAL TREATMENT DURATION:  OT Patient Time In/Time Out  Time In: 1213  Time Out: Kelsey Nath [address]

## 2022-03-25 NOTE — H&P
[facility] Cardiology Initial Cardiac Evaluation   Primary Cardiologist: [facility]  Attending Cardiologist: Dr. Val Francois     Date of  Admission: 3/24/2022  3:48 PM     HPI:  Barbara Durbin is a 69 y.o. morbidly obese WF with h/o CAD s/p Inferior MI on 22 s/p aspirations thrombectomy and 2 stents to mRCA, EF was 40-50% but repeat echo on 1/15 showed improved EF 55-65%, COPD on home O2 3L, former tobacco use, HTN, dyslipidemia, DM II and hypothyroidism who has been having diarrhea for >10 days, nausea and occasional vomiting with rising creatinine. Her PCP sent her for a CT abd/pelvis on 3/23 which showed cirrhosis, multiple varices present suggesting portal hypertension and 2 ventral hernias with omental involvement. She was sent to ER for evaluation by her PCP. Creatinine was elevated to 2.10, she was given IVF for hydration and admitted by the hospitalist. GI and nephrology have been consulted but have not yet seen. Cardiology was consulted for recommendations on DAPT with likely a plan for procedures (i.e. EGD/colo). Pt denies CP, SOB, palpitations, LE swelling, orthopnea or syncope. She mostly complains of diarrhea and severe weakness. Hgb 7.8, Cr 1.70 today.       Past Medical History:   Diagnosis Date    Asthma     20 yrs    Atherosclerosis of coronary artery 2022    Cirrhosis of liver ([address]     COPD     10 years    Diabetes ([address]     Endocrine disease     hypothyroid    Thyroid disease       Past Surgical History:   Procedure Laterality Date    HX  SECTION  ,     HX CHOLECYSTECTOMY      HX GYN      Hyst, C Section       Allergies   Allergen Reactions    No Known Drug Allergies Not Reported This Time      Social History     Socioeconomic History    Marital status:      Spouse name: Not on file    Number of children: Not on file    Years of education: Not on file    Highest education level: Not on file   Occupational History    Not on file Tobacco Use    Smoking status: Former Smoker     Packs/day: 1.00     Years: 30.00     Pack years: 30.00     Types: Cigarettes    Smokeless tobacco: Never Used   Vaping Use    Vaping Use: Never used   Substance and Sexual Activity    Alcohol use: No    Drug use: Yes     Types: Prescription, OTC    Sexual activity: Not Currently   Other Topics Concern    Not on file   Social History Narrative    Lives with friends. Works as a nanny. Social Determinants of Health     Financial Resource Strain:     Difficulty of Paying Living Expenses: Not on file   Food Insecurity:     Worried About Running Out of Food in the Last Year: Not on file    Gela of Food in the Last Year: Not on file   Transportation Needs:     Lack of Transportation (Medical): Not on file    Lack of Transportation (Non-Medical):  Not on file   Physical Activity:     Days of Exercise per Week: Not on file    Minutes of Exercise per Session: Not on file   Stress:     Feeling of Stress : Not on file   Social Connections:     Frequency of Communication with Friends and Family: Not on file    Frequency of Social Gatherings with Friends and Family: Not on file    Attends [employer] Services: Not on file    Active Member of [address] [place] [address] "[employer] or Organizations: Not on file    Attends Club or Organization Meetings: Not on file    Marital Status: Not on file   Intimate Partner Violence:     Fear of Current or Ex-Partner: Not on file    Emotionally Abused: Not on file    Physically Abused: Not on file    Sexually Abused: Not on file   Housing Stability:     Unable to Pay for Housing in the Last Year: Not on file    Number of [place] in the Last Year: Not on file    Unstable Housing in the Last Year: Not on file     Family History   Problem Relation Age of Onset    Heart Attack Father     No Known Problems Maternal Grandmother     No Known Problems Maternal Grandfather     No Known Problems Paternal Grandmother     No Known Problems Paternal Grandfather         Current Facility-Administered Medications   Medication Dose Route Frequency    albuterol (PROVENTIL VENTOLIN) nebulizer solution 2.5 mg  2.5 mg Nebulization Q4H PRN    aspirin delayed-release tablet 81 mg  81 mg Oral DAILY    atorvastatin (LIPITOR) tablet 80 mg  80 mg Oral QHS    ticagrelor (BRILINTA) tablet 90 mg  90 mg Oral Q12H    DULoxetine (CYMBALTA) capsule 30 mg  30 mg Oral DAILY    fluticasone propionate (FLONASE) 50 mcg/actuation nasal spray 2 Spray  2 Spray Both Nostrils DAILY    hydrOXYzine pamoate (VISTARIL) capsule 25 mg  25 mg Oral BID PRN    levothyroxine (SYNTHROID) tablet 175 mcg  175 mcg Oral ACB    metoprolol tartrate (LOPRESSOR) tablet 12.5 mg  12.5 mg Oral BID    pantoprazole (PROTONIX) tablet 40 mg  40 mg Oral ACB&D    sucralfate (CARAFATE) tablet 1 g  1 g Oral AC&HS    sodium chloride (NS) flush 5-40 mL  5-40 mL IntraVENous Q8H    sodium chloride (NS) flush 5-40 mL  5-40 mL IntraVENous PRN    tuberculin injection 5 Units  5 Units IntraDERMal ONCE    insulin lispro (HUMALOG) injection   SubCUTAneous AC&HS       Review of symptoms:  General: no recent weight loss/gain,+ weakness/fatigue, denies fever or chills   Skin: no rashes, lumps, or other skin changes   HEENT: no headache, dizziness, lightheadedness, vision changes, hearing changes, tinnitus, vertigo, sinus pressure/pain, bleeding gums, sore throat, or hoarseness   Neck: no swollen glands, goiter, pain or stiffness   Respiratory: no cough, sputum, hemoptysis, dyspnea, wheezing   Cardiovascular: no chest pain or discomfort, palpitations, dyspnea, orthopnea, paroxysmal nocturnal dyspnea, peripheral edema   Gastrointestinal: no trouble swallowing, heartburn, change of appetite, nausea, change in bowel habits, pain with defecation, rectal bleeding or black/tarry stools, hemorrhoids, constipation, +diarrhea, +abdominal pain, jaundice, liver or gallbladder problems   Urinary: no frequency, urgency , hematuria, burning/pain with urination, recent flank pain, polyuria, nocturia, or difficulty urinating   Genital: no vaginal or pelvic infections   Peripheral Vascular: no claudication, leg cramps, prior DVTs, swelling of calves, legs, or feet, color change, or swelling with redness or tenderness   Musculoskeletal: no muscle or joint pain/stiffness, joint swelling, erythema of joints, or back pain   Psychiatric: no depression, mental disorders, or excessive stress   Neurological: no history of CVA, dizziness, no sensory or motor loss, seizures, syncope, tremors, numbness, tingling, no changes in mood, attention, or speech, no changes in orientation, memory, insight, or judgment. no headache, vertigo. Hematologic: +anemia, easy bruising or bleeding   Endocrine: +diabetes,+ thyroid problems, heat or cold intolerance, excessive sweating, polyuria, polydipsia        Subjective:   Physical Exam    Visit Vitals  BP (!) 120/52 (BP 1 Location: Left lower arm, BP Patient Position: At rest)   Pulse 78   Temp 97.7 °F (36.5 °C)   Resp 20   Ht 5' 4\" (1.626 m)   Wt 113.8 kg (250 lb 12.8 oz)   SpO2 98%   BMI 43.05 kg/m²     General Appearance:  Well developed, obese, alert and oriented x 3, and individual in no acute distress. Ears/Nose/Mouth/Throat:   Hearing grossly normal.         Neck: Supple. Chest:   Lungs fairly clear to auscultation bilaterally. Cardiovascular:  Regular rate and rhythm, S1, S2   Abdomen:   Soft, non-tender, bowel sounds are active. Extremities: No edema bilaterally. Skin: Warm and dry.            Labs:   Recent Results (from the past 24 hour(s))   METABOLIC PANEL, BASIC    Collection Time: 03/24/22 10:50 AM   Result Value Ref Range    Sodium 133 (L) 136 - 145 mmol/L    Potassium 3.2 (L) 3.5 - 5.1 mmol/L    Chloride 96 (L) 98 - 107 mmol/L    CO2 27 21 - 32 mmol/L    Anion gap 10 7 - 16 mmol/L    Glucose 162 (H) 65 - 100 mg/dL    BUN 33 (H) 8 - 23 MG/DL    Creatinine 1.90 (H) 0.6 - 1.0 MG/DL GFR est AA 34 (L) >60 ml/min/1.73m2    GFR est non-AA 28 (L) >60 ml/min/1.73m2    Calcium 8.6 8.3 - 10.4 MG/DL   CBC WITH AUTOMATED DIFF    Collection Time: 03/24/22  3:45 PM   Result Value Ref Range    WBC 4.8 4.3 - 11.1 K/uL    RBC 3.13 (L) 4.05 - 5.2 M/uL    HGB 8.6 (L) 11.7 - 15.4 g/dL    HCT 27.4 (L) 35.8 - 46.3 %    MCV 87.5 79.6 - 97.8 FL    MCH 27.5 26.1 - 32.9 PG    MCHC 31.4 31.4 - 35.0 g/dL    RDW 17.7 (H) 11.9 - 14.6 %    PLATELET 611 (L) 076 - 450 K/uL    MPV 11.4 9.4 - 12.3 FL    ABSOLUTE NRBC 0.00 0.0 - 0.2 K/uL    NEUTROPHILS 73 43 - 78 %    LYMPHOCYTES 12 (L) 13 - 44 %    MONOCYTES 15 (H) 4.0 - 12.0 %    EOSINOPHILS 0 (L) 0.5 - 7.8 %    BASOPHILS 0 0.0 - 2.0 %    IMMATURE GRANULOCYTES 0 0.0 - 5.0 %    ABS. NEUTROPHILS 3.5 1.7 - 8.2 K/UL    ABS. LYMPHOCYTES 0.6 0.5 - 4.6 K/UL    ABS. MONOCYTES 0.7 0.1 - 1.3 K/UL    ABS. EOSINOPHILS 0.0 0.0 - 0.8 K/UL    ABS. BASOPHILS 0.0 0.0 - 0.2 K/UL    ABS. IMM. GRANS. 0.0 0.0 - 0.5 K/UL    RBC COMMENTS SLIGHT  ANISOCYTOSIS + POIKILOCYTOSIS        RBC COMMENTS SLIGHT  HYPOCHROMIA        WBC COMMENTS Result Confirmed By Smear      PLATELET COMMENTS ADEQUATE      DF AUTOMATED     METABOLIC PANEL, COMPREHENSIVE    Collection Time: 03/24/22  3:45 PM   Result Value Ref Range    Sodium 131 (L) 136 - 145 mmol/L    Potassium HEMOLYZED,RECOLLECT REQUESTED 3.5 - 5.1 mmol/L    Chloride 97 (L) 98 - 107 mmol/L    CO2 24 21 - 32 mmol/L    Anion gap 10 7 - 16 mmol/L    Glucose 176 (H) 65 - 100 mg/dL    BUN 34 (H) 8 - 23 MG/DL    Creatinine 2.10 (H) 0.6 - 1.0 MG/DL    GFR est AA 30 (L) >60 ml/min/1.73m2    GFR est non-AA 25 (L) >60 ml/min/1.73m2    Calcium 8.3 8.3 - 10.4 MG/DL    Bilirubin, total 3.6 (H) 0.2 - 1.1 MG/DL    ALT (SGPT) 148 (H) 12 - 65 U/L    AST (SGOT) HEMOLYZED,RECOLLECT REQUESTED 15 - 37 U/L    Alk.  phosphatase 561 (H) 50 - 136 U/L    Protein, total 7.2 6.3 - 8.2 g/dL    Albumin 1.7 (L) 3.2 - 4.6 g/dL    Globulin 5.5 (H) 2.3 - 3.5 g/dL    A-G Ratio 0.3 (L) 1.2 - 3.5     NT-PRO BNP    Collection Time: 03/24/22  5:30 PM   Result Value Ref Range    NT pro-BNP 2,796 (H) 5 - 125 PG/ML   POC URINE MACROSCOPIC    Collection Time: 03/24/22  5:51 PM   Result Value Ref Range    Spec. gravity (POC) 1.020 1.001 - 1.023      pH, urine  (POC) 5.5 5.0 - 9.0      Protein (POC) TRACE (A) NEG mg/dL    Glucose, urine (POC) Negative NEG mg/dL    Ketones (POC) Negative NEG mg/dL    Bilirubin (POC) Negative NEG      Blood (POC) Trace Intact (A) NEG      Urobilinogen (POC) 1.0 0.2 - 1.0 EU/dL    Nitrite (POC) Negative NEG      Leukocyte esterase (POC) Negative NEG      Performed by Dionicio Krabbe    URINALYSIS W/ RFLX MICROSCOPIC    Collection Time: 03/24/22  6:16 PM   Result Value Ref Range    Color YELLOW      Appearance CLOUDY      Specific gravity 1.015 1.001 - 1.023      pH (UA) 5.5 5.0 - 9.0      Protein TRACE (A) NEG mg/dL    Glucose Negative mg/dL    Ketone Negative NEG mg/dL    Bilirubin Negative NEG      Blood TRACE (A) NEG      Urobilinogen 1.0 0.2 - 1.0 EU/dL    Nitrites Negative NEG      Leukocyte Esterase Negative NEG     URINE MICROSCOPIC    Collection Time: 03/24/22  6:16 PM   Result Value Ref Range    WBC 0-3 0 /hpf    RBC 0 0 /hpf    Epithelial cells 0-3 0 /hpf    Bacteria TRACE 0 /hpf    Casts 0-3 0 /lpf    Crystals, urine 0 0 /LPF    Mucus 0 0 /lpf    Other observations RESULTS VERIFIED MANUALLY     GLUCOSE, POC    Collection Time: 03/24/22  8:37 PM   Result Value Ref Range    Glucose (POC) 124 (H) 65 - 100 mg/dL    Performed by Eric    MAGNESIUM    Collection Time: 03/24/22  9:18 PM   Result Value Ref Range    Magnesium 1.7 (L) 1.8 - 2.4 mg/dL   PHOSPHORUS    Collection Time: 03/24/22  9:18 PM   Result Value Ref Range    Phosphorus 3.3 2.3 - 3.7 MG/DL   IRON    Collection Time: 03/24/22  9:18 PM   Result Value Ref Range    Iron 85 35 - 150 ug/dL   HIV 1/2 AG/AB, 4TH GENERATION,W RFLX CONFIRM    Collection Time: 03/24/22 10:55 PM   Result Value Ref Range    HIV 1/2 Interpretation NONREACTIVE NR      HIV 1/2 result comment SEE NOTE (A) NR     FERRITIN    Collection Time: 03/24/22 10:55 PM   Result Value Ref Range    Ferritin 300 8 - 388 NG/ML   HEPATITIS PANEL, ACUTE    Collection Time: 03/24/22 10:55 PM   Result Value Ref Range    Hepatitis A, IgM NONREACTIVE NR      Hepatitis B core, IgM NONREACTIVE NR      Hep B Surface Ag NONREACTIVE NR      Hepatitis C virus Ab NONREACTIVE NR     TRANSFERRIN SATURATION    Collection Time: 03/24/22 10:55 PM   Result Value Ref Range    Iron 79 35 - 150 ug/dL    TIBC 217 (L) 250 - 450 ug/dL    Transferrin Saturation 36 >28 %   METABOLIC PANEL, COMPREHENSIVE    Collection Time: 03/25/22  5:15 AM   Result Value Ref Range    Sodium 136 136 - 145 mmol/L    Potassium 3.0 (L) 3.5 - 5.1 mmol/L    Chloride 101 98 - 107 mmol/L    CO2 27 21 - 32 mmol/L    Anion gap 8 7 - 16 mmol/L    Glucose 114 (H) 65 - 100 mg/dL    BUN 32 (H) 8 - 23 MG/DL    Creatinine 1.70 (H) 0.6 - 1.0 MG/DL    GFR est AA 38 (L) >60 ml/min/1.73m2    GFR est non-AA 32 (L) >60 ml/min/1.73m2    Calcium 8.2 (L) 8.3 - 10.4 MG/DL    Bilirubin, total 3.1 (H) 0.2 - 1.1 MG/DL    ALT (SGPT) 130 (H) 12 - 65 U/L    AST (SGOT) 206 (H) 15 - 37 U/L    Alk.  phosphatase 499 (H) 50 - 136 U/L    Protein, total 6.6 6.3 - 8.2 g/dL    Albumin 1.6 (L) 3.2 - 4.6 g/dL    Globulin 5.0 (H) 2.3 - 3.5 g/dL    A-G Ratio 0.3 (L) 1.2 - 3.5     CBC W/O DIFF    Collection Time: 03/25/22  5:15 AM   Result Value Ref Range    WBC 3.3 (L) 4.3 - 11.1 K/uL    RBC 2.92 (L) 4.05 - 5.2 M/uL    HGB 7.8 (L) 11.7 - 15.4 g/dL    HCT 25.6 (L) 35.8 - 46.3 %    MCV 87.7 79.6 - 97.8 FL    MCH 26.7 26.1 - 32.9 PG    MCHC 30.5 (L) 31.4 - 35.0 g/dL    RDW 17.6 (H) 11.9 - 14.6 %    PLATELET 178 (L) 008 - 450 K/uL    MPV 11.0 9.4 - 12.3 FL    ABSOLUTE NRBC 0.00 0.0 - 0.2 K/uL   GLUCOSE, POC    Collection Time: 03/25/22  7:17 AM   Result Value Ref Range    Glucose (POC) 120 (H) 65 - 100 mg/dL    Performed by Jaylon King MD A/P for plan of care    Nancy Millan PA-C

## 2022-03-25 NOTE — PROGRESS NOTES
Care Management Interventions  PCP Verified by CM: Yes  Physical Therapy Consult: Yes  Occupational Therapy Consult: Yes  Speech Therapy Consult: No  Support Systems: Spouse/Significant Other  Confirm Follow Up Transport: Family  Freedom of Choice List was Provided with Basic Dialogue that Supports the Patient's Individualized Plan of Care/Goals, Treatment Preferences and Shares the Quality Data Associated with the Providers?: Yes  [place] Resource Information Provided?: No  Discharge Location  Patient Expects to be Discharged to[de-identified] Unable to determine at this time  CM met with the patient to discuss discharge plans. Patient is alert and oriented in all spheres. Lives with her spouse, daughter, grown grandchildren. Patient's daughter and grandchildren moved in with the patient after she had a heart attack in January so that they could assist her. Patient has applied for Medicaid and hopes that she is eligible so that her granddaughter can be paid to care for her as her [facility] aide. Her granddaughter drives her to her appointments. DME: cane, rollator, nebulizer, sc, bsc, home 02 nocturnal ([facility] provider). For the past 3 weeks patient has needed to use the wc to ambulate. Prior to this she was using the rollator and cane. Patient had [facility] with [facility] after discharging from the hospital in January. No history of rehab but understands that she's very weak and may need to discharge to rehab. PT/OT consulted. CM following.

## 2022-03-25 NOTE — PROGRESS NOTES
[facility] Consult Note       Referring Physician:  Gayle Ding    Consult Date:  3/25/2022    Admit Date:  3/24/2022    Chief Complaint:  Abnormal CT    Subjective:     History of Present Illness:  Patient is a 69 y.o. female who is seen in consultation at the request of Dr. Gayle Ding for abnormal imaging study. Patient had abnormal ultrasound showing heterogeneous/lobulated liver and splenomegaly in early March. Associated with abnormal LFTs. Associated with some mild abdominal pain. New diagnosis to patient. Associated with longstanding diabetes and obesity. Also nted to have downward trending hgb without evidence of overt bleeding. CT scan done on this admission shows similar liver findings along with varices and ventral hernias. We are asked to evaluate further. PMH:  Past Medical History:   Diagnosis Date    Asthma     20 yrs    Atherosclerosis of coronary artery 2022    [facility]rhosis of [address] ([facility] [address]     COPD     10 years    Diabetes ([facility] [address]     Endocrine disease     hypothyroid    Thyroid disease        PSH:  Past Surgical History:   Procedure Laterality Date    HX  SECTION  , 0    HX CHOLECYSTECTOMY      HX GYN      Hyst, C Section       Allergies: Allergies   Allergen Reactions    No Known Drug Allergies Not Reported This Time       Home Medications:  Prior to Admission medications    Medication Sig Start Date End Date Taking? Authorizing Provider   albuterol (PROVENTIL VENTOLIN) 2.5 mg /3 mL (0.083 %) nebu INHALE 1 VIAL VIA NEBULIZER EVERY 6 HOURS AS NEEDED FOR WHEEZING OR SHORTNESS OF BREATH 3/21/22   Chanelle GIL PA-C   metoprolol tartrate (LOPRESSOR) 25 mg tablet Take 0.5 Tablets by mouth two (2) times a day. 3/14/22   Cata Miller MD   furosemide (LASIX) 40 mg tablet Take 1 Tablet by mouth daily. 3/14/22   Cata Miller MD   sucralfate (CARAFATE) 100 mg/mL suspension Take 10 mL by mouth three (3) times daily.  3/14/22   Cata Miller MD   ondansetron (ZOFRAN ODT) 4 mg disintegrating tablet Take 1 Tablet by mouth every eight (8) hours as needed for Nausea or Vomiting. 3/14/22   Shauna Sellers MD   ascorbic acid (VITAMIN C) 500 mg chewable tablet Take 500 mg by mouth. Provider, Historical   pantoprazole (PROTONIX) 40 mg tablet Take 1 Tablet by mouth daily. Before breakfast 3/2/22   Charlcie Libman, PA-C   hydrOXYzine pamoate (VISTARIL) 25 mg capsule Take 1 Capsule by mouth two (2) times daily as needed for Anxiety. 2/11/22   Shauna Sellers MD   Brilinta 90 mg tablet Take 90 mg by mouth every twelve (12) hours. 1/12/22   Provider, Historical   atorvastatin (LIPITOR) 80 mg tablet Take 80 mg by mouth nightly. 1/12/22   Provider, Historical   nitroglycerin (NITROSTAT) 0.4 mg SL tablet 0.4 mg by SubLINGual route. 1/12/22   Provider, Historical   fluticasone propionate (FLONASE) 50 mcg/actuation nasal spray 2 Sprays by Both Nostrils route daily. 2/4/22   Shauna Sellers MD   guaiFENesin ER (MUCINEX) 600 mg ER tablet Take 1 Tablet by mouth two (2) times a day. 2/4/22   Shauna Sellers MD   albuterol (PROVENTIL HFA, VENTOLIN HFA, PROAIR HFA) 90 mcg/actuation inhaler Take 2 Puffs by inhalation every four (4) hours as needed for Wheezing or Shortness of Breath. 1/14/22   Shauna Sellers MD   metFORMIN (GLUCOPHAGE) 1,000 mg tablet Take 1 Tablet by mouth two (2) times daily (with meals). 11/16/21   Shauna Sellers MD   levothyroxine (SYNTHROID) 175 mcg tablet Take 1 Tablet by mouth Daily (before breakfast). 10/22/21   Shauna Sellers MD   DULoxetine (CYMBALTA) 60 mg capsule Take 1 Capsule by mouth daily. 10/22/21   Shauna Sellers MD   aspirin delayed-release 81 mg tablet Take  by mouth daily.     Provider, Historical       Hospital Medications:  Current Facility-Administered Medications   Medication Dose Route Frequency    albuterol (PROVENTIL VENTOLIN) nebulizer solution 2.5 mg  2.5 mg Nebulization Q4H PRN    aspirin delayed-release tablet 81 mg  81 mg Oral DAILY    atorvastatin (LIPITOR) tablet 80 mg  80 mg Oral QHS    ticagrelor (BRILINTA) tablet 90 mg  90 mg Oral Q12H    DULoxetine (CYMBALTA) capsule 30 mg  30 mg Oral DAILY    fluticasone propionate (FLONASE) 50 mcg/actuation nasal spray 2 Spray  2 Spray Both Nostrils DAILY    hydrOXYzine pamoate (VISTARIL) capsule 25 mg  25 mg Oral BID PRN    levothyroxine (SYNTHROID) tablet 175 mcg  175 mcg Oral ACB    metoprolol tartrate (LOPRESSOR) tablet 12.5 mg  12.5 mg Oral BID    pantoprazole (PROTONIX) tablet 40 mg  40 mg Oral ACB&D    sucralfate (CARAFATE) tablet 1 g  1 g Oral AC&HS    sodium chloride (NS) flush 5-40 mL  5-40 mL IntraVENous Q8H    sodium chloride (NS) flush 5-40 mL  5-40 mL IntraVENous PRN    tuberculin injection 5 Units  5 Units IntraDERMal ONCE    insulin lispro (HUMALOG) injection   SubCUTAneous AC&HS       Social History:  Social History     Tobacco Use    Smoking status: Former Smoker     Packs/day: 1.00     Years: 30.00     Pack years: 30.00     Types: Cigarettes    Smokeless tobacco: Never Used   Substance Use Topics    Alcohol use: No     Pt denies any history of IV drug use, blood transfusions, or tattoos.     Family History:  Family History   Problem Relation Age of Onset    Heart Attack Father     No Known Problems Maternal Grandmother     No Known Problems Maternal Grandfather     No Known Problems Paternal Grandmother     No Known Problems Paternal Grandfather        Review of Systems:  Review of Systems - General ROS: negative for - chills, fatigue or fever  Hematological and Lymphatic ROS: negative for - bleeding problems, blood clots or blood transfusions  Cardiovascular ROS: no chest pain or dyspnea on exertion  Gastrointestinal ROS: positive for - abdominal pain and nausea/vomiting  negative for - blood in stools, hematemesis or melena  Genito-Urinary ROS: no dysuria, trouble voiding, or hematuria  Dermatological ROS: negative  negative for - acne, dry skin or eczema    A full 10 point ROS was completed and all other systems have been reviewed and are negative except those mentioned above and in the  HPI    Diet:      Objective:     Physical Exam:  Vitals:  Visit Vitals  BP (!) 120/52 (BP 1 Location: Left lower arm, BP Patient Position: At rest)   Pulse 78   Temp 97.7 °F (36.5 °C)   Resp 20   Ht 5' 4\" (1.626 m)   Wt 113.8 kg (250 lb 12.8 oz)   SpO2 98%   BMI 43.05 kg/m²     Gen:  Pt is alert, cooperative, no acute distress  Skin:  Extremities and face reveal no rashes. No palmar erythema. No telangiectasias on the chest wall. Normal texture/turgor. Warm and Dry. HEENT: Sclerae anicteric. Extra-occular muscles are intact. No oral ulcers. No abnormal pigmentation of the lips. The neck is supple. Cardiovascular: Regular rate and rhythm. No murmurs, gallops, or rubs. Non displaced PMI  Respiratory:  Comfortable breathing with no accessory muscle use. Clear to auscultation and percussion bilaterally. GI:  Abdomen nondistended, soft, and nontender. Normal active bowel sounds. No enlargement of the liver or spleen. No masses palpable. Rectal:  Deferred  Musculoskeletal:  No pitting edema of the lower legs. Extremities have good range of motion. No costovertebral tenderness. Neurological:  Gross memory appears intact. Patient is alert and oriented. CN2-12 grossly intact, no focal deficits. Normal gait  Psychiatric: Normal speech and affect, normal judgement.        Laboratory:    Recent Labs     03/25/22  0515 03/24/22  2118 03/24/22  1545 03/24/22  1050   WBC 3.3*  --  4.8  --    HGB 7.8*  --  8.6*  --    HCT 25.6*  --  27.4*  --    *  --  129*  --    MCV 87.7  --  87.5  --      --  131* 133*   K 3.0*  --  HEMOLYZED,RECOLLECT REQUESTED 3.2*     --  97* 96*   CO2 27  --  24 27   BUN 32*  --  34* 33*   CREA 1.70*  --  2.10* 1.90*   CA 8.2*  --  8.3 8.6   MG  --  1.7*  --   --    *  --  176* 162*   *  --  561*  --    *  -- HEMOLYZED,RECOLLECT REQUESTED  --    *  --  148*  --    TBILI 3.1*  --  3.6*  --    ALB 1.6*  --  1.7*  --    TP 6.6  --  7.2  --         Radiology:  XR CHEST PA LAT    Result Date: 3/24/2022  No acute airspace disease. COPD. US RETROPERITONEUM LTD    Result Date: 3/25/2022  1. No hydronephrosis. 2. Incidental note is made of splenomegaly. Assessment:       Principal Problem:    ISABELLA (acute kidney injury) ([facility] [address] (3/24/2022)    Active Problems:    Hypoxia, home O2 at night 3L (1/13/2010)      Diabetes mellitus ([address] (1/13/2010)      Hypothyroidism (1/13/2010)      Hyponatremia (1/13/2010)      Morbid obesity ([facility] [address] (1/14/2010)      Atherosclerosis of coronary artery (1/12/2022)      Overview: Last Assessment & Plan:       Formatting of this note is different from the original.      1-PRESENTATION WITH INFERIOR STEMI      2-TOTAL MID VESSEL OCCLUSION OF THE RCA WITH LARGE AMOUNT OF THROMBUS      3-RELATIVELY MILD LEFT SIDED CAD (40-50% proximal LAD)      4-INFERIOR WALL HYPOKINESIS, EF 50% WITH MR      5-SUCCESSFUL ASPIRATION THROMBECTOMY+ OSWALDO RCA (one distal branch fills       slowly)             - ASA, Brilinta, Lipitor, lopressor      Chronic obstructive pulmonary disease (HCC) ()      Overview: 10 years      Cirrhosis of liver (HCC) ()      Elevated bilirubin ()      Normocytic anemia ()      Thrombocytopenia (HCC) ()      Abnormal echocardiogram ()        Plan:       Patient's downward trending hgb if due to GI blood loss is likely 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    This is compounded by the fact that she had PCI in January and remains at risk for in stent thrombosis should her anticoagulation be discontinued   Will work in conjunction with cardiology to try and figure out the best way to handle this situation      Simba Trejo MD  [facility], [place]

## 2022-03-26 NOTE — PROGRESS NOTES
GI DAILY PROGRESS NOTE    Admit Date:  3/24/2022    Today's Date:  3/26/2022    CC:  Cirrhosis/anemia    Subjective:     Patient seen and examined this AM. No acute events overnight. Blood count is fairly stable. No overt bleeding.     Medications:   Current Facility-Administered Medications   Medication Dose Route Frequency    potassium chloride (K-DUR, KLOR-CON M20) SR tablet 40 mEq  40 mEq Oral BID    magnesium sulfate 2 g/50 ml IVPB (premix or compounded)  2 g IntraVENous BID    hydrALAZINE (APRESOLINE) 20 mg/mL injection 20 mg  20 mg IntraVENous Q4H PRN    oxyCODONE IR (ROXICODONE) tablet 5 mg  5 mg Oral Q4H PRN    guaiFENesin-dextromethorphan (ROBITUSSIN DM) 100-10 mg/5 mL syrup 10 mL  10 mL Oral Q4H PRN    senna-docusate (PERICOLACE) 8.6-50 mg per tablet 2 Tablet  2 Tablet Oral DAILY PRN    LORazepam (ATIVAN) tablet 0.5 mg  0.5 mg Oral Q4H PRN    ondansetron (ZOFRAN) injection 4 mg  4 mg IntraVENous Q4H PRN    albuterol (PROVENTIL VENTOLIN) nebulizer solution 2.5 mg  2.5 mg Nebulization BID RT    albuterol (PROVENTIL VENTOLIN) nebulizer solution 2.5 mg  2.5 mg Nebulization Q4H PRN    aspirin delayed-release tablet 81 mg  81 mg Oral DAILY    atorvastatin (LIPITOR) tablet 80 mg  80 mg Oral QHS    ticagrelor (BRILINTA) tablet 90 mg  90 mg Oral Q12H    DULoxetine (CYMBALTA) capsule 30 mg  30 mg Oral DAILY    fluticasone propionate (FLONASE) 50 mcg/actuation nasal spray 2 Spray  2 Spray Both Nostrils DAILY    hydrOXYzine pamoate (VISTARIL) capsule 25 mg  25 mg Oral BID PRN    levothyroxine (SYNTHROID) tablet 175 mcg  175 mcg Oral ACB    metoprolol tartrate (LOPRESSOR) tablet 12.5 mg  12.5 mg Oral BID    pantoprazole (PROTONIX) tablet 40 mg  40 mg Oral ACB&D    sucralfate (CARAFATE) tablet 1 g  1 g Oral AC&HS    sodium chloride (NS) flush 5-40 mL  5-40 mL IntraVENous Q8H    sodium chloride (NS) flush 5-40 mL  5-40 mL IntraVENous PRN    insulin lispro (HUMALOG) injection   SubCUTAneous AC&HS Review of Systems:  ROS was obtained, with pertinent positives as listed above. No chest pain or SOB. Diet:      Objective:   Vitals:  Visit Vitals  /63   Pulse 81   Temp 98.4 °F (36.9 °C)   Resp 22   Ht 5' 4\" (1.626 m)   Wt 113.8 kg (250 lb 12.8 oz)   SpO2 98%   BMI 43.05 kg/m²     Intake/Output:  No intake/output data recorded. 03/24 1901 - 03/26 0700  In: 1005 [P.O.:480; I.V.:525]  Out: 350 [Urine:350]  Exam:  General appearance: alert, cooperative, no distress  Lungs: clear to auscultation bilaterally anteriorly  Heart: regular rate and rhythm  Abdomen: soft, non-tender. Bowel sounds normal. No masses, no organomegaly  Extremities: extremities normal, atraumatic, no cyanosis or edema  Neuro:  alert and oriented    Data Review (Labs):    Recent Labs     03/26/22  0332 03/25/22  0515 03/24/22  2118 03/24/22  1545 03/24/22  1050   WBC 4.1* 3.3*  --  4.8  --    HGB 8.1* 7.8*  --  8.6*  --    HCT 25.7* 25.6*  --  27.4*  --    * 107*  --  129*  --    MCV 87.1 87.7  --  87.5  --    * 136  --  131* 133*   K 2.9* 3.0*  --  HEMOLYZED,RECOLLECT REQUESTED 3.2*    101  --  97* 96*   CO2 27 27  --  24 27   BUN 31* 32*  --  34* 33*   CREA 1.60* 1.70*  --  2.10* 1.90*   CA 8.7 8.2*  --  8.3 8.6   MG  --   --  1.7*  --   --    * 114*  --  176* 162*   * 499*  --  561*  --    * 130*  --  148*  --    * 206*  --  HEMOLYZED,RECOLLECT REQUESTED  --    ALB 1.7* 1.6*  --  1.7*  --    TP 7.2 6.6  --  7.2  --        Radiology:  XR CHEST PA LAT    Result Date: 3/24/2022  No acute airspace disease. COPD. US RETROPERITONEUM LTD    Result Date: 3/25/2022  1. No hydronephrosis. 2. Incidental note is made of splenomegaly.       Assessment:     Principal Problem:    ISABELLA (acute kidney injury) ([facility] [address] (3/24/2022)    Active Problems:    Hypoxia, home O2 at night 3L (1/13/2010)      Diabetes mellitus ([address] (1/13/2010)      Hypothyroidism (1/13/2010)      Hyponatremia (1/13/2010) Morbid obesity ([facility] [address] (1/14/2010)      Atherosclerosis of coronary artery (1/12/2022)      Overview: Last Assessment & Plan:       Formatting of this note is different from the original.      1-PRESENTATION WITH INFERIOR STEMI      2-TOTAL MID VESSEL OCCLUSION OF THE RCA WITH LARGE AMOUNT OF THROMBUS      3-RELATIVELY MILD LEFT SIDED CAD (40-50% proximal LAD)      4-INFERIOR WALL HYPOKINESIS, EF 50% WITH MR      5-SUCCESSFUL ASPIRATION THROMBECTOMY+ OSWALDO RCA (one distal branch fills       slowly)             - ASA, Brilinta, Lipitor, lopressor      Chronic obstructive pulmonary disease (HCC) ()      Overview: 10 years      Cirrhosis of liver (HCC) ()      ABLA (acute blood loss anemia) ()      Thrombocytopenia (HCC) ()      Pancytopenia ([address] (3/25/2022)      Secondary esophageal varices with bleeding ([address] (3/25/2022)      Chronic diastolic congestive heart failure ([address] (3/25/2022)        Plan:     Gema Campuzano for EGD Monday to evaluate for varices  Hgb remains stable  Will continue to follow     Libby Nguyen MD  [facility], [place]

## 2022-03-26 NOTE — PROGRESS NOTES
Hospitalist Progress Note   Admit Date:  3/24/2022  3:48 PM   Name:  Lorri Rincon   Age:  69 y.o. Sex:  female  :  1955   MRN:  [id number]   Room:  [facility]/    Presenting Complaint: Abnormal Lab Results    Reason(s) for Admission: ISABELLA (acute kidney injury) [facility]) [N17.9]     Hospital Course & Interval History:   Liliam Perez is a 69year old CF with a PMH of CAD s/p STEMI with OSWALDO to RCA in 2022, DM2, COPD with nocturnal hypoxia, hypothyroidism, and recent diagnosis of cirrhosis who presented to the ER with shortness of breath over the previous 24 hours, nausea with occasional vomiting and generalized weakness over the past week to the point of being unable to stand up from a sitting position. She states that she had a heart attack in 2022 and has had intermittent nausea since that time. Then about 2-3 weeks prior she had persistent nausea and has not been able to eat or drink much. She went to see her PCP who checked some labs and noticed her liver enzymes were off so ordered a CTAP. CTAP on 3/23 showed new cirrhosis with possible varices. He repeated labs and noticed her kidney function had worsened so sent her to the ER to be evaluated. hepatitis panel neg. Likely RUDD cirrhosis. Admitted for cirrhosis, ISABELLA. Nephrology and GI consulted. Subjective/24hr Events (22): Pt feeling OK. Denies complaints currently other than fatigue. Anemia stable. ISABELLA improved. resp status stable on 3L baseline. DM controlled    Assessment & Plan:       ISABELLA (acute kidney injury) ([facility] [address]  -Cr improved today.    -stop IVF today  -BMP in AM      Anemia likely from variceal bleeding  -hb baseline 10-12 in past  -endoscopy Monday. Also reports some issues with bowel incontinence and no recent colonoscopy.   Defer to GI but may be worthwhile to do Monday since already holding brilinta for procedure.  -brilinta on hold  -CBC in AM; hb stable  -iron studies ok  -PPI BID Hypokalemia   -replace K today  -Check Mg in AM      Cirrhosis of liver likely RUDD (HCC)   -LFTs stable, repeat in AM      Diastolic CHF  -cont toprol  -home lasix on hold currently; ?overdiuresis as outpt      Hypoxia, home O2 at night 3L   -Charted O2 Flow Rate (L/min): 3 l/min (decrease to 2). Baseline is 3L      Diabetes mellitus with hyperglycemia (HCC)   -cont ISS  -check a1c in AM      Hypothyroidism   -cont synthroid  -TSH close to normal      Morbid obesity (HCC)   -complicates care      CAD, recent MI and stenting Jan 2022  -ASA, brilinta as per GI/cardio  -cont toprol      Chronic obstructive pulmonary disease ([facility] [address]   -home PRN albuterol. Pancytopenia ([facility]   -from cirrhosis. -Daily CBC      Dispo/Discharge Planning:    -PT/OT consulted.   Home health recommended      Diet:  ADULT DIET Regular; Low Fat/Low Chol/High Fiber/2 gm Na  DVT PPx: SCD  Code status: Full Code    Hospital Problems as of 3/26/2022 Date Reviewed: 3/14/2022          Codes Class Noted - Resolved POA    Pancytopenia ([facility] ICD-10-CM: U90.670  ICD-9-CM: 284.19  3/25/2022 - Present Yes        Secondary esophageal varices with bleeding ([facility] ICD-10-CM: I85.11  ICD-9-CM: 456.20  3/25/2022 - Present Yes        Chronic diastolic congestive heart failure (HCC) ICD-10-CM: I50.32  ICD-9-CM: 428.32, 428.0  3/25/2022 - Present Yes        * (Principal) ISABELLA (acute kidney injury) ([facility] ICD-10-CM: N17.9  ICD-9-CM: 584.9  3/24/2022 - Present Yes        Chronic obstructive pulmonary disease (HCC) (Chronic) ICD-10-CM: J44.9  ICD-9-CM: 496  Unknown - Present Yes    Overview Signed 3/24/2022  7:32 PM by Prince Jurado, DO     10 years             Cirrhosis of liver ([facility] ICD-10-CM: K74.60  ICD-9-CM: 571.5  Unknown - Present Yes        ABLA (acute blood loss anemia) ICD-10-CM: D62  ICD-9-CM: 285.1  Unknown - Present Yes        Thrombocytopenia (HCC) (Chronic) ICD-10-CM: D69.6  ICD-9-CM: 287.5  Unknown - Present Yes        Atherosclerosis of coronary artery (Chronic) ICD-10-CM: I25.10  ICD-9-CM: 414.00  1/12/2022 - Present Yes    Overview Signed 2/11/2022  8:13 AM by Marisel Mcqueen MD     Last Assessment & Plan:   Formatting of this note is different from the original.  1-PRESENTATION WITH INFERIOR STEMI  2-TOTAL MID VESSEL OCCLUSION OF THE RCA WITH LARGE AMOUNT OF THROMBUS  3-RELATIVELY MILD LEFT SIDED CAD (40-50% proximal LAD)  4-INFERIOR WALL HYPOKINESIS, EF 50% WITH MR  5-SUCCESSFUL ASPIRATION THROMBECTOMY+ OSWALDO RCA (one distal branch fills slowly)     - ASA, Brilinta, Lipitor, lopressor             Morbid obesity (HCC) (Chronic) ICD-10-CM: E66.01  ICD-9-CM: 278.01  1/14/2010 - Present Yes        Hypoxia, home O2 at night 3L (Chronic) ICD-10-CM: R09.02  ICD-9-CM: 799.02  1/13/2010 - Present Yes        Diabetes mellitus ([facility] [address] (Chronic) ICD-10-CM: E11.9  ICD-9-CM: 250.00  1/13/2010 - Present Yes        Hypothyroidism (Chronic) ICD-10-CM: E03.9  ICD-9-CM: 244.9  1/13/2010 - Present Yes        Hyponatremia ICD-10-CM: E87.1  ICD-9-CM: 276.1  1/13/2010 - Present Yes              Objective:     Patient Vitals for the past 24 hrs:   Temp Pulse Resp BP SpO2   03/26/22 1128 98.4 °F (36.9 °C) 75 20 (!) 106/56 100 %   03/26/22 0744 -- -- -- -- 98 %   03/26/22 0736 98.4 °F (36.9 °C) 81 22 119/63 98 %   03/26/22 0454 98.2 °F (36.8 °C) 86 22 (!) 114/59 98 %   03/26/22 0057 98 °F (36.7 °C) 83 16 117/73 97 %   03/25/22 2041 98.2 °F (36.8 °C) 81 18 110/65 100 %   03/25/22 1842 -- -- -- -- 96 %   03/25/22 1511 98 °F (36.7 °C) 75 20 104/60 97 %     Oxygen Therapy  O2 Sat (%): 100 % (03/26/22 1128)  Pulse via Oximetry: 83 beats per minute (03/26/22 0744)  O2 Device: Nasal cannula (03/25/22 1935)  O2 Flow Rate (L/min): 3 l/min (decrease to 2) (03/26/22 0744)    Estimated body mass index is 43.05 kg/m² as calculated from the following:    Height as of this encounter: 5' 4\" (1.626 m). Weight as of this encounter: 113.8 kg (250 lb 12.8 oz).     Intake/Output Summary (Last 24 hours) at 3/26/2022 1204  Last data filed at 3/26/2022 1024  Gross per 24 hour   Intake 1125 ml   Output --   Net 1125 ml         Physical Exam:     Blood pressure (!) 106/56, pulse 75, temperature 98.4 °F (36.9 °C), resp. rate 20, height 5' 4\" (1.626 m), weight 113.8 kg (250 lb 12.8 oz), SpO2 100 %. General:    Well nourished. No overt distress  Head:  Normocephalic, atraumatic  Eyes:  Sclerae appear normal.  Pupils equally round. ENT:  Nares appear normal, no drainage. Moist oral mucosa  Neck:  No restricted ROM. Trachea midline   CV:   RRR. No jugular venous distension. Lungs:   CTAB. Respirations even, unlabored  Abdomen:   nondistended. Extremities: No cyanosis or clubbing. No edema  Skin:     No rashes and normal coloration. Warm and dry. Neuro:  CN II-XII grossly intact. Sensation intact. A&Ox3  Psych:  Normal mood and affect. I have reviewed ordered lab tests and independently visualized imaging below:    Recent Labs:  Recent Results (from the past 48 hour(s))   CBC WITH AUTOMATED DIFF    Collection Time: 03/24/22  3:45 PM   Result Value Ref Range    WBC 4.8 4.3 - 11.1 K/uL    RBC 3.13 (L) 4.05 - 5.2 M/uL    HGB 8.6 (L) 11.7 - 15.4 g/dL    HCT 27.4 (L) 35.8 - 46.3 %    MCV 87.5 79.6 - 97.8 FL    MCH 27.5 26.1 - 32.9 PG    MCHC 31.4 31.4 - 35.0 g/dL    RDW 17.7 (H) 11.9 - 14.6 %    PLATELET 295 (L) 031 - 450 K/uL    MPV 11.4 9.4 - 12.3 FL    ABSOLUTE NRBC 0.00 0.0 - 0.2 K/uL    NEUTROPHILS 73 43 - 78 %    LYMPHOCYTES 12 (L) 13 - 44 %    MONOCYTES 15 (H) 4.0 - 12.0 %    EOSINOPHILS 0 (L) 0.5 - 7.8 %    BASOPHILS 0 0.0 - 2.0 %    IMMATURE GRANULOCYTES 0 0.0 - 5.0 %    ABS. NEUTROPHILS 3.5 1.7 - 8.2 K/UL    ABS. LYMPHOCYTES 0.6 0.5 - 4.6 K/UL    ABS. MONOCYTES 0.7 0.1 - 1.3 K/UL    ABS. EOSINOPHILS 0.0 0.0 - 0.8 K/UL    ABS. BASOPHILS 0.0 0.0 - 0.2 K/UL    ABS. IMM.  GRANS. 0.0 0.0 - 0.5 K/UL    RBC COMMENTS SLIGHT  ANISOCYTOSIS + POIKILOCYTOSIS        RBC COMMENTS SLIGHT  HYPOCHROMIA        WBC COMMENTS Result Confirmed By Smear      PLATELET COMMENTS ADEQUATE      DF AUTOMATED     METABOLIC PANEL, COMPREHENSIVE    Collection Time: 03/24/22  3:45 PM   Result Value Ref Range    Sodium 131 (L) 136 - 145 mmol/L    Potassium HEMOLYZED,RECOLLECT REQUESTED 3.5 - 5.1 mmol/L    Chloride 97 (L) 98 - 107 mmol/L    CO2 24 21 - 32 mmol/L    Anion gap 10 7 - 16 mmol/L    Glucose 176 (H) 65 - 100 mg/dL    BUN 34 (H) 8 - 23 MG/DL    Creatinine 2.10 (H) 0.6 - 1.0 MG/DL    GFR est AA 30 (L) >60 ml/min/1.73m2    GFR est non-AA 25 (L) >60 ml/min/1.73m2    Calcium 8.3 8.3 - 10.4 MG/DL    Bilirubin, total 3.6 (H) 0.2 - 1.1 MG/DL    ALT (SGPT) 148 (H) 12 - 65 U/L    AST (SGOT) HEMOLYZED,RECOLLECT REQUESTED 15 - 37 U/L    Alk.  phosphatase 561 (H) 50 - 136 U/L    Protein, total 7.2 6.3 - 8.2 g/dL    Albumin 1.7 (L) 3.2 - 4.6 g/dL    Globulin 5.5 (H) 2.3 - 3.5 g/dL    A-G Ratio 0.3 (L) 1.2 - 3.5     NT-PRO BNP    Collection Time: 03/24/22  5:30 PM   Result Value Ref Range    NT pro-BNP 2,796 (H) 5 - 125 PG/ML   POC URINE MACROSCOPIC    Collection Time: 03/24/22  5:51 PM   Result Value Ref Range    Spec. gravity (POC) 1.020 1.001 - 1.023      pH, urine  (POC) 5.5 5.0 - 9.0      Protein (POC) TRACE (A) NEG mg/dL    Glucose, urine (POC) Negative NEG mg/dL    Ketones (POC) Negative NEG mg/dL    Bilirubin (POC) Negative NEG      Blood (POC) Trace Intact (A) NEG      Urobilinogen (POC) 1.0 0.2 - 1.0 EU/dL    Nitrite (POC) Negative NEG      Leukocyte esterase (POC) Negative NEG      Performed by Jagjit Bullock    URINALYSIS W/ RFLX MICROSCOPIC    Collection Time: 03/24/22  6:16 PM   Result Value Ref Range    Color YELLOW      Appearance CLOUDY      Specific gravity 1.015 1.001 - 1.023      pH (UA) 5.5 5.0 - 9.0      Protein TRACE (A) NEG mg/dL    Glucose Negative mg/dL    Ketone Negative NEG mg/dL    Bilirubin Negative NEG      Blood TRACE (A) NEG      Urobilinogen 1.0 0.2 - 1.0 EU/dL    Nitrites Negative NEG Leukocyte Esterase Negative NEG     URINE MICROSCOPIC    Collection Time: 03/24/22  6:16 PM   Result Value Ref Range    WBC 0-3 0 /hpf    RBC 0 0 /hpf    Epithelial cells 0-3 0 /hpf    Bacteria TRACE 0 /hpf    Casts 0-3 0 /lpf    Crystals, urine 0 0 /LPF    Mucus 0 0 /lpf    Other observations RESULTS VERIFIED MANUALLY     GLUCOSE, POC    Collection Time: 03/24/22  8:37 PM   Result Value Ref Range    Glucose (POC) 124 (H) 65 - 100 mg/dL    Performed by Eric    MAGNESIUM    Collection Time: 03/24/22  9:18 PM   Result Value Ref Range    Magnesium 1.7 (L) 1.8 - 2.4 mg/dL   PHOSPHORUS    Collection Time: 03/24/22  9:18 PM   Result Value Ref Range    Phosphorus 3.3 2.3 - 3.7 MG/DL   IRON    Collection Time: 03/24/22  9:18 PM   Result Value Ref Range    Iron 85 35 - 150 ug/dL   HIV 1/2 AG/AB, 4TH GENERATION,W RFLX CONFIRM    Collection Time: 03/24/22 10:55 PM   Result Value Ref Range    HIV 1/2 Interpretation NONREACTIVE NR      HIV 1/2 result comment SEE NOTE (A) NR     FERRITIN    Collection Time: 03/24/22 10:55 PM   Result Value Ref Range    Ferritin 300 8 - 388 NG/ML   HEPATITIS PANEL, ACUTE    Collection Time: 03/24/22 10:55 PM   Result Value Ref Range    Hepatitis A, IgM NONREACTIVE NR      Hepatitis B core, IgM NONREACTIVE NR      Hep B Surface Ag NONREACTIVE NR      Hepatitis C virus Ab NONREACTIVE NR     TRANSFERRIN SATURATION    Collection Time: 03/24/22 10:55 PM   Result Value Ref Range    Iron 79 35 - 150 ug/dL    TIBC 217 (L) 250 - 450 ug/dL    Transferrin Saturation 36 >20 %   PLEASE READ & DOCUMENT PPD TEST IN 24 HRS    Collection Time: 03/25/22 12:00 AM   Result Value Ref Range    PPD Negative Negative    mm Induration 0 0 - 5 mm   METABOLIC PANEL, COMPREHENSIVE    Collection Time: 03/25/22  5:15 AM   Result Value Ref Range    Sodium 136 136 - 145 mmol/L    Potassium 3.0 (L) 3.5 - 5.1 mmol/L    Chloride 101 98 - 107 mmol/L    CO2 27 21 - 32 mmol/L    Anion gap 8 7 - 16 mmol/L    Glucose 114 (H) 65 - 100 mg/dL    BUN 32 (H) 8 - 23 MG/DL    Creatinine 1.70 (H) 0.6 - 1.0 MG/DL    GFR est AA 38 (L) >60 ml/min/1.73m2    GFR est non-AA 32 (L) >60 ml/min/1.73m2    Calcium 8.2 (L) 8.3 - 10.4 MG/DL    Bilirubin, total 3.1 (H) 0.2 - 1.1 MG/DL    ALT (SGPT) 130 (H) 12 - 65 U/L    AST (SGOT) 206 (H) 15 - 37 U/L    Alk.  phosphatase 499 (H) 50 - 136 U/L    Protein, total 6.6 6.3 - 8.2 g/dL    Albumin 1.6 (L) 3.2 - 4.6 g/dL    Globulin 5.0 (H) 2.3 - 3.5 g/dL    A-G Ratio 0.3 (L) 1.2 - 3.5     CBC W/O DIFF    Collection Time: 03/25/22  5:15 AM   Result Value Ref Range    WBC 3.3 (L) 4.3 - 11.1 K/uL    RBC 2.92 (L) 4.05 - 5.2 M/uL    HGB 7.8 (L) 11.7 - 15.4 g/dL    HCT 25.6 (L) 35.8 - 46.3 %    MCV 87.7 79.6 - 97.8 FL    MCH 26.7 26.1 - 32.9 PG    MCHC 30.5 (L) 31.4 - 35.0 g/dL    RDW 17.6 (H) 11.9 - 14.6 %    PLATELET 545 (L) 926 - 450 K/uL    MPV 11.0 9.4 - 12.3 FL    ABSOLUTE NRBC 0.00 0.0 - 0.2 K/uL   GLUCOSE, POC    Collection Time: 03/25/22  7:17 AM   Result Value Ref Range    Glucose (POC) 120 (H) 65 - 100 mg/dL    Performed by ToneyRitaPCT    GLUCOSE, POC    Collection Time: 03/25/22 11:10 AM   Result Value Ref Range    Glucose (POC) 118 (H) 65 - 100 mg/dL    Performed by ToneyRitaPCT    GLUCOSE, POC    Collection Time: 03/25/22  4:07 PM   Result Value Ref Range    Glucose (POC) 169 (H) 65 - 100 mg/dL    Performed by [facility]    GLUCOSE, POC    Collection Time: 03/25/22  9:17 PM   Result Value Ref Range    Glucose (POC) 174 (H) 65 - 100 mg/dL    Performed by TAI Etienne    Collection Time: 03/26/22  3:32 AM   Result Value Ref Range    Sodium 135 (L) 136 - 145 mmol/L    Potassium 2.9 (L) 3.5 - 5.1 mmol/L    Chloride 100 98 - 107 mmol/L    CO2 27 21 - 32 mmol/L    Anion gap 8 7 - 16 mmol/L    Glucose 167 (H) 65 - 100 mg/dL    BUN 31 (H) 8 - 23 MG/DL    Creatinine 1.60 (H) 0.6 - 1.0 MG/DL    GFR est AA 41 (L) >60 ml/min/1.73m2    GFR est non-AA 34 (L) >60 ml/min/1.73m2 Calcium 8.7 8.3 - 10.4 MG/DL    Bilirubin, total 3.0 (H) 0.2 - 1.1 MG/DL    ALT (SGPT) 136 (H) 12 - 65 U/L    AST (SGOT) 221 (H) 15 - 37 U/L    Alk. phosphatase 559 (H) 50 - 136 U/L    Protein, total 7.2 6.3 - 8.2 g/dL    Albumin 1.7 (L) 3.2 - 4.6 g/dL    Globulin 5.5 (H) 2.3 - 3.5 g/dL    A-G Ratio 0.3 (L) 1.2 - 3.5     CBC W/O DIFF    Collection Time: 03/26/22  3:32 AM   Result Value Ref Range    WBC 4.1 (L) 4.3 - 11.1 K/uL    RBC 2.95 (L) 4.05 - 5.2 M/uL    HGB 8.1 (L) 11.7 - 15.4 g/dL    HCT 25.7 (L) 35.8 - 46.3 %    MCV 87.1 79.6 - 97.8 FL    MCH 27.5 26.1 - 32.9 PG    MCHC 31.5 31.4 - 35.0 g/dL    RDW 17.5 (H) 11.9 - 14.6 %    PLATELET 408 (L) 029 - 450 K/uL    MPV 10.8 9.4 - 12.3 FL    ABSOLUTE NRBC 0.00 0.0 - 0.2 K/uL   TSH 3RD GENERATION    Collection Time: 03/26/22  3:32 AM   Result Value Ref Range    TSH 3.750 (H) 0.358 - 3.740 uIU/mL   GLUCOSE, POC    Collection Time: 03/26/22  7:32 AM   Result Value Ref Range    Glucose (POC) 150 (H) 65 - 100 mg/dL    Performed by IndaBoxA    GLUCOSE, POC    Collection Time: 03/26/22 11:51 AM   Result Value Ref Range    Glucose (POC) 164 (H) 65 - 100 mg/dL    Performed by IndaBoxA        All Micro Results     None          Other Studies:  No results found.     Current Meds:  Current Facility-Administered Medications   Medication Dose Route Frequency    potassium chloride (K-DUR, KLOR-CON M20) SR tablet 40 mEq  40 mEq Oral BID    magnesium sulfate 2 g/50 ml IVPB (premix or compounded)  2 g IntraVENous BID    hydrALAZINE (APRESOLINE) 20 mg/mL injection 20 mg  20 mg IntraVENous Q4H PRN    oxyCODONE IR (ROXICODONE) tablet 5 mg  5 mg Oral Q4H PRN    guaiFENesin-dextromethorphan (ROBITUSSIN DM) 100-10 mg/5 mL syrup 10 mL  10 mL Oral Q4H PRN    senna-docusate (PERICOLACE) 8.6-50 mg per tablet 2 Tablet  2 Tablet Oral DAILY PRN    LORazepam (ATIVAN) tablet 0.5 mg  0.5 mg Oral Q4H PRN    ondansetron (ZOFRAN) injection 4 mg  4 mg IntraVENous Q4H PRN    albuterol (PROVENTIL VENTOLIN) nebulizer solution 2.5 mg  2.5 mg Nebulization BID RT    albuterol (PROVENTIL VENTOLIN) nebulizer solution 2.5 mg  2.5 mg Nebulization Q4H PRN    aspirin delayed-release tablet 81 mg  81 mg Oral DAILY    atorvastatin (LIPITOR) tablet 80 mg  80 mg Oral QHS    [Held by provider] ticagrelor (BRILINTA) tablet 90 mg  90 mg Oral Q12H    DULoxetine (CYMBALTA) capsule 30 mg  30 mg Oral DAILY    fluticasone propionate (FLONASE) 50 mcg/actuation nasal spray 2 Spray  2 Spray Both Nostrils DAILY    hydrOXYzine pamoate (VISTARIL) capsule 25 mg  25 mg Oral BID PRN    levothyroxine (SYNTHROID) tablet 175 mcg  175 mcg Oral ACB    metoprolol tartrate (LOPRESSOR) tablet 12.5 mg  12.5 mg Oral BID    pantoprazole (PROTONIX) tablet 40 mg  40 mg Oral ACB&D    sucralfate (CARAFATE) tablet 1 g  1 g Oral AC&HS    sodium chloride (NS) flush 5-40 mL  5-40 mL IntraVENous Q8H    sodium chloride (NS) flush 5-40 mL  5-40 mL IntraVENous PRN    insulin lispro (HUMALOG) injection   SubCUTAneous AC&HS       Signed:  Satish Wesley MD    Part of this note may have been written by using a voice dictation software. The note has been proof read but may still contain some grammatical/other typographical errors.

## 2022-03-26 NOTE — PROGRESS NOTES
Patient resting in bed, alert and oriented, cooperative with care. Patient on 3 liters of Oxygen via N/C. Purewick in place. Patient denies pain or distress, safety measures in place, call light within reach.

## 2022-03-27 NOTE — PROGRESS NOTES
Late entry: BSR received  Patient resting in bed watching tv RR even/unlabored on 3LNC with NAD noted at this time

## 2022-03-27 NOTE — PROGRESS NOTES
GI DAILY PROGRESS NOTE    Admit Date:  3/24/2022    Today's Date:  3/27/2022    CC:  Cirrhosis    Subjective:     Patient seen and examined this AM. No acute events overnight. Hgb stable. LFTs remain elevated, but stable. Patient states she has occasional urge incontinence and has never had a colonoscopy. She asks if we can do this at the same time as her EGD tomorrow.      Medications:   Current Facility-Administered Medications   Medication Dose Route Frequency    hydrALAZINE (APRESOLINE) 20 mg/mL injection 20 mg  20 mg IntraVENous Q4H PRN    oxyCODONE IR (ROXICODONE) tablet 5 mg  5 mg Oral Q4H PRN    guaiFENesin-dextromethorphan (ROBITUSSIN DM) 100-10 mg/5 mL syrup 10 mL  10 mL Oral Q4H PRN    senna-docusate (PERICOLACE) 8.6-50 mg per tablet 2 Tablet  2 Tablet Oral DAILY PRN    LORazepam (ATIVAN) tablet 0.5 mg  0.5 mg Oral Q4H PRN    ondansetron (ZOFRAN) injection 4 mg  4 mg IntraVENous Q4H PRN    albuterol (PROVENTIL VENTOLIN) nebulizer solution 2.5 mg  2.5 mg Nebulization BID RT    albuterol (PROVENTIL VENTOLIN) nebulizer solution 2.5 mg  2.5 mg Nebulization Q4H PRN    aspirin delayed-release tablet 81 mg  81 mg Oral DAILY    atorvastatin (LIPITOR) tablet 80 mg  80 mg Oral QHS    [Held by provider] ticagrelor (BRILINTA) tablet 90 mg  90 mg Oral Q12H    DULoxetine (CYMBALTA) capsule 30 mg  30 mg Oral DAILY    fluticasone propionate (FLONASE) 50 mcg/actuation nasal spray 2 Spray  2 Spray Both Nostrils DAILY    hydrOXYzine pamoate (VISTARIL) capsule 25 mg  25 mg Oral BID PRN    levothyroxine (SYNTHROID) tablet 175 mcg  175 mcg Oral ACB    metoprolol tartrate (LOPRESSOR) tablet 12.5 mg  12.5 mg Oral BID    pantoprazole (PROTONIX) tablet 40 mg  40 mg Oral ACB&D    sucralfate (CARAFATE) tablet 1 g  1 g Oral AC&HS    sodium chloride (NS) flush 5-40 mL  5-40 mL IntraVENous Q8H    sodium chloride (NS) flush 5-40 mL  5-40 mL IntraVENous PRN    insulin lispro (HUMALOG) injection   SubCUTAneous AC&HS Review of Systems:  ROS was obtained, with pertinent positives as listed above. No chest pain or SOB. Diet:      Objective:   Vitals:  Visit Vitals  BP (!) 143/75   Pulse 80   Temp 98.2 °F (36.8 °C)   Resp 18   Ht 5' 4\" (1.626 m)   Wt 119.4 kg (263 lb 4.8 oz)   SpO2 98%   BMI 45.20 kg/m²     Intake/Output:  No intake/output data recorded. 03/25 1901 - 03/27 0700  In: 434 [P.O.:240; I.V.:525]  Out: -   Exam:  General appearance: alert, cooperative, no distress  Lungs: clear to auscultation bilaterally anteriorly  Heart: regular rate and rhythm  Abdomen: soft, non-tender. Bowel sounds normal. No masses, no organomegaly  Extremities: extremities normal, atraumatic, no cyanosis or edema  Neuro:  alert and oriented    Data Review (Labs):    Recent Labs     03/27/22  0455 03/26/22  0332 03/25/22  0515 03/24/22  2118 03/24/22  1545 03/24/22  1050   WBC 4.4 4.1* 3.3*  --  4.8  --    HGB 8.0* 8.1* 7.8*  --  8.6*  --    HCT 24.6* 25.7* 25.6*  --  27.4*  --    * 137* 107*  --  129*  --    MCV 85.4 87.1 87.7  --  87.5  --    * 135* 136  --  131* 133*   K 3.9 2.9* 3.0*  --  HEMOLYZED,RECOLLECT REQUESTED 3.2*    100 101  --  97* 96*   CO2 25 27 27  --  24 27   BUN 31* 31* 32*  --  34* 33*   CREA 1.50* 1.60* 1.70*  --  2.10* 1.90*   CA 8.3 8.7 8.2*  --  8.3 8.6   MG 2.7*  --   --  1.7*  --   --    * 167* 114*  --  176* 162*   * 559* 499*  --  561*  --    * 136* 130*  --  148*  --    * 221* 206*  --  HEMOLYZED,RECOLLECT REQUESTED  --    ALB 1.7* 1.7* 1.6*  --  1.7*  --    TP 7.0 7.2 6.6  --  7.2  --        Radiology:  XR CHEST PA LAT    Result Date: 3/24/2022  No acute airspace disease. COPD. US RETROPERITONEUM LTD    Result Date: 3/25/2022  1. No hydronephrosis. 2. Incidental note is made of splenomegaly.       Assessment:     Principal Problem:    ISABELLA (acute kidney injury) ([facility] [address] (3/24/2022)    Active Problems:    Hypoxia, home O2 at night 3L (1/13/2010)      Diabetes mellitus ([address] (1/13/2010)      Hypothyroidism (1/13/2010)      Hyponatremia (1/13/2010)      Morbid obesity ([address] (1/14/2010)      Atherosclerosis of coronary artery (1/12/2022)      Overview: Last Assessment & Plan:       Formatting of this note is different from the original.      1-PRESENTATION WITH INFERIOR STEMI      2-TOTAL MID VESSEL OCCLUSION OF THE RCA WITH LARGE AMOUNT OF THROMBUS      3-RELATIVELY MILD LEFT SIDED CAD (40-50% proximal LAD)      4-INFERIOR WALL HYPOKINESIS, EF 50% WITH MR      5-SUCCESSFUL ASPIRATION THROMBECTOMY+ OSWALDO RCA (one distal branch fills       slowly)             - ASA, Brilinta, Lipitor, lopressor      Chronic obstructive pulmonary disease (HCC) ()      Overview: 10 years      Cirrhosis of liver (HCC) ()      ABLA (acute blood loss anemia) ()      Thrombocytopenia (HCC) ()      Pancytopenia ([address] (3/25/2022)      Secondary esophageal varices with bleeding ([address] (3/25/2022)      Chronic diastolic congestive heart failure ([address] (3/25/2022)        Plan:     EGD/Colonoscopy tomorrow to evaluate for varices, screening as her anticoagulation is being held.    Anticoagulation on hold  Will readdress anticoagulation issue after endoscopy results       Frankie Fregoso MD  [facility], [place]

## 2022-03-27 NOTE — PROGRESS NOTES
Hospitalist Progress Note   Admit Date:  3/24/2022  3:48 PM   Name:  Rosalind Miranda   Age:  69 y.o. Sex:  female  :  1955   MRN:  [id number]   Room:  [facility]/    Presenting Complaint: Abnormal Lab Results    Reason(s) for Admission: ISABELLA (acute kidney injury) [facility]) [N17.9]     Hospital Course & Interval History:   Twin Henry is a 69year old CF with a PMH of CAD s/p STEMI with OSWALDO to RCA in 2022, DM2, COPD with nocturnal hypoxia, hypothyroidism, and recent diagnosis of cirrhosis who presented to the ER with shortness of breath over the previous 24 hours, nausea with occasional vomiting and generalized weakness over the past week to the point of being unable to stand up from a sitting position. She states that she had a heart attack in 2022 and has had intermittent nausea since that time. Then about 2-3 weeks prior she had persistent nausea and has not been able to eat or drink much. She went to see her PCP who checked some labs and noticed her liver enzymes were off so ordered a CTAP. CTAP on 3/23 showed new cirrhosis with possible varices. He repeated labs and noticed her kidney function had worsened so sent her to the ER to be evaluated. hepatitis panel neg. Likely RUDD cirrhosis. Admitted for RUDD cirrhosis, ISABELLA. Nephrology and GI consulted. iron studies ok. Hepatitis panel and other workup ok. Subjective/24hr Events (22): No changes today vs yest.  Denies complaints currently. Anemia stable. ISABELLA still improving. resp status stable on 3L baseline.   DM controlled    Assessment & Plan:       ISABELLA (acute kidney injury) ([facility]  -Cr slowly improving  -BMP in AM      Anemia likely from variceal bleeding  -hb baseline 10-12 in past  -EGD and c-scope Monday.  -brilinta on hold until after procedure  -CBC in AM; hb stable  -PPI BID      Hypokalemia   -resolved  -BMP in AM      Cirrhosis of liver likely RUDD (HCC)   -LFTs stable, repeat in AM      Diastolic CHF  -cont toprol  -resume home lasix when isabella resolved      Hypoxia, home O2 at night 3L   -Charted O2 Flow Rate (L/min): 3 l/min. Baseline is 3L      Diabetes mellitus with hyperglycemia (HCC)   -cont ISS  -a1c 6%      Hypothyroidism   -cont synthroid      Morbid obesity ([facility]   -complicates care      CAD, recent MI and stenting Jan 2022  -ASA, brilinta as per GI/cardio  -cont toprol      Chronic obstructive pulmonary disease ([facility]   -home PRN albuterol. Thrombocytopenia ([facility]   -from cirrhosis. Daily CBC. Dispo/Discharge Planning:    -PT/OT consulted. Home health recommended      Diet:  DIET NPO  ADULT DIET Clear Liquid;  Low Fat/Low Chol/High Fiber/2 gm Na  DVT PPx: SCD  Code status: Full Code    Hospital Problems as of 3/27/2022 Date Reviewed: 3/14/2022          Codes Class Noted - Resolved POA    Pancytopenia ([facility] ICD-10-CM: Q67.813  ICD-9-CM: 284.19  3/25/2022 - Present Yes        Secondary esophageal varices with bleeding ([facility] ICD-10-CM: I85.11  ICD-9-CM: 456.20  3/25/2022 - Present Yes        Chronic diastolic congestive heart failure (HCC) ICD-10-CM: I50.32  ICD-9-CM: 428.32, 428.0  3/25/2022 - Present Yes        * (Principal) ISABELLA (acute kidney injury) ([facility] ICD-10-CM: N17.9  ICD-9-CM: 584.9  3/24/2022 - Present Yes        Chronic obstructive pulmonary disease (HCC) (Chronic) ICD-10-CM: J44.9  ICD-9-CM: 666  Unknown - Present Yes    Overview Signed 3/24/2022  7:32 PM by Melony Ritter DO     10 years             Cirrhosis of liver ([facility] ICD-10-CM: K74.60  ICD-9-CM: 571.5  Unknown - Present Yes        ABLA (acute blood loss anemia) ICD-10-CM: D62  ICD-9-CM: 285.1  Unknown - Present Yes        Thrombocytopenia (HCC) (Chronic) ICD-10-CM: D69.6  ICD-9-CM: 287.5  Unknown - Present Yes        Atherosclerosis of coronary artery (Chronic) ICD-10-CM: I25.10  ICD-9-CM: 414.00  1/12/2022 - Present Yes    Overview Signed 2/11/2022  8:13 AM by Tyra Powers MD     Last Assessment & Plan: Formatting of this note is different from the original.  1-PRESENTATION WITH INFERIOR STEMI  2-TOTAL MID VESSEL OCCLUSION OF THE RCA WITH LARGE AMOUNT OF THROMBUS  3-RELATIVELY MILD LEFT SIDED CAD (40-50% proximal LAD)  4-INFERIOR WALL HYPOKINESIS, EF 50% WITH MR  5-SUCCESSFUL ASPIRATION THROMBECTOMY+ OSWALDO RCA (one distal branch fills slowly)     - ASA, Brilinta, Lipitor, lopressor             Morbid obesity (HCC) (Chronic) ICD-10-CM: E66.01  ICD-9-CM: 278.01  1/14/2010 - Present Yes        Hypoxia, home O2 at night 3L (Chronic) ICD-10-CM: R09.02  ICD-9-CM: 799.02  1/13/2010 - Present Yes        Diabetes mellitus ([address] (Chronic) ICD-10-CM: E11.9  ICD-9-CM: 250.00  1/13/2010 - Present Yes        Hypothyroidism (Chronic) ICD-10-CM: E03.9  ICD-9-CM: 244.9  1/13/2010 - Present Yes        Hyponatremia ICD-10-CM: E87.1  ICD-9-CM: 276.1  1/13/2010 - Present Yes              Objective:     Patient Vitals for the past 24 hrs:   Temp Pulse Resp BP SpO2   03/27/22 1103 98.4 °F (36.9 °C) 73 18 (!) 122/54 100 %   03/27/22 0901 -- -- -- -- 98 %   03/27/22 0759 98.2 °F (36.8 °C) 80 18 (!) 143/75 --   03/27/22 0238 98.2 °F (36.8 °C) 77 18 116/71 --   03/26/22 2236 98.3 °F (36.8 °C) 78 18 (!) 109/52 98 %   03/26/22 2008 -- -- -- -- 98 %   03/26/22 2005 98.3 °F (36.8 °C) 77 17 119/66 100 %   03/26/22 1516 98 °F (36.7 °C) 76 20 (!) 118/54 97 %     Oxygen Therapy  O2 Sat (%): 100 % (03/27/22 1103)  Pulse via Oximetry: 83 beats per minute (03/27/22 0901)  O2 Device: Nasal cannula (03/27/22 0901)  O2 Flow Rate (L/min): 3 l/min (03/27/22 0901)  FIO2 (%): 32 % (03/26/22 2008)    Estimated body mass index is 45.2 kg/m² as calculated from the following:    Height as of this encounter: 5' 4\" (1.626 m). Weight as of this encounter: 119.4 kg (263 lb 4.8 oz).     Intake/Output Summary (Last 24 hours) at 3/27/2022 1426  Last data filed at 3/27/2022 1351  Gross per 24 hour   Intake 240 ml   Output --   Net 240 ml         Physical Exam: Blood pressure (!) 122/54, pulse 73, temperature 98.4 °F (36.9 °C), resp. rate 18, height 5' 4\" (1.626 m), weight 119.4 kg (263 lb 4.8 oz), SpO2 100 %. General:    Well nourished. No overt distress  Head:  Normocephalic, atraumatic  Eyes:  Sclerae appear normal.  Pupils equally round. ENT:  Nares appear normal, no drainage. Moist oral mucosa  Neck:  No restricted ROM. Trachea midline   CV:   RRR. No jugular venous distension. Lungs:   CTAB. Respirations even, unlabored  Abdomen:   nondistended. Extremities: No cyanosis or clubbing. No edema  Skin:     No rashes and normal coloration. Warm and dry. Neuro:  CN II-XII grossly intact. Sensation intact. A&Ox3  Psych:  Normal mood and affect. I have reviewed ordered lab tests and independently visualized imaging below:    Recent Labs:  Recent Results (from the past 48 hour(s))   GLUCOSE, POC    Collection Time: 03/25/22  4:07 PM   Result Value Ref Range    Glucose (POC) 169 (H) 65 - 100 mg/dL    [facility] by [facility]    GLUCOSE, POC    Collection Time: 03/25/22  9:17 PM   Result Value Ref Range    Glucose (POC) 174 (H) 65 - 100 mg/dL    Performed by [facility]    Collection Time: 03/26/22  3:32 AM   Result Value Ref Range    Sodium 135 (L) 136 - 145 mmol/L    Potassium 2.9 (L) 3.5 - 5.1 mmol/L    Chloride 100 98 - 107 mmol/L    CO2 27 21 - 32 mmol/L    Anion gap 8 7 - 16 mmol/L    Glucose 167 (H) 65 - 100 mg/dL    BUN 31 (H) 8 - 23 MG/DL    Creatinine 1.60 (H) 0.6 - 1.0 MG/DL    GFR est AA 41 (L) >60 ml/min/1.73m2    GFR est non-AA 34 (L) >60 ml/min/1.73m2    Calcium 8.7 8.3 - 10.4 MG/DL    Bilirubin, total 3.0 (H) 0.2 - 1.1 MG/DL    ALT (SGPT) 136 (H) 12 - 65 U/L    AST (SGOT) 221 (H) 15 - 37 U/L    Alk.  phosphatase 559 (H) 50 - 136 U/L    Protein, total 7.2 6.3 - 8.2 g/dL    Albumin 1.7 (L) 3.2 - 4.6 g/dL    Globulin 5.5 (H) 2.3 - 3.5 g/dL    A-G Ratio 0.3 (L) 1.2 - 3.5     CBC W/O DIFF Collection Time: 03/26/22  3:32 AM   Result Value Ref Range    WBC 4.1 (L) 4.3 - 11.1 K/uL    RBC 2.95 (L) 4.05 - 5.2 M/uL    HGB 8.1 (L) 11.7 - 15.4 g/dL    HCT 25.7 (L) 35.8 - 46.3 %    MCV 87.1 79.6 - 97.8 FL    MCH 27.5 26.1 - 32.9 PG    MCHC 31.5 31.4 - 35.0 g/dL    RDW 17.5 (H) 11.9 - 14.6 %    PLATELET 866 (L) 082 - 450 K/uL    MPV 10.8 9.4 - 12.3 FL    ABSOLUTE NRBC 0.00 0.0 - 0.2 K/uL   TSH 3RD GENERATION    Collection Time: 03/26/22  3:32 AM   Result Value Ref Range    TSH 3.750 (H) 0.358 - 3.740 uIU/mL   GLUCOSE, POC    Collection Time: 03/26/22  7:32 AM   Result Value Ref Range    Glucose (POC) 150 (H) 65 - 100 mg/dL    Performed by [facility]    GLUCOSE, POC    Collection Time: 03/26/22 11:51 AM   Result Value Ref Range    Glucose (POC) 164 (H) 65 - 100 mg/dL    Performed by [employer]    GLUCOSE, POC    Collection Time: 03/26/22  4:34 PM   Result Value Ref Range    Glucose (POC) 154 (H) 65 - 100 mg/dL    Performed by [address] [place] [address] Giovanna, POC    Collection Time: 03/26/22  8:20 PM   Result Value Ref Range    Glucose (POC) 184 (H) 65 - 100 mg/dL    Performed by [facility]    PLEASE READ & DOCUMENT PPD TEST IN 48 HRS    Collection Time: 03/26/22  9:17 PM   Result Value Ref Range    PPD Negative Negative    mm Induration 0 0 - 5 mm   METABOLIC PANEL, COMPREHENSIVE    Collection Time: 03/27/22  4:55 AM   Result Value Ref Range    Sodium 134 (L) 136 - 145 mmol/L    Potassium 3.9 3.5 - 5.1 mmol/L    Chloride 103 98 - 107 mmol/L    CO2 25 21 - 32 mmol/L    Anion gap 6 (L) 7 - 16 mmol/L    Glucose 115 (H) 65 - 100 mg/dL    BUN 31 (H) 8 - 23 MG/DL    Creatinine 1.50 (H) 0.6 - 1.0 MG/DL    GFR est AA 44 (L) >60 ml/min/1.73m2    GFR est non-AA 37 (L) >60 ml/min/1.73m2    Calcium 8.3 8.3 - 10.4 MG/DL    Bilirubin, total 3.0 (H) 0.2 - 1.1 MG/DL    ALT (SGPT) 137 (H) 12 - 65 U/L    AST (SGOT) 214 (H) 15 - 37 U/L    Alk.  phosphatase 544 (H) 50 - 136 U/L    Protein, total 7.0 6.3 - 8.2 g/dL Albumin 1.7 (L) 3.2 - 4.6 g/dL    Globulin 5.3 (H) 2.3 - 3.5 g/dL    A-G Ratio 0.3 (L) 1.2 - 3.5     CBC W/O DIFF    Collection Time: 03/27/22  4:55 AM   Result Value Ref Range    WBC 4.4 4.3 - 11.1 K/uL    RBC 2.88 (L) 4.05 - 5.2 M/uL    HGB 8.0 (L) 11.7 - 15.4 g/dL    HCT 24.6 (L) 35.8 - 46.3 %    MCV 85.4 79.6 - 97.8 FL    MCH 27.8 26.1 - 32.9 PG    MCHC 32.5 31.4 - 35.0 g/dL    RDW 17.5 (H) 11.9 - 14.6 %    PLATELET 271 (L) 263 - 450 K/uL    MPV 10.3 9.4 - 12.3 FL    ABSOLUTE NRBC 0.00 0.0 - 0.2 K/uL   MAGNESIUM    Collection Time: 03/27/22  4:55 AM   Result Value Ref Range    Magnesium 2.7 (H) 1.8 - 2.4 mg/dL   HEMOGLOBIN A1C WITH EAG    Collection Time: 03/27/22  5:42 AM   Result Value Ref Range    Hemoglobin A1c 6.0 4.20 - 6.30 %    Est. average glucose 126 mg/dL   GLUCOSE, POC    Collection Time: 03/27/22  7:36 AM   Result Value Ref Range    Glucose (POC) 131 (H) 65 - 100 mg/dL    Performed by [facility]    GLUCOSE, POC    Collection Time: 03/27/22 11:51 AM   Result Value Ref Range    Glucose (POC) 159 (H) 65 - 100 mg/dL    Performed by [facility]        All Micro Results     None          Other Studies:  No results found.     Current Meds:  Current Facility-Administered Medications   Medication Dose Route Frequency    polyethylene glycol (MIRALAX) powder 238 g  238 g Oral ONCE    hydrALAZINE (APRESOLINE) 20 mg/mL injection 20 mg  20 mg IntraVENous Q4H PRN    oxyCODONE IR (ROXICODONE) tablet 5 mg  5 mg Oral Q4H PRN    guaiFENesin-dextromethorphan (ROBITUSSIN DM) 100-10 mg/5 mL syrup 10 mL  10 mL Oral Q4H PRN    senna-docusate (PERICOLACE) 8.6-50 mg per tablet 2 Tablet  2 Tablet Oral DAILY PRN    LORazepam (ATIVAN) tablet 0.5 mg  0.5 mg Oral Q4H PRN    ondansetron (ZOFRAN) injection 4 mg  4 mg IntraVENous Q4H PRN    albuterol (PROVENTIL VENTOLIN) nebulizer solution 2.5 mg  2.5 mg Nebulization BID RT    albuterol (PROVENTIL VENTOLIN) nebulizer solution 2.5 mg  2.5 mg Nebulization Q4H PRN    aspirin delayed-release tablet 81 mg  81 mg Oral DAILY    atorvastatin (LIPITOR) tablet 80 mg  80 mg Oral QHS    [Held by provider] ticagrelor (BRILINTA) tablet 90 mg  90 mg Oral Q12H    DULoxetine (CYMBALTA) capsule 30 mg  30 mg Oral DAILY    fluticasone propionate (FLONASE) 50 mcg/actuation nasal spray 2 Spray  2 Spray Both Nostrils DAILY    hydrOXYzine pamoate (VISTARIL) capsule 25 mg  25 mg Oral BID PRN    levothyroxine (SYNTHROID) tablet 175 mcg  175 mcg Oral ACB    metoprolol tartrate (LOPRESSOR) tablet 12.5 mg  12.5 mg Oral BID    pantoprazole (PROTONIX) tablet 40 mg  40 mg Oral ACB&D    sucralfate (CARAFATE) tablet 1 g  1 g Oral AC&HS    sodium chloride (NS) flush 5-40 mL  5-40 mL IntraVENous Q8H    sodium chloride (NS) flush 5-40 mL  5-40 mL IntraVENous PRN    insulin lispro (HUMALOG) injection   SubCUTAneous AC&HS       Signed:  Giovanni Nye MD    Part of this note may have been written by using a voice dictation software. The note has been proof read but may still contain some grammatical/other typographical errors.

## 2022-03-27 NOTE — PROGRESS NOTES
3/27/2022 9:44 AM    Admit Date: 3/24/2022    Admit Diagnosis: ISABELLA (acute kidney injury) ([facility] [N17.9]      Subjective:   No chest pain or shortness of breath      Objective:      Visit Vitals  BP (!) 143/75   Pulse 80   Temp 98.2 °F (36.8 °C)   Resp 18   Ht 5' 4\" (1.626 m)   Wt 263 lb 4.8 oz (119.4 kg)   SpO2 98%   BMI 45.20 kg/m²       Physical Exam:  Orest Pong, Well Nourished, No Acute Distress, Alert & Oriented x 3, appropriate mood. Neck- supple, no JVD  CV- regular rate and rhythm no MRG  Lung- clear bilaterally  Abd- soft, nontender, nondistended  Ext- no edema bilaterally. Skin- warm and dry        Data Review:   Recent Labs     03/27/22  0455   *   K 3.9   BUN 31*   CREA 1.50*   WBC 4.4   HGB 8.0*   HCT 24.6*   *       Assessment/Plan:     Principal Problem:    ISABELLA (acute kidney injury) ([facility] (3/24/2022)    Active Problems:    Hypoxia, home O2 at night 3L (1/13/2010)      Diabetes mellitus ([address] (1/13/2010)      Hypothyroidism (1/13/2010)      Hyponatremia (1/13/2010)      Morbid obesity ([address] (1/14/2010)      Atherosclerosis of coronary artery (1/12/2022)Stable. Continue current medical therapy. Hemoglobin is stable. Holding Brilinta for GI procedure. Will restart after tomorrow pending results from endoscopy.       Overview: Last Assessment & Plan:       Formatting of this note is different from the original.      1-PRESENTATION WITH INFERIOR STEMI      2-TOTAL MID VESSEL OCCLUSION OF THE RCA WITH LARGE AMOUNT OF THROMBUS      3-RELATIVELY MILD LEFT SIDED CAD (40-50% proximal LAD)      4-INFERIOR WALL HYPOKINESIS, EF 50% WITH MR      5-SUCCESSFUL ASPIRATION THROMBECTOMY+ OSWALDO RCA (one distal branch fills       slowly)             - ASA, Brilinta, Lipitor, lopressor      Chronic obstructive pulmonary disease (HCC) ()      Overview: 10 years      Cirrhosis of liver (HCC) ()      ABLA (acute blood loss anemia) ()      Thrombocytopenia (HCC) ()      Pancytopenia (HCC) (3/25/2022)      [facility] with bleeding [facility] (3/25/2022)      Chronic diastolic congestive heart failure ([facility] (3/25/2022)

## 2022-03-28 PROBLEM — D61.818 PANCYTOPENIA (HCC): Status: RESOLVED | Noted: 2022-01-01 | Resolved: 2022-01-01

## 2022-03-28 PROBLEM — I50.32 CHRONIC DIASTOLIC CONGESTIVE HEART FAILURE (HCC): Chronic | Status: ACTIVE | Noted: 2022-01-01

## 2022-03-28 PROBLEM — N17.9 AKI (ACUTE KIDNEY INJURY) (HCC): Status: RESOLVED | Noted: 2022-01-01 | Resolved: 2022-01-01

## 2022-03-28 NOTE — PROGRESS NOTES
3/28/2022 2:39 PM    Admit Date: 3/24/2022    Admit Diagnosis: IS[facility]LL[facility] (acute [facility]) [facility] [N17.9]      Subjective:   No cp or sob      Objective:      Visit Vitals  BP (!) 142/71 (BP 1 Location: Left lower arm, BP Patient Position: At rest)   Pulse 80   Temp 97.5 °F (36.4 °C)   Resp 20   Ht 5' 4\" (1.626 m)   Wt 271 lb (122.9 kg)   SpO2 94%   Breastfeeding No   BMI 46.52 kg/m²       Physical Exam:  Darliss Hammers, Well Nourished, No Acute Distress, Alert & Oriented x 3, appropriate mood. Neck- supple, no JVD  CV- regular rate and rhythm no MRG  Lung- clear bilaterally  Abd- soft, nontender, nondistended  Ext- no edema bilaterally. Skin- warm and dry        Data Review:   Recent Labs     03/28/22  0512   *   K 3.3*   BUN 30*   CREA 1.50*   WBC 4.8   HGB 8.0*   HCT 25.6*   *       Assessment/Plan:     Active Problems:    Hypoxia, home O2 at night 3L (1/13/2010)      Diabetes mellitus ([facility] (1/13/2010)      Hypothyroidism (1/13/2010)      Morbid obesity ([facility] (1/14/2010)      Atherosclerosis of coronary artery (1/12/2022)Stable. Continue current medical therapy.   Procedure done- restart brilinta      Overview: Last Assessment & Plan:       Formatting of this note is different from the original.      1-PRESENTATION WITH INFERIOR STEMI      2-TOTAL MID VESSEL OCCLUSION OF THE RCA WITH LARGE AMOUNT OF THROMBUS      3-RELATIVELY MILD LEFT SIDED CAD (40-50% proximal LAD)      4-INFERIOR WALL HYPOKINESIS, EF 50% WITH MR      5-SUCCESSFUL ASPIRATION THROMBECTOMY+ OSWALDO RCA (one distal branch fills       slowly)             - ASA, Brilinta, Lipitor, lopressor      Chronic obstructive pulmonary disease (HCC) ()      Overview: 10 years      Cirrhosis of liver (HCC) ()      Thrombocytopenia (HCC) ()      Secondary esophageal varices without bleeding ([facility] (3/25/2022)      Chronic diastolic congestive heart failure ([facility] (3/25/2022)

## 2022-03-28 NOTE — PROGRESS NOTES
TRANSFER - IN REPORT:    Verbal report received from By[address] on [address]  being received from  for ordered procedure      Report consisted of patients Situation, Background, Assessment and   Recommendations(SBAR). Information from the following report(s) SBAR, Intake/Output, MAR, Recent Results and Pre Procedure Checklist was reviewed with the receiving nurse. Opportunity for questions and clarification was provided.

## 2022-03-28 NOTE — PROCEDURES
Endoscopic Gastroduodenoscopy Procedure Note    Indications:ABLA, cirrhosis    Anesthesia/Sedation: MAC IV     Pre-Procedure Physical:    Current Facility-Administered Medications   Medication Dose Route Frequency    lactated Ringers infusion  100 mL/hr IntraVENous CONTINUOUS    potassium chloride (K-DUR, KLOR-CON M20) SR tablet 40 mEq  40 mEq Oral NOW    albuterol (PROVENTIL VENTOLIN) nebulizer solution 2.5 mg  2.5 mg Nebulization TID RT    hydrALAZINE (APRESOLINE) 20 mg/mL injection 20 mg  20 mg IntraVENous Q4H PRN    oxyCODONE IR (ROXICODONE) tablet 5 mg  5 mg Oral Q4H PRN    guaiFENesin-dextromethorphan (ROBITUSSIN DM) 100-10 mg/5 mL syrup 10 mL  10 mL Oral Q4H PRN    senna-docusate (PERICOLACE) 8.6-50 mg per tablet 2 Tablet  2 Tablet Oral DAILY PRN    LORazepam (ATIVAN) tablet 0.5 mg  0.5 mg Oral Q4H PRN    ondansetron (ZOFRAN) injection 4 mg  4 mg IntraVENous Q4H PRN    albuterol (PROVENTIL VENTOLIN) nebulizer solution 2.5 mg  2.5 mg Nebulization Q4H PRN    aspirin delayed-release tablet 81 mg  81 mg Oral DAILY    atorvastatin (LIPITOR) tablet 80 mg  80 mg Oral QHS    [Held by provider] ticagrelor (BRILINTA) tablet 90 mg  90 mg Oral Q12H    DULoxetine (CYMBALTA) capsule 30 mg  30 mg Oral DAILY    fluticasone propionate (FLONASE) 50 mcg/actuation nasal spray 2 Spray  2 Spray Both Nostrils DAILY    hydrOXYzine pamoate (VISTARIL) capsule 25 mg  25 mg Oral BID PRN    levothyroxine (SYNTHROID) tablet 175 mcg  175 mcg Oral ACB    metoprolol tartrate (LOPRESSOR) tablet 12.5 mg  12.5 mg Oral BID    pantoprazole (PROTONIX) tablet 40 mg  40 mg Oral ACB&D    sucralfate (CARAFATE) tablet 1 g  1 g Oral AC&HS    sodium chloride (NS) flush 5-40 mL  5-40 mL IntraVENous Q8H    sodium chloride (NS) flush 5-40 mL  5-40 mL IntraVENous PRN    insulin lispro (HUMALOG) injection   SubCUTAneous AC&HS      No known drug allergies    Patient Vitals for the past 8 hrs:   BP Temp Pulse Resp SpO2 Height Weight 03/28/22 0801 (!) 117/58 -- 93 -- -- -- --   03/28/22 0718 (!) 120/57 98.3 °F (36.8 °C) 96 20 100 % 5' 4\" (1.626 m) 122.9 kg (271 lb)   03/28/22 0330 -- -- -- -- -- -- 123.2 kg (271 lb 8 oz)   03/28/22 0317 (!) 117/58 98 °F (36.7 °C) 89 18 97 % -- --   03/28/22 0155 -- -- -- -- 99 % -- --       Exam      Airway: clear   Heart: normal S1and S2    Lungs: clear bilateral  Abdomen: soft, nontender, bowel sounds present and normal in all quads   Mental Status: awake, alert and oriented to person, place and time          Procedure Details     Informed consent was obtained for the procedure, including conscious sedation. Risks of pancreatitis, infection, perforation, hemorrhage, adverse drug reaction and aspiration were discussed. The patient was placed in the left lateral decubitus position. Based on the pre-procedure assessment, including review of the patient's medical history, medications, allergies, and review of systems, she had been deemed to be an appropriate candidate for conscious sedation; she was therefore sedated with the medications listed below. She was monitored continuously with ECG tracing, pulse oximetry, blood pressure monitoring, and direct observation. The EGD gastroscope was inserted into the mouth and advanced under direct vision to the second portion of the duodenum. A careful inspection was made as the gastroscope was withdrawn, including a retroflexed view of the proximal stomach; findings and interventions are described below. Appropriate photodocumentation was obtained. Findings:   Esophagus- One, grade 2 varix extending from the EG junction at 36 cm to 32 cm. Mostly straight without stigmata of bleeding. Not banded due to hold of Brillinta only 2 to 3 days. Two  Other small varices without complication. Stomach- Mild PHG in proximal stomach. No gastric varices noted. No blood seen or source.   Duodenum- Normal.    Therapies: None    Specimens: None    Estimated Blood Loss: 0 cc           Complications:   None; patient tolerated the procedure well. Attending Attestation:  I performed the procedure. Impression:  Esophageal varices without stigmata of bleeding. No banding due to recent Brillinta use. PHG mild. No blood seen. Recommendations: Add PPIs. Consider banding with a a longer Brillinta, 5 day hold. May want to due banding, realizing the risks, after stent has been in one year etc.    Consider Nadolol etc and banding in the future.     Lev Robertson MD

## 2022-03-28 NOTE — H&P
Date of Surgery Update:  Flower Barrett was seen and examined. History and physical has been reviewed. The patient has been examined.  There have been no significant clinical changes since the completion of the originally dated History and Physical.    Signed By: Atul Abrams MD     March 28, 2022 8:03 AM

## 2022-03-28 NOTE — PROGRESS NOTES
Patient transported to Endoscopy via stretcher with tech on 3L NC. Patient ambulated from bed to stretcher with 2x assist. Patient alert and oriented 4X. NO distress noted. Safety measures in place.

## 2022-03-28 NOTE — PROGRESS NOTES
PT Note:  Treatment attempted this AM but pt off the floor for endoscopy. Will re-attempt pending schedule and pt status.   Thanks,  Mateo Thompson, PT, DPT

## 2022-03-28 NOTE — PROGRESS NOTES
TRANSFER - IN REPORT:    Verbal report received from [address] [place] [address] on [address]  being received from GI lab for routine progression of care      Report consisted of patients Situation, Background, Assessment and   Recommendations(SBAR). Information from the following report(s) SBAR, ED Summary and Recent Results was reviewed with the receiving nurse. Opportunity for questions and clarification was provided. Assessment completed upon patients arrival to unit and care assumed.

## 2022-03-28 NOTE — ANESTHESIA PREPROCEDURE EVALUATION
Relevant Problems   RESPIRATORY SYSTEM   (+) COPD exacerbation (HCC)   (+) Chronic obstructive pulmonary disease (HCC)      CARDIOVASCULAR   (+) Atherosclerosis of coronary artery   (+) Chronic diastolic congestive heart failure (HCC)   (+) Mitral regurgitation   (+) STEMI (ST elevation myocardial infarction) (HCC)      GASTROINTESTINAL   (+) Cirrhosis of liver (HCC)      RENAL FAILURE   (+) ISABELLA (acute kidney injury) ([address]      ENDOCRINE   (+) Diabetes mellitus (HCC)   (+) Hypothyroidism   (+) Morbid obesity (HCC)      HEMATOLOGY   (+) ABLA (acute blood loss anemia)   (+) Pancytopenia (HCC)       Anesthetic History               Review of Systems / Medical History  Patient summary reviewed and pertinent labs reviewed    Pulmonary    COPD: severe        Asthma     Comments: Sat 95% on [place] oxygen   Neuro/Psych              Cardiovascular          CHF: orthopnea, dyspnea on exertion    Past MI (inferior STEMI Jan 2022 with occluded RCA and OSWALDO), CAD (LAD 40-50%, RCA documented below) and cardiac stents (MI and stents Jan 2022)    Exercise tolerance: <4 METS  Comments: Some difficulty lying flat with orthopnea    ECHO with normal LV function, mod/severe mitral regurg    Cath Jan 2022 after inferior STEMI with occluded RCA that was cleared and stented at [facility]   GI/Hepatic/Renal         Renal disease: CRI  Liver disease (cirrhosis)     Endo/Other    Diabetes  Hypothyroidism  Morbid obesity and anemia (Hgb 8)     Other Findings            Physical Exam    Airway  Mallampati: I  TM Distance: 4 - 6 cm  Neck ROM: normal range of motion   Mouth opening: Normal     Cardiovascular    Rhythm: regular  Rate: normal         Dental    Dentition: Edentulous     Pulmonary      Decreased breath sounds: bibasilar      Prolonged expiration     Abdominal         Other Findings            Anesthetic Plan    ASA: 4, emergent  Anesthesia type: total IV anesthesia          Induction: Intravenous  Anesthetic plan and risks discussed with: Patient

## 2022-03-28 NOTE — ANESTHESIA POSTPROCEDURE EVALUATION
Procedure(s):  ESOPHAGOGASTRODUODENOSCOPY (EGD)  COLONOSCOPY.    total IV anesthesia    Anesthesia Post Evaluation        Patient location during evaluation: PACU  Patient participation: complete - patient participated  Level of consciousness: sleepy but conscious  Pain management: adequate  Airway patency: patent  Anesthetic complications: no  Cardiovascular status: acceptable  Respiratory status: acceptable  Hydration status: acceptable  Post anesthesia nausea and vomiting:  none

## 2022-03-28 NOTE — PERIOP NOTES
TRANSFER - OUT REPORT:    Verbal report given to 8th floor nurse on [address]  being transferred to  for routine post - op       Report consisted of patients Situation, Background, Assessment and   Recommendations(SBAR). Information from the following report(s) OR Summary, Procedure Summary, Intake/Output and MAR was reviewed with the receiving nurse. Lines:   Peripheral IV 03/24/22 Right;Upper Arm (Active)   Site Assessment Clean, dry, & intact 03/28/22 0958   Phlebitis Assessment 0 03/28/22 0958   Infiltration Assessment 0 03/28/22 0958   Dressing Status Clean, dry, & intact 03/28/22 0958   Dressing Type Tape;Transparent 03/28/22 0958   Hub Color/Line Status Patent 03/28/22 0958   Alcohol Cap Used Yes 03/28/22 0200        Opportunity for questions and clarification was provided. Patient transported with:   O2 @ 3 liters    VTE prophylaxis orders have been written for Flower Herrera. Patient and family given floor number and nurses name. Family updated re: pt status after security code verified.

## 2022-03-28 NOTE — PROCEDURES
Colonoscopy Procedure Note    Indications: ABLA first screening  colon    Anesthesia/Sedation: MAC IV     Pre-Procedure Physical:  Current Facility-Administered Medications   Medication Dose Route Frequency    lactated Ringers infusion  100 mL/hr IntraVENous CONTINUOUS    potassium chloride (K-DUR, KLOR-CON M20) SR tablet 40 mEq  40 mEq Oral NOW    albuterol (PROVENTIL VENTOLIN) nebulizer solution 2.5 mg  2.5 mg Nebulization TID RT    hydrALAZINE (APRESOLINE) 20 mg/mL injection 20 mg  20 mg IntraVENous Q4H PRN    oxyCODONE IR (ROXICODONE) tablet 5 mg  5 mg Oral Q4H PRN    guaiFENesin-dextromethorphan (ROBITUSSIN DM) 100-10 mg/5 mL syrup 10 mL  10 mL Oral Q4H PRN    senna-docusate (PERICOLACE) 8.6-50 mg per tablet 2 Tablet  2 Tablet Oral DAILY PRN    LORazepam (ATIVAN) tablet 0.5 mg  0.5 mg Oral Q4H PRN    ondansetron (ZOFRAN) injection 4 mg  4 mg IntraVENous Q4H PRN    albuterol (PROVENTIL VENTOLIN) nebulizer solution 2.5 mg  2.5 mg Nebulization Q4H PRN    aspirin delayed-release tablet 81 mg  81 mg Oral DAILY    atorvastatin (LIPITOR) tablet 80 mg  80 mg Oral QHS    [Held by provider] ticagrelor (BRILINTA) tablet 90 mg  90 mg Oral Q12H    DULoxetine (CYMBALTA) capsule 30 mg  30 mg Oral DAILY    fluticasone propionate (FLONASE) 50 mcg/actuation nasal spray 2 Spray  2 Spray Both Nostrils DAILY    hydrOXYzine pamoate (VISTARIL) capsule 25 mg  25 mg Oral BID PRN    levothyroxine (SYNTHROID) tablet 175 mcg  175 mcg Oral ACB    metoprolol tartrate (LOPRESSOR) tablet 12.5 mg  12.5 mg Oral BID    pantoprazole (PROTONIX) tablet 40 mg  40 mg Oral ACB&D    sucralfate (CARAFATE) tablet 1 g  1 g Oral AC&HS    sodium chloride (NS) flush 5-40 mL  5-40 mL IntraVENous Q8H    sodium chloride (NS) flush 5-40 mL  5-40 mL IntraVENous PRN    insulin lispro (HUMALOG) injection   SubCUTAneous AC&HS     Facility-Administered Medications Ordered in Other Encounters   Medication Dose Route Frequency    propofoL (DIPRIVAN) 10 mg/mL injection   IntraVENous CONTINUOUS    lidocaine (PF) (XYLOCAINE) 20 mg/mL (2 %) injection   IntraVENous PRN    propofoL (DIPRIVAN) 10 mg/mL injection   IntraVENous PRN    PHENYLephrine 100 mcg/mL 10 mL syringe (ONE-STEP)   IntraVENous CONTINUOUS      No known drug allergies    Patient Vitals for the past 8 hrs:   BP Temp Pulse Resp SpO2 Height Weight   03/28/22 0801 (!) 117/58 -- 93 -- -- -- --   03/28/22 0718 (!) 120/57 98.3 °F (36.8 °C) 96 20 100 % 5' 4\" (1.626 m) 122.9 kg (271 lb)   03/28/22 0330 -- -- -- -- -- -- 123.2 kg (271 lb 8 oz)   03/28/22 0317 (!) 117/58 98 °F (36.7 °C) 89 18 97 % -- --   03/28/22 0155 -- -- -- -- 99 % -- --       Exam:    Airway: clear   Heart: normal S1and S2    Lungs: clear bilateral  Abdomen: soft, nontender, bowel sounds present and normal in all quads   Mental Status: awake, alert and oriented to person, place and time        Procedure Details      Informed consent was obtained for the procedure, including sedation. Risks of perforation, hemorrhage, adverse drug reaction and aspiration were discussed. The patient was placed in the left lateral decubitus position. Based on the pre-procedure assessment, including review of the patient's medical history, medications, allergies, and review of systems, she had been deemed to be an appropriate candidate for conscious sedation; she was therefore sedated with the medications listed below. The patient was monitored continuously with ECG tracing, pulse oximetry, blood pressure monitoring, and direct observations. A rectal examination was performed. The colonoscope was inserted into the rectum and advanced under direct vision to the cecum. The quality of the colonic preparation was good. A careful inspection was made as the colonoscope was withdrawn, including a retroflexed view of the rectum; findings and interventions are described below. Appropriate photodocumentation was obtained.     Findings: Lissette William diverticulosis. Small colon polyps (2) one in cecum and one in TC. Prep was only fair though. No biopsies etc due to Brillinta use. Specimens: None    Estimated Blood Loss: 0 cc           Complications: None; patient tolerated the procedure well. Attending Attestation: I performed the procedure. Impression:  No active bleeding or site of bleeding identified. Prep was only fair. Pan diverticulosis was seen without bleeding. 2 small 3 mm polyps were seen in the cecum and TC. These were not removed due to Brillinta use. Moderate sized IHs and anal tags noted on retroflex. Recommendations:   Consider repeating a colonoscopy in January of 2023 after one year post stent placement. High fiber diet.     Brock Huitron MD

## 2022-03-28 NOTE — PROGRESS NOTES
Care Management Interventions  PCP Verified by CM: Yes  Transition of Care Consult (CM Consult): [address]: No  Reason Outside [place]: Patient already serviced by other home care/hospice agency  Physical Therapy Consult: Yes  Occupational Therapy Consult: Yes  Speech Therapy Consult: No  Support Systems: Spouse/Significant Other  Confirm Follow Up Transport: Family  The Plan for Transition of Care is Related to the Following Treatment Goals : HH  The Patient and/or Patient Representative was Provided with a Choice of Provider and Agrees with the Discharge Plan?: Yes  Name of the Patient Representative Who was Provided with a Choice of Provider and Agrees with the Discharge Plan: patient  Freedom of Choice List was Provided with Basic Dialogue that Supports the Patient's Individualized Plan of Care/Goals, Treatment Preferences and Shares the Quality Data Associated with the Providers?: Yes   Resource Information Provided?: No  Discharge Location  Patient Expects to be Discharged to[de-identified] Home with home health  Patient is discharging home with orders to resume [facility]. Patient's family will transport home. Patient is agreeable to this plan.

## 2022-03-28 NOTE — PROGRESS NOTES
DC instructions given to pt . Pt being DC home to self care. Pt medications, appts and instructions given and understood. Prescription sent into pharmacy.  Pt awaiting ride from family

## 2022-03-28 NOTE — DISCHARGE INSTRUCTIONS
DISCHARGE SUMMARY from Nurse    PATIENT INSTRUCTIONS:    After general anesthesia or intravenous sedation, for 24 hours or while taking prescription Narcotics:  · Limit your activities  · Do not drive and operate hazardous machinery  · Do not make important personal or business decisions  · Do  not drink alcoholic beverages  · If you have not urinated within 8 hours after discharge, please contact your surgeon on call. Report the following to your surgeon:  · Excessive pain, swelling, redness or odor of or around the surgical area  · Temperature over 100.5  · Nausea and vomiting lasting longer than 4 hours or if unable to take medications  · Any signs of decreased circulation or nerve impairment to extremity: change in color, persistent  numbness, tingling, coldness or increase pain  · Any questions    What to do at Home:  Recommended activity: Activity as tolerated, Gastrointestinal Esophagogastroduodenoscopy (EGD) - Upper Exam Discharge Instructions    1. Call Dr. Juan Gibbons at [phone] for any problems or questions. 2. Contact the doctor's office for follow up appointment as directed. 3. Medication may cause drowsiness for several hours, therefore:  · Do not drive or operate machinery for remainder of the day. · No alcohol today. · Do not make any important or legal decisions for 24 hours. · Do not sign any legal documents for 24 hours. 5. Ordinarily, you may resume regular diet and activity after exam unless otherwise specified by your physician. 6. For mild soreness in your throat you may use Cepacol throat lozenges or warm salt-water gargles as needed. Any additional instructions: Instructions given to Karen Marcano and other family members. Gastrointestinal Colonoscopy/Flexible Sigmoidoscopy - Lower Exam Discharge Instructions  1. Call Dr. Juan Gibbons at [phone] for any problems or questions.   2. Contact the doctors office for follow up appointment as directed  3. Medication may cause drowsiness for several hours, therefore:  · Do not drive or operate machinery for reminder of the day. · No alcohol today. · Do not make any important or legal decisions for 24 hours. · Do not sign any legal documents for 24 hours. 4. Ordinarily, you may resume regular diet and activity after exam unless otherwise specified by your physician. 5. Because of air put into your colon during exam, you may experience some abdominal distension, relieved by the passage of gas, for several hours. 6. Contact your physician if you have any of the following:  · Excessive amount of bleeding - large amount when having a bowel movement. Occasional specks of blood with bowel movement would not be unusual.  · Severe abdominal pain  · Fever or Chills  7. Polyp Removal - follow these additional instructions  · Clear liquid diet for the next meal (jell-o, broth, clear drinks)  · Soft diet for 24 hours, then resume regular diet   · Take Metamucil - 1 tablespoon in juice every morning for 3 days  · No Aspirin, Advil, Aleve, Nuprin, Ibuprofen, or medications that contain these drugs for 2 weeks. Instructions given to Karen Marcano and other family members. If you experience any of the following symptoms shortness of breath not relieved by rest, pain not relieved by medications, chest pain or pressure, temperature greater than 100 or any s/sx of bleeding please follow up with MD.    *  Please give a list of your current medications to your Primary Care Provider. *  Please update this list whenever your medications are discontinued, doses are      changed, or new medications (including over-the-counter products) are added. *  Please carry medication information at all times in case of emergency situations.     These are general instructions for a healthy lifestyle:    No smoking/ No tobacco products/ Avoid exposure to second hand smoke  Surgeon General's Warning:  Quitting smoking now greatly reduces serious risk to your health. Obesity, smoking, and sedentary lifestyle greatly increases your risk for illness    A healthy diet, regular physical exercise & weight monitoring are important for maintaining a healthy lifestyle    You may be retaining fluid if you have a history of heart failure or if you experience any of the following symptoms:  Weight gain of 3 pounds or more overnight or 5 pounds in a week, increased swelling in our hands or feet or shortness of breath while lying flat in bed. Please call your doctor as soon as you notice any of these symptoms; do not wait until your next office visit. The discharge information has been reviewed with the patient. The patient verbalized understanding. Discharge medications reviewed with the patient and appropriate educational materials and side effects teaching were provided.   ___________________________________________________________________________________________________________________________________

## 2022-03-28 NOTE — DISCHARGE SUMMARY
Hospitalist Discharge Summary   Admit Date:  3/24/2022  3:48 PM   DC Note date: 3/28/2022  Name:  Timmy Mike   Age:  69 y.o.   Sex:  female  :  1955   MRN:  [id number]   Room:  [facility]  PCP:  Jaylon Lara MD    Presenting Complaint: Abnormal Lab Results    Initial Admission Diagnosis: ISABELLA (acute kidney injury) ([facility] [N17.9]     Problem List for this Hospitalization:  Hospital Problems as of 3/28/2022 Date Reviewed: 3/14/2022          Codes Class Noted - Resolved POA    Pancytopenia ([facility] ICD-10-CM: U51.597  ICD-9-CM: 284.19  3/25/2022 - Present Yes        Secondary esophageal varices with bleeding ([facility] ICD-10-CM: I85.11  ICD-9-CM: 456.20  3/25/2022 - Present Yes        Chronic diastolic congestive heart failure ([facility] ICD-10-CM: I50.32  ICD-9-CM: 428.32, 428.0  3/25/2022 - Present Yes        * (Principal) ISABELLA (acute kidney injury) ([facility] ICD-10-CM: N17.9  ICD-9-CM: 584.9  3/24/2022 - Present Yes        Chronic obstructive pulmonary disease ([facility] (Chronic) ICD-10-CM: J44.9  ICD-9-CM: 557  Unknown - Present Yes    Overview Signed 3/24/2022  7:32 PM by Dalila Holman DO     10 years             Cirrhosis of liver ([facility] ICD-10-CM: K74.60  ICD-9-CM: 571.5  Unknown - Present Yes        ABLA (acute blood loss anemia) ICD-10-CM: D62  ICD-9-CM: 285.1  Unknown - Present Yes        Thrombocytopenia (HCC) (Chronic) ICD-10-CM: D69.6  ICD-9-CM: 287.5  Unknown - Present Yes        Atherosclerosis of coronary artery (Chronic) ICD-10-CM: I25.10  ICD-9-CM: 414.00  2022 - Present Yes    Overview Signed 2022  8:13 AM by Jaylon Lara MD     Last Assessment & Plan:   Formatting of this note is different from the original.  1-PRESENTATION WITH INFERIOR STEMI  2-TOTAL MID VESSEL OCCLUSION OF THE RCA WITH LARGE AMOUNT OF THROMBUS  3-RELATIVELY MILD LEFT SIDED CAD (40-50% proximal LAD)  4-INFERIOR WALL HYPOKINESIS, EF 50% WITH MR  5-SUCCESSFUL ASPIRATION THROMBECTOMY+ OSWALDO RCA (one distal branch fills slowly)     - ASA, Brilinta, Lipitor, lopressor             Morbid obesity ([facility] (Chronic) ICD-10-CM: E66.01  ICD-9-CM: 278.01  1/14/2010 - Present Yes        Hypoxia, home O2 at night 3L (Chronic) ICD-10-CM: R09.02  ICD-9-CM: 799.02  1/13/2010 - Present Yes        Diabetes mellitus ([facility] (Chronic) ICD-10-CM: E11.9  ICD-9-CM: 250.00  1/13/2010 - Present Yes        Hypothyroidism (Chronic) ICD-10-CM: E03.9  ICD-9-CM: 244.9  1/13/2010 - Present Yes        Hyponatremia ICD-10-CM: E87.1  ICD-9-CM: 276.1  1/13/2010 - Present Yes            Did Patient have Sepsis (YES OR NO): no    Admission HPI from 3/24/2022:  \" Myla Runner is a 69year old CF with a PMH of CAD s/p STEMI with OSWALDO to RCA in 1/2022, DM2, COPD with nocturnal hypoxia, hypothyroidism, and recent diagnosis of cirrhosis who presented to the ER with shortness of breath over the previous 24 hours, nausea with occasional vomiting and generalized weakness over the past week to the point of being unable to stand up from a sitting position. She states that she had a heart attack in January 2022 and has had intermittent nausea since that time. Then about 2-3 weeks prior she had persistent nausea and has not been able to eat or drink much. She went to see her PCP who checked some labs and noticed her liver enzymes were off so ordered a CTAP. CTAP on 3/23 showed new cirrhosis with possible varices. He repeated labs and noticed her kidney function had worsened so sent her to the ER to be evaluated. When he called, she had SOB, but this is improved in the ER. Denies CP. Denies cough. Denies edema. Denies F/C. Denies diarrhea. Denies dysuria/frequency. \"    Hospital Course:  Admitted for RUDD cirrhosis, ISABELLA. Nephrology and GI consulted. iron studies ok. Hepatitis panel and other workup ok. ISABELLA improved with IVF. LFTs have remained stable, GI planning follow up. EGD and colonoscopy delayed due to brilinta needing to be held prior to procedures. Colonoscopy with diverticulosis, IH, and 2 small polyps. Repeat in a year after being able to come off brilinta longer. EGD showed esophageal varices without bleeding. No banding done due to brilinta. PHG also seen. Increased PPi to BID. High fiber recommended by GI. Will add iron as the assumption is she has had chronic or intermittent micro blood loss on brilinta. This may help with some of her intermittent diarrhea as well    Pt has been stable for days and feeling ok after procedures. Hb remains stable. She can go home later today. I worry the longer she is here the more likely she will need SNF placement    Disposition: Home Health Care Svc  Diet: ADULT DIET Easy to Chew; Low Fat/Low Chol/High Fiber/GELY  Code Status: Full Code    Follow Up Orders: Follow-up Appointments   Procedures    FOLLOW UP VISIT Appointment in: One Week Primary care to recheck labwork     Primary care to recheck labwork     Standing Status:   Standing     Number of Occurrences:   1     Order Specific Question:   Appointment in     Answer: One Week       Follow-up Information     Follow up With Specialties Details Why Contact Info    Lennie Mojica MD Family Medicine On 4/5/2022 10:20 [id number] [address]  [phone] [address] [facility]   they should call you with follow up information [address] [facility] [address] [facility]  [phone]          Follow up labs/diagnostics (ultimately defer to outpatient provider):  CBC, BMP    Time spent in patient discharge and coordination 35 minutes. Plan was discussed with pt. All questions answered. Patient was stable at time of discharge. Instructions given to call a physician or return if any concerns.     Discharge Info:   Current Discharge Medication List      START taking these medications    Details   ferrous sulfate (IRON) 325 mg (65 mg iron) EC tablet Take 1 Tablet by mouth Daily (before breakfast). Indications: anemia from inadequate iron  Qty: 30 Tablet, Refills: 2  Start date: 3/28/2022         CONTINUE these medications which have CHANGED    Details   pantoprazole (PROTONIX) 40 mg tablet Take 1 Tablet by mouth Before breakfast and dinner. Before breakfast  Qty: 90 Tablet, Refills: 1  Start date: 3/28/2022         CONTINUE these medications which have NOT CHANGED    Details   albuterol (PROVENTIL VENTOLIN) 2.5 mg /3 mL (0.083 %) nebu INHALE 1 VIAL VIA NEBULIZER EVERY 6 HOURS AS NEEDED FOR WHEEZING OR SHORTNESS OF BREATH  Qty: 300 mL, Refills: 1      metoprolol tartrate (LOPRESSOR) 25 mg tablet Take 0.5 Tablets by mouth two (2) times a day. Qty: 90 Tablet, Refills: 1      furosemide (LASIX) 40 mg tablet Take 1 Tablet by mouth daily. Qty: 30 Tablet, Refills: 5      sucralfate (CARAFATE) 100 mg/mL suspension Take 10 mL by mouth three (3) times daily. Qty: 420 mL, Refills: 0      ondansetron (ZOFRAN ODT) 4 mg disintegrating tablet Take 1 Tablet by mouth every eight (8) hours as needed for Nausea or Vomiting. Qty: 20 Tablet, Refills: 0      ascorbic acid (VITAMIN C) 500 mg chewable tablet Take 500 mg by mouth. hydrOXYzine pamoate (VISTARIL) 25 mg capsule Take 1 Capsule by mouth two (2) times daily as needed for Anxiety. Qty: 30 Capsule, Refills: 1    Associated Diagnoses: MAURICIO (generalized anxiety disorder)      Brilinta 90 mg tablet Take 90 mg by mouth every twelve (12) hours. atorvastatin (LIPITOR) 80 mg tablet Take 80 mg by mouth nightly. nitroglycerin (NITROSTAT) 0.4 mg SL tablet 0.4 mg by SubLINGual route. fluticasone propionate (FLONASE) 50 mcg/actuation nasal spray 2 Sprays by Both Nostrils route daily. Qty: 1 Each, Refills: 0      guaiFENesin ER (MUCINEX) 600 mg ER tablet Take 1 Tablet by mouth two (2) times a day.   Qty: 20 Tablet, Refills: 0      albuterol (PROVENTIL HFA, VENTOLIN HFA, PROAIR HFA) 90 mcg/actuation inhaler Take 2 Puffs by inhalation every four (4) hours as needed for Wheezing or Shortness of Breath. Qty: 18 g, Refills: 2      metFORMIN (GLUCOPHAGE) 1,000 mg tablet Take 1 Tablet by mouth two (2) times daily (with meals). Qty: 180 Tablet, Refills: 1      levothyroxine (SYNTHROID) 175 mcg tablet Take 1 Tablet by mouth Daily (before breakfast). Qty: 90 Tablet, Refills: 1    Associated Diagnoses: Hypothyroidism, unspecified type      DULoxetine (CYMBALTA) 60 mg capsule Take 1 Capsule by mouth daily. Qty: 30 Capsule, Refills: 5    Associated Diagnoses: MAURICIO (generalized anxiety disorder)      aspirin delayed-release 81 mg tablet Take  by mouth daily. Procedures done this admission:  Procedure(s):  ESOPHAGOGASTRODUODENOSCOPY (EGD)  COLONOSCOPY    Consults this admission:  IP CONSULT TO NEPHROLOGY  IP CONSULT TO GASTROENTEROLOGY  IP CONSULT TO CARDIOLOGY    Echocardiogram/EKG results:  No results found for this or any previous visit. EKG Results     None          Diagnostic Imaging/Tests:   XR CHEST PA LAT    Result Date: 3/24/2022  No acute airspace disease. COPD. US RETROPERITONEUM LTD    Result Date: 3/25/2022  1. No hydronephrosis. 2. Incidental note is made of splenomegaly.       All Micro Results     None          Labs: Results:       BMP, Mg, Phos Recent Labs     03/28/22 0512 03/27/22 0455 03/26/22 0332   * 134* 135*   K 3.3* 3.9 2.9*    103 100   CO2 24 25 27   AGAP 10 6* 8   BUN 30* 31* 31*   CREA 1.50* 1.50* 1.60*   CA 8.4 8.3 8.7   * 115* 167*   MG  --  2.7*  --       CBC Recent Labs     03/28/22 0512 03/27/22 0455 03/26/22 0332   WBC 4.8 4.4 4.1*   RBC 2.92* 2.88* 2.95*   HGB 8.0* 8.0* 8.1*   HCT 25.6* 24.6* 25.7*   * 130* 137*      LFT Recent Labs     03/28/22 0512 03/27/22 0455 03/26/22 0332   * 137* 136*   * 544* 559*   TP 7.4 7.0 7.2   ALB 1.7* 1.7* 1.7*   GLOB 5.7* 5.3* 5.5*   AGRAT 0.3* 0.3* 0.3*      Cardiac Testing No results found for: BNPP, BNP, CPK, RCK1, RCK2, RCK3, RCK4, CKMB, CKNDX, CKND1, TROPT, TROIQ   Coagulation Tests No results found for: PTP, INR, APTT, INREXT   A1c Lab Results   Component Value Date/Time    Hemoglobin A1c 6.0 03/27/2022 05:42 AM    Hemoglobin A1c 7.5 (H) 10/22/2021 02:39 PM    Hemoglobin A1c 5.8 (H) 05/03/2021 11:41 AM    Hemoglobin A1c, External 6.7 03/03/2020 12:00 AM      Lipid Panel Lab Results   Component Value Date/Time    Cholesterol, total 161 05/03/2021 11:41 AM    HDL Cholesterol 66 05/03/2021 11:41 AM    LDL, calculated 82 05/03/2021 11:41 AM    LDL, calculated 114 (H) 12/04/2019 02:24 PM    VLDL, calculated 13 05/03/2021 11:41 AM    VLDL, calculated 22 12/04/2019 02:24 PM    Triglyceride 68 05/03/2021 11:41 AM      Thyroid Panel Lab Results   Component Value Date/Time    TSH 3.750 (H) 03/26/2022 03:32 AM    TSH 2.820 10/22/2021 02:39 PM    T4, Free 1.57 10/22/2021 02:39 PM    T4, Free 1.25 05/03/2021 11:41 AM        Most Recent UA Lab Results   Component Value Date/Time    Color YELLOW 03/24/2022 06:16 PM    Appearance CLOUDY 03/24/2022 06:16 PM    pH (UA) 5.5 03/24/2022 06:16 PM    Protein TRACE (A) 03/24/2022 06:16 PM    Glucose Negative 03/24/2022 06:16 PM    Ketone Negative 03/24/2022 06:16 PM    Bilirubin Negative 03/24/2022 06:16 PM    Blood TRACE (A) 03/24/2022 06:16 PM    Urobilinogen 1.0 03/24/2022 06:16 PM    Nitrites Negative 03/24/2022 06:16 PM    Leukocyte Esterase Negative 03/24/2022 06:16 PM    WBC 0-3 03/24/2022 06:16 PM    RBC 0 03/24/2022 06:16 PM    Epithelial cells 0-3 03/24/2022 06:16 PM    Bacteria TRACE 03/24/2022 06:16 PM    Casts 0-3 03/24/2022 06:16 PM    Crystals, urine 0 03/24/2022 06:16 PM    Mucus 0 03/24/2022 06:16 PM    Other observations RESULTS VERIFIED MANUALLY 03/24/2022 06:16 PM          All Labs from Last 24 Hrs:  Recent Results (from the past 24 hour(s))   GLUCOSE, POC    Collection Time: 03/27/22 11:51 AM   Result Value Ref Range    Glucose (POC) 159 (H) 65 - 100 mg/dL    Performed by Benjamin    GLUCOSE, POC    Collection Time: 03/27/22  4:51 PM   Result Value Ref Range    Glucose (POC) 161 (H) 65 - 100 mg/dL    Performed by Benjamin    GLUCOSE, POC    Collection Time: 03/27/22  8:34 PM   Result Value Ref Range    Glucose (POC) 223 (H) 65 - 100 mg/dL    Performed by Dhara    METABOLIC PANEL, COMPREHENSIVE    Collection Time: 03/28/22  5:12 AM   Result Value Ref Range    Sodium 135 (L) 136 - 145 mmol/L    Potassium 3.3 (L) 3.5 - 5.1 mmol/L    Chloride 101 98 - 107 mmol/L    CO2 24 21 - 32 mmol/L    Anion gap 10 7 - 16 mmol/L    Glucose 148 (H) 65 - 100 mg/dL    BUN 30 (H) 8 - 23 MG/DL    Creatinine 1.50 (H) 0.6 - 1.0 MG/DL    GFR est AA 44 (L) >60 ml/min/1.73m2    GFR est non-AA 37 (L) >60 ml/min/1.73m2    Calcium 8.4 8.3 - 10.4 MG/DL    Bilirubin, total 3.1 (H) 0.2 - 1.1 MG/DL    ALT (SGPT) 144 (H) 12 - 65 U/L    AST (SGOT) 222 (H) 15 - 37 U/L    Alk.  phosphatase 568 (H) 50 - 136 U/L    Protein, total 7.4 6.3 - 8.2 g/dL    Albumin 1.7 (L) 3.2 - 4.6 g/dL    Globulin 5.7 (H) 2.3 - 3.5 g/dL    A-G Ratio 0.3 (L) 1.2 - 3.5     CBC W/O DIFF    Collection Time: 03/28/22  5:12 AM   Result Value Ref Range    WBC 4.8 4.3 - 11.1 K/uL    RBC 2.92 (L) 4.05 - 5.2 M/uL    HGB 8.0 (L) 11.7 - 15.4 g/dL    HCT 25.6 (L) 35.8 - 46.3 %    MCV 87.7 79.6 - 97.8 FL    MCH 27.4 26.1 - 32.9 PG    MCHC 31.3 (L) 31.4 - 35.0 g/dL    RDW 17.8 (H) 11.9 - 14.6 %    PLATELET 100 (L) 418 - 450 K/uL    MPV 10.2 9.4 - 12.3 FL    ABSOLUTE NRBC 0.00 0.0 - 0.2 K/uL   GLUCOSE, POC    Collection Time: 03/28/22  7:15 AM   Result Value Ref Range    Glucose (POC) 133 (H) 65 - 100 mg/dL    Performed by [address]        Current Med List in Hospital:   Current Facility-Administered Medications   Medication Dose Route Frequency    potassium chloride (K-DUR, KLOR-CON M20) SR tablet 40 mEq  40 mEq Oral NOW    ticagrelor (BRILINTA) tablet 90 mg  90 mg Oral Q12H    [START ON 3/29/2022] ferrous gluconate 324 mg (38 mg iron) tablet 1 Tablet  1 Tablet Oral DAILY WITH BREAKFAST    albuterol (PROVENTIL VENTOLIN) nebulizer solution 2.5 mg  2.5 mg Nebulization TID RT    hydrALAZINE (APRESOLINE) 20 mg/mL injection 20 mg  20 mg IntraVENous Q4H PRN    oxyCODONE IR (ROXICODONE) tablet 5 mg  5 mg Oral Q4H PRN    guaiFENesin-dextromethorphan (ROBITUSSIN DM) 100-10 mg/5 mL syrup 10 mL  10 mL Oral Q4H PRN    senna-docusate (PERICOLACE) 8.6-50 mg per tablet 2 Tablet  2 Tablet Oral DAILY PRN    LORazepam (ATIVAN) tablet 0.5 mg  0.5 mg Oral Q4H PRN    ondansetron (ZOFRAN) injection 4 mg  4 mg IntraVENous Q4H PRN    albuterol (PROVENTIL VENTOLIN) nebulizer solution 2.5 mg  2.5 mg Nebulization Q4H PRN    aspirin delayed-release tablet 81 mg  81 mg Oral DAILY    atorvastatin (LIPITOR) tablet 80 mg  80 mg Oral QHS    DULoxetine (CYMBALTA) capsule 30 mg  30 mg Oral DAILY    fluticasone propionate (FLONASE) 50 mcg/actuation nasal spray 2 Spray  2 Spray Both Nostrils DAILY    hydrOXYzine pamoate (VISTARIL) capsule 25 mg  25 mg Oral BID PRN    levothyroxine (SYNTHROID) tablet 175 mcg  175 mcg Oral ACB    metoprolol tartrate (LOPRESSOR) tablet 12.5 mg  12.5 mg Oral BID    pantoprazole (PROTONIX) tablet 40 mg  40 mg Oral ACB&D    sucralfate (CARAFATE) tablet 1 g  1 g Oral AC&HS    sodium chloride (NS) flush 5-40 mL  5-40 mL IntraVENous Q8H    sodium chloride (NS) flush 5-40 mL  5-40 mL IntraVENous PRN    insulin lispro (HUMALOG) injection   SubCUTAneous AC&HS       Allergies   Allergen Reactions    No Known Drug Allergies Not Reported This Time     Immunization History   Administered Date(s) Administered    COVID-19, Moderna Booster, PF, 0.25mL Dose 11/18/2021    COVID-19, Moderna, Primary or Immunocompromised Series, MRNA, PF, 100mcg/0.5mL 04/09/2021, 05/09/2021    Influenza High Dose Vaccine PF 12/21/2018    Influenza Vaccine 10/23/2012, 11/06/2015    Influenza Vaccine (Quad) PF (>6 Mo Flulaval, Fluarix, and >3 Yrs [facility]) 09/25/2017    Influenza Vaccine (Tri) Adjuvanted (>65 Yrs FLUAD TRI [id number]) 12/04/2019    Influenza Vaccine Split 01/16/2010    Influenza, Quadrivalent, Adjuvanted (>65 Yrs FLUAD QUAD [id number]) 10/22/2021    Pneumococcal Conjugate (PCV-13) 12/21/2018    Pneumococcal Polysaccharide (PPSV-23) 11/06/2012    TB Skin Test (PPD) Intradermal 03/24/2022       Recent Vital Data:  Patient Vitals for the past 24 hrs:   Temp Pulse Resp BP SpO2   03/28/22 1057 97.5 °F (36.4 °C) 80 20 (!) 142/71 99 %   03/28/22 1005 -- -- 16 -- --   03/28/22 0958 97.7 °F (36.5 °C) 78 16 131/60 97 %   03/28/22 0955 -- 91 16 (!) 118/56 97 %   03/28/22 0952 -- 90 16 (!) 124/56 97 %   03/28/22 0949 -- 90 16 135/64 97 %   03/28/22 0946 -- 91 16 125/85 97 %   03/28/22 0943 -- 91 16 116/62 98 %   03/28/22 0938 -- 92 16 (!) 109/53 98 %   03/28/22 0934 -- 83 16 (!) 120/55 99 %   03/28/22 0930 -- 96 16 (!) 120/55 99 %   03/28/22 0928 -- 92 16 (!) 110/58 99 %   03/28/22 0925 -- 96 16 (!) 121/56 100 %   03/28/22 0922 -- 82 16 (!) 110/55 100 %   03/28/22 0919 -- 97 16 (!) 115/54 100 %   03/28/22 0916 -- 71 16 (!) 119/59 100 %   03/28/22 0914 97.7 °F (36.5 °C) 95 18 (!) 114/57 100 %   03/28/22 0913 -- -- -- (!) 114/57 100 %   03/28/22 0801 -- 93 -- (!) 117/58 --   03/28/22 0718 98.3 °F (36.8 °C) 96 20 (!) 120/57 100 %   03/28/22 0317 98 °F (36.7 °C) 89 18 (!) 117/58 97 %   03/28/22 0155 -- -- -- -- 99 %   03/27/22 2254 98 °F (36.7 °C) 88 20 127/63 97 %   03/27/22 2041 -- -- -- -- 96 %   03/27/22 2032 -- -- -- -- 96 %   03/27/22 1907 98.2 °F (36.8 °C) 87 21 (!) 121/55 100 %   03/27/22 1525 98.4 °F (36.9 °C) 80 18 (!) 110/53 96 %   03/27/22 1456 -- -- -- -- 98 %     Oxygen Therapy  O2 Sat (%): 99 % (03/28/22 1057)  Pulse via Oximetry: 95 beats per minute (03/28/22 0958)  O2 Device: Nasal cannula (03/28/22 1022)  Skin Assessment: Clean, dry, & intact (03/28/22 0718)  O2 Flow Rate (L/min): 3 l/min (03/28/22 1022)  FIO2 (%): 32 % (03/26/22 2008)    Estimated body mass index is 46.52 kg/m² as calculated from the following:    Height as of this encounter: 5' 4\" (1.626 m). Weight as of this encounter: 122.9 kg (271 lb). Intake/Output Summary (Last 24 hours) at 3/28/2022 1133  Last data filed at 3/28/2022 0910  Gross per 24 hour   Intake 970 ml   Output 0 ml   Net 970 ml         Physical Exam:    General:    Well nourished. No overt distress  Head:  Normocephalic, atraumatic  Eyes:  Sclerae appear normal.  Pupils equally round. HENT:  Nares appear normal, no drainage. Moist mucous membranes  Neck:  No restricted ROM. Trachea midline  CV:   No JVD  Lungs:   Even, unlabored  Abdomen:   Nondistended. Extremities: No cyanosis or clubbing. No edema. Skin:     No rashes. Normal coloration  Neuro:  CN II-XII grossly intact. Psych:  Normal mood and affect. Signed:  Karon Ochoa MD    Part of this note may have been written by using a voice dictation software. The note has been proof read but may still contain some grammatical/other typographical errors.

## 2022-03-28 NOTE — PROGRESS NOTES
Pt returns to room from  GI lab. Pt alert oriented times 3 at this time. Pt denies pain or distress at this time. Pt on 3 L NC. Call light in reach, will monitor.

## 2022-04-05 PROBLEM — I25.2 HISTORY OF ST ELEVATION MYOCARDIAL INFARCTION (STEMI): Chronic | Status: ACTIVE | Noted: 2022-01-01

## 2022-04-05 PROBLEM — I85.10 SECONDARY ESOPHAGEAL VARICES WITHOUT BLEEDING (HCC): Chronic | Status: ACTIVE | Noted: 2022-01-01

## 2022-04-05 PROBLEM — N17.9 AKI (ACUTE KIDNEY INJURY) (HCC): Status: ACTIVE | Noted: 2022-01-01

## 2022-04-05 PROBLEM — N39.0 UTI (URINARY TRACT INFECTION): Status: ACTIVE | Noted: 2022-01-01

## 2022-04-05 PROBLEM — D50.0 IRON DEFICIENCY ANEMIA SECONDARY TO BLOOD LOSS (CHRONIC): Status: ACTIVE | Noted: 2022-01-01

## 2022-04-05 PROBLEM — D50.0 IRON DEFICIENCY ANEMIA SECONDARY TO BLOOD LOSS (CHRONIC): Chronic | Status: ACTIVE | Noted: 2022-01-01

## 2022-04-05 PROBLEM — I34.0 MITRAL REGURGITATION: Chronic | Status: ACTIVE | Noted: 2022-01-01

## 2022-04-05 NOTE — ED NOTES
Report to [facility] RN and Radha. They are to assume care of this pt at this time.

## 2022-04-05 NOTE — ED PROVIDER NOTES
76-year-old lady presents with concerns about fatigue and weakness. She says ever since she was discharged from the hospital she is gotten gradually worse and she has been unable to get up and walk around. She says she feels very lightheaded and dizzy even at rest.  She does have a history of significant debility and medical problems. He says that prior to her initial admission couple of months ago she was able to be up walking around and now she cannot. Patient does take iron and she has had black tarry stools. She denies any fevers or chills and has had no nausea, vomiting, or diarrhea. Family says they spoke to the primary care doctor today who advised her to come to the emergency department for further evaluation. Elements of this note were created using speech recognition software. As such, errors of speech recognition may be present.            Past Medical History:   Diagnosis Date    Asthma     20 yrs    Atherosclerosis of coronary artery 1/12/2022    Chronic diastolic congestive heart failure ([facility] [address] 3/25/2022    Cirrhosis of liver ([facility]     COPD     10 years    Diabetes ([facility] [address] 2005    Endocrine disease 1977    hypothyroid    Thyroid disease        Past Surgical History:   Procedure Laterality Date    COLONOSCOPY N/A 3/28/2022    COLONOSCOPY performed by Hosea Colindres MD at [address] [place] [address], 0    [address] HX COLONOSCOPY  2022    HX ENDOSCOPY  2022    HX GYN  1995    Hyst, C Section         Family History:   Problem Relation Age of Onset    Heart Attack Father     No Known Problems Maternal Grandmother     No Known Problems Maternal Grandfather     No Known Problems Paternal Grandmother     No Known Problems Paternal Grandfather        Social History     Socioeconomic History    Marital status:      Spouse name: Not on file    Number of children: Not on file    Years of education: Not on file    Highest education level: Not on file   Occupational History    Not on file   Tobacco Use    Smoking status: Former Smoker     Packs/day: 1.00     Years: 30.00     Pack years: 30.00     Types: Cigarettes    Smokeless tobacco: Never Used   Vaping Use    Vaping Use: Never used   Substance and Sexual Activity    Alcohol use: No    Drug use: Yes     Types: Prescription, OTC    Sexual activity: Not Currently   Other Topics Concern    Not on file   Social History Narrative    Lives with friends. Works as a nanny. Social Determinants of Health     Financial Resource Strain:     Difficulty of Paying Living Expenses: Not on file   Food Insecurity:     Worried About Running Out of Food in the Last Year: Not on file    Gela of Food in the Last Year: Not on file   Transportation Needs:     Lack of Transportation (Medical): Not on file    Lack of Transportation (Non-Medical): Not on file   Physical Activity:     Days of Exercise per Week: Not on file    Minutes of Exercise per Session: Not on file   Stress:     Feeling of Stress : Not on file   Social Connections:     Frequency of Communication with Friends and Family: Not on file    Frequency of Social Gatherings with Friends and Family: Not on file    Attends Muslim Services: Not on file    Active Member of [address] [place] [address] [employer] for [employer] and Android or Organizations: Not on file    Attends Club or Organization Meetings: Not on file    Marital Status: Not on file   Intimate Partner Violence:     Fear of Current or Ex-Partner: Not on file    Emotionally Abused: Not on file    Physically Abused: Not on file    Sexually Abused: Not on file   Housing Stability:     Unable to Pay for Housing in the Last Year: Not on file    Number of [place] in the Last Year: Not on file    Unstable Housing in the Last Year: Not on file         ALLERGIES: No known drug allergies    Review of Systems   Constitutional: Positive for activity change, appetite change and fatigue.  Negative for chills, diaphoresis and fever.   HENT: Negative for congestion, rhinorrhea and sore throat. Eyes: Negative for redness and visual disturbance. Respiratory: Negative for cough, chest tightness, shortness of breath and wheezing. Cardiovascular: Negative for chest pain and palpitations. Gastrointestinal: Negative for abdominal pain, blood in stool, diarrhea, nausea and vomiting. Dark stools (on iron)   Endocrine: Negative for polydipsia and polyuria. Genitourinary: Negative for dysuria and hematuria. Musculoskeletal: Negative for arthralgias, myalgias and neck stiffness. Skin: Negative for color change and rash. Allergic/Immunologic: Negative for environmental allergies and food allergies. Neurological: Positive for weakness. Negative for dizziness and headaches. Hematological: Negative for adenopathy. Does not bruise/bleed easily. Psychiatric/Behavioral: Negative for confusion and sleep disturbance. The patient is not nervous/anxious. Vitals:    04/05/22 1509 04/05/22 1529 04/05/22 1645 04/05/22 1727   BP: (!) 101/54 (!) 106/51 114/61 (!) (P) 104/55   Pulse: 81 85 86 (P) 82   Resp: 19 20 22 (P) 18   Temp:    (P) 97.4 °F (36.3 °C)   SpO2: 99% 100%  (P) 98%   Weight:       Height:                Physical Exam  Constitutional:       Comments: Globally debilitated morbidly obese lady   HENT:      Head: Normocephalic and atraumatic. Eyes:      General:         Right eye: No discharge. Left eye: No discharge. Cardiovascular:      Rate and Rhythm: Normal rate and regular rhythm. Pulmonary:      Effort: Pulmonary effort is normal.      Breath sounds: Normal breath sounds. Abdominal:      General: Bowel sounds are normal.      Palpations: Abdomen is soft. Neurological:      General: No focal deficit present. Mental Status: She is oriented to person, place, and time. Comments: Patient has 3 out of 5 strength in both lower extremities that is equal bilaterally.   5 out of 5 strength in both upper extremities. MDM  Number of Diagnoses or Management Options  Diagnosis management comments: Her low hemoglobin I will order her a unit of blood. I will also do a sepsis evaluation. Amount and/or Complexity of Data Reviewed  Clinical lab tests: reviewed  Decide to obtain previous medical records or to obtain history from someone other than the patient: yes      ED Course as of 04/05/22 1938   Tue Apr 05, 2022   167 75-year-old morbidly obese lady who is globally deconditioned presents with worsening fatigue and weakness. She says since being discharged from the hospital despite physical therapy coming to the house she has been unable to get up and get around effectively because she is gotten so weak. Her white count has gone up some to 12.2 and her hemoglobin has dropped to 7. She does take iron and her stool is dark black.   Her lactic acid is elevated to 2.1 and her BUN is up to 78 with a creatinine of 2.7. [AC]      ED Course User Index  [AC] Ivonne Bernstein MD       Procedures

## 2022-04-05 NOTE — H&P
Hospitalist History and Physical   Admit Date:  2022  2:14 PM   Name:  Sunshine Weinberg   Age:  69 y.o. Sex:  female  :  1955   MRN:  [id number]   Room:  [facility]/    Presenting Complaint: Fatigue    Reason(s) for Admission: ISABELLA (acute kidney injury) ([facility] [N17.9]     History of Present Illness:   Flower Grant is a 69 y.o. female with medical history of CAD s/p STEMI with OSWALDO to RCA in 2022, DM2, COPD with nocturnal hypoxia, hypothyroidism, and recent diagnosis of cirrhosis who presented with fatigue. Constant, progressively worsening, since discharge from hospital 3-28. She has also had some nausea, dry heaves, dark \"mushy\" stools, decreased PO intake for several days. She has been compliant with all meds including iron and has been taking lasix as well despite minimal intake. No CP, SOB, fevers, abd pain. She was discharged 3-28 after a stay for ISABELLA, cirrhosis, suspected GIB. PT/OT recommended HH at discharge and she did not want SNF. She is agreeable to SNF now. Reviewed DC summary from 3-28:  \"Admitted for RUDD cirrhosis, ISABELLA.  Nephrology and GI consulted. Rand Mac studies ok.  Hepatitis panel and other workup ok. ISABELLA improved with IVF. LFTs have remained stable, GI planning follow up. EGD and colonoscopy delayed due to brilinta needing to be held prior to procedures. Colonoscopy with diverticulosis, IH, and 2 small polyps. Repeat in a year after being able to come off brilinta longer. EGD showed esophageal varices without bleeding. No banding done due to brilinta. PHG also seen. Increased PPi to BID. High fiber recommended by GI. Will add iron as the assumption is she has had chronic or intermittent micro blood loss on brilinta. This may help with some of her intermittent diarrhea as well     Pt has been stable for days and feeling ok after procedures. Hb remains stable. She can go home later today.   I worry the longer she is here the more likely she will need SNF placement\"    Review of Systems:  10 systems reviewed and negative except as noted in HPI. Assessment & Plan:       ISABELLA (acute kidney injury) ([facility] [address]   -from lack of intake and taking lasix  -give IVF  -hold home lasix; would consider not resuming at discharge, decreasing, or instructing her to not take if her intake is poor. Had ISABELLA on recent admission as well.  -daily BMP      UTI (urinary tract infection)   -rocephin  -urine culture; put nursing misc order to ensure it is sent. 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  -unfortunately cannot stop brilinta/ASA  -she may need to get set up for outpatient hb monitoring and transfusions in hematology office. 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 -recheck in AM      Secondary esophageal varices without bleeding (HCC)   -no bleeding on recent EGD  -suspect she has chronic micro or obscure blood loss      Dispo/Discharge Planning:   -will need rehab this time. She did not want SNF placement last time. PT/OT/CM consulted. PPD ordered    Diet: ADULT DIET Regular; 4 carb choices (60 gm/meal);  Low Fat/Low Chol/High Fiber/GELY; Low Sodium (2 gm)  VTE ppx: SCDs  Code status: Full Code    Hospital Problems as of 4/5/2022 Date Reviewed: 4/5/2022          Codes Class Noted - Resolved POA    * (Principal) ISABELLA (acute kidney injury) ([facility] [address] ICD-10-CM: N17.9  ICD-9-CM: 584.9  4/5/2022 - Present Yes        UTI (urinary tract infection) ICD-10-CM: N39.0  ICD-9-CM: 599.0  4/5/2022 - Present Yes Secondary esophageal varices without bleeding (HCC) (Chronic) ICD-10-CM: I85.10  ICD-9-CM: 456.21  3/25/2022 - Present Yes        Chronic diastolic congestive heart failure (HCC) (Chronic) ICD-10-CM: I50.32  ICD-9-CM: 428.32, 428.0  3/25/2022 - Present Yes        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 [address] (Chronic) ICD-10-CM: K75.81, K74.60  ICD-9-CM: 571.8, 571.5  Unknown - Present Yes        History of ST elevation myocardial infarction (STEMI) (Chronic) ICD-10-CM: I25.2  ICD-9-CM: 542  1/9/2022 - Present Yes    Overview Signed 2/11/2022  8:13 AM by Isiah Nolasco MD     Last Assessment & Plan:   Formatting of this note might be different from the original.  Inferior STEMI with large amount of thrombus.  Total mid occlusion of the RCA   Mild left sided CAD - EF 40-50% pLAD   Inferior wall hypokinesis with MR (moderate by cath)  Aspiration Thrombectomy and OSWALDO - RCA   Denies any ongoing angina.  - ASA, Brillinta, Lisinopril, Metoprolol             Morbid obesity (HCC) (Chronic) ICD-10-CM: E66.01  ICD-9-CM: 278.01  1/14/2010 - Present Yes        Hypoxia, home O2 at night 3L (Chronic) ICD-10-CM: R09.02  ICD-9-CM: 799.02  1/13/2010 - Present Yes        Diabetes mellitus ([facility] [address] (Chronic) ICD-10-CM: E11.9  ICD-9-CM: 250.00  1/13/2010 - Present Yes        Snoring, likely JANY (Chronic) ICD-10-CM: R06.83  ICD-9-CM: 786.09  1/13/2010 - Present Yes              Past History:  Past Medical History:   Diagnosis Date    Asthma     20 yrs    Atherosclerosis of coronary artery 1/12/2022    Chronic diastolic congestive heart failure ([facility] [address] 3/25/2022    Cirrhosis of liver ([facility] [address]     COPD     10 years    Diabetes ([facility] [address] 2005    Endocrine disease 1977    hypothyroid    Thyroid disease     UTI (urinary tract infection) 4/5/2022     Past Surgical History:   Procedure Laterality Date    COLONOSCOPY N/A 3/28/2022    COLONOSCOPY performed by Sandoval Richardson MD at [address] [place] [address] HX CHOLECYSTECTOMY  1995    HX COLONOSCOPY  2022    HX ENDOSCOPY  2022    HX GYN  1995    Hyst, C Section      Allergies   Allergen Reactions    No Known Drug Allergies Not Reported This Time      Social History     Tobacco Use    Smoking status: Former Smoker     Packs/day: 1.00     Years: 30.00     Pack years: 30.00     Types: Cigarettes    Smokeless tobacco: Never Used   Substance Use Topics    Alcohol use: No      Family History   Problem Relation Age of Onset    Heart Attack Father     No Known Problems Maternal Grandmother     No Known Problems Maternal Grandfather     No Known Problems Paternal Grandmother     No Known Problems Paternal Grandfather       Family history reviewed and negative except as noted above.     Immunization History   Administered Date(s) Administered    COVID-19, Moderna Booster, PF, 0.25mL Dose 11/18/2021    COVID-19, Moderna, Primary or Immunocompromised Series, MRNA, PF, 100mcg/0.5mL 04/09/2021, 05/09/2021    Influenza High Dose Vaccine PF 12/21/2018    Influenza Vaccine 10/23/2012, 11/06/2015    Influenza Vaccine (Quad) PF (>6 Mo Flulaval, Fluarix, and >3 Yrs Afluria, Fluzone [id number]) 09/25/2017    Influenza Vaccine (Tri) Adjuvanted (>65 Yrs FLUAD TRI 51979) 12/04/2019    Influenza Vaccine Split 01/16/2010    Influenza, Quadrivalent, Adjuvanted (>65 Yrs FLUAD QUAD 08681) 10/22/2021    Pneumococcal Conjugate (PCV-13) 12/21/2018    Pneumococcal Polysaccharide (PPSV-23) 11/06/2012    TB Skin Test (PPD) Intradermal 03/24/2022     Prior to Admit Medications:  Current Outpatient Medications   Medication Instructions    albuterol (PROVENTIL HFA, VENTOLIN HFA, PROAIR HFA) 90 mcg/actuation inhaler 2 Puffs, Inhalation, EVERY 4 HOURS AS NEEDED    albuterol (PROVENTIL VENTOLIN) 2.5 mg /3 mL (0.083 %) nebu INHALE 1 VIAL VIA NEBULIZER EVERY 6 HOURS AS NEEDED FOR WHEEZING OR SHORTNESS OF BREATH    ascorbic acid (VITAMIN C) 500 mg, Oral    aspirin delayed-release 81 mg tablet Oral, DAILY    atorvastatin (LIPITOR) 80 mg, Oral, EVERY BEDTIME    Brilinta 90 mg, Oral, EVERY 12 HOURS    DULoxetine (CYMBALTA) 60 mg, Oral, DAILY    ferrous sulfate (IRON) 325 mg, Oral, DAILY BEFORE BREAKFAST    fluticasone propionate (FLONASE) 50 mcg/actuation nasal spray 2 Sprays, Both Nostrils, DAILY    furosemide (LASIX) 40 mg, Oral, DAILY    guaiFENesin ER (MUCINEX) 600 mg, Oral, 2 TIMES DAILY    hydrOXYzine pamoate (VISTARIL) 25 mg, Oral, 2 TIMES DAILY AS NEEDED    levothyroxine (SYNTHROID) 175 mcg, Oral, DAILY BEFORE BREAKFAST    metFORMIN (GLUCOPHAGE) 1,000 mg, Oral, 2 TIMES DAILY WITH MEALS    metoprolol tartrate (LOPRESSOR) 12.5 mg, Oral, 2 TIMES DAILY    nitroglycerin (NITROSTAT) 0.4 mg, SubLINGual    ondansetron (ZOFRAN ODT) 4 mg, Oral, EVERY 8 HOURS AS NEEDED    pantoprazole (PROTONIX) 40 mg, Oral, 2 TIMES DAILY BEFORE MEALS, Before breakfast    sucralfate (CARAFATE) 1 g, Oral, 3 TIMES DAILY       Objective:     Patient Vitals for the past 24 hrs:   Temp Pulse Resp BP SpO2   04/05/22 1728 -- 83 18 (!) 104/55 --   04/05/22 1727 97.4 °F (36.3 °C) 82 18 (!) 104/55 98 %   04/05/22 1645 -- 86 22 114/61 --   04/05/22 1529 -- 85 20 (!) 106/51 100 %   04/05/22 1509 -- 81 19 (!) 101/54 99 %   04/05/22 1435 -- 84 17 (!) 105/51 --   04/05/22 1434 98.6 °F (37 °C) 82 19 -- 94 %     Oxygen Therapy  O2 Sat (%): 98 % (04/05/22 1727)  Pulse via Oximetry: 82 beats per minute (04/05/22 1529)  O2 Device: Nasal cannula (04/05/22 1434)  O2 Flow Rate (L/min): 2 l/min (04/05/22 1434)    Estimated body mass index is 46.52 kg/m² as calculated from the following:    Height as of this encounter: 5' 4\" (1.626 m). Weight as of this encounter: 122.9 kg (271 lb). No intake or output data in the 24 hours ending 04/05/22 1754      Physical Exam:    Blood pressure (!) 104/55, pulse 83, temperature 97.4 °F (36.3 °C), resp. rate 18, height 5' 4\" (1.626 m), weight 122.9 kg (271 lb), SpO2 98 %.   General:    Well nourished. Chronically ill appearing. No overt distress  Head:  Normocephalic, atraumatic  Eyes:  Faint icterus both sclerae  Pupils equally round. ENT:  Nares appear normal, no drainage. Moist oral mucosa  Neck:  No restricted ROM. Trachea midline   CV:   RRR. No jugular venous distension. Lungs:   CTAB. Respirations even, unlabored  Abdomen:   Soft, nontender, nondistended. Extremities: No cyanosis or clubbing. No edema  Skin:     No rashes. Mild jaundice. Warm and dry. Neuro:  CN II-XII grossly intact. Sensation intact. A&Ox3  Psych:  Normal mood and affect. I have reviewed ordered lab tests and independently visualized imaging below:    Last 24hr Labs:  Recent Results (from the past 24 hour(s))   CBC WITH AUTOMATED DIFF    Collection Time: 04/05/22  2:31 PM   Result Value Ref Range    WBC 12.2 (H) 4.3 - 11.1 K/uL    RBC 2.53 (L) 4.05 - 5.2 M/uL    HGB 7.0 (L) 11.7 - 15.4 g/dL    HCT 22.6 (L) 35.8 - 46.3 %    MCV 89.3 79.6 - 97.8 FL    MCH 27.7 26.1 - 32.9 PG    MCHC 31.0 (L) 31.4 - 35.0 g/dL    RDW 20.7 (H) 11.9 - 14.6 %    PLATELET 929 (L) 948 - 450 K/uL    MPV 10.2 9.4 - 12.3 FL    ABSOLUTE NRBC 0.03 0.0 - 0.2 K/uL    DF AUTOMATED      NEUTROPHILS 87 (H) 43 - 78 %    LYMPHOCYTES 5 (L) 13 - 44 %    MONOCYTES 8 4.0 - 12.0 %    EOSINOPHILS 0 (L) 0.5 - 7.8 %    BASOPHILS 0 0.0 - 2.0 %    IMMATURE GRANULOCYTES 1 0.0 - 5.0 %    ABS. NEUTROPHILS 10.6 (H) 1.7 - 8.2 K/UL    ABS. LYMPHOCYTES 0.6 0.5 - 4.6 K/UL    ABS. MONOCYTES 0.9 0.1 - 1.3 K/UL    ABS. EOSINOPHILS 0.0 0.0 - 0.8 K/UL    ABS. BASOPHILS 0.0 0.0 - 0.2 K/UL    ABS. IMM.  GRANS. 0.1 0.0 - 0.5 K/UL   METABOLIC PANEL, COMPREHENSIVE    Collection Time: 04/05/22  2:31 PM   Result Value Ref Range    Sodium 137 136 - 145 mmol/L    Potassium 4.7 3.5 - 5.1 mmol/L    Chloride 103 98 - 107 mmol/L    CO2 26 21 - 32 mmol/L    Anion gap 8 7 - 16 mmol/L    Glucose 132 (H) 65 - 100 mg/dL    BUN 78 (H) 8 - 23 MG/DL    Creatinine 2.70 (H) 0.6 - 1.0 MG/DL    GFR est AA 22 (L) >60 ml/min/1.73m2    GFR est non-AA 19 (L) >60 ml/min/1.73m2    Calcium 9.2 8.3 - 10.4 MG/DL    Bilirubin, total 3.3 (H) 0.2 - 1.1 MG/DL    ALT (SGPT) 159 (H) 12 - 65 U/L    AST (SGOT) 287 (H) 15 - 37 U/L    Alk. phosphatase 602 (H) 50 - 136 U/L    Protein, total 7.5 6.3 - 8.2 g/dL    Albumin 1.7 (L) 3.2 - 4.6 g/dL    Globulin 5.8 (H) 2.3 - 3.5 g/dL    A-G Ratio 0.3 (L) 1.2 - 3.5     LIPASE    Collection Time: 04/05/22  2:31 PM   Result Value Ref Range    Lipase 360 73 - 393 U/L   LACTIC ACID    Collection Time: 04/05/22  2:31 PM   Result Value Ref Range    Lactic acid 2.1 (H) 0.4 - 2.0 MMOL/L   TYPE & SCREEN    Collection Time: 04/05/22  2:31 PM   Result Value Ref Range    Crossmatch Expiration 04/08/2022,2359     ABO/Rh(D) Barber Sauceda POSITIVE     Antibody screen NEG     Unit number [id number]     Blood component type  LR     Unit division 00     Status of unit ISSUED     Crossmatch result Compatible    URINALYSIS W/ RFLX MICROSCOPIC    Collection Time: 04/05/22  3:24 PM   Result Value Ref Range    Color ORANGE      Appearance TURBID      Specific gravity 1.020 1.001 - 1.023      pH (UA) 6.0 5.0 - 9.0      Protein 30 (A) NEG mg/dL    Glucose Negative mg/dL    Ketone Negative NEG mg/dL    Bilirubin SMALL AMOUNT (A) NEG      Blood LARGE (A) NEG      Urobilinogen 1.0 0.2 - 1.0 EU/dL    Nitrites Negative NEG      Leukocyte Esterase TRACE (A) NEG     URINE MICROSCOPIC    Collection Time: 04/05/22  3:24 PM   Result Value Ref Range    Crystals, urine DUPLICATE REQUEST /LPF   RBC, ALLOCATE    Collection Time: 04/05/22  4:15 PM   Result Value Ref Range    HISTORY CHECKED?  Historical check performed        All Micro Results     Procedure Component Value Units Date/Time    CULTURE, BLOOD [[id number]] Collected: 04/05/22 1647    Order Status: Completed Specimen: Blood Updated: 04/05/22 1723    CULTURE, BLOOD [[id number]] Collected: 04/05/22 1645    Order Status: Completed Specimen: Blood Updated: 04/05/22 Jeri 122, URINE [[id number]]     Order Status: Sent Specimen: Urine from Clean catch     CULTURE, URINE [[id number]]     Order Status: Canceled Specimen: Urine from Clean catch           Other Studies:  XR CHEST PORT    Result Date: 4/5/2022  CHEST X-RAY, 1 VIEW INDICATION: Sepsis COMPARISON: Chest x-ray 3/24/2022 TECHNIQUE: Single AP view of the chest was obtained. FINDINGS: Lungs: Stable chronic appearing interstitial opacities in both lungs. Cardiomediastinal silhouette: Enlarged. Pleura: No pneumothorax or pleural effusion. Osseous structures: Normal.     No radiographic evidence of acute cardiopulmonary disease. Medications Administered     cefepime (MAXIPIME) 2 g in 0.9% sodium chloride (MBP/ADV) 100 mL MBP     Admin Date  04/05/2022 Action  New Bag Dose  2 g Rate  200 mL/hr Route  IntraVENous Administered By  Maeve Castillo RN                Signed:  Lashaun Casas MD    Part of this note may have been written by using a voice dictation software. The note has been proof read but may still contain some grammatical/other typographical errors.

## 2022-04-05 NOTE — ACP (ADVANCE CARE PLANNING)
[facility] Hospitalist Service  At the heart of better care     Advance Care Planning   Admit Date:  2022  2:14 PM   Name:  Yesika Barroso   Age:  69 y.o. Sex:  female  :  1955   MRN:  [id number]   Room:  [place] [address]    Flower Borrego is able to make her own decisions: Yes    Patient / surrogate decision-maker directed:  Code Status: FULL CODE -full aggressive medical and surgical interventions, including intubations, resuscitations, pressors, artificial tube feeding    Patient or surrogate consented to discussion of the current conditions, workup, management plans, prognosis, and understand the risk for further deterioration. Time spent: 18 minutes in direct discussion (face to face and/or over phone).     Signed:  Suzette Virk MD

## 2022-04-05 NOTE — ED TRIAGE NOTES
Pt arrives per EMS from home for complaints of \"concern for her hemaglobin levels and other levels\". States she is nauseseted all the time, cant get out of bed and family wants her put into a rehab facility. Pt had phone visit with PCP today who sent her here for further evaluation. Pt with recent admission for \"liver failure, kidney failure, congestive heart failure and a heart attack\".

## 2022-04-06 NOTE — CONSULTS
[facility] Consult Note       Primary GI Physician: Dr Jolie Chan    Referring Provider:  Dr Yinka Baldwin Date:  4/6/2022    Admit Date:  4/5/2022    Chief Complaint:  Elevated LFT's    Subjective:     History of Present Illness:  Patient is a 69 y.o. female with PMH including but not limited to CAD s/p STEMI with OSWALDO to RCA in 1/2022, DM2, COPD with nocturnal hypoxia, hypothyroidism, and recent diagnosis of cirrhosis, who is seen in consultation at the request of Dr. Brad Webster for elevated LFT's. She was admitted with ISABELLA, UTI and started on rocephin. LFTs are elevated CW labs from 3/28/22 admission. Today TB 2.9, , , , hgb 8.8, hct 28.1, MCV 28.1, plt 93. Last CT 3/23/22 with cirrhotic liver, no biliary ductal dilation. Work up for liver with viral hepatitis negative 3/24. M2 .3 elevated and smooth muscle AB 36 elevated. Iron studies wnl 3/24/22. Try and TC wnl 5/3/21. She was discharged 3/28 after a stay for ISABELLA, cirrhosis, suspected GIB. She was admitted 4/5/22 with fatigue. Constant, progressively worsening, since discharge from hospital 3/28. She has also had some nausea, dry heaves, dark \"mushy\" stools, decreased PO intake for several days. She reports darker than normal stool.  hgb 8.8 today with elevated BUN 80 and Cr 2.50. Denies BRRB. She reports nausea off and on but worse yesterday with dry heaves. She denies hematemesis or coffee ground emesis. She denies hx of liver disease, ETOH use or NSAID use. She is on Brilinta for PCI Jan 2022. She was seen in consult by us on 3/25/22 at for abnormal imaging study. Patient had abnormal ultrasound showing heterogeneous/lobulated liver and splenomegaly in early March. Associated with abnormal LFTs. Associated with some mild abdominal pain. Also at that time was noted to have downward trending hgb without evidence of overt bleeding.  CT scan done on that admission shows similar liver findings along with varices and ventral hernias. We are asked to evaluate further. Hgb 3/28 was 8.0. She underwent EGD 3/28/22 with grade 2 eso varices and PHG. [place] with tic, colon polyps, fair prep, no bx due to Edison use. Rec to repeat colo in Jan 2023 when she could hold Brilenta.          Ct A/P 3/23/22  FINDINGS:  - LUNG BASES: No infiltrates or masses. - LIVER: Liver has a cirrhotic appearance, 16 cm in length. No discrete liver  mass. Portal vein is patent. Small gastroesophageal varices are present.  - GALLBLADDER/BILE DUCTS: Postcholecystectomy. No bile duct dilatation.  - PANCREAS: Normal.  - SPLEEN: 18 cm in length. No discrete splenic mass. - ADRENALS: Normal.  - KIDNEYS/URETERS: No hydronephrosis or significant mass. - BLADDER: Normal.  - REPRODUCTIVE ORGANS: Post hysterectomy. No pelvic masses. - BOWEL: Normal caliber. No inflammatory changes.  - LYMPH NODES: Multiple small periportal and gastrohepatic lymph nodes. No  significant mesenteric or pelvic adenopathy.  - BONES: There are degenerative changes throughout the lumbar spine. No  fracture or significant bone lesion.  - VASCULATURE: Normal  - OTHER: 2 ventral hernias adjacent to the umbilicus with omentum involvements  and several enlarged vessels. No bowel involvement. IMPRESSION  1. Cirrhosis. Multiple varices present suggesting portal hypertension. 2.  2 ventral hernias with omental involvement.       EGD/COLO 3/28/22 Dr Bourne Postin  EGD  Findings:   Esophagus- One, grade 2 varix extending from the EG junction at 36 cm to 32 cm. Mostly straight without stigmata of bleeding. Not banded due to hold of Brillinta only 2 to 3 days. Two  Other small varices without complication. Stomach- Mild PHG in proximal stomach. No gastric varices noted. No blood seen or source. Duodenum- Normal.  Impression:  Esophageal varices without stigmata of bleeding. No banding due to recent Brillinta use. PHG mild.   No blood seen.  Recommendations: Add PPIs. Consider banding with a a longer Brillinta, 5 day hold. May want to due banding, realizing the risks, after stent has been in one year etc.  Consider Nadolol etc and banding in the future. COLO  Findings: Pan diverticulosis. Small colon polyps (2) one in cecum and one in TC. Prep was only fair though. No biopsies etc due to Brillinta use. Specimens: None  Impression:  No active bleeding or site of bleeding identified. Prep was only fair. Pan diverticulosis was seen without bleeding. 2 small 3 mm polyps were seen in the cecum and TC. These were not removed due to Brillinta use. Moderate sized IHs and anal tags noted on retroflex. Recommendations:   Consider repeating a colonoscopy in January of 2023 after one year post stent placement. High fiber diet. PMH:  Past Medical History:   Diagnosis Date    Asthma     20 yrs    Atherosclerosis of coronary artery 1/12/2022    Chronic diastolic congestive heart failure ([facility] [address] 3/25/2022    Cirrhosis of liver ([facility] [address]     COPD     10 years    Diabetes ([facility] [address] 2005    Endocrine disease 1977    hypothyroid    Thyroid disease     UTI (urinary tract infection) 4/5/2022       PSH:  Past Surgical History:   Procedure Laterality Date    COLONOSCOPY N/A 3/28/2022    COLONOSCOPY performed by Naga Mclaughlin MD at [address] [place] [address],     [address] [place] [address] HX COLONOSCOPY  2022    HX ENDOSCOPY  2022    HX GYN  1995    Hyst, C Section       Allergies: Allergies   Allergen Reactions    No Known Drug Allergies Not Reported This Time       Home Medications:  Prior to Admission medications    Medication Sig Start Date End Date Taking? Authorizing Provider   ferrous sulfate (IRON) 325 mg (65 mg iron) EC tablet Take 1 Tablet by mouth Daily (before breakfast).  Indications: anemia from inadequate iron 3/28/22   Toro Vaz MD   pantoprazole (PROTONIX) 40 mg tablet Take 1 Tablet by mouth Before breakfast and dinner. Before breakfast 3/28/22   Manuelito Atkins MD   albuterol (PROVENTIL VENTOLIN) 2.5 mg /3 mL (0.083 %) nebu INHALE 1 VIAL VIA NEBULIZER EVERY 6 HOURS AS NEEDED FOR WHEEZING OR SHORTNESS OF BREATH 3/21/22   Chano GIL PA-C   metoprolol tartrate (LOPRESSOR) 25 mg tablet Take 0.5 Tablets by mouth two (2) times a day. 3/14/22   Alonso Zambrano MD   furosemide (LASIX) 40 mg tablet Take 1 Tablet by mouth daily. 3/14/22   Alonso Zambrano MD   sucralfate (CARAFATE) 100 mg/mL suspension Take 10 mL by mouth three (3) times daily. 3/14/22   Alonso Zambrano MD   ondansetron (ZOFRAN ODT) 4 mg disintegrating tablet Take 1 Tablet by mouth every eight (8) hours as needed for Nausea or Vomiting. 3/14/22   Alonso Zambrano MD   ascorbic acid (VITAMIN C) 500 mg chewable tablet Take 500 mg by mouth. Provider, Historical   hydrOXYzine pamoate (VISTARIL) 25 mg capsule Take 1 Capsule by mouth two (2) times daily as needed for Anxiety. 2/11/22   Alonso Zambrano MD   Brilinta 90 mg tablet Take 90 mg by mouth every twelve (12) hours. 1/12/22   Provider, Historical   atorvastatin (LIPITOR) 80 mg tablet Take 80 mg by mouth nightly. 1/12/22   Provider, Historical   nitroglycerin (NITROSTAT) 0.4 mg SL tablet 0.4 mg by SubLINGual route. 1/12/22   Provider, Historical   fluticasone propionate (FLONASE) 50 mcg/actuation nasal spray 2 Sprays by Both Nostrils route daily. 2/4/22   Alonso Zambrano MD   guaiFENesin ER (MUCINEX) 600 mg ER tablet Take 1 Tablet by mouth two (2) times a day. Patient not taking: Reported on 4/5/2022 2/4/22   Alonso Zambrano MD   albuterol (PROVENTIL HFA, VENTOLIN HFA, PROAIR HFA) 90 mcg/actuation inhaler Take 2 Puffs by inhalation every four (4) hours as needed for Wheezing or Shortness of Breath. 1/14/22   Alonso Zambrano MD   metFORMIN (GLUCOPHAGE) 1,000 mg tablet Take 1 Tablet by mouth two (2) times daily (with meals).  11/16/21   Alonso Zambrano MD   levothyroxine (SYNTHROID) 175 mcg tablet Take 1 Tablet by mouth Daily (before breakfast). 10/22/21   Norberto Cole MD   DULoxetine (CYMBALTA) 60 mg capsule Take 1 Capsule by mouth daily. 10/22/21   Norberto Cole MD   aspirin delayed-release 81 mg tablet Take  by mouth daily.     Provider, Historical       Hospital Medications:  Current Facility-Administered Medications   Medication Dose Route Frequency    sodium chloride (NS) flush 5-10 mL  5-10 mL IntraVENous Q8H    sodium chloride (NS) flush 5-10 mL  5-10 mL IntraVENous PRN    0.9% sodium chloride infusion 250 mL  250 mL IntraVENous PRN    0.9% sodium chloride infusion  75 mL/hr IntraVENous CONTINUOUS    ticagrelor (BRILINTA) tablet 90 mg  90 mg Oral Q12H    aspirin delayed-release tablet 81 mg  81 mg Oral DAILY    atorvastatin (LIPITOR) tablet 80 mg  80 mg Oral QHS    albuterol (PROVENTIL VENTOLIN) nebulizer solution 2.5 mg  2.5 mg Nebulization Q6H PRN    DULoxetine (CYMBALTA) capsule 30 mg  30 mg Oral DAILY    ferrous sulfate tablet 325 mg  1 Tablet Oral BID WITH MEALS    [Held by provider] furosemide (LASIX) tablet 40 mg  40 mg Oral DAILY    hydrOXYzine pamoate (VISTARIL) capsule 25 mg  25 mg Oral BID PRN    levothyroxine (SYNTHROID) tablet 175 mcg  175 mcg Oral ACB    [Held by provider] metoprolol tartrate (LOPRESSOR) tablet 12.5 mg  12.5 mg Oral BID    pantoprazole (PROTONIX) tablet 40 mg  40 mg Oral ACB&D    cefTRIAXone (ROCEPHIN) 1 g in 0.9% sodium chloride (MBP/ADV) 50 mL MBP  1 g IntraVENous Q24H    insulin lispro (HUMALOG) injection   SubCUTAneous AC&HS    sodium chloride (NS) flush 5-40 mL  5-40 mL IntraVENous Q8H    sodium chloride (NS) flush 5-40 mL  5-40 mL IntraVENous PRN    acetaminophen (TYLENOL) tablet 650 mg  650 mg Oral Q6H PRN    Or    acetaminophen (TYLENOL) suppository 650 mg  650 mg Rectal Q6H PRN    polyethylene glycol (MIRALAX) packet 17 g  17 g Oral DAILY PRN    bisacodyL (DULCOLAX) suppository 10 mg  10 mg Rectal DAILY PRN  famotidine (PEPCID) tablet 20 mg  20 mg Oral BID PRN    alum-mag hydroxide-simeth (MYLANTA) oral suspension 15 mL  15 mL Oral Q6H PRN    hydrALAZINE (APRESOLINE) 20 mg/mL injection 20 mg  20 mg IntraVENous Q4H PRN    oxyCODONE IR (ROXICODONE) tablet 5 mg  5 mg Oral Q4H PRN    guaiFENesin-dextromethorphan (ROBITUSSIN DM) 100-10 mg/5 mL syrup 10 mL  10 mL Oral Q4H PRN    senna-docusate (PERICOLACE) 8.6-50 mg per tablet 2 Tablet  2 Tablet Oral DAILY PRN    melatonin tablet 5 mg  5 mg Oral QHS PRN    tuberculin injection 5 Units  5 Units IntraDERMal ONCE    prochlorperazine (COMPAZINE) with saline injection 10 mg  10 mg IntraVENous Q6H PRN    nystatin (MYCOSTATIN) 100,000 unit/gram powder   Topical BID    sucralfate (CARAFATE) tablet 1 g  1 g Oral TID WITH MEALS    albuterol (PROVENTIL VENTOLIN) nebulizer solution 2.5 mg  2.5 mg Nebulization Q6H RT       Social History:  Social History     Tobacco Use    Smoking status: Former Smoker     Packs/day: 1.00     Years: 30.00     Pack years: 30.00     Types: Cigarettes    Smokeless tobacco: Never Used   Substance Use Topics    Alcohol use: No       Pt denies any history of drug use, blood transfusions, or tattoos. Family History:  Family History   Problem Relation Age of Onset    Heart Attack Father     No Known Problems Maternal Grandmother     No Known Problems Maternal Grandfather     No Known Problems Paternal Grandmother     No Known Problems Paternal Grandfather        Review of Systems:  A detailed 10 system ROS is obtained, with pertinent positives as listed above. All others are negative.     Diet:  Regular    Objective:     Physical Exam:  Vitals:  Visit Vitals  /61 (BP 1 Location: Left lower arm, BP Patient Position: At rest)   Pulse 90   Temp 97.7 °F (36.5 °C)   Resp 15   Ht 5' 4\" (1.626 m)   Wt 122.9 kg (271 lb)   SpO2 96%   BMI 46.52 kg/m²     Gen:  Pt is alert, cooperative, no acute distress, obese  Skin:  Extremities and face reveal no rashes. HEENT: Sclerae anicteric. Extra-occular muscles are intact. No oral ulcers. No abnormal pigmentation of the lips. The neck is supple. Cardiovascular: Regular rate and rhythm. No murmurs, gallops, or rubs. Respiratory:  Comfortable breathing with no accessory muscle use. Clear breath sounds anteriorly with no wheezes, rales, or rhonchi. GI:  Abdomen nondistended, soft, and nontender. Normal active bowel sounds. No enlargement of the liver or spleen. No masses palpable. Rectal:  Deferred  Musculoskeletal:  No pitting edema of the lower legs. Neurological:  Gross memory appears intact. Patient is alert and oriented. Psychiatric:  Mood appears appropriate with judgement intact. Laboratory:    Recent Labs     04/06/22  0548 04/05/22  1431   WBC 10.5 12.2*   HGB 8.8* 7.0*   HCT 28.1* 22.6*   PLT 93* 126*   MCV 87.8 89.3    137   K 4.3 4.7    103   CO2 25 26   BUN 80* 78*   CREA 2.50* 2.70*   CA 8.9 9.2   * 132*   * 602*   * 287*   * 159*   TBILI 2.9* 3.3*   CBIL 2.4*  --    ALB 1.7* 1.7*   TP 7.1 7.5   LPSE  --  360          Assessment:     Principal Problem:    ISABELLA (acute kidney injury) ([facility] [address] (4/5/2022)    Active Problems:    Hypoxia, home O2 at night 3L (1/13/2010)      Diabetes mellitus ([facility] [address] (1/13/2010)      Snoring, likely JANY (1/13/2010)      Morbid obesity ([facility] [address] (1/14/2010)      History of ST elevation myocardial infarction (STEMI) (1/9/2022)      Overview: Last Assessment & Plan:       Formatting of this note might be different from the original.      Inferior STEMI with large amount of thrombus.  Total mid occlusion of the       RCA       Mild left sided CAD - EF 40-50% pLAD       Inferior wall hypokinesis with MR (moderate by cath)      Aspiration Thrombectomy and OSWALDO - RCA       Denies any ongoing angina.      - ASA, Brillinta, Lisinopril, Metoprolol      Liver cirrhosis secondary to RUDD [facility]) ()      Secondary esophageal varices without bleeding ([facility] [address] (3/25/2022)      Chronic diastolic congestive heart failure ([facility] [address] (3/25/2022)      UTI (urinary tract infection) (4/5/2022)      Iron deficiency anemia secondary to blood loss (chronic) (4/5/2022)      69 y.o. female with PMH including but not limited to CAD s/p STEMI with OSWALDO to RCA in 1/2022, DM2, COPD with nocturnal hypoxia, hypothyroidism, and recent diagnosis of cirrhosis admitted with dark stool, ISABELLA and nausea. Was started on rocephin. LFTs are elevated CW labs from 3/28/22 admission. Today TB 2.9, , , , hgb 8.8, hct 28.1, MCV 28.1, plt 93. Last CT 3/23/22 with cirrhotic liver, no biliary ductal dilation. Hgb 8.8 (was 8.0 on d/c 3/28). Reports dark, mushy stools on iron iron. Work up for liver with viral hepatitis negative 3/24. M2 .3 elevated and smooth muscle AB 36 elevated. Iron studies wnl 3/24/22. Triglycerides and TC wnl 5/3/21. Plan:     - Continue PPI BID  - Will make NPO after MN in case EGD needed. Currently hgb stable at 8.8  - Monitor H&H and transfuse prn  - Work up for liver last admission with elevated M2 Ab and SMAB as above  - Supportive care with antiemetics prn    Gordy Cali NP  Patient is seen and examined in collaboration with Dr. Alexia Krueger. Assessment and plan as per Dr. Jailene Sykes.

## 2022-04-06 NOTE — CONSULTS
Nephrology consult    Admission Date:  4/5/2022    Admission Diagnosis  ISABELLA (acute kidney injury) ([facility] [address] [N17.9]    We are asked by Dr. Kandi García    History of Present Illness: Ms. Alfa Epstein is a 69 y.o female with PMH significant for CAD s/p STEMI, COPD on 3L O2 at home, DM type II, hypothyroidism, liver cirrhosis and ECHO 1/15/22 dCHF EF 55-65%, RVSP 33 mmHg admitted with reported complaints of worsening fatigue, nausea, dry heaves, poor appetite and persistent shortness of breath. hgb 7.0-> 8.8 s/p PRBC 4/5. Recent hospitalization with similar symptoms including ISABELLA. BP on admission 105/41, Temp 98.6, HR 82. Elevated LFTs and bilirubin GI to eval. CXR with no evidence of acute cardiopulmonary process. We are consulted for ISABELLA. From a renal standpoint her Cr/BUN on admission was 2.70/78->2.50/80, baseline Cr 0.8-1.0, Cr was 1.50s recent prior admission. UA with hyaline casts, 30 protein, RBC 3-5, WBC 10-20. Renal US revealed normal kidneys bilaterally, no hydronephrosis, spleen enlarged. Home meds significant for cymbalta, lasix, metformin and PPI. Pt seen and examined in room SOB at her baseline on 3L O2, reports nausea, dry heaves poor appetite and weakness, + dark loose stools, hemorrhoid discomfort. Denies changes in uop, dysuria, urinary hesitancy or urgency, no CP, diarrhea, fever/chills, dizziness or syncope, no NSAID use.       Past Medical History:   Diagnosis Date    Asthma     20 yrs    Atherosclerosis of coronary artery 1/12/2022    Chronic diastolic congestive heart failure ([facility] [address] 3/25/2022    Cirrhosis of liver ([facility] [address]     COPD     10 years    Diabetes ([facility] [address] 2005    Endocrine disease 1977    hypothyroid    Thyroid disease     UTI (urinary tract infection) 4/5/2022      Past Surgical History:   Procedure Laterality Date    COLONOSCOPY N/A 3/28/2022    COLONOSCOPY performed by Jose Last MD at [address] HX COLONOSCOPY  2022  HX ENDOSCOPY  2022    HX GYN  1995    Hyst, C Section      Current Facility-Administered Medications   Medication Dose Route Frequency    sodium chloride (NS) flush 5-10 mL  5-10 mL IntraVENous Q8H    sodium chloride (NS) flush 5-10 mL  5-10 mL IntraVENous PRN    0.9% sodium chloride infusion 250 mL  250 mL IntraVENous PRN    0.9% sodium chloride infusion  75 mL/hr IntraVENous CONTINUOUS    ticagrelor (BRILINTA) tablet 90 mg  90 mg Oral Q12H    aspirin delayed-release tablet 81 mg  81 mg Oral DAILY    atorvastatin (LIPITOR) tablet 80 mg  80 mg Oral QHS    albuterol (PROVENTIL VENTOLIN) nebulizer solution 2.5 mg  2.5 mg Nebulization Q6H PRN    DULoxetine (CYMBALTA) capsule 30 mg  30 mg Oral DAILY    ferrous sulfate tablet 325 mg  1 Tablet Oral BID WITH MEALS    [Held by provider] furosemide (LASIX) tablet 40 mg  40 mg Oral DAILY    hydrOXYzine pamoate (VISTARIL) capsule 25 mg  25 mg Oral BID PRN    levothyroxine (SYNTHROID) tablet 175 mcg  175 mcg Oral ACB    [Held by provider] metoprolol tartrate (LOPRESSOR) tablet 12.5 mg  12.5 mg Oral BID    pantoprazole (PROTONIX) tablet 40 mg  40 mg Oral ACB&D    cefTRIAXone (ROCEPHIN) 1 g in 0.9% sodium chloride (MBP/ADV) 50 mL MBP  1 g IntraVENous Q24H    insulin lispro (HUMALOG) injection   SubCUTAneous AC&HS    sodium chloride (NS) flush 5-40 mL  5-40 mL IntraVENous Q8H    sodium chloride (NS) flush 5-40 mL  5-40 mL IntraVENous PRN    acetaminophen (TYLENOL) tablet 650 mg  650 mg Oral Q6H PRN    Or    acetaminophen (TYLENOL) suppository 650 mg  650 mg Rectal Q6H PRN    polyethylene glycol (MIRALAX) packet 17 g  17 g Oral DAILY PRN    bisacodyL (DULCOLAX) suppository 10 mg  10 mg Rectal DAILY PRN    famotidine (PEPCID) tablet 20 mg  20 mg Oral BID PRN    alum-mag hydroxide-simeth (MYLANTA) oral suspension 15 mL  15 mL Oral Q6H PRN    hydrALAZINE (APRESOLINE) 20 mg/mL injection 20 mg  20 mg IntraVENous Q4H PRN    oxyCODONE IR (ROXICODONE) tablet 5 mg  5 mg Oral Q4H PRN    guaiFENesin-dextromethorphan (ROBITUSSIN DM) 100-10 mg/5 mL syrup 10 mL  10 mL Oral Q4H PRN    senna-docusate (PERICOLACE) 8.6-50 mg per tablet 2 Tablet  2 Tablet Oral DAILY PRN    melatonin tablet 5 mg  5 mg Oral QHS PRN    tuberculin injection 5 Units  5 Units IntraDERMal ONCE    prochlorperazine (COMPAZINE) with saline injection 10 mg  10 mg IntraVENous Q6H PRN    nystatin (MYCOSTATIN) 100,000 unit/gram powder   Topical BID    sucralfate (CARAFATE) tablet 1 g  1 g Oral TID WITH MEALS    albuterol (PROVENTIL VENTOLIN) nebulizer solution 2.5 mg  2.5 mg Nebulization Q6H RT     Allergies   Allergen Reactions    No Known Drug Allergies Not Reported This Time      Social History     Tobacco Use    Smoking status: Former Smoker     Packs/day: 1.00     Years: 30.00     Pack years: 30.00     Types: Cigarettes    Smokeless tobacco: Never Used   Substance Use Topics    Alcohol use: No      Family History   Problem Relation Age of Onset    Heart Attack Father     No Known Problems Maternal Grandmother     No Known Problems Maternal Grandfather     No Known Problems Paternal Grandmother     No Known Problems Paternal Grandfather         Review of Systems  Gen - no fever, no chills, appetite poor  HEENT - no sore throat, no decreased vision, no hearing loss  CV - no chest pain, no palpitation, + orthopnea  Lung - + chronic exertional shortness of breath, no cough, no hemoptysis  Abd - no tenderness, + nausea/dry heaves, dark loose stools  Ext - + edema, no clubbing, no cyanosis  Musculoskeletal - no joint pain, no back pain, generalized LE weakness  Neurologic - no headaches, no dizziness, no seizures  Psychiatric - no anxiety, no depression  Skin - no rashes, no pupura  Genitourinary - no decreased urine output, no hematuria, no dysuria     Objective:     Vitals:    04/06/22 0346 04/06/22 0516 04/06/22 0718 04/06/22 0825   BP: 121/61   (!) 106/52   Pulse: 90   88   Resp: 15   18 Temp: 97.7 °F (36.5 °C)   97.3 °F (36.3 °C)   SpO2: 97% 98% 96% 99%   Weight:       Height:           Intake/Output Summary (Last 24 hours) at 4/6/2022 1103  Last data filed at 4/6/2022 0641  Gross per 24 hour   Intake 1138.75 ml   Output 100 ml   Net 1038.75 ml       Physical Exam  GEN :in no distress, alert and oriented  HEENT:  Mucous membranes are moist.  Neck - supple without JVD  CV - regular rate, S1, S2, no Rub   Lung - clear low lung volumes bilaterally, lungs expand symmetrically  Abd - soft obese, nontender, bowel sounds present, no hepatosplenomegaly  Ext - no clubbing, no cyanosis, trace BLE edema  Neurologic - nonfocal  Genitourinary - bladder nonpalpable  Skin - no rashes, no purpura, no ecchymoses  Psychiatric: Normal mood and affect. Data Review:   Recent Labs     04/06/22  0548 04/05/22  1431   WBC 10.5 12.2*   HGB 8.8* 7.0*   HCT 28.1* 22.6*   PLT 93* 126*     Recent Labs     04/06/22  0548 04/05/22  1431    137   K 4.3 4.7    103   CO2 25 26   BUN 80* 78*   CREA 2.50* 2.70*   * 132*   CA 8.9 9.2     No results for input(s): PH, PCO2, PO2, PCO2 in the last 72 hours.     Problem List:     Patient Active Problem List    Diagnosis Date Noted    ISABELLA (acute kidney injury) ([facility] [address] 04/05/2022    UTI (urinary tract infection) 04/05/2022    Iron deficiency anemia secondary to blood loss (chronic) 04/05/2022    Secondary esophageal varices without bleeding ([facility] [address] 03/25/2022    Chronic diastolic congestive heart failure ([facility] [address] 03/25/2022    Chronic obstructive pulmonary disease (HCC)     Liver cirrhosis secondary to RUDD (HCC)     Thrombocytopenia ([facility] [address]     Atherosclerosis of coronary artery 01/12/2022    Mitral regurgitation 01/10/2022    History of ST elevation myocardial infarction (STEMI) 01/09/2022    Morbid obesity ([facility] [address] 01/14/2010    Hypoxia, home O2 at night 3L 01/13/2010    Diabetes mellitus ([facility] [address] 01/13/2010    Hypothyroidism 01/13/2010    Snoring, likely JANY 01/13/2010       Impression:    Plan:   1. ISABELLA likely multifactorial pre renal azotemia, underlying liver cirrhosis ? HRS, also with Hx of ECHO 1/15/22 dCHF EF 55-65%, RVSP 33 mmHg. Albumin 1.7, add albumin, midodrine and octreotide    Obtain urine studies  Renal US with no evidence of obstructive nephropathy  -hold metformin, lasix. Gentle IVF ending today at 17:48 monitor volume status   -no indication for acute RRT at this time, trend renal indices with strict I&O purewick     2. RUDD Cirrhosis Elevated LFTs, alk phos and bilirubin- GI to evaluate     3. COPD hypoxia on home 3L O2 NC    4. Anemia hx of hemorrhoids, s/p PRBC 4/5 per primary  EGD with esophageal varices without bleeding, no banding done, pt on brilinta     5.  DM type II- SSI per hosp

## 2022-04-06 NOTE — PROGRESS NOTES
Problem: Pressure Injury - Risk of  Goal: *Prevention of pressure injury  Description: Document Taqueria Scale and appropriate interventions in the flowsheet. Outcome: Progressing Towards Goal  Note: Pressure Injury Interventions:  Sensory Interventions: Assess changes in LOC,Assess need for specialty bed,Chair cushion    Moisture Interventions: Absorbent underpads,Apply protective barrier, creams and emollients,Limit adult briefs    Activity Interventions: Chair cushion,Assess need for specialty bed,Pressure redistribution bed/mattress(bed type)    Mobility Interventions: Assess need for specialty bed,Chair cushion,Pressure redistribution bed/mattress (bed type)    Nutrition Interventions: Document food/fluid/supplement intake    Friction and Shear Interventions: Apply protective barrier, creams and emollients,Feet elevated on foot rest,Lift sheet                Problem: Patient Education: Go to Patient Education Activity  Goal: Patient/Family Education  Outcome: Progressing Towards Goal     Problem: Falls - Risk of  Goal: *Absence of Falls  Description: Document Clemencia  Fall Risk and appropriate interventions in the flowsheet.   Outcome: Progressing Towards Goal  Note: Fall Risk Interventions:  Mobility Interventions: Assess mobility with egress test,Bed/chair exit alarm,Patient to call before getting OOB         Medication Interventions: Assess postural VS orthostatic hypotension,Bed/chair exit alarm,Patient to call before getting OOB    Elimination Interventions: Bed/chair exit alarm,Call light in reach,Patient to call for help with toileting needs              Problem: Patient Education: Go to Patient Education Activity  Goal: Patient/Family Education  Outcome: Progressing Towards Goal

## 2022-04-06 NOTE — PROGRESS NOTES
END OF SHIFT NOTE:    Intake/Output  04/05 1901 - 04/06 0700  In: 1138.8 [P.O.:120; I.V.:1018.8]  Out: 100 [Urine:100]   Voiding: YES  Catheter: YES  Drain:              Stool:  1 occurrences. Stool Assessment  Stool Color: Brown (04/06/22 0501)  Stool Appearance: Soft (04/06/22 0501)  Stool Amount: Large (04/06/22 0501)  Stool Source/Status: Incontinence; Rectum (04/06/22 0501)    Emesis:  0 occurrences. VITAL SIGNS  Patient Vitals for the past 12 hrs:   Temp Pulse Resp BP SpO2   04/06/22 0516 -- -- -- -- 98 %   04/06/22 0346 97.7 °F (36.5 °C) 90 15 121/61 97 %   04/05/22 2252 -- -- -- -- 98 %   04/05/22 2218 97.6 °F (36.4 °C) 96 16 (!) 109/55 98 %   04/05/22 2200 -- -- -- (!) 108/55 --   04/05/22 2015 -- 87 15 112/61 100 %   04/05/22 1930 -- 86 18 111/60 100 %   04/05/22 1927 97.5 °F (36.4 °C) 84 13 (!) 110/55 100 %       Pain Assessment  Pain 1  Pain Scale 1: Numeric (0 - 10) (04/06/22 0346)  Pain Intensity 1: 0 (04/06/22 0346)  Patient Stated Pain Goal: 0 (04/06/22 0346)    Ambulating  No    Additional Information:   Albuterol x2  Compazine x1  PPD placed  Bladder scan 371ml MD notified    Shift report given to oncoming nurse at the bedside.     Simon Garcia RN

## 2022-04-06 NOTE — PROGRESS NOTES
Hospitalist Progress Note   Admit Date:  2022  2:14 PM   Name:  Sue Durán   Age:  69 y.o. Sex:  female  :  1955   MRN:  [id number]   Room:  3/    Presenting Complaint: Fatigue    Reason(s) for Admission: ISABELLA (acute kidney injury) [facility]) [N17.9]     Hospital Course & Interval History:   Rebecca Mota is a 69 y.o. female with medical history of CAD s/p STEMI with OSWALDO to RCA in 2022, DM2, COPD with nocturnal hypoxia, hypothyroidism, and recent diagnosis of cirrhosis who presented with fatigue. Constant, progressively worsening, since discharge from hospital 3-28. She has also had some nausea, dry heaves, dark \"mushy\" stools, decreased PO intake for several days. She has been compliant with all meds including iron and has been taking lasix as well despite minimal intake. No CP, SOB, fevers, abd pain.     She was discharged 3-28 after a stay for ISABELLA, cirrhosis, suspected GIB. PT/OT recommended HH at discharge and she did not want SNF. She is agreeable to SNF now.     Reviewed DC summary from 3-28:  \"Admitted for RUDD cirrhosis, ISABELLA.  Nephrology and GI consulted. Prashant Litter studies ok.  Hepatitis panel and other workup ok.  ISABELLA improved with IVF.  LFTs have remained stable, GI planning follow up.   EGD and colonoscopy delayed due to brilinta needing to be held prior to procedures.  Colonoscopy with diverticulosis, IH, and 2 small polyps.  Repeat in a year after being able to come off brilinta longer.  EGD showed esophageal varices without bleeding.  No banding done due to brilinta.  PHG also seen.  Increased PPi to BID.  High fiber recommended by GI.  Will add iron as the assumption is she has had chronic or intermittent micro blood loss on brilinta.  This may help with some of her intermittent diarrhea as well   Subjective/24hr Events (22): Patient is feeling nauseous and is vomiting. Patient is not eating well and feeling weak and tired.   Patient denies chest pain, shortness of breath, palpitations, diarrhea. ROS:  10 systems reviewed and negative except as noted above. Assessment & Plan:     ISABELLA (acute kidney injury) ([facility] [address]  ISABELLA likely multifactorial pre renal azotemia, underlying liver cirrhosis  And HRS  Cr 2.5 and baseline is 1.5  Renal US with no evidence of obstructive nephropathy  Holding lasix and metformin  Continue  IVF  Obtain urine studies  strict I&O   Started on albumin, midodrine and octreotide   -daily BMP       UTI (urinary tract infection)   -rocephin  -urine culture; put nursing misc order to ensure it is sent.       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   -unfortunately cannot stop brilinta/ASA  -transfuse PRN  -cont PO iron       Chronic diastolic congestive heart failure (HCC)   -hold home lasix        Hypoxia, home O2 at night 3L  -noted       Diabetes mellitus ([facility] [address]  -ISS, ADA diet       Morbid obesity ([facility] [address]   -complicates care and worsens prognosis       History of ST elevation myocardial infarction (STEMI) with PCI  -must continue brilinta and ASA regardless of anemia  -cont statin as well       Liver cirrhosis secondary to RUDD ([facility] [address]   Elevated Bilirubin 2.9  -chronic elevation of LFTs. Work up for liver last admission with elevated M2 Ab and SMAB as above         Secondary esophageal varices without bleeding ([facility] [address]   EGD with esophageal varices without bleeding   Will make NPO after MN in case EGD needed. PT recommended STR        Dispo/Discharge Planning:   -will need rehab this time. PT/OT/CM consulted. PPD ordered     Diet: ADULT DIET Regular; 4 carb choices (60 gm/meal);  Low Fat/Low Chol/High Fiber/GELY; Low Sodium (2 gm)  VTE ppx: SCDs  Code status: Full Code    Hospital Problems as of 4/6/2022 Date Reviewed: 4/5/2022          Codes Class Noted - Resolved POA    * (Principal) ISABELLA (acute kidney injury) ([facility] [address] ICD-10-CM: N17.9  ICD-9-CM: 584.9  4/5/2022 - Present Yes        UTI (urinary tract infection) ICD-10-CM: N39.0  ICD-9-CM: 599.0  4/5/2022 - Present Yes        Iron deficiency anemia secondary to blood loss (chronic) ICD-10-CM: D50.0  ICD-9-CM: 280.0  4/5/2022 - Present Yes        Secondary esophageal varices without bleeding (HCC) (Chronic) ICD-10-CM: I85.10  ICD-9-CM: 456.21  3/25/2022 - Present Yes        Chronic diastolic congestive heart failure (HCC) (Chronic) ICD-10-CM: I50.32  ICD-9-CM: 428.32, 428.0  3/25/2022 - Present Yes        Liver cirrhosis secondary to RUDD ([facility] [address] (Chronic) ICD-10-CM: K75.81, K74.60  ICD-9-CM: 571.8, 571.5  Unknown - Present Yes        History of ST elevation myocardial infarction (STEMI) (Chronic) ICD-10-CM: I25.2  ICD-9-CM: 618  1/9/2022 - Present Yes    Overview Signed 2/11/2022  8:13 AM by Dale Herrera MD     Last Assessment & Plan:   Formatting of this note might be different from the original.  Inferior STEMI with large amount of thrombus.  Total mid occlusion of the RCA   Mild left sided CAD - EF 40-50% pLAD   Inferior wall hypokinesis with MR (moderate by cath)  Aspiration Thrombectomy and OSWALDO - RCA   Denies any ongoing angina.  - ASA, Brillinta, Lisinopril, Metoprolol             Morbid obesity (HCC) (Chronic) ICD-10-CM: E66.01  ICD-9-CM: 278.01  1/14/2010 - Present Yes        Hypoxia, home O2 at night 3L (Chronic) ICD-10-CM: R09.02  ICD-9-CM: 799.02  1/13/2010 - Present Yes        Diabetes mellitus (HCC) (Chronic) ICD-10-CM: E11.9  ICD-9-CM: 250.00  1/13/2010 - Present Yes        Snoring, likely JANY (Chronic) ICD-10-CM: R06.83  ICD-9-CM: 786.09  1/13/2010 - Present Yes              Objective:     Patient Vitals for the past 24 hrs:   Temp Pulse Resp BP SpO2   04/06/22 1319 -- -- -- -- 99 %   04/06/22 1215 97.4 °F (36.3 °C) 86 22 124/63 100 %   04/06/22 0825 97.3 °F (36.3 °C) 88 18 (!) 106/52 99 %   04/06/22 0718 -- -- -- -- 96 %   04/06/22 0516 -- -- -- -- 98 %   04/06/22 0346 97.7 °F (36.5 °C) 90 15 121/61 97 %   04/05/22 2252 -- -- -- -- 98 % 04/05/22 2218 97.6 °F (36.4 °C) 96 16 (!) 109/55 98 %   04/05/22 2200 -- -- -- (!) 108/55 --   04/05/22 2015 -- 87 15 112/61 100 %   04/05/22 1930 -- 86 18 111/60 100 %   04/05/22 1927 97.5 °F (36.4 °C) 84 13 (!) 110/55 100 %   04/05/22 1754 97.5 °F (36.4 °C) 86 17 108/88 100 %   04/05/22 1728 -- 83 18 (!) 104/55 --   04/05/22 1727 97.4 °F (36.3 °C) 82 18 (!) 104/55 98 %   04/05/22 1645 -- 86 22 114/61 --   04/05/22 1529 -- 85 20 (!) 106/51 100 %   04/05/22 1509 -- 81 19 (!) 101/54 99 %   04/05/22 1435 -- 84 17 (!) 105/51 --   04/05/22 1434 98.6 °F (37 °C) 82 19 -- 94 %     Oxygen Therapy  O2 Sat (%): 99 % (04/06/22 1319)  Pulse via Oximetry: 88 beats per minute (04/06/22 1319)  O2 Device: Nasal cannula (04/06/22 0740)  O2 Flow Rate (L/min): 3 l/min (04/06/22 1319)    Estimated body mass index is 46.52 kg/m² as calculated from the following:    Height as of this encounter: 5' 4\" (1.626 m). Weight as of this encounter: 122.9 kg (271 lb). Intake/Output Summary (Last 24 hours) at 4/6/2022 1347  Last data filed at 4/6/2022 0641  Gross per 24 hour   Intake 1138.75 ml   Output 100 ml   Net 1038.75 ml         Physical Exam:     Blood pressure 124/63, pulse 86, temperature 97.4 °F (36.3 °C), resp. rate 22, height 5' 4\" (1.626 m), weight 122.9 kg (271 lb), SpO2 99 %. General:    Well nourished. No overt distress  Head:  Normocephalic, atraumatic  Eyes:  Sclerae appear normal.  Pupils equally round. ENT:  Nares appear normal, no drainage. Moist oral mucosa  Neck:  No restricted ROM. Trachea midline   CV:   RRR. No m/r/g. No jugular venous distension. Lungs:   CTAB. No wheezing, rhonchi, or rales. Respirations even, unlabored  Abdomen: Bowel sounds present. Soft, nontender, nondistended. Extremities: No cyanosis or clubbing. No edema  Skin:     No rashes and normal coloration. Warm and dry. Neuro:  CN II-XII grossly intact. Sensation intact. A&Ox3  Psych:  Normal mood and affect.       I have reviewed ordered lab tests and independently visualized imaging below:    Recent Labs:  Recent Results (from the past 48 hour(s))   CBC WITH AUTOMATED DIFF    Collection Time: 04/05/22  2:31 PM   Result Value Ref Range    WBC 12.2 (H) 4.3 - 11.1 K/uL    RBC 2.53 (L) 4.05 - 5.2 M/uL    HGB 7.0 (L) 11.7 - 15.4 g/dL    HCT 22.6 (L) 35.8 - 46.3 %    MCV 89.3 79.6 - 97.8 FL    MCH 27.7 26.1 - 32.9 PG    MCHC 31.0 (L) 31.4 - 35.0 g/dL    RDW 20.7 (H) 11.9 - 14.6 %    PLATELET 839 (L) 978 - 450 K/uL    MPV 10.2 9.4 - 12.3 FL    ABSOLUTE NRBC 0.03 0.0 - 0.2 K/uL    DF AUTOMATED      NEUTROPHILS 87 (H) 43 - 78 %    LYMPHOCYTES 5 (L) 13 - 44 %    MONOCYTES 8 4.0 - 12.0 %    EOSINOPHILS 0 (L) 0.5 - 7.8 %    BASOPHILS 0 0.0 - 2.0 %    IMMATURE GRANULOCYTES 1 0.0 - 5.0 %    ABS. NEUTROPHILS 10.6 (H) 1.7 - 8.2 K/UL    ABS. LYMPHOCYTES 0.6 0.5 - 4.6 K/UL    ABS. MONOCYTES 0.9 0.1 - 1.3 K/UL    ABS. EOSINOPHILS 0.0 0.0 - 0.8 K/UL    ABS. BASOPHILS 0.0 0.0 - 0.2 K/UL    ABS. IMM. GRANS. 0.1 0.0 - 0.5 K/UL   METABOLIC PANEL, COMPREHENSIVE    Collection Time: 04/05/22  2:31 PM   Result Value Ref Range    Sodium 137 136 - 145 mmol/L    Potassium 4.7 3.5 - 5.1 mmol/L    Chloride 103 98 - 107 mmol/L    CO2 26 21 - 32 mmol/L    Anion gap 8 7 - 16 mmol/L    Glucose 132 (H) 65 - 100 mg/dL    BUN 78 (H) 8 - 23 MG/DL    Creatinine 2.70 (H) 0.6 - 1.0 MG/DL    GFR est AA 22 (L) >60 ml/min/1.73m2    GFR est non-AA 19 (L) >60 ml/min/1.73m2    Calcium 9.2 8.3 - 10.4 MG/DL    Bilirubin, total 3.3 (H) 0.2 - 1.1 MG/DL    ALT (SGPT) 159 (H) 12 - 65 U/L    AST (SGOT) 287 (H) 15 - 37 U/L    Alk.  phosphatase 602 (H) 50 - 136 U/L    Protein, total 7.5 6.3 - 8.2 g/dL    Albumin 1.7 (L) 3.2 - 4.6 g/dL    Globulin 5.8 (H) 2.3 - 3.5 g/dL    A-G Ratio 0.3 (L) 1.2 - 3.5     LIPASE    Collection Time: 04/05/22  2:31 PM   Result Value Ref Range    Lipase 360 73 - 393 U/L   LACTIC ACID    Collection Time: 04/05/22  2:31 PM   Result Value Ref Range    Lactic acid 2.1 (H) 0.4 - 2.0 MMOL/L   TYPE & SCREEN    Collection Time: 04/05/22  2:31 PM   Result Value Ref Range    Crossmatch Expiration 04/08/2022,2359     ABO/Rh(D) O POSITIVE     Antibody screen NEG     Unit number [id number]     Blood component type RC LR     Unit division 00     Status of unit TRANSFUSED     Crossmatch result Compatible    PROCALCITONIN    Collection Time: 04/05/22  2:31 PM   Result Value Ref Range    Procalcitonin 2.31 (H) 0.00 - 0.49 ng/mL   URINALYSIS W/ RFLX MICROSCOPIC    Collection Time: 04/05/22  3:24 PM   Result Value Ref Range    Color ORANGE      Appearance TURBID      Specific gravity 1.020 1.001 - 1.023      pH (UA) 6.0 5.0 - 9.0      Protein 30 (A) NEG mg/dL    Glucose Negative mg/dL    Ketone Negative NEG mg/dL    Bilirubin SMALL AMOUNT (A) NEG      Blood LARGE (A) NEG      Urobilinogen 1.0 0.2 - 1.0 EU/dL    Nitrites Negative NEG      Leukocyte Esterase TRACE (A) NEG      WBC 20-50 0 /hpf    RBC 20-50 0 /hpf    Epithelial cells 0-3 0 /hpf    Bacteria 4+ (H) 0 /hpf    Casts 10-20 0 /lpf   URINE MICROSCOPIC    Collection Time: 04/05/22  3:24 PM   Result Value Ref Range    Other observations DUPLICATE REQUEST    URINE MICROSCOPIC    Collection Time: 04/05/22  3:24 PM   Result Value Ref Range    WBC 10-20 0 /hpf    RBC 3-5 0 /hpf    Epithelial cells 0-3 0 /hpf    Bacteria 4+ (H) 0 /hpf    Casts HYALINE 0 /lpf    Crystals, urine 0 0 /LPF    Mucus 0 0 /lpf    Other observations RESULTS VERIFIED MANUALLY     RBC, ALLOCATE    Collection Time: 04/05/22  4:15 PM   Result Value Ref Range    HISTORY CHECKED?  Historical check performed    CULTURE, BLOOD    Collection Time: 04/05/22  4:45 PM    Specimen: Blood   Result Value Ref Range    Special Requests: LEFT  Antecubital        Culture result: NO GROWTH AFTER 15 HOURS     CULTURE, BLOOD    Collection Time: 04/05/22  4:47 PM    Specimen: Blood   Result Value Ref Range    Special Requests: RIGHT  ARM        Culture result: NO GROWTH AFTER 15 HOURS CULTURE, URINE    Collection Time: 04/05/22  6:04 PM    Specimen: Clean catch; Urine   Result Value Ref Range    Special Requests: NO SPECIAL REQUESTS      Culture result:        NO GROWTH AFTER SHORT PERIOD OF INCUBATION. FURTHER RESULTS TO FOLLOW AFTER OVERNIGHT INCUBATION. LACTIC ACID    Collection Time: 04/05/22  6:44 PM   Result Value Ref Range    Lactic acid 1.4 0.4 - 2.0 MMOL/L   GLUCOSE, POC    Collection Time: 04/05/22 10:59 PM   Result Value Ref Range    Glucose (POC) 118 (H) 65 - 100 mg/dL    Performed by [facility]    METABOLIC PANEL, BASIC    Collection Time: 04/06/22  5:48 AM   Result Value Ref Range    Sodium 137 136 - 145 mmol/L    Potassium 4.3 3.5 - 5.1 mmol/L    Chloride 105 98 - 107 mmol/L    CO2 25 21 - 32 mmol/L    Anion gap 7 7 - 16 mmol/L    Glucose 105 (H) 65 - 100 mg/dL    BUN 80 (H) 8 - 23 MG/DL    Creatinine 2.50 (H) 0.6 - 1.0 MG/DL    GFR est AA 25 (L) >60 ml/min/1.73m2    GFR est non-AA 20 (L) >60 ml/min/1.73m2    Calcium 8.9 8.3 - 10.4 MG/DL   CBC WITH AUTOMATED DIFF    Collection Time: 04/06/22  5:48 AM   Result Value Ref Range    WBC 10.5 4.3 - 11.1 K/uL    RBC 3.20 (L) 4.05 - 5.2 M/uL    HGB 8.8 (L) 11.7 - 15.4 g/dL    HCT 28.1 (L) 35.8 - 46.3 %    MCV 87.8 79.6 - 97.8 FL    MCH 27.5 26.1 - 32.9 PG    MCHC 31.3 (L) 31.4 - 35.0 g/dL    RDW 20.5 (H) 11.9 - 14.6 %    PLATELET 93 (L) 257 - 450 K/uL    MPV 10.1 9.4 - 12.3 FL    ABSOLUTE NRBC 0.02 0.0 - 0.2 K/uL    DF AUTOMATED      NEUTROPHILS 84 (H) 43 - 78 %    LYMPHOCYTES 6 (L) 13 - 44 %    MONOCYTES 9 4.0 - 12.0 %    EOSINOPHILS 0 (L) 0.5 - 7.8 %    BASOPHILS 0 0.0 - 2.0 %    IMMATURE GRANULOCYTES 1 0.0 - 5.0 %    ABS. NEUTROPHILS 8.8 (H) 1.7 - 8.2 K/UL    ABS. LYMPHOCYTES 0.6 0.5 - 4.6 K/UL    ABS. MONOCYTES 1.0 0.1 - 1.3 K/UL    ABS. EOSINOPHILS 0.0 0.0 - 0.8 K/UL    ABS. BASOPHILS 0.0 0.0 - 0.2 K/UL    ABS. IMM.  GRANS. 0.1 0.0 - 0.5 K/UL   HEPATIC FUNCTION PANEL    Collection Time: 04/06/22  5:48 AM   Result Value Ref Range    Protein, total 7.1 6.3 - 8.2 g/dL    Albumin 1.7 (L) 3.2 - 4.6 g/dL    Globulin 5.4 (H) 2.3 - 3.5 g/dL    A-G Ratio 0.3 (L) 1.2 - 3.5      Bilirubin, total 2.9 (H) 0.2 - 1.1 MG/DL    Bilirubin, direct 2.4 (H) <0.4 MG/DL    Alk. phosphatase 534 (H) 50 - 136 U/L    AST (SGOT) 263 (H) 15 - 37 U/L    ALT (SGPT) 151 (H) 12 - 65 U/L   PROTEIN/CREATININE RATIO, URINE    Collection Time: 04/06/22 11:20 AM   Result Value Ref Range    Protein, urine random 85 (H) <11.9 mg/dL    Creatinine, urine 102.00 mg/dL    Protein/Creat. urine Ratio 0.8     OSMOLALITY, UR    Collection Time: 04/06/22 11:20 AM   Result Value Ref Range    Osmolality,urine 406 50 - 1,400 MOSM/kg H2O   SODIUM, UR, RANDOM    Collection Time: 04/06/22 11:20 AM   Result Value Ref Range    Sodium,urine random 6 MMOL/L   GLUCOSE, POC    Collection Time: 04/06/22 11:26 AM   Result Value Ref Range    Glucose (POC) 128 (H) 65 - 100 mg/dL    Performed by Elsa        All Micro Results     Procedure Component Value Units Date/Time    CULTURE, BLOOD [[id number]] Collected: 04/05/22 1645    Order Status: Completed Specimen: Blood Updated: 04/06/22 0914     Special Requests: --        LEFT  Antecubital       Culture result: NO GROWTH AFTER 15 HOURS       CULTURE, BLOOD [[id number]] Collected: 04/05/22 1647    Order Status: Completed Specimen: Blood Updated: 04/06/22 0914     Special Requests: --        RIGHT  ARM       Culture result: NO GROWTH AFTER 15 HOURS       CULTURE, URINE [[id number]] Collected: 04/05/22 1804    Order Status: Completed Specimen: Urine from Clean catch Updated: 04/06/22 0844     Special Requests: NO SPECIAL REQUESTS        Culture result:       NO GROWTH AFTER SHORT PERIOD OF INCUBATION. FURTHER RESULTS TO FOLLOW AFTER OVERNIGHT INCUBATION.           CULTURE, URINE [[id number]]     Order Status: Canceled Specimen: Urine from Clean catch           Other Studies:  US RETROPERITONEUM COMP    Result Date: 4/6/2022  EXAM:  US RETROPERITONEUM COMP INDICATION:  ISABELLA AND TRANSAMANITIS COMPARISON: None. TECHNIQUE: Real-time sonography of the kidneys, retroperitoneum and bladder was performed with multiple static images obtained. FINDINGS: The kidneys have normal echogenicity with no mass, stone or hydronephrosis. The right kidney measures 10.9 cm and the left kidney measures 11.6 cm in length. The spleen is enlarged measuring 19.3 cm. The aorta tapers normally. The proximal iliac arteries are normal.  The IVC is normal. No retroperitoneal mass is identified. The urinary bladder is normal.     1. Splenomegaly. 2. No additional abnormality. XR CHEST PORT    Result Date: 4/5/2022  CHEST X-RAY, 1 VIEW INDICATION: Sepsis COMPARISON: Chest x-ray 3/24/2022 TECHNIQUE: Single AP view of the chest was obtained. FINDINGS: Lungs: Stable chronic appearing interstitial opacities in both lungs. Cardiomediastinal silhouette: Enlarged. Pleura: No pneumothorax or pleural effusion. Osseous structures: Normal.     No radiographic evidence of acute cardiopulmonary disease.        Current Meds:  Current Facility-Administered Medications   Medication Dose Route Frequency    midodrine (PROAMATINE) tablet 5 mg  5 mg Oral TID WITH MEALS    octreotide (SANDOSTATIN) injection 100 mcg  100 mcg SubCUTAneous TID    albumin human 25% (BUMINATE) solution 12.5 g  12.5 g IntraVENous Q6H    sodium chloride (NS) flush 5-10 mL  5-10 mL IntraVENous Q8H    sodium chloride (NS) flush 5-10 mL  5-10 mL IntraVENous PRN    0.9% sodium chloride infusion 250 mL  250 mL IntraVENous PRN    0.9% sodium chloride infusion  75 mL/hr IntraVENous CONTINUOUS    ticagrelor (BRILINTA) tablet 90 mg  90 mg Oral Q12H    aspirin delayed-release tablet 81 mg  81 mg Oral DAILY    atorvastatin (LIPITOR) tablet 80 mg  80 mg Oral QHS    albuterol (PROVENTIL VENTOLIN) nebulizer solution 2.5 mg  2.5 mg Nebulization Q6H PRN    DULoxetine (CYMBALTA) capsule 30 mg  30 mg Oral DAILY    ferrous sulfate tablet 325 mg  1 Tablet Oral BID WITH MEALS    [Held by provider] furosemide (LASIX) tablet 40 mg  40 mg Oral DAILY    hydrOXYzine pamoate (VISTARIL) capsule 25 mg  25 mg Oral BID PRN    levothyroxine (SYNTHROID) tablet 175 mcg  175 mcg Oral ACB    [Held by provider] metoprolol tartrate (LOPRESSOR) tablet 12.5 mg  12.5 mg Oral BID    pantoprazole (PROTONIX) tablet 40 mg  40 mg Oral ACB&D    cefTRIAXone (ROCEPHIN) 1 g in 0.9% sodium chloride (MBP/ADV) 50 mL MBP  1 g IntraVENous Q24H    insulin lispro (HUMALOG) injection   SubCUTAneous AC&HS    sodium chloride (NS) flush 5-40 mL  5-40 mL IntraVENous Q8H    sodium chloride (NS) flush 5-40 mL  5-40 mL IntraVENous PRN    acetaminophen (TYLENOL) tablet 650 mg  650 mg Oral Q6H PRN    Or    acetaminophen (TYLENOL) suppository 650 mg  650 mg Rectal Q6H PRN    polyethylene glycol (MIRALAX) packet 17 g  17 g Oral DAILY PRN    bisacodyL (DULCOLAX) suppository 10 mg  10 mg Rectal DAILY PRN    famotidine (PEPCID) tablet 20 mg  20 mg Oral BID PRN    alum-mag hydroxide-simeth (MYLANTA) oral suspension 15 mL  15 mL Oral Q6H PRN    hydrALAZINE (APRESOLINE) 20 mg/mL injection 20 mg  20 mg IntraVENous Q4H PRN    oxyCODONE IR (ROXICODONE) tablet 5 mg  5 mg Oral Q4H PRN    guaiFENesin-dextromethorphan (ROBITUSSIN DM) 100-10 mg/5 mL syrup 10 mL  10 mL Oral Q4H PRN    senna-docusate (PERICOLACE) 8.6-50 mg per tablet 2 Tablet  2 Tablet Oral DAILY PRN    melatonin tablet 5 mg  5 mg Oral QHS PRN    tuberculin injection 5 Units  5 Units IntraDERMal ONCE    prochlorperazine (COMPAZINE) with saline injection 10 mg  10 mg IntraVENous Q6H PRN    nystatin (MYCOSTATIN) 100,000 unit/gram powder   Topical BID    sucralfate (CARAFATE) tablet 1 g  1 g Oral TID WITH MEALS    albuterol (PROVENTIL VENTOLIN) nebulizer solution 2.5 mg  2.5 mg Nebulization Q6H RT       Signed:  Jo Fontaine MD    Part of this note may have been written by using a voice dictation software. The note has been proof read but may still contain some grammatical/other typographical errors.

## 2022-04-06 NOTE — PROGRESS NOTES
ACUTE PHYSICAL THERAPY GOALS:  (Developed with and agreed upon by patient and/or caregiver. )  LTG:  (1.)Ms. Joe Vences will move from supine to sit and sit to supine , scoot up and down and roll side to side in bed with  CONTACT GUARD ASSIST  within 7 treatment day(s). (2.)Ms. Joe Vences will transfer from bed to chair and chair to bed with MAXIMUM ASSIST  using the least restrictive device within 7 treatment day(s). (3.)Ms. Joe Vences will be able to unlock/lock wheelchair safely 75% of the time for demonstration of wheelchair safety and improving functional mobility in 7 treatment days. (4.)Ms. Joe Vences will perform seated static and dynamic balance activities x 25 minutes with STAND BY ASSIST to improve safety within 7 day(s). (5.)Ms. Joe Vences will perform standing static and dynamic balance activities x 25 minutes with MODERATE ASSIST to improve safety within 7 day(s).         PHYSICAL THERAPY ASSESSMENT: Initial Assessment, Daily Note and AM PT Treatment Day # 1      Flower Burgos is a 69 y.o. female   PRIMARY DIAGNOSIS: ISABELLA (acute kidney injury) ([facility] [address]  ISABELLA (acute kidney injury) ([facility] [address] [N17.9]       Reason for Referral:    ICD-10: Treatment Diagnosis: Generalized Muscle Weakness (M62.81)  Difficulty in walking, Not elsewhere classified (R26.2)  INPATIENT: Payor: [employer] MEDICARE COMPLETE / Plan: [employer] / Product Type: Managed Care Medicare /     ASSESSMENT:     REHAB RECOMMENDATIONS:   Recommendation to date pending progress:  Setting:   Short-term Rehab  Equipment:    To Be Determined     PRIOR LEVEL OF FUNCTION:  (Prior to Hospitalization) INITIAL/CURRENT LEVEL OF FUNCTION:  (Most Recently Demonstrated)   Bed Mobility:   Minimal Assistance  Sit to Stand:   Unable to perform  Transfers:   Unable to perform  Gait/Mobility:   Unable to perform Bed Mobility:   Moderate Assistance  Sit to Stand:   Maximal Assistance x 2  Transfers:   Not tested  Gait/Mobility:   Not tested ASSESSMENT:   Ms. Aniceto Bermeo is a 69year old F who presents readmission to the hospital after a recent discharge with HHPT to her mobile home on 3/28. Pt states since her d/c she has been bed bound and has experienced SOB and admitted currently with ISABELLA, UTI and PMH of cirrhosis. Pt states she has a standard bed at home and has required assistance from family members for brief changing and transferring to sit EOB, which she does not do often. Pt also states she has not eaten or drank much at all in the past 5 days secondary to her incontinence issues. This date pt performs mobility including supine>sit with Mod A and demonstrates Fair +seated balance while performing ADLs with OT. Pt then required Max A x2 for two STS attempts with little success on clearing her bottom off the bed. On the second attempt, though, the patient was able to take a couple of side steps to the [address] with Max A x2 with HHA. Pt transferred back supine with Mod A and Mod A required for rolling for repositioning in bed. Pt left with all needs in reach supine in bed and RN notified. Pt presents as functioning below her baseline, with deficits in mobility including transfers, gait, balance, and activity tolerance. Pt will benefit from skilled therapy services to address stated deficits to promote return to highest level of function, independence, and safety. STR recommended at the time of discharge at this time. Will continue to follow. SUBJECTIVE:   Ms. Aniceto Bermeo states, \"It feels good sitting up\"    SOCIAL HISTORY/LIVING ENVIRONMENT: lives with \"friend\", daughter, son in law, son, daughter in law and granddaughter who all assist in her care for bathing/dressing and cooking.  DME=rollator, w/c, cane, BSC but has been bed bound since 3/28 discharge from hospital when she was ambulating with her rollator with assistance  Home Environment: Other (comment) (mobile home)  One/Two Story Residence: One story  Living Alone: No  Support Systems: Child(malia),Other Family Member(s)  OBJECTIVE:     PAIN: VITAL SIGNS: LINES/DRAINS:   Pre Treatment:    Post Treatment: 0   IV and Purewick  O2 Device: Nasal cannula     GROSS EVALUATION:  B LE Within Functional Limits Abnormal/ Functional Abnormal/ Non-Functional (see comments) Not Tested Comments:   AROM [x] [] [] []    PROM [] [] [] []    Strength [] [x] [] [] Generalized weakness   Balance [] [x] [] [] Fair+ seated balance, standing balance very much so limited by strength   Posture [] [x] [] [] Rounded shoulders   Sensation [x] [] [] []    Coordination [] [] [] []    Tone [] [] [] []    Edema [] [x] [] [] LE swelling B   Activity Tolerance [] [x] [] [] Generalized weakness and fatigue with mobility     [] [] [] []      COGNITION/  PERCEPTION: Intact Impaired   (see comments) Comments:   Orientation [x] []    Vision [x] []    Hearing [x] []    Command Following [x] []    Safety Awareness [x] [x] Education provided regarding nutrition importance, as patient limiting foods/liquids due to incontinence    [] []      MOBILITY: I Mod I S SBA CGA Min Mod Max Total  NT x2 Comments:   Bed Mobility    Rolling [] [] [] [] [] [] [x] [] [] [] []    Supine to Sit [] [] [] [] [] [] [x] [] [] [] []    Scooting [] [] [] [] [] [] [x] [] [] [] []    Sit to Supine [] [] [] [] [] [] [x] [] [] [] []    Transfers    Sit to Stand [] [] [] [] [] [] [] [x] [] [] [x]    Bed to Chair [] [] [] [] [] [] [] [] [] [x] []    Stand to Sit [] [] [] [] [] [] [] [x] [] [] [x]    I=Independent, Mod I=Modified Independent, S=Supervision, SBA=Standby Assistance, CGA=Contact Guard Assistance,   Min=Minimal Assistance, Mod=Moderate Assistance, Max=Maximal Assistance, Total=Total Assistance, NT=Not Tested  GAIT: I Mod I S SBA CGA Min Mod Max Total  NT x2 Comments:   Level of Assistance [] [] [] [] [] [] [] [] [] [x] []    Distance     DME N/A    Gait Quality     Weightbearing Status N/A     I=Independent, Mod I=Modified Independent, S=Supervision, SBA=Standby Assistance, CGA=Contact Guard Assistance,   Min=Minimal Assistance, Mod=Moderate Assistance, Max=Maximal Assistance, Total=Total Assistance, NT=Not [facility]       How much difficulty does the patient currently have. .. Unable A Lot A Little None   1. Turning over in bed (including adjusting bedclothes, sheets and blankets)? [] 1   [x] 2   [] 3   [] 4   2. Sitting down on and standing up from a chair with arms ( e.g., wheelchair, bedside commode, etc.)   [] 1   [x] 2   [] 3   [] 4   3. Moving from lying on back to sitting on the side of the bed? [] 1   [x] 2   [] 3   [] 4   How much help from another person does the patient currently need. .. Total A Lot A Little None   4. Moving to and from a bed to a chair (including a wheelchair)? [x] 1   [] 2   [] 3   [] 4   5. Need to walk in hospital room? [x] 1   [] 2   [] 3   [] 4   6. Climbing 3-5 steps with a railing? [x] 1   [] 2   [] 3   [] 4   © 2007, Trustees of [address] [place] [address], under license to [employer]. All rights reserved     Score:  Initial: 9 Most Recent: X (Date: -- )    Interpretation of Tool:  Represents activities that are increasingly more difficult (i.e. Bed mobility, Transfers, Gait). PLAN:   FREQUENCY/DURATION: PT Plan of Care: 3 times/week for duration of hospital stay or until stated goals are met, whichever comes first.    PROBLEM LIST:   (Skilled intervention is medically necessary to address:)  1. Decreased ADL/Functional Activities  2. Decreased Activity Tolerance  3. Decreased Balance  4. Decreased Gait Ability  5. Decreased Strength  6. Decreased Transfer Abilities   INTERVENTIONS PLANNED:   (Benefits and precautions of physical therapy have been discussed with the patient.)  1. Therapeutic Activity  2. Therapeutic Exercise/HEP  3. Neuromuscular Re-education  4. Gait Training  5.  Education     TREATMENT:     EVALUATION: Moderate Complexity : (Untimed Charge)    TREATMENT:   ($$ Therapeutic Activity: 23-37 mins    )  Co-Treatment PT/OT necessary due to patient's decreased overall endurance/tolerance levels, as well as need for high level skilled assistance to complete functional transfers/mobility and functional tasks  Therapeutic Activity (23 Minutes): Therapeutic activity included Rolling, Supine to Sit, Sit to Supine, Scooting, Ambulation on level ground, Sitting balance  and Standing balance to improve functional Mobility, Strength and Activity tolerance.     TREATMENT GRID:  N/A    AFTER TREATMENT POSITION/PRECAUTIONS:  Bed, Needs within reach and RN notified    INTERDISCIPLINARY COLLABORATION:  RN/PCT, PT/PTA and OT/CHASE    TOTAL TREATMENT DURATION:  PT Patient Time In/Time Out  Time In: 1137  Time Out: Marycarmen

## 2022-04-06 NOTE — PROGRESS NOTES
Pt stated of SOB - gave PRN tx      04/05/22 7222   Oxygen Therapy   O2 Sat (%) 98 %   Pulse via Oximetry 96 beats per minute   O2 Device Nasal cannula   O2 Flow Rate (L/min) 3 l/min

## 2022-04-06 NOTE — PROGRESS NOTES
LOS 1d   Admission:ISABELLA  Lives with her spouse, daughter, grown grandchildren. Patient's daughter and grandchildren moved in with the patient after she had a heart attack in January  DME: cane, rollator, nebulizer, sc, bsc, home 02 nocturnal ([facility] provider). For the past 3 weeks patient has needed to use the wc to ambulate. Prior to this she was using the rollator and cane. Readmissions from 3/28/2022  Patient was discharged home  with [facility]. PT/OT ordered recomendation is for [facility]. PPD ordered 4/5/2022  Will discuss [facility] choices with patient. Will continue to follow for discharge planning needs  Please consult  if any new issues arise  Discharge plan is recommendation for [facility] if patient is agreeable    Care Management Interventions  PCP Verified by CM: Yes  Mode of Transport at Discharge:  Other (see comment)  Transition of Care Consult (CM Consult): Discharge Planning  Discharge Durable Medical Equipment: No  Physical Therapy Consult: Yes  Occupational Therapy Consult: Yes  Speech Therapy Consult: No  Support Systems: Child(malia),Other Family Member(s)  Discharge Location  Patient Expects to be Discharged to[de-identified] Rehab hospital/unit acute

## 2022-04-06 NOTE — PROGRESS NOTES
04/05/22 2218   Dual Skin Pressure Injury Assessment   Dual Skin Pressure Injury Assessment WDL   Second Care Provider (Based on [address] [place] [address]) St. jaxon RN   Skin Integumentary   Skin Integumentary (WDL) X    Pressure  Injury Documentation No Pressure Injury Noted-Pressure Ulcer Prevention Initiated   Skin Integrity Abrasion  (Right inner butt chek )   Wound Prevention and Protection Methods   Orientation of Wound Prevention Mid;Posterior   Location of Wound Prevention Sacrum/Coccyx   Dressing Present  No   Wound Offloading (Prevention Methods) Bed, pressure redistribution/air;Bed, pressure reduction mattress;Pillows;Repositioning;Turning

## 2022-04-06 NOTE — PROGRESS NOTES
ACUTE OT GOALS:  (Developed with and agreed upon by patient and/or caregiver.)  1. Patient will complete upper body bathing and dressing with supervision and adaptive equipment as needed. 2. Patient will complete bed mobility for positioning and hygiene with minimal assistance. 3. Patient will tolerate 30 minutes of OT treatment with 2-3 rest breaks to increase activity tolerance for ADLs. 4. Patient will tolerate standing for 45 seconds to improve standing tolerance for ADL. 5. Patient will complete transfers to the chair or BSC with minimal assistance X 2 and appropriate safety awareness. 6. Patient will complete 10 minutes of therapeutic exercises to improve strength for ADL/functional transfers. 7. Patient will complete toileting with moderate assistance to improve independence with self-care.     Timeframe: 7 visits       OCCUPATIONAL THERAPY ASSESSMENT: Initial Assessment and Daily Note OT Treatment Day # 1    Flower Palomares is a 69 y.o. female   PRIMARY DIAGNOSIS: ISABELLA (acute kidney injury) ([facility] [address]  ISABELLA (acute kidney injury) ([facility] [address] [N17.9]       Reason for Referral:    ICD-10: Treatment Diagnosis: Generalized Muscle Weakness (M62.81)  Other lack of cordination (R27.8)  INPATIENT: Payor: [facility] MEDICARE COMPLETE / [facility] / Product Type: Managed Care Medicare /   ASSESSMENT:     REHAB RECOMMENDATIONS:   Recommendation to date pending progress:  Setting:   Short-term Rehab  Equipment:    To Be Determined     PRIOR LEVEL OF FUNCTION:  (Prior to Hospitalization)  INITIAL/CURRENT LEVEL OF FUNCTION:  (Based on today's evaluation)   Bathing:   Maximal Assistance  Dressing:   Maximal Assistance  Feeding/Grooming:   Standby Assistance  Toileting:   Maximal Assistance  Functional Mobility:   Unable to perform Bathing:   Maximal Assistance  Dressing:   Maximal Assistance  Feeding/Grooming:   Minimal Assistance  Toileting:   Total Assistance  Functional Mobility:   Maximal Assistance x 2     ASSESSMENT:  Ms. Chip Fraser presents to the hospital with ISABELLA. Pt was supine in the bed and reports she went home after last admission and has been bed bound since March 28th. Pt reports inability to stand or take care of herself at home. Pt has multiple family members living with her. Pt is very pleasant and agreeable to working with therapy. Pt states she hasn't really been eating or drinking due to her incontinence issues. Pt has been max to total care with ADL at home. Pt was able to complete long sitting and then moderate assistance to sit at edge of the bed. Pt demonstrated intact static sitting. Pt worked on brushing her teeth in sitting with SBA today. Pt agreeable to attempt standing which she did twice with maximal assistance x 2 (HHA). Pt completed standing twice and then took side step to head of bed with second attempt. Pt positioned back in bed and worked on rolling side to side with moderate assistance to position pads. Pt left with head of bed elevated and pt encouraged to try to eat lunch. Pt is currently functioning below baseline and will benefit from OT services to address stated goals and plan of care.       SUBJECTIVE:   Ms. Chip Fraser states, \"I haven't eaten in 5 days\"    SOCIAL HISTORY/LIVING ENVIRONMENT: Lives with a large amount of family members in mobile home; 4-5 RAVIN; Pt on 3 L of O2 at home; Pt has not mobilized since going home and has been bed bound and needing max-total assistance for all ADL; Pt has WC, cane, BSC  Home Environment: Other (comment) (mobile home)  One/Two Story Residence: One story  Living Alone: No  Support Systems: Child(malia),Other Family Member(s)    OBJECTIVE:     PAIN: VITAL SIGNS: LINES/DRAINS:   Pre Treatment: Pain Screen  Pain Scale 1: Numeric (0 - 10)  Pain Intensity 1: 0  Post Treatment: 0   IV and Purewick  O2 Device: Nasal cannula     GROSS EVALUATION:   Within Functional Limits Abnormal/ Functional Abnormal/ Non-Functional (see comments) Not Tested Comments:   AROM [] [x] [] [] Generally decreased in shoulders   PROM [] [] [] [x]    Strength [] [x] [] [] Decreased in B UES   Balance [] [x] [] [] Good sitting; poor standing    Posture [] [x] [] []    Sensation [] [x] [] []    Coordination [] [x] [] []    Tone [] [] [] [x]    Edema [] [x] [] [] B LEs   Activity Tolerance [] [x] [] []     [] [] [] []      COGNITION/  PERCEPTION: Intact Impaired   (see comments) Comments:   Orientation [x] []    Vision [x] []    Hearing [x] []    Judgment/ Insight [] [x]    Attention [x] []    Memory [] []    Command Following [x] []    Emotional Regulation [x] []     [] []      ACTIVITIES OF DAILY LIVING: I Mod I S SBA CGA Min Mod Max Total NT Comments   BASIC ADLs:              Bathing/ Showering [] [] [] [] [] [] [] [] [] [x]    Toileting [] [] [] [] [] [] [] [] [] [x]    Dressing [] [] [] [] [] [] [] [] [x] [] Donning socks   Feeding [] [] [] [] [] [] [] [] [] [x]    Grooming [] [] [] [x] [] [] [] [] [] [] Brushing teeth    Personal Device Care [] [] [] [] [] [] [] [] [] [x]    Functional Mobility [] [] [] [] [] [] [] [x] [] [] X 2 HHA   I=Independent, Mod I=Modified Independent, S=Supervision, SBA=Standby Assistance, CGA=Contact Guard Assistance,   Min=Minimal Assistance, Mod=Moderate Assistance, Max=Maximal Assistance, Total=Total Assistance, NT=Not Tested    MOBILITY: I Mod I S SBA CGA Min Mod Max Total  NT x2 Comments:   Supine to sit [] [] [] [] [] [] [x] [] [] [] []    Sit to supine [] [] [] [] [] [] [x] [] [] [] [x]    Sit to stand [] [] [] [] [] [] [] [x] [] [] [x]    Bed to chair [] [] [] [] [] [] [] [] [] [x] []    I=Independent, Mod I=Modified Independent, S=Supervision, SBA=Standby Assistance, CGA=Contact Guard Assistance,   Min=Minimal Assistance, Mod=Moderate Assistance, Max=Maximal Assistance, Total=Total Assistance, NT=Not Tested    [employer] 6 Clicks   Daily Activity Inpatient Short Form        How much help from another person does the patient currently need. .. Total A Lot A Little None   1. Putting on and taking off regular lower body clothing? [x] 1   [] 2   [] 3   [] 4   2. Bathing (including washing, rinsing, drying)? [] 1   [x] 2   [] 3   [] 4   3. Toileting, which includes using toilet, bedpan or urinal?   [x] 1   [] 2   [] 3   [] 4   4. Putting on and taking off regular upper body clothing? [] 1   [x] 2   [] 3   [] 4   5. Taking care of personal grooming such as brushing teeth? [] 1   [x] 2   [] 3   [] 4   6. Eating meals? [] 1   [] 2   [x] 3   [] 4   © 2007, Trustees of [address] [place] [address] [address] [id number], under license to [employer]. All rights reserved     Score:  Initial: 11 Most Recent: X (Date: -- )   Interpretation of Tool:  Represents activities that are increasingly more difficult (i.e. Bed mobility, Transfers, Gait). PLAN:   FREQUENCY/DURATION: OT Plan of Care: 3 times/week for duration of hospital stay or until stated goals are met, whichever comes first.    PROBLEM LIST:   (Skilled intervention is medically necessary to address:)  1. Decreased ADL/Functional Activities  2. Decreased Activity Tolerance  3. Decreased AROM/PROM  4. Decreased Balance  5. Decreased Cognition  6. Decreased Coordination  7. Decreased Gait Ability  8. Decreased Strength  9. Decreased Transfer Abilities   INTERVENTIONS PLANNED:   (Benefits and precautions of occupational therapy have been discussed with the patient.)  1. Self Care Training  2. Therapeutic Activity  3. Therapeutic Exercise/HEP  4. Neuromuscular Re-education  5.  Education     TREATMENT:     EVALUATION: Moderate Complexity : (Untimed Charge)    TREATMENT:   ($$ Self Care/Home Management: 8-22 mins$$ Neuromuscular Re-Education: 8-22 mins   )  Co-Treatment PT/OT necessary due to patient's decreased overall endurance/tolerance levels, as well as need for high level skilled assistance to complete functional transfers/mobility and functional tasks  Self Care (8 Minutes): Self care including Lower Body Dressing and Grooming to increase independence and decrease level of assistance required. Neuromuscular Re-education (15 Minutes): Neuromuscular Re-education included Balance Training, Coordination training, Postural training, Sitting balance training and Standing balance training to improve Balance, Coordination and Postural Control.     TREATMENT GRID:  N/A    AFTER TREATMENT POSITION/PRECAUTIONS:  Bed, Needs within reach and RN notified    INTERDISCIPLINARY COLLABORATION:  RN/PCT, PT/PTA and OT/CHASE    TOTAL TREATMENT DURATION:  OT Patient Time In/Time Out  Time In: 1137  Time Out: [address] [facility], OT

## 2022-04-07 NOTE — PROGRESS NOTES
[address]  Admission Date: 4/5/2022             Renal Daily Progress Note: 4/7/2022    The patient's chart is reviewed and the patient is discussed with the staff.   Follow up ISABELLA  Subjective:   Pt seen and examined in room, complaints of hemorrhoidal discomfort, dry mouth, SOB at baseline comfortable on O2 NC, uop via purewick    ROS:  General: no fever/chills  CV: no CP  Lung: + exertional SOB, no cough  GI: no N/V/D  : no dysuria  Ext: no edema       Current Facility-Administered Medications   Medication Dose Route Frequency    0.9% sodium chloride infusion 250 mL  250 mL IntraVENous PRN    0.9% sodium chloride infusion 250 mL  250 mL IntraVENous PRN    0.9% sodium chloride infusion 250 mL  250 mL IntraVENous PRN    midodrine (PROAMATINE) tablet 5 mg  5 mg Oral TID WITH MEALS    octreotide (SANDOSTATIN) injection 100 mcg  100 mcg SubCUTAneous TID    albumin human 25% (BUMINATE) solution 12.5 g  12.5 g IntraVENous Q6H    ursodioL (ACTIGALL) capsule 900 mg  900 mg Oral BID WITH MEALS    sodium chloride (NS) flush 5-10 mL  5-10 mL IntraVENous Q8H    sodium chloride (NS) flush 5-10 mL  5-10 mL IntraVENous PRN    0.9% sodium chloride infusion 250 mL  250 mL IntraVENous PRN    ticagrelor (BRILINTA) tablet 90 mg  90 mg Oral Q12H    aspirin delayed-release tablet 81 mg  81 mg Oral DAILY    atorvastatin (LIPITOR) tablet 80 mg  80 mg Oral QHS    albuterol (PROVENTIL VENTOLIN) nebulizer solution 2.5 mg  2.5 mg Nebulization Q6H PRN    DULoxetine (CYMBALTA) capsule 30 mg  30 mg Oral DAILY    ferrous sulfate tablet 325 mg  1 Tablet Oral BID WITH MEALS    [Held by provider] furosemide (LASIX) tablet 40 mg  40 mg Oral DAILY    hydrOXYzine pamoate (VISTARIL) capsule 25 mg  25 mg Oral BID PRN    levothyroxine (SYNTHROID) tablet 175 mcg  175 mcg Oral ACB    [Held by provider] metoprolol tartrate (LOPRESSOR) tablet 12.5 mg  12.5 mg Oral BID    pantoprazole (PROTONIX) tablet 40 mg  40 mg Oral ACB&D    cefTRIAXone (ROCEPHIN) 1 g in 0.9% sodium chloride (MBP/ADV) 50 mL MBP  1 g IntraVENous Q24H    insulin lispro (HUMALOG) injection   SubCUTAneous AC&HS    sodium chloride (NS) flush 5-40 mL  5-40 mL IntraVENous Q8H    sodium chloride (NS) flush 5-40 mL  5-40 mL IntraVENous PRN    acetaminophen (TYLENOL) tablet 650 mg  650 mg Oral Q6H PRN    Or    acetaminophen (TYLENOL) suppository 650 mg  650 mg Rectal Q6H PRN    polyethylene glycol (MIRALAX) packet 17 g  17 g Oral DAILY PRN    bisacodyL (DULCOLAX) suppository 10 mg  10 mg Rectal DAILY PRN    famotidine (PEPCID) tablet 20 mg  20 mg Oral BID PRN    alum-mag hydroxide-simeth (MYLANTA) oral suspension 15 mL  15 mL Oral Q6H PRN    hydrALAZINE (APRESOLINE) 20 mg/mL injection 20 mg  20 mg IntraVENous Q4H PRN    oxyCODONE IR (ROXICODONE) tablet 5 mg  5 mg Oral Q4H PRN    guaiFENesin-dextromethorphan (ROBITUSSIN DM) 100-10 mg/5 mL syrup 10 mL  10 mL Oral Q4H PRN    senna-docusate (PERICOLACE) 8.6-50 mg per tablet 2 Tablet  2 Tablet Oral DAILY PRN    melatonin tablet 5 mg  5 mg Oral QHS PRN    prochlorperazine (COMPAZINE) with saline injection 10 mg  10 mg IntraVENous Q6H PRN    nystatin (MYCOSTATIN) 100,000 unit/gram powder   Topical BID    sucralfate (CARAFATE) tablet 1 g  1 g Oral TID WITH MEALS    albuterol (PROVENTIL VENTOLIN) nebulizer solution 2.5 mg  2.5 mg Nebulization Q6H RT         Objective:     Vitals:    04/07/22 0245 04/07/22 0348 04/07/22 0701 04/07/22 0812   BP:  101/64  106/64   Pulse:  89  91   Resp:  19  16   Temp:  97.2 °F (36.2 °C)  97.5 °F (36.4 °C)   SpO2: 96% 95% 98% 100%   Weight:       Height:         Intake and Output:   04/05 1901 - 04/07 0700  In: 1718.8 [P.O.:600; I.V.:1118.8]  Out: 500 [Urine:500]  No intake/output data recorded.     Physical Exam:   Constitutional:  the patient is well developed and in no acute distress, alert and oriented   HEENT:  Sclera clear, pupils equal, oral mucosa dry  Lungs: clear low lung volumes bilaterally   Cardiovascular:  Regular rate, S1, S2, no Rub   Abd/GI: soft and non-tender; with positive bowel sounds. Ext: warm without cyanosis. There is trace lower leg edema. Skin:  no jaundice or rashes  Neuro: no gross neuro deficits   Psychiatric: Calm. LAB  Recent Labs     04/07/22  0805 04/07/22  0521 04/06/22  1452 04/06/22  0548 04/05/22  1431   WBC 11.7* 10.2  --  10.5 12.2*   HGB 7.0* 7.4*  --  8.8* 7.0*   HCT 22.2* 24.4*  --  28.1* 22.6*   * 95*  --  93* 126*   INR  --   --  1.0  --   --      Recent Labs     04/07/22  0521 04/06/22  0548 04/05/22  1431    137 137   K 3.8 4.3 4.7    105 103   CO2 24 25 26   * 105* 132*   BUN 80* 80* 78*   CREA 2.50* 2.50* 2.70*   MG 2.1  --   --    PHOS 4.2*  --   --    ALB  --  1.7* 1.7*     No results for input(s): PH, PCO2, PO2, HCO3 in the last 72 hours.       Assessment:  (Medical Decision Making)     Hospital Problems  Date Reviewed: 4/5/2022          Codes Class Noted POA    * (Principal) ISABELLA (acute kidney injury) ([facility] [address] ICD-10-CM: N17.9  ICD-9-CM: 584.9  4/5/2022 Yes        UTI (urinary tract infection) ICD-10-CM: N39.0  ICD-9-CM: 599.0  4/5/2022 Yes        Iron deficiency anemia secondary to blood loss (chronic) ICD-10-CM: D50.0  ICD-9-CM: 280.0  4/5/2022 Yes        Secondary esophageal varices without bleeding (HCC) (Chronic) ICD-10-CM: I85.10  ICD-9-CM: 456.21  3/25/2022 Yes        Chronic diastolic congestive heart failure (HCC) (Chronic) ICD-10-CM: I50.32  ICD-9-CM: 428.32, 428.0  3/25/2022 Yes        Liver cirrhosis secondary to RUDD (HCC) (Chronic) ICD-10-CM: K75.81, K74.60  ICD-9-CM: 571.8, 571.5  Unknown Yes        History of ST elevation myocardial infarction (STEMI) (Chronic) ICD-10-CM: I25.2  ICD-9-CM: 477  1/9/2022 Yes    Overview Signed 2/11/2022  8:13 AM by Anthony Segura MD     Last Assessment & Plan:   Formatting of this note might be different from the original.  Inferior STEMI with large amount of thrombus. Total mid occlusion of the RCA   Mild left sided CAD - EF 40-50% pLAD   Inferior wall hypokinesis with MR (moderate by cath)  Aspiration Thrombectomy and OSWALDO - RCA   Denies any ongoing angina.  - ASA, Brillinta, Lisinopril, Metoprolol             Morbid obesity (HCC) (Chronic) ICD-10-CM: E66.01  ICD-9-CM: 278.01  1/14/2010 Yes        Hypoxia, home O2 at night 3L (Chronic) ICD-10-CM: R09.02  ICD-9-CM: 799.02  1/13/2010 Yes        Diabetes mellitus (HCC) (Chronic) ICD-10-CM: E11.9  ICD-9-CM: 250.00  1/13/2010 Yes        Snoring, likely JANY (Chronic) ICD-10-CM: R06.83  ICD-9-CM: 786.09  1/13/2010 Yes              Plan:  (Medical Decision Making)   1. ISABELLA likely multifactorial pre renal azotemia, underlying liver cirrhosis ? HRS, [address], also with Hx of ECHO 1/15/22 dCHF EF 55-65%, RVSP 33 mmHg. Albumin 1.7, continue albumin, midodrine and octreotide   Renal US with no evidence of obstructive nephropathy  -hold metformin, lasix. Gentle IVF, poor PO intake   -no indication for acute RRT at this time, trend renal indices with strict I&O purewick   Renal function holding, Cr/BUN 2.50/80     2. RUDD Cirrhosis Elevated LFTs, alk phos and bilirubin- GI to evaluate      3. COPD hypoxia on home 3L O2 NC     4. Anemia hx of hemorrhoids, s/p PRBC 4/5 per primary  EGD with esophageal varices without bleeding, no banding done, pt on brilinta      5.  DM type II- SSI per hosp     Claudene Books, SHO

## 2022-04-07 NOTE — PROGRESS NOTES
Occupational Therapy Note:    Attempted to see pt for OT treatment session--RN requested therapy hold on this date due to pt's low hemoglobin and pt awaiting blood transfusion. Will continue to follow and attempt to see as schedule allows.      Thank you,  Miroslava Orlando, OTR/L

## 2022-04-07 NOTE — PROGRESS NOTES
I have discussed with the PATIENT  the rationale for blood component transfusion; its benefits in treating or preventing fatigue, organ damage, or death; and its risk which includes mild transfusion reactions, rare risk of blood borne infection, or more serious but rare reactions. I have discussed the alternatives to transfusion, including the risk and consequences of not receiving transfusion. The PATIENThad an opportunity to ask questions and had agreed to proceed with transfusion of blood components.

## 2022-04-07 NOTE — PROGRESS NOTES
Problem: Pressure Injury - Risk of  Goal: *Prevention of pressure injury  Description: Document [employer] Scale and appropriate interventions in the flowsheet.   4/6/2022 2033 by Tonio Hooper RN  Outcome: Progressing Towards Goal  Note: Pressure Injury Interventions:  Sensory Interventions: Assess changes in LOC,Assess need for specialty bed    Moisture Interventions: Apply protective barrier, creams and emollients,Assess need for specialty bed,Absorbent underpads    Activity Interventions: Assess need for specialty bed,Chair cushion,Pressure redistribution bed/mattress(bed type)    Mobility Interventions: Assess need for specialty bed,Chair cushion,Pressure redistribution bed/mattress (bed type)    Nutrition Interventions: Document food/fluid/supplement intake    Friction and Shear Interventions: Apply protective barrier, creams and emollients,Feet elevated on foot rest             4/6/2022 2030 by Tonio Hooper RN  Outcome: Progressing Towards Goal  Note: Pressure Injury Interventions:  Sensory Interventions: Assess changes in LOC,Assess need for specialty bed    Moisture Interventions: Apply protective barrier, creams and emollients,Assess need for specialty bed,Absorbent underpads    Activity Interventions: Assess need for specialty bed,Chair cushion,Pressure redistribution bed/mattress(bed type)    Mobility Interventions: Assess need for specialty bed,Chair cushion,Pressure redistribution bed/mattress (bed type)    Nutrition Interventions: Document food/fluid/supplement intake    Friction and Shear Interventions: Apply protective barrier, creams and emollients,Feet elevated on foot rest             4/6/2022 2030 by Tonio Hooper RN  Outcome: Progressing Towards Goal  Note: Pressure Injury Interventions:  Sensory Interventions: Assess changes in LOC,Assess need for specialty bed    Moisture Interventions: Apply protective barrier, creams and emollients,Assess need for specialty bed,Absorbent underpads    Activity Interventions: Assess need for specialty bed,Chair cushion,Pressure redistribution bed/mattress(bed type)    Mobility Interventions: Assess need for specialty bed,Chair cushion,Pressure redistribution bed/mattress (bed type)    Nutrition Interventions: Document food/fluid/supplement intake    Friction and Shear Interventions: Apply protective barrier, creams and emollients,Feet elevated on foot rest                Problem: Patient Education: Go to Patient Education Activity  Goal: Patient/Family Education  4/6/2022 2033 by Janice Schulz RN  Outcome: Progressing Towards Goal  4/6/2022 2030 by Janice Schulz RN  Outcome: Progressing Towards Goal  4/6/2022 2030 by Janice Schulz RN  Outcome: Progressing Towards Goal     Problem: Falls - Risk of  Goal: *Absence of Falls  Description: Document Adalgisa Jones Fall Risk and appropriate interventions in the flowsheet.   4/6/2022 2033 by Janice Schulz RN  Outcome: Progressing Towards Goal  Note: Fall Risk Interventions:  Mobility Interventions: Assess mobility with egress test,Bed/chair exit alarm,Patient to call before getting OOB         Medication Interventions: Assess postural VS orthostatic hypotension,Bed/chair exit alarm,Patient to call before getting OOB    Elimination Interventions: Bed/chair exit alarm,Call light in reach,Patient to call for help with toileting needs           4/6/2022 2030 by Janice Schulz RN  Outcome: Progressing Towards Goal  Note: Fall Risk Interventions:  Mobility Interventions: Assess mobility with egress test,Bed/chair exit alarm,Patient to call before getting OOB         Medication Interventions: Assess postural VS orthostatic hypotension,Bed/chair exit alarm,Patient to call before getting OOB    Elimination Interventions: Bed/chair exit alarm,Call light in reach,Patient to call for help with toileting needs           4/6/2022 2030 by Janice Schulz RN  Outcome: Progressing Towards Goal  Note: Fall Risk Interventions:  Mobility Interventions: Assess mobility with egress test,Bed/chair exit alarm,Patient to call before getting OOB         Medication Interventions: Assess postural VS orthostatic hypotension,Bed/chair exit alarm,Patient to call before getting OOB    Elimination Interventions: Bed/chair exit alarm,Call light in reach,Patient to call for help with toileting needs              Problem: Patient Education: Go to Patient Education Activity  Goal: Patient/Family Education  4/6/2022 2033 by Holly Hicks RN  Outcome: Progressing Towards Goal  4/6/2022 2030 by Holly Hicks RN  Outcome: Progressing Towards Goal  4/6/2022 2030 by Holly Hicks RN  Outcome: Progressing Towards Goal     Problem: General Medical Care Plan  Goal: *Vital signs within specified parameters  4/6/2022 2033 by Holly Hicks RN  Outcome: Progressing Towards Goal  4/6/2022 2033 by Holly Hicks RN  Outcome: Progressing Towards Goal  Goal: *Skin integrity maintained  4/6/2022 2033 by Holly Hicks RN  Outcome: Progressing Towards Goal  4/6/2022 2033 by Holly Hicks RN  Outcome: Progressing Towards Goal  Goal: *Fluid volume balance  4/6/2022 2033 by Holly Hicks RN  Outcome: Progressing Towards Goal  4/6/2022 2033 by Holly Hicks RN  Outcome: Progressing Towards Goal

## 2022-04-07 NOTE — PROGRESS NOTES
Hospitalist Progress Note   Admit Date:  2022  2:14 PM   Name:  Shorty Jovel   Age:  69 y.o. Sex:  female  :  1955   MRN:  [id number]   Room:  3    Presenting Complaint: Fatigue    Reason(s) for Admission: ISABELLA (acute kidney injury) [facility]) [N17.9]     Hospital Course & Interval History:   Bernardo Logan is a 69 y.o. female with medical history of CAD s/p STEMI with OSWALDO to RCA in 2022, DM2, COPD with nocturnal hypoxia, hypothyroidism, and recent diagnosis of cirrhosis who presented with fatigue. Constant, progressively worsening, since discharge from hospital 3-28. She has also had some nausea, dry heaves, dark \"mushy\" stools, decreased PO intake for several days. She has been compliant with all meds including iron and has been taking lasix as well despite minimal intake. No CP, SOB, fevers, abd pain.     She was discharged 3-28 after a stay for ISABELLA, cirrhosis, suspected GIB. PT/OT recommended HH at discharge and she did not want SNF. She is agreeable to SNF now.     Reviewed DC summary from 3-28:  \"Admitted for RUDD cirrhosis, ISABELLA.  Nephrology and GI consulted. Isabella Grapes studies ok.  Hepatitis panel and other workup ok.  ISABELLA improved with IVF.  LFTs have remained stable, GI planning follow up.   EGD and colonoscopy delayed due to brilinta needing to be held prior to procedures.  Colonoscopy with diverticulosis, IH, and 2 small polyps.  Repeat in a year after being able to come off brilinta longer.  EGD showed esophageal varices without bleeding.  No banding done due to brilinta.  PHG also seen.  Increased PPi to BID.  High fiber recommended by GI.  Will add iron as the assumption is she has had chronic or intermittent micro blood loss on brilinta.  This may help with some of her intermittent diarrhea as well   Subjective/24hr Events (22): Patient is feeling nauseous. Patient is receiving 1 unit of blood today.   Patient denies chest pain, shortness of breath, palpitations, diarrhea. ROS:  10 systems reviewed and negative except as noted above. Assessment & Plan:     ISABELLA (acute kidney injury) ([facility] [address]  ISABELLA likely multifactorial pre renal azotemia, underlying liver cirrhosis  And HRS  Cr 2.5 and baseline is 1.5  Renal US with no evidence of obstructive nephropathy  Holding lasix and metformin  urine studies consistent with urine sodium 6  strict I&O   Continue albumin, midodrine and octreotide   -daily BMP    PBC  Elevated AMA and ASMA with elevated AP  Continue PPI twice daily and octreotide  Started at outside ER on 4/6  Meld score 19 and needs to be evaluated for liver transplant       UTI (urinary tract infection)   -rocephin  -urine culture; put nursing misc order to ensure it is sent.       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  s/p PRBC 4/5   Hb trended down to 7.4   Giving 2 units of PRBC today  unfortunately cannot stop brilinta/ASA  -transfuse PRN if hemoglobin is less than 7  -cont PO iron    Thrombocytopenia  Most likely due to alcohol  Platelet count is stable       Chronic diastolic congestive heart failure (HCC)   -hold home lasix        Hypoxia, home O2 at night 3L  -noted       Diabetes mellitus ([facility] [address]  -ISS, ADA diet       Morbid obesity ([facility] [address]   -complicates care and worsens prognosis       History of ST elevation myocardial infarction (STEMI) with PCI on 1/22   brilinta is stopped indefinitely due to recurrent GI bleed  Continue ASA   -cont statin as well  Cardio was consulted       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 Work up for liver last admission with elevated M2 Ab and SMAB as above         Secondary esophageal varices without bleeding ([facility] [address]   Recent EGD with esophageal varices without bleeding   Will make NPO after MN in case EGD needed. PT recommended STR        Dispo/Discharge Planning: Pending clinical course  -will need rehab this time. PT/OT/CM consulted.   PPD ordered     Diet: ADULT DIET Regular; 4 carb choices (60 gm/meal); Low Fat/Low Chol/High Fiber/GELY; Low Sodium (2 gm)  VTE ppx: SCDs  Code status: Full Code    Hospital Problems as of 4/7/2022 Date Reviewed: 4/5/2022          Codes Class Noted - Resolved POA    * (Principal) ISABELLA (acute kidney injury) ([facility] [address] ICD-10-CM: N17.9  ICD-9-CM: 584.9  4/5/2022 - Present Yes        UTI (urinary tract infection) ICD-10-CM: N39.0  ICD-9-CM: 599.0  4/5/2022 - Present Yes        Iron deficiency anemia secondary to blood loss (chronic) ICD-10-CM: D50.0  ICD-9-CM: 280.0  4/5/2022 - Present Yes        Secondary esophageal varices without bleeding (HCC) (Chronic) ICD-10-CM: I85.10  ICD-9-CM: 456.21  3/25/2022 - Present Yes        Chronic diastolic congestive heart failure (HCC) (Chronic) ICD-10-CM: I50.32  ICD-9-CM: 428.32, 428.0  3/25/2022 - Present Yes        Liver cirrhosis secondary to RUDD ([facility] (Chronic) ICD-10-CM: K75.81, K74.60  ICD-9-CM: 571.8, 571.5  Unknown - Present Yes        History of ST elevation myocardial infarction (STEMI) (Chronic) ICD-10-CM: I25.2  ICD-9-CM: 311  1/9/2022 - Present Yes    Overview Signed 2/11/2022  8:13 AM by Lennie Peters MD     Last Assessment & Plan:   Formatting of this note might be different from the original.  Inferior STEMI with large amount of thrombus.  Total mid occlusion of the RCA   Mild left sided CAD - EF 40-50% pLAD   Inferior wall hypokinesis with MR (moderate by cath)  Aspiration Thrombectomy and OSWALDO - RCA   Denies any ongoing angina.  - ASA, Brillinta, Lisinopril, Metoprolol             Morbid obesity (HCC) (Chronic) ICD-10-CM: E66.01  ICD-9-CM: 278.01  1/14/2010 - Present Yes        Hypoxia, home O2 at night 3L (Chronic) ICD-10-CM: R09.02  ICD-9-CM: 799.02  1/13/2010 - Present Yes        Diabetes mellitus ([facility] (Chronic) ICD-10-CM: E11.9  ICD-9-CM: 250.00  1/13/2010 - Present Yes        Snoring, likely JANY (Chronic) ICD-10-CM: R06.83  ICD-9-CM: 786.09  1/13/2010 - Present Yes Objective:     Patient Vitals for the past 24 hrs:   Temp Pulse Resp BP SpO2   04/07/22 1458 97.4 °F (36.3 °C) 93 16 125/61 96 %   04/07/22 1327 97.4 °F (36.3 °C) 94 17 131/72 96 %   04/07/22 1325 -- -- -- -- 94 %   04/07/22 1209 97.6 °F (36.4 °C) 91 16 (!) 130/50 100 %   04/07/22 1107 97.4 °F (36.3 °C) 91 16 (!) 118/54 98 %   04/07/22 1018 97.5 °F (36.4 °C) 92 14 (!) 111/54 96 %   04/07/22 0825 -- -- -- -- 100 %   04/07/22 0812 97.5 °F (36.4 °C) 91 16 106/64 100 %   04/07/22 0701 -- -- -- -- 98 %   04/07/22 0348 97.2 °F (36.2 °C) 89 19 101/64 95 %   04/07/22 0245 -- -- -- -- 96 %   04/06/22 2347 97.3 °F (36.3 °C) 90 18 (!) 97/58 96 %   04/06/22 2025 -- -- -- -- 97 %   04/06/22 2010 97.3 °F (36.3 °C) 88 18 (!) 90/50 98 %   04/06/22 1530 97.3 °F (36.3 °C) 91 18 (!) 106/49 100 %     Oxygen Therapy  O2 Sat (%): 96 % (04/07/22 1458)  Pulse via Oximetry: 89 beats per minute (04/07/22 1325)  O2 Device: Nasal cannula (04/07/22 0825)  Skin Assessment: Clean, dry, & intact (04/07/22 0825)  O2 Flow Rate (L/min): 3 l/min (04/07/22 1325)  FIO2 (%): 32 % (04/07/22 0245)    Estimated body mass index is 43.67 kg/m² as calculated from the following:    Height as of this encounter: 5' 4\" (1.626 m). Weight as of this encounter: 115.4 kg (254 lb 6.6 oz). Intake/Output Summary (Last 24 hours) at 4/7/2022 1520  Last data filed at 4/7/2022 1458  Gross per 24 hour   Intake 632.1 ml   Output 200 ml   Net 432.1 ml         Physical Exam:     Blood pressure 125/61, pulse 93, temperature 97.4 °F (36.3 °C), resp. rate 16, height 5' 4\" (1.626 m), weight 115.4 kg (254 lb 6.6 oz), SpO2 96 %. General:    Well nourished. No overt distress  Head:  Normocephalic, atraumatic  Eyes:  Sclerae appear normal.  Pupils equally round. ENT:  Nares appear normal, no drainage. Moist oral mucosa  Neck:  No restricted ROM. Trachea midline   CV:   RRR. No m/r/g. No jugular venous distension. Lungs:   CTAB. No wheezing, rhonchi, or rales. Respirations even, unlabored  Abdomen: Bowel sounds present. Soft, nontender, nondistended. Extremities: No cyanosis or clubbing. No edema  Skin:     No rashes and normal coloration. Warm and dry. Neuro:  CN II-XII grossly intact. Sensation intact. A&Ox3  Psych:  Normal mood and affect. I have reviewed ordered lab tests and independently visualized imaging below:    Recent Labs:  Recent Results (from the past 48 hour(s))   URINALYSIS W/ RFLX MICROSCOPIC    Collection Time: 04/05/22  3:24 PM   Result Value Ref Range    Color ORANGE      Appearance TURBID      Specific gravity 1.020 1.001 - 1.023      pH (UA) 6.0 5.0 - 9.0      Protein 30 (A) NEG mg/dL    Glucose Negative mg/dL    Ketone Negative NEG mg/dL    Bilirubin SMALL AMOUNT (A) NEG      Blood LARGE (A) NEG      Urobilinogen 1.0 0.2 - 1.0 EU/dL    Nitrites Negative NEG      Leukocyte Esterase TRACE (A) NEG      WBC 20-50 0 /hpf    RBC 20-50 0 /hpf    Epithelial cells 0-3 0 /hpf    Bacteria 4+ (H) 0 /hpf    Casts 10-20 0 /lpf   URINE MICROSCOPIC    Collection Time: 04/05/22  3:24 PM   Result Value Ref Range    Other observations DUPLICATE REQUEST    URINE MICROSCOPIC    Collection Time: 04/05/22  3:24 PM   Result Value Ref Range    WBC 10-20 0 /hpf    RBC 3-5 0 /hpf    Epithelial cells 0-3 0 /hpf    Bacteria 4+ (H) 0 /hpf    Casts HYALINE 0 /lpf    Crystals, urine 0 0 /LPF    Mucus 0 0 /lpf    Other observations RESULTS VERIFIED MANUALLY     RBC, ALLOCATE    Collection Time: 04/05/22  4:15 PM   Result Value Ref Range    HISTORY CHECKED?  Historical check performed    CULTURE, BLOOD    Collection Time: 04/05/22  4:45 PM    Specimen: Blood   Result Value Ref Range    Special Requests: LEFT  Antecubital        Culture result: NO GROWTH 2 DAYS     CULTURE, BLOOD    Collection Time: 04/05/22  4:47 PM    Specimen: Blood   Result Value Ref Range    Special Requests: RIGHT  ARM        Culture result: NO GROWTH 2 DAYS     CULTURE, URINE    Collection Time: 04/05/22  6:04 PM    Specimen: Clean catch; Urine   Result Value Ref Range    Special Requests: NO SPECIAL REQUESTS      Culture result: (A)       >100,000 COLONIES/mL GRAM NEGATIVE RODS IDENTIFICATION AND SUSCEPTIBILITY TO FOLLOW   LACTIC ACID    Collection Time: 04/05/22  6:44 PM   Result Value Ref Range    Lactic acid 1.4 0.4 - 2.0 MMOL/L   GLUCOSE, POC    Collection Time: 04/05/22 10:59 PM   Result Value Ref Range    Glucose (POC) 118 (H) 65 - 100 mg/dL    Performed by [facility]    METABOLIC PANEL, BASIC    Collection Time: 04/06/22  5:48 AM   Result Value Ref Range    Sodium 137 136 - 145 mmol/L    Potassium 4.3 3.5 - 5.1 mmol/L    Chloride 105 98 - 107 mmol/L    CO2 25 21 - 32 mmol/L    Anion gap 7 7 - 16 mmol/L    Glucose 105 (H) 65 - 100 mg/dL    BUN 80 (H) 8 - 23 MG/DL    Creatinine 2.50 (H) 0.6 - 1.0 MG/DL    GFR est AA 25 (L) >60 ml/min/1.73m2    GFR est non-AA 20 (L) >60 ml/min/1.73m2    Calcium 8.9 8.3 - 10.4 MG/DL   CBC WITH AUTOMATED DIFF    Collection Time: 04/06/22  5:48 AM   Result Value Ref Range    WBC 10.5 4.3 - 11.1 K/uL    RBC 3.20 (L) 4.05 - 5.2 M/uL    HGB 8.8 (L) 11.7 - 15.4 g/dL    HCT 28.1 (L) 35.8 - 46.3 %    MCV 87.8 79.6 - 97.8 FL    MCH 27.5 26.1 - 32.9 PG    MCHC 31.3 (L) 31.4 - 35.0 g/dL    RDW 20.5 (H) 11.9 - 14.6 %    PLATELET 93 (L) 320 - 450 K/uL    MPV 10.1 9.4 - 12.3 FL    ABSOLUTE NRBC 0.02 0.0 - 0.2 K/uL    DF AUTOMATED      NEUTROPHILS 84 (H) 43 - 78 %    LYMPHOCYTES 6 (L) 13 - 44 %    MONOCYTES 9 4.0 - 12.0 %    EOSINOPHILS 0 (L) 0.5 - 7.8 %    BASOPHILS 0 0.0 - 2.0 %    IMMATURE GRANULOCYTES 1 0.0 - 5.0 %    ABS. NEUTROPHILS 8.8 (H) 1.7 - 8.2 K/UL    ABS. LYMPHOCYTES 0.6 0.5 - 4.6 K/UL    ABS. MONOCYTES 1.0 0.1 - 1.3 K/UL    ABS. EOSINOPHILS 0.0 0.0 - 0.8 K/UL    ABS. BASOPHILS 0.0 0.0 - 0.2 K/UL    ABS. IMM.  GRANS. 0.1 0.0 - 0.5 K/UL   HEPATIC FUNCTION PANEL    Collection Time: 04/06/22  5:48 AM   Result Value Ref Range    Protein, total 7.1 6.3 - 8.2 g/dL Albumin 1.7 (L) 3.2 - 4.6 g/dL    Globulin 5.4 (H) 2.3 - 3.5 g/dL    A-G Ratio 0.3 (L) 1.2 - 3.5      Bilirubin, total 2.9 (H) 0.2 - 1.1 MG/DL    Bilirubin, direct 2.4 (H) <0.4 MG/DL    Alk. phosphatase 534 (H) 50 - 136 U/L    AST (SGOT) 263 (H) 15 - 37 U/L    ALT (SGPT) 151 (H) 12 - 65 U/L   PROTEIN/CREATININE RATIO, URINE    Collection Time: 04/06/22 11:20 AM   Result Value Ref Range    Protein, urine random 85 (H) <11.9 mg/dL    Creatinine, urine 102.00 mg/dL    Protein/Creat.  urine Ratio 0.8     OSMOLALITY, UR    Collection Time: 04/06/22 11:20 AM   Result Value Ref Range    Osmolality,urine 406 50 - 1,400 MOSM/kg H2O   SODIUM, UR, RANDOM    Collection Time: 04/06/22 11:20 AM   Result Value Ref Range    Sodium,urine random 6 MMOL/L   CHLORIDE, URINE RANDOM    Collection Time: 04/06/22 11:20 AM   Result Value Ref Range    Chloride,urine random 12 MMOL/L   GLUCOSE, POC    Collection Time: 04/06/22 11:26 AM   Result Value Ref Range    Glucose (POC) 128 (H) 65 - 100 mg/dL    Performed by Elsa    PLEASE READ & DOCUMENT PPD TEST IN 24 HRS    Collection Time: 04/06/22 11:29 AM   Result Value Ref Range    PPD Negative Negative    mm Induration 0 0 - 5 mm   PROTHROMBIN TIME + INR    Collection Time: 04/06/22  2:52 PM   Result Value Ref Range    Prothrombin time 13.6 12.6 - 14.5 sec    INR 1.0     GLUCOSE, POC    Collection Time: 04/06/22  4:42 PM   Result Value Ref Range    Glucose (POC) 144 (H) 65 - 100 mg/dL    Performed by Elsa    GLUCOSE, POC    Collection Time: 04/06/22  9:08 PM   Result Value Ref Range    Glucose (POC) 155 (H) 65 - 100 mg/dL    Performed by Celi    CBC WITH AUTOMATED DIFF    Collection Time: 04/07/22  5:21 AM   Result Value Ref Range    WBC 10.2 4.3 - 11.1 K/uL    RBC 2.66 (L) 4.05 - 5.2 M/uL    HGB 7.4 (L) 11.7 - 15.4 g/dL    HCT 24.4 (L) 35.8 - 46.3 %    MCV 91.7 79.6 - 97.8 FL    MCH 27.8 26.1 - 32.9 PG    MCHC 30.3 (L) 31.4 - 35.0 g/dL    RDW 20.9 (H) 11.9 - 14.6 % PLATELET 95 (L) 018 - 450 K/uL    MPV 9.8 9.4 - 12.3 FL    ABSOLUTE NRBC 0.00 0.0 - 0.2 K/uL    DF AUTOMATED      NEUTROPHILS 87 (H) 43 - 78 %    LYMPHOCYTES 6 (L) 13 - 44 %    MONOCYTES 6 4.0 - 12.0 %    EOSINOPHILS 0 (L) 0.5 - 7.8 %    BASOPHILS 0 0.0 - 2.0 %    IMMATURE GRANULOCYTES 1 0.0 - 5.0 %    ABS. NEUTROPHILS 8.9 (H) 1.7 - 8.2 K/UL    ABS. LYMPHOCYTES 0.6 0.5 - 4.6 K/UL    ABS. MONOCYTES 0.6 0.1 - 1.3 K/UL    ABS. EOSINOPHILS 0.0 0.0 - 0.8 K/UL    ABS. BASOPHILS 0.0 0.0 - 0.2 K/UL    ABS. IMM. GRANS. 0.1 0.0 - 0.5 K/UL   METABOLIC PANEL, BASIC    Collection Time: 04/07/22  5:21 AM   Result Value Ref Range    Sodium 137 136 - 145 mmol/L    Potassium 3.8 3.5 - 5.1 mmol/L    Chloride 104 98 - 107 mmol/L    CO2 24 21 - 32 mmol/L    Anion gap 9 7 - 16 mmol/L    Glucose 122 (H) 65 - 100 mg/dL    BUN 80 (H) 8 - 23 MG/DL    Creatinine 2.50 (H) 0.6 - 1.0 MG/DL    GFR est AA 25 (L) >60 ml/min/1.73m2    GFR est non-AA 20 (L) >60 ml/min/1.73m2    Calcium 9.1 8.3 - 10.4 MG/DL   MAGNESIUM    Collection Time: 04/07/22  5:21 AM   Result Value Ref Range    Magnesium 2.1 1.8 - 2.4 mg/dL   PHOSPHORUS    Collection Time: 04/07/22  5:21 AM   Result Value Ref Range    Phosphorus 4.2 (H) 2.3 - 3.7 MG/DL   METABOLIC PANEL, COMPREHENSIVE    Collection Time: 04/07/22  5:21 AM   Result Value Ref Range    Sodium 138 136 - 145 mmol/L    Potassium 3.8 3.5 - 5.1 mmol/L    Chloride 104 98 - 107 mmol/L    CO2 24 21 - 32 mmol/L    Anion gap 10 7 - 16 mmol/L    Glucose 123 (H) 65 - 100 mg/dL    BUN 82 (H) 8 - 23 MG/DL    Creatinine 2.60 (H) 0.6 - 1.0 MG/DL    GFR est AA 23 (L) >60 ml/min/1.73m2    GFR est non-AA 19 (L) >60 ml/min/1.73m2    Calcium 8.9 8.3 - 10.4 MG/DL    Bilirubin, total 2.7 (H) 0.2 - 1.1 MG/DL    ALT (SGPT) 144 (H) 12 - 65 U/L    AST (SGOT) 275 (H) 15 - 37 U/L    Alk.  phosphatase 488 (H) 50 - 136 U/L    Protein, total 7.4 6.3 - 8.2 g/dL    Albumin 2.2 (L) 3.2 - 4.6 g/dL    Globulin 5.2 (H) 2.3 - 3.5 g/dL    A-G Ratio 0.4 (L) 1.2 - 3.5     GLUCOSE, POC    Collection Time: 04/07/22  7:54 AM   Result Value Ref Range    Glucose (POC) 129 (H) 65 - 100 mg/dL    Performed by BenettiMarianeECPIInstructor    CBC WITH AUTOMATED DIFF    Collection Time: 04/07/22  8:05 AM   Result Value Ref Range    WBC 11.7 (H) 4.3 - 11.1 K/uL    RBC 2.48 (L) 4.05 - 5.2 M/uL    HGB 7.0 (L) 11.7 - 15.4 g/dL    HCT 22.2 (L) 35.8 - 46.3 %    MCV 89.5 79.6 - 97.8 FL    MCH 28.2 26.1 - 32.9 PG    MCHC 31.5 31.4 - 35.0 g/dL    RDW 20.7 (H) 11.9 - 14.6 %    PLATELET 113 (L) 307 - 450 K/uL    MPV 9.9 9.4 - 12.3 FL    ABSOLUTE NRBC 0.00 0.0 - 0.2 K/uL    DF AUTOMATED      NEUTROPHILS 84 (H) 43 - 78 %    LYMPHOCYTES 7 (L) 13 - 44 %    MONOCYTES 8 4.0 - 12.0 %    EOSINOPHILS 0 (L) 0.5 - 7.8 %    BASOPHILS 0 0.0 - 2.0 %    IMMATURE GRANULOCYTES 1 0.0 - 5.0 %    ABS. NEUTROPHILS 9.8 (H) 1.7 - 8.2 K/UL    ABS. LYMPHOCYTES 0.8 0.5 - 4.6 K/UL    ABS. MONOCYTES 0.9 0.1 - 1.3 K/UL    ABS. EOSINOPHILS 0.0 0.0 - 0.8 K/UL    ABS. BASOPHILS 0.0 0.0 - 0.2 K/UL    ABS. IMM. GRANS. 0.1 0.0 - 0.5 K/UL   RBC, ALLOCATE    Collection Time: 04/07/22  9:30 AM   Result Value Ref Range    HISTORY CHECKED?  Historical check performed    GLUCOSE, POC    Collection Time: 04/07/22 11:01 AM   Result Value Ref Range    Glucose (POC) 119 (H) 65 - 100 mg/dL    Performed by BenettiMarianeECPIInstructor    CBC WITH AUTOMATED DIFF    Collection Time: 04/07/22  2:38 PM   Result Value Ref Range    WBC 16.3 (H) 4.3 - 11.1 K/uL    RBC 2.91 (L) 4.05 - 5.2 M/uL    HGB 8.2 (L) 11.7 - 15.4 g/dL    HCT 26.2 (L) 35.8 - 46.3 %    MCV 90.0 79.6 - 97.8 FL    MCH 28.2 26.1 - 32.9 PG    MCHC 31.3 (L) 31.4 - 35.0 g/dL    RDW 19.9 (H) 11.9 - 14.6 %    PLATELET 098 (L) 961 - 450 K/uL    MPV 10.3 9.4 - 12.3 FL    ABSOLUTE NRBC 0.00 0.0 - 0.2 K/uL    DF AUTOMATED      NEUTROPHILS 86 (H) 43 - 78 %    LYMPHOCYTES 5 (L) 13 - 44 %    MONOCYTES 8 4.0 - 12.0 %    EOSINOPHILS 0 (L) 0.5 - 7.8 %    BASOPHILS 0 0.0 - 2.0 % IMMATURE GRANULOCYTES 1 0.0 - 5.0 %    ABS. NEUTROPHILS 14.0 (H) 1.7 - 8.2 K/UL    ABS. LYMPHOCYTES 0.7 0.5 - 4.6 K/UL    ABS. MONOCYTES 1.3 0.1 - 1.3 K/UL    ABS. EOSINOPHILS 0.0 0.0 - 0.8 K/UL    ABS. BASOPHILS 0.0 0.0 - 0.2 K/UL    ABS. IMM. GRANS. 0.2 0.0 - 0.5 K/UL       All Micro Results     Procedure Component Value Units Date/Time    CULTURE, URINE [[id number]]  (Abnormal) Collected: 04/05/22 1804    Order Status: Completed Specimen: Urine from Clean catch Updated: 04/07/22 0854     Special Requests: NO SPECIAL REQUESTS        Culture result:       >100,000 COLONIES/mL GRAM NEGATIVE RODS IDENTIFICATION AND SUSCEPTIBILITY TO FOLLOW          CULTURE, BLOOD [[id number]] Collected: 04/05/22 1645    Order Status: Completed Specimen: Blood Updated: 04/07/22 0722     Special Requests: --        LEFT  Antecubital       Culture result: NO GROWTH 2 DAYS       CULTURE, BLOOD [[id number]] Collected: 04/05/22 1647    Order Status: Completed Specimen: Blood Updated: 04/07/22 0722     Special Requests: --        RIGHT  ARM       Culture result: NO GROWTH 2 DAYS       CULTURE, URINE [[id number]]     Order Status: Canceled Specimen: Urine from Clean catch           Other Studies:  No results found.     Current Meds:  Current Facility-Administered Medications   Medication Dose Route Frequency    0.9% sodium chloride infusion 250 mL  250 mL IntraVENous PRN    0.9% sodium chloride infusion 250 mL  250 mL IntraVENous PRN    0.9% sodium chloride infusion 250 mL  250 mL IntraVENous PRN    0.9% sodium chloride infusion  75 mL/hr IntraVENous CONTINUOUS    ondansetron (ZOFRAN) injection 4 mg  4 mg IntraVENous Q6H PRN    midodrine (PROAMATINE) tablet 5 mg  5 mg Oral TID WITH MEALS    octreotide (SANDOSTATIN) injection 100 mcg  100 mcg SubCUTAneous TID    albumin human 25% (BUMINATE) solution 12.5 g  12.5 g IntraVENous Q6H    ursodioL (ACTIGALL) capsule 900 mg  900 mg Oral BID WITH MEALS    sodium chloride (NS) flush 5-10 mL  5-10 mL IntraVENous Q8H    sodium chloride (NS) flush 5-10 mL  5-10 mL IntraVENous PRN    0.9% sodium chloride infusion 250 mL  250 mL IntraVENous PRN    aspirin delayed-release tablet 81 mg  81 mg Oral DAILY    atorvastatin (LIPITOR) tablet 80 mg  80 mg Oral QHS    albuterol (PROVENTIL VENTOLIN) nebulizer solution 2.5 mg  2.5 mg Nebulization Q6H PRN    DULoxetine (CYMBALTA) capsule 30 mg  30 mg Oral DAILY    ferrous sulfate tablet 325 mg  1 Tablet Oral BID WITH MEALS    [Held by provider] furosemide (LASIX) tablet 40 mg  40 mg Oral DAILY    hydrOXYzine pamoate (VISTARIL) capsule 25 mg  25 mg Oral BID PRN    levothyroxine (SYNTHROID) tablet 175 mcg  175 mcg Oral ACB    [Held by provider] metoprolol tartrate (LOPRESSOR) tablet 12.5 mg  12.5 mg Oral BID    pantoprazole (PROTONIX) tablet 40 mg  40 mg Oral ACB&D    cefTRIAXone (ROCEPHIN) 1 g in 0.9% sodium chloride (MBP/ADV) 50 mL MBP  1 g IntraVENous Q24H    insulin lispro (HUMALOG) injection   SubCUTAneous AC&HS    sodium chloride (NS) flush 5-40 mL  5-40 mL IntraVENous Q8H    sodium chloride (NS) flush 5-40 mL  5-40 mL IntraVENous PRN    acetaminophen (TYLENOL) tablet 650 mg  650 mg Oral Q6H PRN    Or    acetaminophen (TYLENOL) suppository 650 mg  650 mg Rectal Q6H PRN    polyethylene glycol (MIRALAX) packet 17 g  17 g Oral DAILY PRN    bisacodyL (DULCOLAX) suppository 10 mg  10 mg Rectal DAILY PRN    famotidine (PEPCID) tablet 20 mg  20 mg Oral BID PRN    alum-mag hydroxide-simeth (MYLANTA) oral suspension 15 mL  15 mL Oral Q6H PRN    hydrALAZINE (APRESOLINE) 20 mg/mL injection 20 mg  20 mg IntraVENous Q4H PRN    oxyCODONE IR (ROXICODONE) tablet 5 mg  5 mg Oral Q4H PRN    guaiFENesin-dextromethorphan (ROBITUSSIN DM) 100-10 mg/5 mL syrup 10 mL  10 mL Oral Q4H PRN    senna-docusate (PERICOLACE) 8.6-50 mg per tablet 2 Tablet  2 Tablet Oral DAILY PRN    melatonin tablet 5 mg  5 mg Oral QHS PRN    prochlorperazine (COMPAZINE) with saline injection 10 mg  10 mg IntraVENous Q6H PRN    nystatin (MYCOSTATIN) 100,000 unit/gram powder   Topical BID    sucralfate (CARAFATE) tablet 1 g  1 g Oral TID WITH MEALS    albuterol (PROVENTIL VENTOLIN) nebulizer solution 2.5 mg  2.5 mg Nebulization Q6H RT       Signed:  Carlos Howard MD    Part of this note may have been written by using a voice dictation software. The note has been proof read but may still contain some grammatical/other typographical errors.

## 2022-04-07 NOTE — PROGRESS NOTES
Pt c/o SOB despite breathing treatments, Dr Claudia Avalos notified and orders received for one time dose of lasix. Bladder scan showed 554cc. Dr. Claudia Avalos notified and orders received to place jones catheter.

## 2022-04-07 NOTE — PROGRESS NOTES
END OF SHIFT NOTE:    Intake/Output  04/06 1901 - 04/07 0700  In: 100 [I.V.:100]  Out: 100 [Urine:100]   Voiding: YES  Catheter: NO  Drain:              Stool:  0 occurrences. Stool Assessment  Stool Color: Brown (dark brown) (04/07/22 0512)  Stool Appearance: Soft (04/07/22 0512)  Stool Amount: Small (04/07/22 0512)  Stool Source/Status: Incontinence; Rectum (04/07/22 0512)    Emesis:  0 occurrences. VITAL SIGNS  Patient Vitals for the past 12 hrs:   Temp Pulse Resp BP SpO2   04/07/22 0348 97.2 °F (36.2 °C) 89 19 101/64 95 %   04/07/22 0245 -- -- -- -- 96 %   04/06/22 2347 97.3 °F (36.3 °C) 90 18 (!) 97/58 96 %   04/06/22 2025 -- -- -- -- 97 %   04/06/22 2010 97.3 °F (36.3 °C) 88 18 (!) 90/50 98 %       Pain Assessment  Pain 1  Pain Scale 1: Visual (04/07/22 0420)  Pain Intensity 1: 0 (04/07/22 0420)  Patient Stated Pain Goal: 0 (04/07/22 0420)  Pain Reassessment 1: Yes (04/07/22 0348)  Pain Onset 1: ongoing (04/06/22 0740)  Pain Location 1: Buttocks; Coccyx (04/06/22 0740)  Pain Orientation 1: Posterior (04/06/22 0740)  Pain Description 1: Aching (04/06/22 0740)  Pain Intervention(s) 1: Medication (see MAR) (04/07/22 0348)    Ambulating  No    Additional Information:   Oxy x2    Shift report given to oncoming nurse at the bedside.     Painter RODRI Sunshine

## 2022-04-07 NOTE — H&P
[address] Cardiology Initial Cardiac Evaluation                 Date of  Admission: 4/5/2022  2:14 PM     Primary Care Physician: Dr. Kevin Bhardwaj  Primary Cardiologist: [facility]  Referring Physician: Camron Harkins NP  Supervising Cardiologist: Dr. Sung     Reason for cardiac evaluation: GI bleed on [address]za is a 69 y.o. female with a history of STEMI (1/9/22-inferior MI s/p aspirations thrombectomy w/ 2 stents to [facility], EF was 40-50% but repeat ECHO on 1/15 showed improved EF 55-65%) COPD on home O2 3L, former tobacco use, HTN, dyslipidemia, DM II and hypothyroidism. Recent admission for N/V/D and Gi bleed. Brilinta was held two days that admission for endoscopy which was performed and showed esophageal varices that need banding but not done due to brilinta. Patient presents with the same issue this admission. She nausea, dry heaves, dark \"mushy\" stools, decreased PO intake for several days. In ED, labs showed hgb 7.0. Cardiology consulted to evaluate need for holding Brilinta. Hgb remains 7.0 s/p transfusion on 4/5. Patient received Brilinta this AM. Currently denies any chest pain or SOB.        Patient Active Problem List   Diagnosis Code    Hypoxia, home O2 at night 3L R09.02    Diabetes mellitus ([facility] [address] E11.9    Hypothyroidism E03.9    Snoring, likely JANY R06.83    Morbid obesity (HCC) E66.01    Atherosclerosis of coronary artery I25.10    History of ST elevation myocardial infarction (STEMI) I25.2    Mitral regurgitation I34.0    Chronic obstructive pulmonary disease (HCC) J44.9    Liver cirrhosis secondary to RUDD (HCC) K75.81, K74.60    Thrombocytopenia (HCC) D69.6    Secondary esophageal varices without bleeding (HCC) I85.10    Chronic diastolic congestive heart failure (HCC) I50.32    ISABELLA (acute kidney injury) (HCC) N17.9    UTI (urinary tract infection) N39.0    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       Past Medical History: Diagnosis Date    Asthma     20 yrs    Atherosclerosis of coronary artery 1/12/2022    Chronic diastolic congestive heart failure ([facility] [address] 3/25/2022    Cirrhosis of liver ([facility] [address]     COPD     10 years    Diabetes ([facility] [address] 2005    Endocrine disease 1977    hypothyroid    Thyroid disease     UTI (urinary tract infection) 4/5/2022      Past Surgical History:   Procedure Laterality Date    COLONOSCOPY N/A 3/28/2022    COLONOSCOPY performed by Gypsy Bauman MD at [address] [place] [address], [address] [place] [address]    HX COLONOSCOPY  2022    HX ENDOSCOPY  2022    HX GYN  1995    Hyst, C Section     Allergies   Allergen Reactions    No Known Drug Allergies Not Reported This Time      Family History   Problem Relation Age of Onset    Heart Attack Father     No Known Problems Maternal Grandmother     No Known Problems Maternal Grandfather     No Known Problems Paternal Grandmother     No Known Problems Paternal Grandfather         Current Facility-Administered Medications   Medication Dose Route Frequency    0.9% sodium chloride infusion 250 mL  250 mL IntraVENous PRN    0.9% sodium chloride infusion 250 mL  250 mL IntraVENous PRN    0.9% sodium chloride infusion 250 mL  250 mL IntraVENous PRN    0.9% sodium chloride infusion  75 mL/hr IntraVENous CONTINUOUS    midodrine (PROAMATINE) tablet 5 mg  5 mg Oral TID WITH MEALS    octreotide (SANDOSTATIN) injection 100 mcg  100 mcg SubCUTAneous TID    albumin human 25% (BUMINATE) solution 12.5 g  12.5 g IntraVENous Q6H    ursodioL (ACTIGALL) capsule 900 mg  900 mg Oral BID WITH MEALS    sodium chloride (NS) flush 5-10 mL  5-10 mL IntraVENous Q8H    sodium chloride (NS) flush 5-10 mL  5-10 mL IntraVENous PRN    0.9% sodium chloride infusion 250 mL  250 mL IntraVENous PRN    ticagrelor (BRILINTA) tablet 90 mg  90 mg Oral Q12H    aspirin delayed-release tablet 81 mg  81 mg Oral DAILY    atorvastatin (LIPITOR) tablet 80 mg  80 mg Oral QHS    albuterol (PROVENTIL VENTOLIN) nebulizer solution 2.5 mg  2.5 mg Nebulization Q6H PRN    DULoxetine (CYMBALTA) capsule 30 mg  30 mg Oral DAILY    ferrous sulfate tablet 325 mg  1 Tablet Oral BID WITH MEALS    [Held by provider] furosemide (LASIX) tablet 40 mg  40 mg Oral DAILY    hydrOXYzine pamoate (VISTARIL) capsule 25 mg  25 mg Oral BID PRN    levothyroxine (SYNTHROID) tablet 175 mcg  175 mcg Oral ACB    [Held by provider] metoprolol tartrate (LOPRESSOR) tablet 12.5 mg  12.5 mg Oral BID    pantoprazole (PROTONIX) tablet 40 mg  40 mg Oral ACB&D    cefTRIAXone (ROCEPHIN) 1 g in 0.9% sodium chloride (MBP/ADV) 50 mL MBP  1 g IntraVENous Q24H    insulin lispro (HUMALOG) injection   SubCUTAneous AC&HS    sodium chloride (NS) flush 5-40 mL  5-40 mL IntraVENous Q8H    sodium chloride (NS) flush 5-40 mL  5-40 mL IntraVENous PRN    acetaminophen (TYLENOL) tablet 650 mg  650 mg Oral Q6H PRN    Or    acetaminophen (TYLENOL) suppository 650 mg  650 mg Rectal Q6H PRN    polyethylene glycol (MIRALAX) packet 17 g  17 g Oral DAILY PRN    bisacodyL (DULCOLAX) suppository 10 mg  10 mg Rectal DAILY PRN    famotidine (PEPCID) tablet 20 mg  20 mg Oral BID PRN    alum-mag hydroxide-simeth (MYLANTA) oral suspension 15 mL  15 mL Oral Q6H PRN    hydrALAZINE (APRESOLINE) 20 mg/mL injection 20 mg  20 mg IntraVENous Q4H PRN    oxyCODONE IR (ROXICODONE) tablet 5 mg  5 mg Oral Q4H PRN    guaiFENesin-dextromethorphan (ROBITUSSIN DM) 100-10 mg/5 mL syrup 10 mL  10 mL Oral Q4H PRN    senna-docusate (PERICOLACE) 8.6-50 mg per tablet 2 Tablet  2 Tablet Oral DAILY PRN    melatonin tablet 5 mg  5 mg Oral QHS PRN    prochlorperazine (COMPAZINE) with saline injection 10 mg  10 mg IntraVENous Q6H PRN    nystatin (MYCOSTATIN) 100,000 unit/gram powder   Topical BID    sucralfate (CARAFATE) tablet 1 g  1 g Oral TID WITH MEALS    albuterol (PROVENTIL VENTOLIN) nebulizer solution 2.5 mg  2.5 mg Nebulization Q6H RT Review of Systems   Constitutional: Positive for decreased appetite. Negative for diaphoresis and malaise/fatigue. HENT: Negative for congestion. Cardiovascular: Negative for chest pain, claudication, cyanosis, dyspnea on exertion, irregular heartbeat, leg swelling, near-syncope, orthopnea, palpitations, paroxysmal nocturnal dyspnea and syncope. Respiratory: Negative for cough, shortness of breath and wheezing. Endocrine: Negative for cold intolerance and heat intolerance. Hematologic/Lymphatic: Does not bruise/bleed easily. Skin: Negative for nail changes. Gastrointestinal: Positive for diarrhea, nausea and vomiting. Neurological: Negative for dizziness, headaches and weakness. Physical Exam  Vitals:    04/07/22 0245 04/07/22 0348 04/07/22 0701 04/07/22 0812   BP:  101/64  106/64   Pulse:  89  91   Resp:  19  16   Temp:  97.2 °F (36.2 °C)  97.5 °F (36.4 °C)   SpO2: 96% 95% 98% 100%   Weight:       Height:           Physical Exam:  General: Well Developed, Well Nourished, No Acute Distress  HEENT: pupils equal and round, no abnormalities noted  Neck: supple, no JVD, no carotid bruits  Heart: S1S2 with RRR without murmurs or gallops  Lungs: Clear throughout auscultation bilaterally without adventitious sounds  Abd: soft, nontender, nondistended, with good bowel sounds  Ext: warm, 1+ edema, calves supple/nontender, pulses 2+ bilaterally  Skin: warm and dry  Psychiatric: Normal mood and affect  Neurologic: Alert and oriented X 3    Cardiographics    Telemetry: ordered   ECG: Sinus Rhythm  Echocardiogram: 1/15/22  · The left ventricular systolic function is normal (55-65%). · The right ventricular systolic function is normal.   · There is mild concentric left ventricular hypertrophy present. · Mild mitral valve regurgitation. · Grade II (moderate) left ventricular diastolic dysfunction present,   consistent with pseudonormalization.    · Partially calcified mass in association with posterior mitral apparatus,   measuring 3.47 x 2.54 cm. Consider RUBEN +/- advanced cardiac imaging to   evaluate further. Described also on echocardiograms performed 1/9/22 and   1/10/22. Labs:   Recent Labs     04/07/22  0805 04/07/22  0521 04/06/22  1452 04/06/22  0548 04/06/22  0548   NA  --  137  --   --  137   K  --  3.8  --   --  4.3   MG  --  2.1  --   --   --    BUN  --  80*  --   --  80*   CREA  --  2.50*  --   --  2.50*   GLU  --  122*  --   --  105*   WBC 11.7* 10.2  --    < > 10.5   HGB 7.0* 7.4*  --    < > 8.8*   HCT 22.2* 24.4*  --    < > 28.1*   * 95*  --    < > 93*   INR  --   --  1.0  --   --     < > = values in this interval not displayed. Assessment/Plan:     Assessment:    Will stop brilinta and start ASA- Remote tele . See MD note for detailed plan of care. We appreciate the opportunity to participate in this patient's care.      Matt Keller FNP  Supervising MD: Dr. Claudene March

## 2022-04-07 NOTE — PROGRESS NOTES
[facility] Progress Note         Admit Date:  4/5/2022    Today's Date:  4/7/2022    CC:  Elevated LFT's, Anemia    Subjective:     Patient has loose green stool in brief.   Reports continued nausea this am.  Denies abd pain    Medications:   Current Facility-Administered Medications   Medication Dose Route Frequency    0.9% sodium chloride infusion 250 mL  250 mL IntraVENous PRN    0.9% sodium chloride infusion 250 mL  250 mL IntraVENous PRN    midodrine (PROAMATINE) tablet 5 mg  5 mg Oral TID WITH MEALS    octreotide (SANDOSTATIN) injection 100 mcg  100 mcg SubCUTAneous TID    albumin human 25% (BUMINATE) solution 12.5 g  12.5 g IntraVENous Q6H    ursodioL (ACTIGALL) capsule 900 mg  900 mg Oral BID WITH MEALS    sodium chloride (NS) flush 5-10 mL  5-10 mL IntraVENous Q8H    sodium chloride (NS) flush 5-10 mL  5-10 mL IntraVENous PRN    0.9% sodium chloride infusion 250 mL  250 mL IntraVENous PRN    ticagrelor (BRILINTA) tablet 90 mg  90 mg Oral Q12H    aspirin delayed-release tablet 81 mg  81 mg Oral DAILY    atorvastatin (LIPITOR) tablet 80 mg  80 mg Oral QHS    albuterol (PROVENTIL VENTOLIN) nebulizer solution 2.5 mg  2.5 mg Nebulization Q6H PRN    DULoxetine (CYMBALTA) capsule 30 mg  30 mg Oral DAILY    ferrous sulfate tablet 325 mg  1 Tablet Oral BID WITH MEALS    [Held by provider] furosemide (LASIX) tablet 40 mg  40 mg Oral DAILY    hydrOXYzine pamoate (VISTARIL) capsule 25 mg  25 mg Oral BID PRN    levothyroxine (SYNTHROID) tablet 175 mcg  175 mcg Oral ACB    [Held by provider] metoprolol tartrate (LOPRESSOR) tablet 12.5 mg  12.5 mg Oral BID    pantoprazole (PROTONIX) tablet 40 mg  40 mg Oral ACB&D    cefTRIAXone (ROCEPHIN) 1 g in 0.9% sodium chloride (MBP/ADV) 50 mL MBP  1 g IntraVENous Q24H    insulin lispro (HUMALOG) injection   SubCUTAneous AC&HS    sodium chloride (NS) flush 5-40 mL  5-40 mL IntraVENous Q8H    sodium chloride (NS) flush 5-40 mL  5-40 mL IntraVENous PRN    acetaminophen (TYLENOL) tablet 650 mg  650 mg Oral Q6H PRN    Or    acetaminophen (TYLENOL) suppository 650 mg  650 mg Rectal Q6H PRN    polyethylene glycol (MIRALAX) packet 17 g  17 g Oral DAILY PRN    bisacodyL (DULCOLAX) suppository 10 mg  10 mg Rectal DAILY PRN    famotidine (PEPCID) tablet 20 mg  20 mg Oral BID PRN    alum-mag hydroxide-simeth (MYLANTA) oral suspension 15 mL  15 mL Oral Q6H PRN    hydrALAZINE (APRESOLINE) 20 mg/mL injection 20 mg  20 mg IntraVENous Q4H PRN    oxyCODONE IR (ROXICODONE) tablet 5 mg  5 mg Oral Q4H PRN    guaiFENesin-dextromethorphan (ROBITUSSIN DM) 100-10 mg/5 mL syrup 10 mL  10 mL Oral Q4H PRN    senna-docusate (PERICOLACE) 8.6-50 mg per tablet 2 Tablet  2 Tablet Oral DAILY PRN    melatonin tablet 5 mg  5 mg Oral QHS PRN    prochlorperazine (COMPAZINE) with saline injection 10 mg  10 mg IntraVENous Q6H PRN    nystatin (MYCOSTATIN) 100,000 unit/gram powder   Topical BID    sucralfate (CARAFATE) tablet 1 g  1 g Oral TID WITH MEALS    albuterol (PROVENTIL VENTOLIN) nebulizer solution 2.5 mg  2.5 mg Nebulization Q6H RT       Review of Systems:  ROS was obtained, with pertinent positives as listed above. No chest pain or SOB. Diet:  Regular diet    Objective:   Vitals:  Visit Vitals  /64 (BP 1 Location: Left upper arm, BP Patient Position: Semi fowlers)   Pulse 91   Temp 97.5 °F (36.4 °C)   Resp 16   Ht 5' 4\" (1.626 m)   Wt 115.4 kg (254 lb 6.6 oz)   SpO2 100%   BMI 43.67 kg/m²     Intake/Output:  No intake/output data recorded. 04/05 1901 - 04/07 0700  In: 1718.8 [P.O.:600; I.V.:1118.8]  Out: 500 [Urine:500]  Exam:  General appearance: alert, cooperative, no distress  Lungs: clear to auscultation bilaterally anteriorly  Heart: regular rate and rhythm  Abdomen: soft, non-tender.  Bowel sounds normal. No masses, no organomegaly  Extremities: extremities normal, atraumatic, no cyanosis or edema  Neuro:  alert and oriented    Data Review (Labs):    Recent Labs     04/07/22  0805 04/07/22  0521 04/06/22  1452 04/06/22  0548 04/05/22  1431   WBC 11.7* 10.2  --  10.5 12.2*   HGB 7.0* 7.4*  --  8.8* 7.0*   HCT 22.2* 24.4*  --  28.1* 22.6*   * 95*  --  93* 126*   MCV 89.5 91.7  --  87.8 89.3   NA  --  137  --  137 137   K  --  3.8  --  4.3 4.7   CL  --  104  --  105 103   CO2  --  24  --  25 26   BUN  --  80*  --  80* 78*   CREA  --  2.50*  --  2.50* 2.70*   CA  --  9.1  --  8.9 9.2   MG  --  2.1  --   --   --    GLU  --  122*  --  105* 132*   AP  --   --   --  534* 602*   AST  --   --   --  263* 287*   ALT  --   --   --  151* 159*   TBILI  --   --   --  2.9* 3.3*   CBIL  --   --   --  2.4*  --    ALB  --   --   --  1.7* 1.7*   TP  --   --   --  7.1 7.5   LPSE  --   --   --   --  360   PTP  --   --  13.6  --   --    INR  --   --  1.0  --   --      Ct A/P 3/23/22  FINDINGS:  - LUNG BASES: No infiltrates or masses. - LIVER: Liver has a cirrhotic appearance, 16 cm in length.  No discrete liver  mass.  Portal vein is patent.  Small gastroesophageal varices are present.  - GALLBLADDER/BILE DUCTS: Postcholecystectomy.  No bile duct dilatation.  - PANCREAS: Normal.  - SPLEEN: 18 cm in length.  No discrete splenic mass. - ADRENALS: Normal.  - KIDNEYS/URETERS: No hydronephrosis or significant mass. - BLADDER: Normal.  - REPRODUCTIVE ORGANS: Post hysterectomy.  No pelvic masses. - BOWEL: Normal caliber.  No inflammatory changes.  - LYMPH NODES: Multiple small periportal and gastrohepatic lymph nodes.  No  significant mesenteric or pelvic adenopathy.  - BONES: There are degenerative changes throughout the lumbar spine.  No  fracture or significant bone lesion.  - VASCULATURE: Normal  - OTHER: 2 ventral hernias adjacent to the umbilicus with omentum involvements  and several enlarged vessels.  No bowel involvement. IMPRESSION  1.  Cirrhosis.  Multiple varices present suggesting portal hypertension.   2.  2 ventral hernias with omental involvement.        EGD/COLO 3/28/22 Dr Bourne Postin  EGD  Findings:   Esophagus- One, grade 2 varix extending from the EG junction at 36 cm to 32 cm.  Mostly straight without stigmata of bleeding.  Not banded due to hold of Brillinta only 2 to 3 days. Two  Other small varices without complication. Stomach- Mild PHG in proximal stomach.  No gastric varices noted.  No blood seen or source. Duodenum- Normal.  Impression:  Esophageal varices without stigmata of bleeding.  No banding due to recent Brillinta use.  PHG mild.  No blood seen. Recommendations:    Add PPIs. Consider banding with a a longer Brillinta, 5 day hold.   May want to due banding, realizing the risks, after stent has been in one year etc.  Consider Nadolol etc and banding in the future.     COLO  Findings: Pan diverticulosis.  Small colon polyps (2) one in cecum and one in TC.  Prep was only fair though.  No biopsies etc due to [place] use. Specimens: None  Impression:  No active bleeding or site of bleeding identified.  Prep was only fair.  Pan diverticulosis was seen without bleeding. 2 small 3 mm polyps were seen in the cecum and TC.  These were not removed due to Brillinta use.  Moderate sized IHs and anal tags noted on retroflex. Recommendations:   Consider repeating a colonoscopy in January of 2023 after one year post stent placement.  High fiber diet. Assessment:     Principal Problem:    ISABELLA (acute kidney injury) ([facility] [address] (4/5/2022)    Active Problems:    Hypoxia, home O2 at night 3L (1/13/2010)      Diabetes mellitus ([address] (1/13/2010)      Snoring, likely JANY (1/13/2010)      Morbid obesity ([facility] [address] (1/14/2010)      History of ST elevation myocardial infarction (STEMI) (1/9/2022)      Overview: Last Assessment & Plan:       Formatting of this note might be different from the original.      Inferior STEMI with large amount of thrombus.  Total mid occlusion of the       RCA       Mild left sided CAD - EF 40-50% pLAD       Inferior wall hypokinesis with MR (moderate by cath)      Aspiration Thrombectomy and OSWALDO - RCA       Denies any ongoing angina.      - ASA, Brillinta, Lisinopril, Metoprolol      Liver cirrhosis secondary to RUDD (HCC) ()      Secondary esophageal varices without bleeding ([facility] [address] (3/25/2022)      Chronic diastolic congestive heart failure ([facility] [address] (3/25/2022)      UTI (urinary tract infection) (4/5/2022)      Iron deficiency anemia secondary to blood loss (chronic) (4/5/2022)         69 y.o. female with PMH including but not limited to CAD s/p STEMI with OSWALDO to RCA in 1/2022, DM2, COPD with nocturnal hypoxia, hypothyroidism, and recent diagnosis of cirrhosis admitted with dark stool, ISABELLA and nausea. Was started on rocephin. LFTs are elevated CW labs from 3/28/22 admission. Today TB 2.9, , , , hgb 8.8, hct 28.1, MCV 28.1, plt 93. Last CT 3/23/22 with cirrhotic liver, no biliary ductal dilation. Hgb 8.8 (was 8.0 on d/c 3/28). Reports dark, mushy stools on iron iron.       Work up for liver with viral hepatitis negative 3/24. M2 .3 elevated and smooth muscle AB 36 elevated. Iron studies wnl 3/24/22. Triglycerides and TC wnl 5/3/21.      4/7/22: WBC 11.7, hgb 7.0, BUN 80, Cr 2.50, LFT's pending    Plan:     - Continue PPI BID and Octreotide   - Elevated AMA and ASMA with elevated AP supporting PBC, unclear if she has autoimmune hepatitis  - Ursodiol started yesterday   - Monitor H&H - hgb drop to 7.0. Will consult cardiology as she likely needs variceal banding if Brilenta can be held? Will transfuse 1 U    Jeremiah Bateman NP  Patient is seen and examined in collaboration with Dr. Obdulia Dumont. Assessment and plan as per Dr. Laurie Garcia.

## 2022-04-08 PROBLEM — J96.22 ACUTE ON CHRONIC RESPIRATORY FAILURE WITH HYPOXIA AND HYPERCAPNIA (HCC): Status: ACTIVE | Noted: 2022-01-01

## 2022-04-08 PROBLEM — J96.21 ACUTE ON CHRONIC RESPIRATORY FAILURE WITH HYPOXIA AND HYPERCAPNIA (HCC): Status: ACTIVE | Noted: 2022-01-01

## 2022-04-08 NOTE — PROGRESS NOTES
Occupational Therapy Note:    Pt transferred to the ICU for a higher level of care--will d/c OT order at this time. Please re-consult once medically appropriate.      Thank you,  Patricia Bower, OTR/L

## 2022-04-08 NOTE — CONSULTS
Patient has received 4 days of abx which is more than enough for UTI. Susceptibility testing is available to determine which abx would be necessary but I would consider this treated. No evidence of PNA. Has heart failure with edema and known baseline oxygen requirement. Has a white count related to steroids.

## 2022-04-08 NOTE — PROCEDURES
Department of Interventional Radiology  [phone]        Interventional Radiology Brief Procedure Note    Patient: Gagan Bashir MRN: [id number]  SSN: [id number]    YOB: 1955  Age: 69 y.o.   Sex: female      Date of Procedure: 4/8/2022    Pre-Procedure Diagnosis: ISABELLA, respiratory failure    Post-Procedure Diagnosis: SAME    Procedure(s): Temporary Central Venous Catheter    Brief Description of Procedure: US guided right IJ temp HD catheter placement    Performed By: Benita Burkitt, PA-C     Assistants: None    Anesthesia:Lidocaine    Estimated Blood Loss: None    Specimens:  None    Implants:  Temporary Hemodialysis Catheter    Findings: right IJ patent    Complications: None    Recommendations: CXR     Follow Up: prn    Signed By: Benita Burkitt, PA-C     April 8, 2022

## 2022-04-08 NOTE — CONSULTS
History and Physical Initial Visit NOTE           4/8/2022    Karen Marcano                        Date of Admission:  4/5/2022    The patient's chart is reviewed and the patient is discussed with the staff. Subjective:     Patient is a 69 y.o.  female seen and evaluated at the request of Dr. Aubree Guallpa. PMH includes CAD s/p STEMI 1/2022, T2DM, COPD with 3L O2, hypothyroidism and recent cirrhosis diagnosis thought 2/2 primary biliary cirrhosis (+ASMA, +AMA). Former smoker- 37years 1ppd, quit in January 2022. Patient presented to ER on 4/5 after recent discharge on 3/28. She presented with concerns of fatigue and weakness that has been gradually getting worse. She has had poor PO intake, nausea and dark \"mushy\" stools. Notable labs Cr 2.7, , , procal 2.31, lactic 2.1, albumin 1.7, WBC 12.2, Hgb 7.0, platelet 048. She was started on abx for UTI, lasix held and started on gentle hydration and IV albumin. Kidney function had mildly improve but then began to worsen. She required blood transfusion yesterday. Per nursing she started having increasing SOB overnight after transfusion and did receive lasix without much UOP. This AM she also received lasix. After being cleaned up she had increased work of breathing and wheezing and O2 had to be increased to [facility]. CXR obtained revealed worsening pulmonary edema, ABG 7.21/55.9/72/22. 1. She is labored on exam and only able to speak in short phrases. Review of Systems  A comprehensive review of systems was negative except for that written in the HPI. Prior to Admission Medications   Prescriptions Last Dose Informant Patient Reported? Taking? Brilinta 90 mg tablet 4/3/2022 at Unknown time  Yes Yes   Sig: Take 90 mg by mouth every twelve (12) hours. DULoxetine (CYMBALTA) 60 mg capsule 3/30/2022 at Unknown time  No Yes   Sig: Take 1 Capsule by mouth daily.    albuterol (PROVENTIL HFA, VENTOLIN HFA, PROAIR HFA) 90 mcg/actuation inhaler 3/30/2022 at Unknown time  No Yes   Sig: Take 2 Puffs by inhalation every four (4) hours as needed for Wheezing or Shortness of Breath. albuterol (PROVENTIL VENTOLIN) 2.5 mg /3 mL (0.083 %) nebu 3/30/2022 at Unknown time  No Yes   Sig: INHALE 1 VIAL VIA NEBULIZER EVERY 6 HOURS AS NEEDED FOR WHEEZING OR SHORTNESS OF BREATH   ascorbic acid (VITAMIN C) 500 mg chewable tablet 3/6/2022 at Unknown time  Yes Yes   Sig: Take 500 mg by mouth. aspirin delayed-release 81 mg tablet 4/3/2022 at Unknown time  Yes Yes   Sig: Take  by mouth daily. atorvastatin (LIPITOR) 80 mg tablet 3/30/2022 at Unknown time  Yes Yes   Sig: Take 80 mg by mouth nightly. ferrous sulfate (IRON) 325 mg (65 mg iron) EC tablet 4/3/2022 at Unknown time  No Yes   Sig: Take 1 Tablet by mouth Daily (before breakfast). Indications: anemia from inadequate iron   fluticasone propionate (FLONASE) 50 mcg/actuation nasal spray Not Taking at Unknown time  No No   Si Sprays by Both Nostrils route daily. Patient not taking: Reported on 2022   furosemide (LASIX) 40 mg tablet 4/3/2022 at Unknown time  No Yes   Sig: Take 1 Tablet by mouth daily. guaiFENesin ER (MUCINEX) 600 mg ER tablet   No No   Sig: Take 1 Tablet by mouth two (2) times a day. Patient not taking: Reported on 2022   hydrOXYzine pamoate (VISTARIL) 25 mg capsule Not Taking at Unknown time  No No   Sig: Take 1 Capsule by mouth two (2) times daily as needed for Anxiety. Patient not taking: Reported on 2022   levothyroxine (SYNTHROID) 175 mcg tablet 3/30/2022 at Unknown time  No Yes   Sig: Take 1 Tablet by mouth Daily (before breakfast). metFORMIN (GLUCOPHAGE) 1,000 mg tablet 4/3/2022 at Unknown time  No Yes   Sig: Take 1 Tablet by mouth two (2) times daily (with meals). metoprolol tartrate (LOPRESSOR) 25 mg tablet 4/3/2022 at Unknown time  No Yes   Sig: Take 0.5 Tablets by mouth two (2) times a day.    nitroglycerin (NITROSTAT) 0.4 mg SL tablet Not Taking at Unknown time  Yes No   Si.4 mg by SubLINGual route. Patient not taking: Reported on 2022   ondansetron (ZOFRAN ODT) 4 mg disintegrating tablet 3/6/2022 at Unknown time  No Yes   Sig: Take 1 Tablet by mouth every eight (8) hours as needed for Nausea or Vomiting. pantoprazole (PROTONIX) 40 mg tablet 3/30/2022 at Unknown time  No Yes   Sig: Take 1 Tablet by mouth Before breakfast and dinner. Before breakfast   sucralfate (CARAFATE) 100 mg/mL suspension 3/30/2022 at Unknown time  No Yes   Sig: Take 10 mL by mouth three (3) times daily. Facility-Administered Medications: None     Past Medical History:   Diagnosis Date    Asthma     20 yrs    Atherosclerosis of coronary artery 2022    Chronic diastolic congestive heart failure ([facility] 3/25/2022    Cirrhosis of liver ([facility]     COPD     10 years    Diabetes ([facility]     Endocrine disease     hypothyroid    Thyroid disease     UTI (urinary tract infection) 2022     Past Surgical History:   Procedure Laterality Date    COLONOSCOPY N/A 3/28/2022    COLONOSCOPY performed by Lord Leonel MD at [facility] ENDOSCOPY    [address]X [address], 0    [address]    HX COLONOSCOPY      HX ENDOSCOPY      HX GYN      Hyst, C Section     Social History     Socioeconomic History    Marital status:      Spouse name: Not on file    Number of children: Not on file    Years of education: Not on file    Highest education level: Not on file   Occupational History    Not on file   Tobacco Use    Smoking status: Former Smoker     Packs/day: 1.00     Years: 30.00     Pack years: 30.00     Types: Cigarettes    Smokeless tobacco: Never Used   Vaping Use    Vaping Use: Never used   Substance and Sexual Activity    Alcohol use: No    Drug use: Yes     Types: Prescription, OTC    Sexual activity: Not Currently   Other Topics Concern    Not on file   Social History Narrative    Lives with friends.  Works as a . Social Determinants of Health     Financial Resource Strain:     Difficulty of Paying Living Expenses: Not on file   Food Insecurity:     Worried About Running Out of Food in the Last Year: Not on file    Gela of Food in the Last Year: Not on file   Transportation Needs:     Lack of Transportation (Medical): Not on file    Lack of Transportation (Non-Medical):  Not on file   Physical Activity:     Days of Exercise per Week: Not on file    Minutes of Exercise per Session: Not on file   Stress:     Feeling of Stress : Not on file   Social Connections:     Frequency of Communication with Friends and Family: Not on file    Frequency of Social Gatherings with Friends and Family: Not on file    Attends Shinto Services: Not on file    Active Member of Clubs or Organizations: Not on file    Attends Club or Organization Meetings: Not on file    Marital Status: Not on file   Intimate Partner Violence:     Fear of Current or Ex-Partner: Not on file    Emotionally Abused: Not on file    Physically Abused: Not on file    Sexually Abused: Not on file   Housing Stability:     Unable to Pay for Housing in the Last Year: Not on file    Number of Places Lived in the Last Year: Not on file    Unstable Housing in the Last Year: Not on file     Family History   Problem Relation Age of Onset    Heart Attack Father     No Known Problems Maternal Grandmother     No Known Problems Maternal Grandfather     No Known Problems Paternal Grandmother     No Known Problems Paternal Grandfather      Allergies   Allergen Reactions    No Known Drug Allergies Not Reported This Time     Current Facility-Administered Medications   Medication Dose Route Frequency    albuterol-ipratropium (DUO-NEB) 2.5 MG-0.5 MG/3 ML  3 mL Nebulization NOW    furosemide (LASIX) injection 40 mg  40 mg IntraVENous BID    cefepime (MAXIPIME) 2 g in 0.9% sodium chloride (MBP/ADV) 100 mL MBP  2 g IntraVENous Q24H    methylPREDNISolone (PF) (SOLU-MEDROL) injection 40 mg  40 mg IntraVENous Q8H    [START ON 4/9/2022] pantoprazole (PROTONIX) 40 mg in 0.9% sodium chloride 10 mL injection  40 mg IntraVENous DAILY    0.9% sodium chloride infusion 250 mL  250 mL IntraVENous PRN    0.9% sodium chloride infusion 250 mL  250 mL IntraVENous PRN    0.9% sodium chloride infusion 250 mL  250 mL IntraVENous PRN    ondansetron (ZOFRAN) injection 4 mg  4 mg IntraVENous Q6H PRN    midodrine (PROAMATINE) tablet 5 mg  5 mg Oral TID WITH MEALS    octreotide (SANDOSTATIN) injection 100 mcg  100 mcg SubCUTAneous TID    ursodioL (ACTIGALL) capsule 900 mg  900 mg Oral BID WITH MEALS    sodium chloride (NS) flush 5-10 mL  5-10 mL IntraVENous Q8H    sodium chloride (NS) flush 5-10 mL  5-10 mL IntraVENous PRN    0.9% sodium chloride infusion 250 mL  250 mL IntraVENous PRN    aspirin delayed-release tablet 81 mg  81 mg Oral DAILY    atorvastatin (LIPITOR) tablet 80 mg  80 mg Oral QHS    albuterol (PROVENTIL VENTOLIN) nebulizer solution 2.5 mg  2.5 mg Nebulization Q6H PRN    DULoxetine (CYMBALTA) capsule 30 mg  30 mg Oral DAILY    ferrous sulfate tablet 325 mg  1 Tablet Oral BID WITH MEALS    [Held by provider] furosemide (LASIX) tablet 40 mg  40 mg Oral DAILY    hydrOXYzine pamoate (VISTARIL) capsule 25 mg  25 mg Oral BID PRN    levothyroxine (SYNTHROID) tablet 175 mcg  175 mcg Oral ACB    [Held by provider] metoprolol tartrate (LOPRESSOR) tablet 12.5 mg  12.5 mg Oral BID    insulin lispro (HUMALOG) injection   SubCUTAneous AC&HS    sodium chloride (NS) flush 5-40 mL  5-40 mL IntraVENous Q8H    sodium chloride (NS) flush 5-40 mL  5-40 mL IntraVENous PRN    acetaminophen (TYLENOL) tablet 650 mg  650 mg Oral Q6H PRN    Or    acetaminophen (TYLENOL) suppository 650 mg  650 mg Rectal Q6H PRN    polyethylene glycol (MIRALAX) packet 17 g  17 g Oral DAILY PRN    bisacodyL (DULCOLAX) suppository 10 mg  10 mg Rectal DAILY PRN    famotidine (PEPCID) tablet 20 mg  20 mg Oral BID PRN    alum-mag hydroxide-simeth (MYLANTA) oral suspension 15 mL  15 mL Oral Q6H PRN    hydrALAZINE (APRESOLINE) 20 mg/mL injection 20 mg  20 mg IntraVENous Q4H PRN    oxyCODONE IR (ROXICODONE) tablet 5 mg  5 mg Oral Q4H PRN    guaiFENesin-dextromethorphan (ROBITUSSIN DM) 100-10 mg/5 mL syrup 10 mL  10 mL Oral Q4H PRN    senna-docusate (PERICOLACE) 8.6-50 mg per tablet 2 Tablet  2 Tablet Oral DAILY PRN    melatonin tablet 5 mg  5 mg Oral QHS PRN    prochlorperazine (COMPAZINE) with saline injection 10 mg  10 mg IntraVENous Q6H PRN    nystatin (MYCOSTATIN) 100,000 unit/gram powder   Topical BID    sucralfate (CARAFATE) tablet 1 g  1 g Oral TID WITH MEALS    albuterol (PROVENTIL VENTOLIN) nebulizer solution 2.5 mg  2.5 mg Nebulization Q6H RT     Objective:   Blood pressure 123/71, pulse (!) 109, temperature 98.1 °F (36.7 °C), resp. rate 22, height 5' 4\" (1.626 m), weight 272 lb 11.3 oz (123.7 kg), SpO2 94 %. Intake/Output Summary (Last 24 hours) at 4/8/2022 1126  Last data filed at 4/8/2022 1114  Gross per 24 hour   Intake 1189.1 ml   Output 775 ml   Net 414.1 ml     PHYSICAL EXAM   Constitutional:  the patient with labored breathing only able to speak in short phrases. EENMT:  Sclera clear, pupils equal, oral mucosa moist  Respiratory: wheezing, crackles on 7L HFNC  Cardiovascular:  RRR without M,G,R  Gastrointestinal: soft and non-tender; with positive bowel sounds. Musculoskeletal: warm without cyanosis. There is 3+ lower extremity edema.   Skin:  no jaundice or rashes,   Neurologic: no gross neuro deficits     Psychiatric:  alert and calm    CXR:  4/8          Recent Labs     04/08/22  0659 04/07/22  1438 04/07/22  0805 04/07/22  0521 04/07/22  0521 04/06/22  1452 04/06/22  0548 04/06/22  0548 04/05/22  1844 04/05/22  1431 04/05/22  1431   WBC 23.9* 16.3* 11.7*   < > 10.2  --    < > 10.5  --    < > 12.2*   HGB 7.8* 8.2* 7.0*   < > 7.4*  --    < > 8.8*  --    < > 7.0*   HCT 25.2* 26.2* 22.2*   < > 24.4*  --    < > 28.1*  --    < > 22.6*   * 110* 101*   < > 95*  --    < > 93*  --    < > 126*   INR  --   --   --   --   --  1.0  --   --   --   --   --    PCT  --   --   --   --   --   --   --   --   --   --  2.31*   LAC  --   --   --   --   --   --   --   --  1.4  --  2.1*     --   --   --  138  137  --   --  137  --    < > 137   K 4.0  --   --   --  3.8  3.8  --   --  4.3  --    < > 4.7     --   --   --  104  104  --   --  105  --    < > 103   CO2 23  --   --   --  24  24  --   --  25  --    < > 26   *  --   --   --  123*  122*  --   --  105*  --    < > 132*   BUN 81*  --   --   --  82*  80*  --   --  80*  --    < > 78*   CREA 2.80*  --   --   --  2.60*  2.50*  --   --  2.50*  --    < > 2.70*   MG 2.0  --   --   --  2.1  --   --   --   --   --   --    CA 8.9  --   --   --  8.9  9.1  --   --  8.9  --    < > 9.2   PHOS 4.4*  --   --   --  4.2*  --   --   --   --   --   --    ALB 2.9*  --   --   --  2.2*  --   --  1.7*  --    < > 1.7*   *  --   --   --  275*  --   --  263*  --    < > 287*   *  --   --   --  144*  --   --  151*  --    < > 159*   *  --   --   --  488*  --   --  534*  --    < > 602*    < > = values in this interval not displayed. ECHO: No results found for this or any previous visit.      Results     Procedure Component Value Units Date/Time    CULTURE, URINE [[id number]]  (Abnormal)  (Susceptibility) Collected: 04/05/22 1804    Order Status: Completed Specimen: Urine from Clean catch Updated: 04/08/22 0709     Special Requests: NO SPECIAL REQUESTS        Culture result:       >100,000 COLONIES/mL KLEBSIELLA PNEUMONIAE                  <10,000 COLONIES/mL NORMAL SKIN ROHIT ISOLATED          Susceptibility      Klebsiella pneumoniae     JESSICA     Ampicillin/sulbactam ($) Susceptible     Aztreonam ($$$$) Susceptible     Cefazolin ($) Susceptible     Cefepime ($$) Susceptible     Ceftriaxone ($) Susceptible     Gentamicin ($) Susceptible     Nitrofurantoin Resistant     Piperacillin/Tazobac ($) Susceptible     Tobramycin ($) Susceptible     Trimeth-Sulfamethoxa Susceptible                  Linear View                   CULTURE, BLOOD [[id number]] Collected: 04/05/22 1647    Order Status: Completed Specimen: Blood Updated: 04/08/22 0737     Special Requests: --        RIGHT  ARM       Culture result: NO GROWTH 3 DAYS       CULTURE, BLOOD [[id number]] Collected: 04/05/22 1645    Order Status: Completed Specimen: Blood Updated: 04/08/22 0737     Special Requests: --        LEFT  Antecubital       Culture result: NO GROWTH 3 DAYS       CULTURE, URINE [[id number]]     Order Status: Canceled Specimen: Urine from Clean catch         Inpat Anti-Infectives (From admission, onward)     Start     Ordered Stop    04/08/22 1000  cefepime (MAXIPIME) 2 g in 0.9% sodium chloride (MBP/ADV) 100 mL MBP  2 g,   IntraVENous,   EVERY 24 HOURS         04/08/22 0922 04/15/22 0959    04/06/22 0900  nystatin (MYCOSTATIN) 100,000 unit/gram powder  Topical,   2 TIMES DAILY         04/05/22 2212 --              Assessment and Plan:  (Medical Decision Making)   Principal Problem:    ISABELLA (acute kidney injury) ([facility] (4/5/2022)    Nephro following, Cr worsening and +514cc in 24 hours despite lasix. Active Problems:    Hypoxia, home O2 at night 3L (1/13/2010)    Now with increasing O2 needs. Diabetes mellitus ([facility] (1/13/2010)    SSI      Snoring, likely JANY (1/13/2010)    Will need OP f/u for sleep study      Morbid obesity ([facility] (1/14/2010)    Adds to complexity of care      History of ST elevation myocardial infarction (STEMI) (1/9/2022)    OSWALDO 1/2022. Cardiology has seen and given GIB stopped brilinta.        Liver cirrhosis secondary to [facility]) ()    GI following      Secondary esophageal varices without bleeding ([facility] (3/25/2022)    GI Following, transfuse PRBC prn       Chronic diastolic congestive heart failure ([facility] (3/25/2022)    Echo with EF 55-65%, RVSP 33mmHg at the time. RUBEN with moderate to severe MR 1/18      UTI (urinary tract infection) (4/5/2022)    Completed abx      Iron deficiency anemia secondary to blood loss (chronic) (4/5/2022)    monitor Hgb, currently 7/8. Transfuse as needed. Acute on chronic respiratory failure with hypoxia and hypercapnia (HCC) (4/8/2022)    resp acidosis on ABG, currently requiring 7L NC. Will transfer to ICU for BiPAP. Continue albuterol neb, add pulmicort. IV steroids started per Hospitalist. Also started on cefepime for ? PNA- doubt PNA, and would recommend stopping abx. Full Code    More than 50% of the time documented was spent in face-to-face contact with the patient and in the care of the patient on the floor/unit where the patient is located. Thank you very much for this referral.  We appreciate the opportunity to participate in this patient's care. Will follow along with above stated plan. Sixto Lord NP     I have spoken with and examined the patient. I agree with the above assessment and plan as documented. Unfortunate situation- pt with declining respiratory status and likely pulmonary edema with cirrhosis (recent dx PBC), moderate to severe MR by recent RUBEN, ISABELLA thought to be multifactorial.    Gen: awake  Lungs:  Diminished with crackles  Heart:  RRR with no Murmur/Rubs/Gallops  Abd:NTND  Ext: 3+ edema    Transfer to ICU  Monitor on BiPAP with dialysis for volume removal.  Pt currently on octreotide, albumin, midodrine. GI following for EV, cirrhosis, PBC.     Ppx with scds, PPI  Full code    Lawson Perry MD

## 2022-04-08 NOTE — PROGRESS NOTES
4/8/2022 1:26 PM    Admit Date: 4/5/2022    Admit Diagnosis: ISAB[facility]LLA (acute kidney injury) ([facility] [N17.9]      Subjective:   No chest pain but more short of breath      Objective:      Visit Vitals  /71 (BP 1 Location: Left arm, BP Patient Position: Sitting)   Pulse (!) 105   Temp 98.1 °F (36.7 °C)   Resp 22   Ht 5' 4\" (1.626 m)   Wt 272 lb 11.3 oz (123.7 kg)   SpO2 93%   BMI 46.81 kg/m²       Physical Exam:  Alka Molina, Well Nourished, No Acute Distress, Alert & Oriented x 3, appropriate mood. Neck- supple, no JVD  CV- regular rate and rhythm no MRG  Lung-Rales bilaterally  Abd- soft, nontender, nondistended  Ext-1-2+ edema bilaterally. Skin- warm and dry        Data Review:   Recent Labs     04/08/22  0659 04/07/22  0521 04/06/22  1452      < >  --    K 4.0   < >  --    BUN 81*   < >  --    CREA 2.80*   < >  --    WBC 23.9*   < >  --    HGB 7.8*   < >  --    HCT 25.2*   < >  --    *   < >  --    INR  --   --  1.0    < > = values in this interval not displayed. Assessment/Plan:     Principal Problem:    ISABELLA (acute kidney injury) ([facility] (4/5/2022)    Active Problems:    Hypoxia, home O2 at night 3L (1/13/2010)      Diabetes mellitus ([facility] (1/13/2010)      Snoring, likely JANY (1/13/2010)      Morbid obesity ([facility] (1/14/2010)      History of ST elevation myocardial infarction (STEMI) (1/9/2022)      Overview: Last Assessment & Plan:       Formatting of this note might be different from the original.      Inferior STEMI with large amount of thrombus.  Total mid occlusion of the       RCA       Mild left sided CAD - EF 40-50% pLAD       Inferior wall hypokinesis with MR (moderate by cath)      Aspiration Thrombectomy and OSWALDO - RCA       Denies any ongoing angina.      - ASA, Brillinta, Lisinopril, Metoprolol      Liver cirrhosis secondary to [facility]) ()      Secondary esophageal varices without bleeding ([facility] (3/25/2022)      Chronic diastolic congestive heart failure ([facility] (3/25/2022) worse. We will start IV Lasix and monitor creatinine and electrolytes closely. UTI (urinary tract infection) (4/5/2022)      Iron deficiency anemia secondary to blood loss (chronic) (4/5/2022)      Acute on chronic respiratory failure with hypoxia and hypercapnia ([facility] (4/8/2022)      CAD. Recurrent GI bleed. STEMI with stents and January. Discussed options and will continue aspirin with no dual antiplatelet therapy.

## 2022-04-08 NOTE — PROGRESS NOTES
END OF SHIFT NOTE:    Intake/Output  04/07 1901 - 04/08 0700  In: 897 [I.V.:897]  Out: 100 [Urine:100]   Voiding: NO  Catheter: YES  Drain:              Stool:  0 occurrences. Stool Assessment  Stool Color: Black (04/07/22 1458)  Stool Appearance: Soft (04/07/22 1941)  Stool Amount: Medium (04/07/22 1458)  Stool Source/Status: Incontinence (04/07/22 1458)    Emesis:  0 occurrences. VITAL SIGNS  Patient Vitals for the past 12 hrs:   Temp Pulse Resp BP SpO2   04/08/22 0339 97 °F (36.1 °C) (!) 106 18 122/64 94 %   04/08/22 0155 -- -- -- -- 95 %   04/07/22 2357 -- 99 -- -- 95 %   04/07/22 2330 98.2 °F (36.8 °C) (!) 118 22 (!) 153/72 95 %   04/07/22 2301 -- (!) 145 -- -- (!) 78 %   04/07/22 1951 97 °F (36.1 °C) (!) 106 17 124/64 100 %   04/07/22 1941 -- -- -- -- 97 %       Pain Assessment  Pain 1  Pain Scale 1: Numeric (0 - 10) (04/08/22 0339)  Pain Intensity 1: 0 (04/08/22 0339)  Patient Stated Pain Goal: 0 (04/08/22 0339)  Pain Reassessment 1: Yes (04/07/22 0348)  Pain Onset 1: ongoing (04/06/22 0740)  Pain Location 1: Buttocks; Coccyx (04/06/22 0740)  Pain Orientation 1: Posterior (04/06/22 0740)  Pain Description 1: Aching (04/06/22 0740)  Pain Intervention(s) 1: Medication (see MAR) (04/07/22 0348)    Ambulating  No    Additional Information:   Lopressor x1  desat to 78% but stable now. Shift report given to oncoming nurse at the bedside.     Sunshine Ruelas RN

## 2022-04-08 NOTE — PROGRESS NOTES
8 HR CRRT  initiated using right IJ. Aspirated and flushed both ports without difficulty. Machine settings per MD order. 500 UFR  300 DFR  150 BFR  4K Bath. Reported to primary nurse. Dialysis nurse available as needed.      04/08/22 8084   Dialyzer/Set Up Inspection   Dialyzer/Set Up Inspection F-6   Alarms Verified Yes   Test Pass Yes   pH 7   Machine Conductivity 14   Meter Conductivity 14   Reverse Osmosis Safety Checks   Reverse Osmosis Machine Log Completed Yes   Total Chlorine Test Negative   Machine Initiation   Machine Number k1   Unused Lines Clamped Yes   Machine Temperature 95 °F (35 °C)   Dialysis Initiation   All Connections Secured Yes   NS Bag  Yes   Saline Line Double Clamped Yes   Prime Given   Air Foam Detector Engaged Yes   Dialysate NA (mEq/L) 140   Dialysate K (mEq/L) 4   Dialysate CA (mEq/L) 2.5   Dialysate HCO3 (mEq/L) 35   Citrasate No   During Hemodialysis    Pulse (Heart Rate) 96   BP (!) 109/53   MAP (Calculated) 72   Transducer Checks Dry   Saline Given (mL) 300 mL   Heparin Bolus (units) 1000 units   Continuous Heparin Infusion (Units/hr) 500 Units/hr   Blood Flow Rate (ml/min) 150 ml/min   Dialysate Flow Rate (ml/hr) 300 ml/hr   Arterial Access Pressure (mmHg) 70   Venous Return Pressure (mmHg) 40   Transmembrane Pressure (mmHg) 50 mmHg   $$ Dialysis Charges   $$ Method CRRT

## 2022-04-08 NOTE — PROGRESS NOTES
TRANSFER - IN REPORT:    Verbal report received from 5th floor (name) on [address]  being received from 5th floor (unit) for change in patient condition(respiratory decompensation )      Report consisted of patients Situation, Background, Assessment and   Recommendations(SBAR). Information from the following report(s) SBAR, Kardex, Intake/Output, Recent Results, Med Rec Status, Cardiac Rhythm ST/ ST and Alarm Parameters  was reviewed with the receiving nurse. Opportunity for questions and clarification was provided. Assessment completed upon patients arrival to unit and care assumed.

## 2022-04-08 NOTE — PROGRESS NOTES
Problem: Pressure Injury - Risk of  Goal: *Prevention of pressure injury  Description: Document [employer] Scale and appropriate interventions in the flowsheet. Outcome: Progressing Towards Goal  Note: Pressure Injury Interventions:  Sensory Interventions: Assess changes in LOC,Assess need for specialty bed    Moisture Interventions: Absorbent underpads,Apply protective barrier, creams and emollients    Activity Interventions: Assess need for specialty bed,Chair cushion,Pressure redistribution bed/mattress(bed type)    Mobility Interventions: Assess need for specialty bed,Chair cushion,Pressure redistribution bed/mattress (bed type)    Nutrition Interventions: Document food/fluid/supplement intake    Friction and Shear Interventions: Apply protective barrier, creams and emollients,Feet elevated on foot rest,Lift sheet                Problem: Patient Education: Go to Patient Education Activity  Goal: Patient/Family Education  Outcome: Progressing Towards Goal     Problem: Falls - Risk of  Goal: *Absence of Falls  Description: Document [place] Fall Risk and appropriate interventions in the flowsheet.   Outcome: Progressing Towards Goal  Note: Fall Risk Interventions:  Mobility Interventions: Assess mobility with egress test,Bed/chair exit alarm,Patient to call before getting OOB         Medication Interventions: Assess postural VS orthostatic hypotension,Bed/chair exit alarm,Patient to call before getting OOB    Elimination Interventions: Bed/chair exit alarm,Call light in reach,Patient to call for help with toileting needs              Problem: Patient Education: Go to Patient Education Activity  Goal: Patient/Family Education  Outcome: Progressing Towards Goal     Problem: General Medical Care Plan  Goal: *Vital signs within specified parameters  Outcome: Progressing Towards Goal  Goal: *Skin integrity maintained  Outcome: Progressing Towards Goal  Goal: *Fluid volume balance  Outcome: Progressing Towards Goal Problem: Fluid Volume - Risk of, Imbalanced  Goal: *Balanced intake and output  Outcome: Progressing Towards Goal

## 2022-04-08 NOTE — PROGRESS NOTES
Checked back in on patient. Reports SOB is improved. Appears to be little additional urine output since receiving lasix this AM. No new complaints voiced. Will follow per outreach protocol.

## 2022-04-08 NOTE — PROGRESS NOTES
Patient is being transferred to unit for next level of care   Charge RN spoke with family about tranfer. PT and OT recommendation  Is [facility]. Patient would like a [facility] in the [facility] area when ready for rehab.

## 2022-04-08 NOTE — PROGRESS NOTES
TRANSFER - OUT REPORT:    Verbal report given to Jose Raul(name) on Flower Dotson Finely  being transferred to ICU(unit) for change in patient condition(increased oxygen needs)       Report consisted of patients Situation, Background, Assessment and   Recommendations(SBAR). Information from the following report(s) SBAR, Procedure Summary and Recent Results was reviewed with the receiving nurse. Opportunity for questions and clarification was provided.       Patient will be transported with:   O2 @ 15 liters  Registered Nurse

## 2022-04-08 NOTE — PROGRESS NOTES
Hospitalist Progress Note   Admit Date:  2022  2:14 PM   Name:  Louise Rubio   Age:  69 y.o. Sex:  female  :  1955   MRN:  [id number]   Room:  3/    Presenting Complaint: Fatigue    Reason(s) for Admission: ISABELLA (acute kidney injury) [facility]) [N17.9]     Hospital Course & Interval History:   Wilder Oconnor is a 69 y.o. female with medical history of CAD s/p STEMI with OSWALDO to RCA in 2022, DM2, COPD with nocturnal hypoxia, hypothyroidism, and recent diagnosis of cirrhosis who presented with fatigue. Constant, progressively worsening, since discharge from hospital 3-28. She has also had some nausea, dry heaves, dark \"mushy\" stools, decreased PO intake for several days. She has been compliant with all meds including iron and has been taking lasix as well despite minimal intake. No CP, SOB, fevers, abd pain.     She was discharged 3-28 after a stay for ISABELLA, cirrhosis, suspected GIB. PT/OT recommended HH at discharge and she did not want SNF. She is agreeable to SNF now.     Reviewed DC summary from 3-28:  \"Admitted for RUDD cirrhosis, ISABELLA.  Nephrology and GI consulted. [place] studies ok.  Hepatitis panel and other workup ok.  ISABELLA improved with IVF.  LFTs have remained stable, GI planning follow up.   EGD and colonoscopy delayed due to brilinta needing to be held prior to procedures.  Colonoscopy with diverticulosis, IH, and 2 small polyps.  Repeat in a year after being able to come off brilinta longer.  EGD showed esophageal varices without bleeding.  No banding done due to brilinta.  PHG also seen.  Increased PPi to BID.  High fiber recommended by GI.  Will add iron as the assumption is she has had chronic or intermittent micro blood loss on brilinta.  This may help with some of her intermittent diarrhea as well     Subjective/24hr Events (22): Patient seen and examined at the bedside. Patient is complaining of shortness of breath and wheezing.   Her shortness of breath worsened overnight. She can barely complete full sentences. She was given a dose of Lasix with minimal response. Patient denies chest pain, palpitations, diarrhea. ROS:  10 systems reviewed and negative except as noted above. Assessment & Plan:   Acute on chronic hypoxic and hypercapnic respiratory failure  Currently patient is saturating well on 5 L of oxygen NC  Uses home oxygen at night 3 L  Chest x-ray showed pulmonary edema  ABG showed respiratory acidosis  S/p 1 dose of Lasix and Solu-Medrol  Continue albuterol neb, add pulmicort.   Start on IV steroids  Placing on BiPAP and transferring to ICU for close monitoring of respiratory status  Pulmonary is consulted    Leukocytosis of 24K  Most likely due to steroids  Holding off on antibiotics for now as it is less likely pneumonia      ISABELLA (acute kidney injury) ([address]  ISABELLA likely multifactorial pre renal azotemia, underlying liver cirrhosis  And HRS  urine studies consistent with urine sodium 6  Cr 2.5 and baseline is 1.5  Net positive by 514 cc in 24 hours  Not responding to Lasix  Renal US with no evidence of obstructive nephropathy  Holding metformin  strict I&O   Continue albumin, midodrine and octreotide   IR is consulted for temporary line placement for volume and clearance  -daily BMP    PBC  Elevated AMA and ASMA with elevated AP  Continue PPI twice daily and octreotide  Started at outside ER on 4/6  Meld score 19 and needs to be evaluated for liver transplant       UTI (urinary tract infection)   urine cultures showed Klebsiella   completed rocephin  .   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  s/p PRBC 4/5 and on 4/7  Hb stable 7-8  -transfuse PRN if hemoglobin is less than 7  -cont PO iron    Thrombocytopenia  Most likely due to alcohol  Platelet count is stable       Chronic diastolic congestive heart failure ([address]   -hold home lasix        Obstructive sleep apnea and hypoxia, home O2 at night 3L  Follow-up with pulmonary for outpatient PFTs       Diabetes mellitus ([facility]  -ISS, ADA diet       Morbid obesity ([facility]   -complicates care and worsens prognosis       History of ST elevation myocardial infarction (STEMI) with PCI on 1/22   brilinta is stopped indefinitely due to recurrent GI bleed  Continue ASA   -cont statin as well  Cardio was consulted       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 Work up for liver last admission with elevated M2 Ab and SMAB as above        Secondary esophageal varices without bleeding ([facility]   Recent EGD with esophageal varices without bleeding     PT recommended STR        Dispo/Discharge Planning: Pending clinical course     PT/OT/CM consulted. PPD ordered     Diet: ADULT DIET Regular; 4 carb choices (60 gm/meal);  Low Fat/Low Chol/High Fiber/GELY; Low Sodium (2 gm)  VTE ppx: SCDs  Code status: Full Code    Hospital Problems as of 4/8/2022 Date Reviewed: 4/5/2022          Codes Class Noted - Resolved POA    Acute on chronic respiratory failure with hypoxia and hypercapnia (HCC) ICD-10-CM: J96.21, J96.22  ICD-9-CM: 518.84, 786.09, 799.02  4/8/2022 - Present Unknown        * (Principal) ISABELLA (acute kidney injury) ([facility] ICD-10-CM: N17.9  ICD-9-CM: 584.9  4/5/2022 - Present Yes        UTI (urinary tract infection) ICD-10-CM: N39.0  ICD-9-CM: 599.0  4/5/2022 - Present Yes        Iron deficiency anemia secondary to blood loss (chronic) ICD-10-CM: D50.0  ICD-9-CM: 280.0  4/5/2022 - Present Yes        Secondary esophageal varices without bleeding ([facility] (Chronic) ICD-10-CM: I85.10  ICD-9-CM: 456.21  3/25/2022 - Present Yes        Chronic diastolic congestive heart failure (HCC) (Chronic) ICD-10-CM: I50.32  ICD-9-CM: 428.32, 428.0  3/25/2022 - Present Yes        Liver cirrhosis secondary to RUDD ([facility] (Chronic) ICD-10-CM: K75.81, K74.60  ICD-9-CM: 571.8, 571.5  Unknown - Present Yes        History of ST elevation myocardial infarction (STEMI) (Chronic) ICD-10-CM: I25.2  ICD-9-CM: 889  1/9/2022 - Present Yes    Overview Signed 2/11/2022  8:13 AM by Juliana Olivia MD     Last Assessment & Plan:   Formatting of this note might be different from the original.  Inferior STEMI with large amount of thrombus.  Total mid occlusion of the RCA   Mild left sided CAD - EF 40-50% pLAD   Inferior wall hypokinesis with MR (moderate by cath)  Aspiration Thrombectomy and OSWALDO - RCA   Denies any ongoing angina.  - ASA, Brillinta, Lisinopril, Metoprolol             Morbid obesity (HCC) (Chronic) ICD-10-CM: E66.01  ICD-9-CM: 278.01  1/14/2010 - Present Yes        Hypoxia, home O2 at night 3L (Chronic) ICD-10-CM: R09.02  ICD-9-CM: 799.02  1/13/2010 - Present Yes        Diabetes mellitus ([facility] (Chronic) ICD-10-CM: E11.9  ICD-9-CM: 250.00  1/13/2010 - Present Yes        Snoring, likely JANY (Chronic) ICD-10-CM: R06.83  ICD-9-CM: 786.09  1/13/2010 - Present Yes              Objective:     Patient Vitals for the past 24 hrs:   Temp Pulse Resp BP SpO2   04/08/22 1336 -- -- -- -- 93 %   04/08/22 1226 -- (!) 105 22 -- 93 %   04/08/22 1135 -- -- -- -- 94 %   04/08/22 1118 98.1 °F (36.7 °C) (!) 109 22 123/71 94 %   04/08/22 0749 97.6 °F (36.4 °C) (!) 104 25 (!) 117/57 95 %   04/08/22 0745 -- -- -- -- 95 %   04/08/22 0729 -- -- -- -- 94 %   04/08/22 0339 97 °F (36.1 °C) (!) 106 18 122/64 94 %   04/08/22 0155 -- -- -- -- 95 %   04/07/22 2357 -- 99 -- -- 95 %   04/07/22 2330 98.2 °F (36.8 °C) (!) 118 22 (!) 153/72 95 %   04/07/22 2301 -- (!) 145 -- -- (!) 78 %   04/07/22 1951 97 °F (36.1 °C) (!) 106 17 124/64 100 %   04/07/22 1941 -- -- -- -- 97 %   04/07/22 1648 -- -- -- -- 95 %   04/07/22 1458 97.4 °F (36.3 °C) 93 16 125/61 96 %     Oxygen Therapy  O2 Sat (%): 93 % (04/08/22 1336)  Pulse via Oximetry: 96 beats per minute (04/08/22 1336)  O2 Device: Nasal cannula (04/08/22 1336)  Skin Assessment: Clean, dry, & intact (04/08/22 0745)  Skin Protection for O2 Device: N/A (04/08/22 0745)  O2 Flow Rate (L/min): 6 l/min (04/08/22 1336)  FIO2 (%): 32 % (04/07/22 0245)    Estimated body mass index is 46.81 kg/m² as calculated from the following:    Height as of this encounter: 5' 4\" (1.626 m). Weight as of this encounter: 123.7 kg (272 lb 11.3 oz). Intake/Output Summary (Last 24 hours) at 4/8/2022 1415  Last data filed at 4/8/2022 1321  Gross per 24 hour   Intake 897 ml   Output 775 ml   Net 122 ml         Physical Exam:     Blood pressure 123/71, pulse (!) 105, temperature 98.1 °F (36.7 °C), resp. rate 22, height 5' 4\" (1.626 m), weight 123.7 kg (272 lb 11.3 oz), SpO2 93 %. General:    Well nourished. No overt distress  Head:  Normocephalic, atraumatic  Eyes:  Sclerae appear normal.  Pupils equally round. ENT:  Nares appear normal, no drainage. Moist oral mucosa  Neck:  No restricted ROM. Trachea midline   CV:   RRR. No m/r/g. No jugular venous distension. Lungs:   CTAB. No wheezing, rhonchi, or rales. Respirations even, unlabored  Abdomen: Bowel sounds present. Soft, nontender, nondistended. Extremities: No cyanosis or clubbing. No edema  Skin:     No rashes and normal coloration. Warm and dry. Neuro:  CN II-XII grossly intact. Sensation intact. A&Ox3  Psych:  Normal mood and affect.       I have reviewed ordered lab tests and independently visualized imaging below:    Recent Labs:  Recent Results (from the past 48 hour(s))   PROTHROMBIN TIME + INR    Collection Time: 04/06/22  2:52 PM   Result Value Ref Range    Prothrombin time 13.6 12.6 - 14.5 sec    INR 1.0     GLUCOSE, POC    Collection Time: 04/06/22  4:42 PM   Result Value Ref Range    Glucose (POC) 144 (H) 65 - 100 mg/dL    Performed by [facility]    Collection Time: 04/06/22  8:04 PM   Result Value Ref Range    Immunoglobulin G, Qt. 1,838 (H) 586 - 1,602 mg/dL   GLUCOSE, POC    Collection Time: 04/06/22  9:08 PM   Result Value Ref Range    Glucose (POC) 155 (H) 65 - 100 mg/dL    Performed by [facility] Jagdish Joey [facility] PPD TEST IN 48 HRS    Collection Time: 04/07/22 12:00 AM   Result Value Ref Range    PPD Negative Negative    mm Induration 0 0 - 5 mm   CBC WITH AUTOMATED DIFF    Collection Time: 04/07/22  5:21 AM   Result Value Ref Range    WBC 10.2 4.3 - 11.1 K/uL    RBC 2.66 (L) 4.05 - 5.2 M/uL    HGB 7.4 (L) 11.7 - 15.4 g/dL    HCT 24.4 (L) 35.8 - 46.3 %    MCV 91.7 79.6 - 97.8 FL    MCH 27.8 26.1 - 32.9 PG    MCHC 30.3 (L) 31.4 - 35.0 g/dL    RDW 20.9 (H) 11.9 - 14.6 %    PLATELET 95 (L) 191 - 450 K/uL    MPV 9.8 9.4 - 12.3 FL    ABSOLUTE NRBC 0.00 0.0 - 0.2 K/uL    DF AUTOMATED      NEUTROPHILS 87 (H) 43 - 78 %    LYMPHOCYTES 6 (L) 13 - 44 %    MONOCYTES 6 4.0 - 12.0 %    EOSINOPHILS 0 (L) 0.5 - 7.8 %    BASOPHILS 0 0.0 - 2.0 %    IMMATURE GRANULOCYTES 1 0.0 - 5.0 %    ABS. NEUTROPHILS 8.9 (H) 1.7 - 8.2 K/UL    ABS. LYMPHOCYTES 0.6 0.5 - 4.6 K/UL    ABS. MONOCYTES 0.6 0.1 - 1.3 K/UL    ABS. EOSINOPHILS 0.0 0.0 - 0.8 K/UL    ABS. BASOPHILS 0.0 0.0 - 0.2 K/UL    ABS. IMM.  GRANS. 0.1 0.0 - 0.5 K/UL   METABOLIC PANEL, BASIC    Collection Time: 04/07/22  5:21 AM   Result Value Ref Range    Sodium 137 136 - 145 mmol/L    Potassium 3.8 3.5 - 5.1 mmol/L    Chloride 104 98 - 107 mmol/L    CO2 24 21 - 32 mmol/L    Anion gap 9 7 - 16 mmol/L    Glucose 122 (H) 65 - 100 mg/dL    BUN 80 (H) 8 - 23 MG/DL    Creatinine 2.50 (H) 0.6 - 1.0 MG/DL    GFR est AA 25 (L) >60 ml/min/1.73m2    GFR est non-AA 20 (L) >60 ml/min/1.73m2    Calcium 9.1 8.3 - 10.4 MG/DL   MAGNESIUM    Collection Time: 04/07/22  5:21 AM   Result Value Ref Range    Magnesium 2.1 1.8 - 2.4 mg/dL   PHOSPHORUS    Collection Time: 04/07/22  5:21 AM   Result Value Ref Range    Phosphorus 4.2 (H) 2.3 - 3.7 MG/DL   METABOLIC PANEL, COMPREHENSIVE    Collection Time: 04/07/22  5:21 AM   Result Value Ref Range    Sodium 138 136 - 145 mmol/L    Potassium 3.8 3.5 - 5.1 mmol/L    Chloride 104 98 - 107 mmol/L    CO2 24 21 - 32 mmol/L    Anion gap 10 7 - 16 mmol/L    Glucose 123 (H) 65 - 100 mg/dL BUN 82 (H) 8 - 23 MG/DL    Creatinine 2.60 (H) 0.6 - 1.0 MG/DL    GFR est AA 23 (L) >60 ml/min/1.73m2    GFR est non-AA 19 (L) >60 ml/min/1.73m2    Calcium 8.9 8.3 - 10.4 MG/DL    Bilirubin, total 2.7 (H) 0.2 - 1.1 MG/DL    ALT (SGPT) 144 (H) 12 - 65 U/L    AST (SGOT) 275 (H) 15 - 37 U/L    Alk. phosphatase 488 (H) 50 - 136 U/L    Protein, total 7.4 6.3 - 8.2 g/dL    Albumin 2.2 (L) 3.2 - 4.6 g/dL    Globulin 5.2 (H) 2.3 - 3.5 g/dL    A-G Ratio 0.4 (L) 1.2 - 3.5     GLUCOSE, POC    Collection Time: 04/07/22  7:54 AM   Result Value Ref Range    Glucose (POC) 129 (H) 65 - 100 mg/dL    Performed by [facility]    CBC WITH AUTOMATED DIFF    Collection Time: 04/07/22  8:05 AM   Result Value Ref Range    WBC 11.7 (H) 4.3 - 11.1 K/uL    RBC 2.48 (L) 4.05 - 5.2 M/uL    HGB 7.0 (L) 11.7 - 15.4 g/dL    HCT 22.2 (L) 35.8 - 46.3 %    MCV 89.5 79.6 - 97.8 FL    MCH 28.2 26.1 - 32.9 PG    MCHC 31.5 31.4 - 35.0 g/dL    RDW 20.7 (H) 11.9 - 14.6 %    PLATELET 963 (L) 581 - 450 K/uL    MPV 9.9 9.4 - 12.3 FL    ABSOLUTE NRBC 0.00 0.0 - 0.2 K/uL    DF AUTOMATED      NEUTROPHILS 84 (H) 43 - 78 %    LYMPHOCYTES 7 (L) 13 - 44 %    MONOCYTES 8 4.0 - 12.0 %    EOSINOPHILS 0 (L) 0.5 - 7.8 %    BASOPHILS 0 0.0 - 2.0 %    IMMATURE GRANULOCYTES 1 0.0 - 5.0 %    ABS. NEUTROPHILS 9.8 (H) 1.7 - 8.2 K/UL    ABS. LYMPHOCYTES 0.8 0.5 - 4.6 K/UL    ABS. MONOCYTES 0.9 0.1 - 1.3 K/UL    ABS. EOSINOPHILS 0.0 0.0 - 0.8 K/UL    ABS. BASOPHILS 0.0 0.0 - 0.2 K/UL    ABS. IMM. GRANS. 0.1 0.0 - 0.5 K/UL   RBC, ALLOCATE    Collection Time: 04/07/22  9:30 AM   Result Value Ref Range    HISTORY CHECKED?  Historical check performed    GLUCOSE, POC    Collection Time: 04/07/22 11:01 AM   Result Value Ref Range    Glucose (POC) 119 (H) 65 - 100 mg/dL    Performed by Stanislav    CBC WITH AUTOMATED DIFF    Collection Time: 04/07/22  2:38 PM   Result Value Ref Range    WBC 16.3 (H) 4.3 - 11.1 K/uL    RBC 2.91 (L) 4.05 - 5.2 M/uL HGB 8.2 (L) 11.7 - 15.4 g/dL    HCT 26.2 (L) 35.8 - 46.3 %    MCV 90.0 79.6 - 97.8 FL    MCH 28.2 26.1 - 32.9 PG    MCHC 31.3 (L) 31.4 - 35.0 g/dL    RDW 19.9 (H) 11.9 - 14.6 %    PLATELET 858 (L) 018 - 450 K/uL    MPV 10.3 9.4 - 12.3 FL    ABSOLUTE NRBC 0.00 0.0 - 0.2 K/uL    DF AUTOMATED      NEUTROPHILS 86 (H) 43 - 78 %    LYMPHOCYTES 5 (L) 13 - 44 %    MONOCYTES 8 4.0 - 12.0 %    EOSINOPHILS 0 (L) 0.5 - 7.8 %    BASOPHILS 0 0.0 - 2.0 %    IMMATURE GRANULOCYTES 1 0.0 - 5.0 %    ABS. NEUTROPHILS 14.0 (H) 1.7 - 8.2 K/UL    ABS. LYMPHOCYTES 0.7 0.5 - 4.6 K/UL    ABS. MONOCYTES 1.3 0.1 - 1.3 K/UL    ABS. EOSINOPHILS 0.0 0.0 - 0.8 K/UL    ABS. BASOPHILS 0.0 0.0 - 0.2 K/UL    ABS. IMM. GRANS. 0.2 0.0 - 0.5 K/UL   GLUCOSE, POC    Collection Time: 04/07/22  3:58 PM   Result Value Ref Range    Glucose (POC) 95 65 - 100 mg/dL    Performed by Radha    GLUCOSE, POC    Collection Time: 04/07/22  8:39 PM   Result Value Ref Range    Glucose (POC) 120 (H) 65 - 100 mg/dL    Performed by HalliePCT    CBC WITH AUTOMATED DIFF    Collection Time: 04/08/22  6:59 AM   Result Value Ref Range    WBC 23.9 (H) 4.3 - 11.1 K/uL    RBC 2.77 (L) 4.05 - 5.2 M/uL    HGB 7.8 (L) 11.7 - 15.4 g/dL    HCT 25.2 (L) 35.8 - 46.3 %    MCV 91.0 79.6 - 97.8 FL    MCH 28.2 26.1 - 32.9 PG    MCHC 31.0 (L) 31.4 - 35.0 g/dL    RDW 20.5 (H) 11.9 - 14.6 %    PLATELET 954 (L) 347 - 450 K/uL    MPV 9.3 (L) 9.4 - 12.3 FL    ABSOLUTE NRBC 0.05 0.0 - 0.2 K/uL    DF AUTOMATED      NEUTROPHILS 87 (H) 43 - 78 %    LYMPHOCYTES 4 (L) 13 - 44 %    MONOCYTES 8 4.0 - 12.0 %    EOSINOPHILS 0 (L) 0.5 - 7.8 %    BASOPHILS 0 0.0 - 2.0 %    IMMATURE GRANULOCYTES 2 0.0 - 5.0 %    ABS. NEUTROPHILS 20.8 (H) 1.7 - 8.2 K/UL    ABS. LYMPHOCYTES 0.8 0.5 - 4.6 K/UL    ABS. MONOCYTES 1.9 (H) 0.1 - 1.3 K/UL    ABS. EOSINOPHILS 0.0 0.0 - 0.8 K/UL    ABS. BASOPHILS 0.0 0.0 - 0.2 K/UL    ABS. IMM. GRANS.  0.4 0.0 - 0.5 K/UL   METABOLIC PANEL, COMPREHENSIVE    Collection Time: 04/08/22  6:59 AM   Result Value Ref Range    Sodium 136 136 - 145 mmol/L    Potassium 4.0 3.5 - 5.1 mmol/L    Chloride 104 98 - 107 mmol/L    CO2 23 21 - 32 mmol/L    Anion gap 9 7 - 16 mmol/L    Glucose 109 (H) 65 - 100 mg/dL    BUN 81 (H) 8 - 23 MG/DL    Creatinine 2.80 (H) 0.6 - 1.0 MG/DL    GFR est AA 22 (L) >60 ml/min/1.73m2    GFR est non-AA 18 (L) >60 ml/min/1.73m2    Calcium 8.9 8.3 - 10.4 MG/DL    Bilirubin, total 2.8 (H) 0.2 - 1.1 MG/DL    ALT (SGPT) 117 (H) 12 - 65 U/L    AST (SGOT) 239 (H) 15 - 37 U/L    Alk.  phosphatase 383 (H) 50 - 136 U/L    Protein, total 7.4 6.3 - 8.2 g/dL    Albumin 2.9 (L) 3.2 - 4.6 g/dL    Globulin 4.5 (H) 2.3 - 3.5 g/dL    A-G Ratio 0.6 (L) 1.2 - 3.5     MAGNESIUM    Collection Time: 04/08/22  6:59 AM   Result Value Ref Range    Magnesium 2.0 1.8 - 2.4 mg/dL   PHOSPHORUS    Collection Time: 04/08/22  6:59 AM   Result Value Ref Range    Phosphorus 4.4 (H) 2.3 - 3.7 MG/DL   GLUCOSE, POC    Collection Time: 04/08/22  8:33 AM   Result Value Ref Range    Glucose (POC) 111 (H) 65 - 100 mg/dL    Performed by Cristhian    BLOOD GAS, ARTERIAL POC    Collection Time: 04/08/22 11:12 AM   Result Value Ref Range    Device: NASAL CANNULA      pH (POC) 7.21 (LL) 7.35 - 7.45      pCO2 (POC) 55.9 (H) 35 - 45 MMHG    pO2 (POC) 72 (L) 75 - 100 MMHG    HCO3 (POC) 22.1 22 - 26 MMOL/L    sO2 (POC) 90.0 (L) 95 - 98 %    Base deficit (POC) 5.8 mmol/L    Allens test (POC) Positive      Site LEFT RADIAL      Specimen type (POC) ARTERIAL      Performed by Darryn     Critical value read back MAYKEL     FIO2, L/min 8     GLUCOSE, POC    Collection Time: 04/08/22 11:24 AM   Result Value Ref Range    Glucose (POC) 155 (H) 65 - 100 mg/dL    Performed by Michelle    NT-PRO BNP    Collection Time: 04/08/22 12:37 PM   Result Value Ref Range    NT pro-BNP 26,492 (H) 5 - 125 PG/ML   C REACTIVE PROTEIN, QT    Collection Time: 04/08/22 12:37 PM   Result Value Ref Range    C-Reactive protein 10. 8 (H) 0.0 - 0.9 mg/dL   PROTHROMBIN TIME + INR    Collection Time: 04/08/22  1:14 PM   Result Value Ref Range    Prothrombin time 15.9 (H) 12.6 - 14.5 sec    INR 1.2         All Micro Results     Procedure Component Value Units Date/Time    CULTURE, BLOOD [[id number]] Collected: 04/05/22 1645    Order Status: Completed Specimen: Blood Updated: 04/08/22 0737     Special Requests: --        LEFT  Antecubital       Culture result: NO GROWTH 3 DAYS       CULTURE, BLOOD [[id number]] Collected: 04/05/22 1647    Order Status: Completed Specimen: Blood Updated: 04/08/22 0737     Special Requests: --        RIGHT  ARM       Culture result: NO GROWTH 3 DAYS       CULTURE, URINE [[id number]]  (Abnormal)  (Susceptibility) Collected: 04/05/22 1804    Order Status: Completed Specimen: Urine from Clean catch Updated: 04/08/22 0709     Special Requests: NO SPECIAL REQUESTS        Culture result:       >100,000 COLONIES/mL KLEBSIELLA PNEUMONIAE                  <10,000 COLONIES/mL NORMAL SKIN ROHIT ISOLATED          CULTURE, URINE [[id number]]     Order Status: Canceled Specimen: Urine from Clean catch           Other Studies:  XR CHEST SNGL V    Result Date: 4/8/2022  Portable chest x-ray CLINICAL INDICATION: Shortness of breath FINDINGS: Single AP view of the chest compared to a similar exam dated 4/5/2022 shows worsening bilateral pulmonary edema. The cardiac silhouette and mediastinum are unremarkable. There is no pleural effusion. The cardiac silhouette and mediastinum are unremarkable. There is no pneumothorax. Worsening bilateral pulmonary edema. Note, atypical pneumonia could also be considered.       Current Meds:  Current Facility-Administered Medications   Medication Dose Route Frequency    albuterol-ipratropium (DUO-NEB) 2.5 MG-0.5 MG/3 ML  3 mL Nebulization NOW    furosemide (LASIX) injection 40 mg  40 mg IntraVENous BID    methylPREDNISolone (PF) (SOLU-MEDROL) injection 40 mg  40 mg IntraVENous Q8H    [START ON 4/9/2022] pantoprazole (PROTONIX) 40 mg in 0.9% sodium chloride 10 mL injection  40 mg IntraVENous DAILY    budesonide (PULMICORT) 500 mcg/2 ml nebulizer suspension  500 mcg Nebulization BID RT    midodrine (PROAMATINE) tablet 10 mg  10 mg Oral TID WITH MEALS    0.9% sodium chloride infusion 250 mL  250 mL IntraVENous PRN    0.9% sodium chloride infusion 250 mL  250 mL IntraVENous PRN    0.9% sodium chloride infusion 250 mL  250 mL IntraVENous PRN    ondansetron (ZOFRAN) injection 4 mg  4 mg IntraVENous Q6H PRN    octreotide (SANDOSTATIN) injection 100 mcg  100 mcg SubCUTAneous TID    ursodioL (ACTIGALL) capsule 900 mg  900 mg Oral BID WITH MEALS    sodium chloride (NS) flush 5-10 mL  5-10 mL IntraVENous Q8H    sodium chloride (NS) flush 5-10 mL  5-10 mL IntraVENous PRN    0.9% sodium chloride infusion 250 mL  250 mL IntraVENous PRN    aspirin delayed-release tablet 81 mg  81 mg Oral DAILY    atorvastatin (LIPITOR) tablet 80 mg  80 mg Oral QHS    albuterol (PROVENTIL VENTOLIN) nebulizer solution 2.5 mg  2.5 mg Nebulization Q6H PRN    DULoxetine (CYMBALTA) capsule 30 mg  30 mg Oral DAILY    ferrous sulfate tablet 325 mg  1 Tablet Oral BID WITH MEALS    [Held by provider] furosemide (LASIX) tablet 40 mg  40 mg Oral DAILY    hydrOXYzine pamoate (VISTARIL) capsule 25 mg  25 mg Oral BID PRN    levothyroxine (SYNTHROID) tablet 175 mcg  175 mcg Oral ACB    [Held by provider] metoprolol tartrate (LOPRESSOR) tablet 12.5 mg  12.5 mg Oral BID    insulin lispro (HUMALOG) injection   SubCUTAneous AC&HS    sodium chloride (NS) flush 5-40 mL  5-40 mL IntraVENous Q8H    sodium chloride (NS) flush 5-40 mL  5-40 mL IntraVENous PRN    acetaminophen (TYLENOL) tablet 650 mg  650 mg Oral Q6H PRN    Or    acetaminophen (TYLENOL) suppository 650 mg  650 mg Rectal Q6H PRN    polyethylene glycol (MIRALAX) packet 17 g  17 g Oral DAILY PRN    bisacodyL (DULCOLAX) suppository 10 mg  10 mg Rectal DAILY PRN    famotidine (PEPCID) tablet 20 mg  20 mg Oral BID PRN    alum-mag hydroxide-simeth (MYLANTA) oral suspension 15 mL  15 mL Oral Q6H PRN    hydrALAZINE (APRESOLINE) 20 mg/mL injection 20 mg  20 mg IntraVENous Q4H PRN    oxyCODONE IR (ROXICODONE) tablet 5 mg  5 mg Oral Q4H PRN    guaiFENesin-dextromethorphan (ROBITUSSIN DM) 100-10 mg/5 mL syrup 10 mL  10 mL Oral Q4H PRN    senna-docusate (PERICOLACE) 8.6-50 mg per tablet 2 Tablet  2 Tablet Oral DAILY PRN    melatonin tablet 5 mg  5 mg Oral QHS PRN    prochlorperazine (COMPAZINE) with saline injection 10 mg  10 mg IntraVENous Q6H PRN    nystatin (MYCOSTATIN) 100,000 unit/gram powder   Topical BID    sucralfate (CARAFATE) tablet 1 g  1 g Oral TID WITH MEALS    albuterol (PROVENTIL VENTOLIN) nebulizer solution 2.5 mg  2.5 mg Nebulization Q6H RT       Signed:  Josh Montes MD    Part of this note may have been written by using a voice dictation software. The note has been proof read but may still contain some grammatical/other typographical errors.

## 2022-04-08 NOTE — PROGRESS NOTES
Occupational Therapy Note:    Attempted to see pt for OT treatment session--RN requested therapy hold today due to decline in respiratory status overnight. Will continue to follow and attempt to see as schedule allows/medically appropriate.     Thank you,  Mikayla Painting OTR/L

## 2022-04-08 NOTE — PROGRESS NOTES
Attempted to treat. RN reporting pt is being trnf to ICU. Will dc PT service and re order if and when appropriate.

## 2022-04-08 NOTE — PROGRESS NOTES
Critical Care Outreach Nurse Progress Report:    Subjective: In to assess pt secondary to nurse concern - increased SOB and decreased urine output overnight. MEWS Score: 2 (04/07/22 1951)    Vitals:    04/08/22 0155 04/08/22 0339 04/08/22 0444 04/08/22 0729   BP:  122/64     Pulse:  (!) 106     Resp:  18     Temp:  97 °F (36.1 °C)     SpO2: 95% 94%  94%   Weight:   123.7 kg (272 lb 11.3 oz)    Height:            LAB DATA:    Recent Labs     04/07/22  0521 04/06/22  0548 04/05/22  1431     137 137 137   K 3.8  3.8 4.3 4.7     104 105 103   CO2 24  24 25 26   AGAP 10  9 7 8   *  122* 105* 132*   BUN 82*  80* 80* 78*   CREA 2.60*  2.50* 2.50* 2.70*   GFRAA 23*  25* 25* 22*   GFRNA 19*  20* 20* 19*   CA 8.9  9.1 8.9 9.2   MG 2.1  --   --    PHOS 4.2*  --   --    ALB 2.2* 1.7* 1.7*   TP 7.4 7.1 7.5   GLOB 5.2* 5.4* 5.8*   AGRAT 0.4* 0.3* 0.3*   * 151* 159*        Recent Labs     04/07/22  1438 04/07/22  0805 04/07/22  0521   WBC 16.3* 11.7* 10.2   HGB 8.2* 7.0* 7.4*   HCT 26.2* 22.2* 24.4*   * 101* 95*        Objective:     Pain Intensity 1: 0 (04/08/22 0339)  Pain Location 1: Buttocks,Coccyx  Pain Intervention(s) 1: Medication (see MAR)  Patient Stated Pain Goal: 0    Assessment: Patient alert and oriented. Reports dyspnea at rest. Crackles noted on auscultation. SaO2 95% on 5L NC. HR tachycardic with regular rhythm (105 via pulse ox). Small amount of rissa urine in jones bag. VS, labs, and progress notes reviewed. Plan: Primary RN discussing with attending provider. Patient to have CXR and receive lasix and solumedrol. Will follow up per outreach protocol.

## 2022-04-08 NOTE — PROGRESS NOTES
Date of Outreach Update:  Flower Oconnor was seen and assessed. MEWS Score: 3 (04/08/22 1118)  Vitals:    04/08/22 0749 04/08/22 1118 04/08/22 1135 04/08/22 1226   BP: (!) 117/57 123/71     Pulse: (!) 104 (!) 109  (!) 105   Resp: 25 22 22   Temp: 97.6 °F (36.4 °C) 98.1 °F (36.7 °C)     SpO2: 95% 94% 94% 93%   Weight:       Height:             Pain Assessment  Pain Intensity 1: 0 (04/08/22 0745)  Pain Location 1: Buttocks,Coccyx  Pain Intervention(s) 1: Medication (see MAR)  Patient Stated Pain Goal: 0      Previous Outreach assessment has been reviewed. Patient now on BiPAP. Reports feeling more comfortable now. Currently awaiting bed assignment in ICU.

## 2022-04-08 NOTE — PROGRESS NOTES
Pt de-sat to 78%, the nurse increased NC to 10L, and called RT. Lung sound increases wheezing bilateral. HR is 145. MD notified. RT came by and gave PRN albuterol when HR started to go down to 120. Sat increasing and hanging at 98% with 6L NC humidified,  sinus tach per tele. MD order lopressor 5mg IV x1     Continue to monitor.

## 2022-04-08 NOTE — PROGRESS NOTES
Flower Davis  Admission Date: 4/5/2022             Renal Daily Progress Note: 4/8/2022    The patient's chart is reviewed and the patient is discussed with the staff.   Follow up ISABELLA  Subjective:   Pt seen and examined in room sitting up in bed complaints of worsening shortness of breath, + LE edema  675 cc uop via purewick recorded last 24 hrs    ROS:  General: no fever/chills  CV: no CP  Lung: + exertional SOB, no cough  GI: no N/V/D  : no dysuria  Ext: + BLE edema       Current Facility-Administered Medications   Medication Dose Route Frequency    albuterol-ipratropium (DUO-NEB) 2.5 MG-0.5 MG/3 ML  3 mL Nebulization NOW    furosemide (LASIX) injection 40 mg  40 mg IntraVENous BID    cefepime (MAXIPIME) 2 g in 0.9% sodium chloride (MBP/ADV) 100 mL MBP  2 g IntraVENous Q24H    0.9% sodium chloride infusion 250 mL  250 mL IntraVENous PRN    0.9% sodium chloride infusion 250 mL  250 mL IntraVENous PRN    0.9% sodium chloride infusion 250 mL  250 mL IntraVENous PRN    0.9% sodium chloride infusion  75 mL/hr IntraVENous CONTINUOUS    ondansetron (ZOFRAN) injection 4 mg  4 mg IntraVENous Q6H PRN    midodrine (PROAMATINE) tablet 5 mg  5 mg Oral TID WITH MEALS    octreotide (SANDOSTATIN) injection 100 mcg  100 mcg SubCUTAneous TID    ursodioL (ACTIGALL) capsule 900 mg  900 mg Oral BID WITH MEALS    sodium chloride (NS) flush 5-10 mL  5-10 mL IntraVENous Q8H    sodium chloride (NS) flush 5-10 mL  5-10 mL IntraVENous PRN    0.9% sodium chloride infusion 250 mL  250 mL IntraVENous PRN    aspirin delayed-release tablet 81 mg  81 mg Oral DAILY    atorvastatin (LIPITOR) tablet 80 mg  80 mg Oral QHS    albuterol (PROVENTIL VENTOLIN) nebulizer solution 2.5 mg  2.5 mg Nebulization Q6H PRN    DULoxetine (CYMBALTA) capsule 30 mg  30 mg Oral DAILY    ferrous sulfate tablet 325 mg  1 Tablet Oral BID WITH MEALS    [Held by provider] furosemide (LASIX) tablet 40 mg  40 mg Oral DAILY  hydrOXYzine pamoate (VISTARIL) capsule 25 mg  25 mg Oral BID PRN    levothyroxine (SYNTHROID) tablet 175 mcg  175 mcg Oral ACB    [Held by provider] metoprolol tartrate (LOPRESSOR) tablet 12.5 mg  12.5 mg Oral BID    pantoprazole (PROTONIX) tablet 40 mg  40 mg Oral ACB&D    insulin lispro (HUMALOG) injection   SubCUTAneous AC&HS    sodium chloride (NS) flush 5-40 mL  5-40 mL IntraVENous Q8H    sodium chloride (NS) flush 5-40 mL  5-40 mL IntraVENous PRN    acetaminophen (TYLENOL) tablet 650 mg  650 mg Oral Q6H PRN    Or    acetaminophen (TYLENOL) suppository 650 mg  650 mg Rectal Q6H PRN    polyethylene glycol (MIRALAX) packet 17 g  17 g Oral DAILY PRN    bisacodyL (DULCOLAX) suppository 10 mg  10 mg Rectal DAILY PRN    famotidine (PEPCID) tablet 20 mg  20 mg Oral BID PRN    alum-mag hydroxide-simeth (MYLANTA) oral suspension 15 mL  15 mL Oral Q6H PRN    hydrALAZINE (APRESOLINE) 20 mg/mL injection 20 mg  20 mg IntraVENous Q4H PRN    oxyCODONE IR (ROXICODONE) tablet 5 mg  5 mg Oral Q4H PRN    guaiFENesin-dextromethorphan (ROBITUSSIN DM) 100-10 mg/5 mL syrup 10 mL  10 mL Oral Q4H PRN    senna-docusate (PERICOLACE) 8.6-50 mg per tablet 2 Tablet  2 Tablet Oral DAILY PRN    melatonin tablet 5 mg  5 mg Oral QHS PRN    prochlorperazine (COMPAZINE) with saline injection 10 mg  10 mg IntraVENous Q6H PRN    nystatin (MYCOSTATIN) 100,000 unit/gram powder   Topical BID    sucralfate (CARAFATE) tablet 1 g  1 g Oral TID WITH MEALS    albuterol (PROVENTIL VENTOLIN) nebulizer solution 2.5 mg  2.5 mg Nebulization Q6H RT         Objective:     Vitals:    04/08/22 0339 04/08/22 0444 04/08/22 0729 04/08/22 0749   BP: 122/64   (!) 117/57   Pulse: (!) 106   (!) 104   Resp: 18   25   Temp: 97 °F (36.1 °C)   97.6 °F (36.4 °C)   SpO2: 94%  94% 95%   Weight:  123.7 kg (272 lb 11.3 oz)     Height:         Intake and Output:   04/06 1901 - 04/08 0700  In: 1289.1 [I.V.:997]  Out: 775 [Urine:775]  No intake/output data recorded. Physical Exam:   Constitutional:  the patient is well developed and in no acute distress, alert and oriented   HEENT:  Sclera clear, pupils equal, oral mucosa dry  Lungs: coarse bilaterally  Cardiovascular:  Regular rate, S1, S2, no Rub   Abd/GI: soft and non-tender; with positive bowel sounds. Ext: warm without cyanosis. There is 1+ lower leg edema. Skin:  no jaundice or rashes  Neuro: no gross neuro deficits   Psychiatric: Calm. LAB  Recent Labs     04/08/22  0659 04/07/22  1438 04/07/22  0805 04/07/22  0521 04/06/22  1452 04/06/22  0548   WBC 23.9* 16.3* 11.7* 10.2  --    < >   HGB 7.8* 8.2* 7.0* 7.4*  --    < >   HCT 25.2* 26.2* 22.2* 24.4*  --    < >   * 110* 101* 95*  --    < >   INR  --   --   --   --  1.0  --     < > = values in this interval not displayed. Recent Labs     04/08/22  0659 04/07/22  0521 04/06/22  0548    138  137 137   K 4.0 3.8  3.8 4.3    104  104 105   CO2 23 24  24 25   * 123*  122* 105*   BUN 81* 82*  80* 80*   CREA 2.80* 2.60*  2.50* 2.50*   MG 2.0 2.1  --    PHOS 4.4* 4.2*  --    ALB 2.9* 2.2* 1.7*     No results for input(s): PH, PCO2, PO2, HCO3 in the last 72 hours.       Assessment:  (Medical Decision Making)     Hospital Problems  Date Reviewed: 4/5/2022          Codes Class Noted POA    * (Principal) ISABELLA (acute kidney injury) ([facility] [address] ICD-10-CM: N17.9  ICD-9-CM: 584.9  4/5/2022 Yes        UTI (urinary tract infection) ICD-10-CM: N39.0  ICD-9-CM: 599.0  4/5/2022 Yes        Iron deficiency anemia secondary to blood loss (chronic) ICD-10-CM: D50.0  ICD-9-CM: 280.0  4/5/2022 Yes        Secondary esophageal varices without bleeding (HCC) (Chronic) ICD-10-CM: I85.10  ICD-9-CM: 456.21  3/25/2022 Yes        Chronic diastolic congestive heart failure (HCC) (Chronic) ICD-10-CM: I50.32  ICD-9-CM: 428.32, 428.0  3/25/2022 Yes        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  Inferior STEMI with large amount of thrombus. Total mid occlusion of the RCA   Mild left sided CAD - EF 40-50% pLAD   Inferior wall hypokinesis with MR (moderate by cath)  Aspiration Thrombectomy and OSWALDO - RCA   Denies any ongoing angina.  - ASA, Brillinta, Lisinopril, Metoprolol             Morbid obesity (HCC) (Chronic) ICD-10-CM: E66.01  ICD-9-CM: 278.01  1/14/2010 Yes        Hypoxia, home O2 at night 3L (Chronic) ICD-10-CM: R09.02  ICD-9-CM: 799.02  1/13/2010 Yes        Diabetes mellitus (HCC) (Chronic) ICD-10-CM: E11.9  ICD-9-CM: 250.00  1/13/2010 Yes        Snoring, likely JANY (Chronic) ICD-10-CM: R06.83  ICD-9-CM: 786.09  1/13/2010 Yes              Plan:  (Medical Decision Making)   1. ISABELLA likely multifactorial pre renal azotemia, underlying liver cirrhosis ? HRS, [place] 6, also with Hx of ECHO 1/15/22 dCHF EF 55-65%, RVSP 33 mmHg. Albumin 1.7, continue albumin, midodrine and octreotide   Renal US with no evidence of obstructive nephropathy  -hold metformin  -no indication for acute RRT at this time, trend renal indices with strict I&O purewick   Cr slowly trending up, more LE edema and SOB resume lasix    Note amendment 12:30 pt with worsening dyspnea, orthopnea plan to transfer to ICU, consulted IR for temp line placement ordered SED for volume and clearance     2. RUDD Cirrhosis Elevated LFTs, alk phos and bilirubin- GI to evaluate      3. COPD hypoxia-3L O2 NC     4. Anemia hx of hemorrhoids, s/p PRBC 4/5 per primary  EGD with esophageal varices without bleeding, no banding done, pt on brilinta      5.  DM type II- SSI per hosp     Yolanda Ser, NP

## 2022-04-08 NOTE — PROGRESS NOTES
[facility]   DAILY PROGRESS NOTE    Admit Date:  4/5/2022    CC:  PBC, ISABELLA/ Possible HRS, Anemia    Problem List:  Principal Problem:    ISABELLA (acute kidney injury) ([facility] [address] (4/5/2022)    Active Problems:    Hypoxia, home O2 at night 3L (1/13/2010)      Diabetes mellitus ([facility] [address] (1/13/2010)      Snoring, likely JANY (1/13/2010)      Morbid obesity ([facility] [address] (1/14/2010)      History of ST elevation myocardial infarction (STEMI) (1/9/2022)      Overview: Last Assessment & Plan:       Formatting of this note might be different from the original.      Inferior STEMI with large amount of thrombus. Total mid occlusion of the       RCA       Mild left sided CAD - EF 40-50% pLAD       Inferior wall hypokinesis with MR (moderate by cath)      Aspiration Thrombectomy and OSWALDO - RCA       Denies any ongoing angina.      - ASA, Brillinta, Lisinopril, Metoprolol      Liver cirrhosis secondary to [facility]) ()      Secondary esophageal varices without bleeding ([facility] [address] (3/25/2022)      Chronic diastolic congestive [facility] failure [facility] (3/25/2022)      UTI (urinary tract infection) (4/5/2022)      Iron deficiency anemia secondary to blood loss (chronic) (4/5/2022)      Mrs. Babita Belle is a 69 y.o. female with PMH including but not limited to CAD s/p STEMI with OSWALDO to RCA in 1/2022, DM2, COPD with nocturnal hypoxia, hypothyroidism, and recent diagnosis of cirrhosis admitted with dark stool, ISABELLA and nausea.  Was started on rocephin.  LFTs are elevated consistent with labs from 3/28/22 admission. On arrival, TB 2.9, , , , hgb 8.8, hct 28.1, MCV 28.1, plt 93.  Last CT 3/23/22 with cirrhotic liver, no biliary ductal dilation.  Hgb 8.8 (was 8.0 on d/c 3/28).    Work up for liver with viral hepatitis negative 3/24.  M2 .3 elevated and smooth muscle AB 36 elevated.  Iron studies wnl 3/24/22. Note that EGD and Colonoscopy were done by Dr. Thom Tipton on 3/28/22.   EGD showed one esophageal varix (not banded as patient was on Brilinta), mild PHG. Colonoscopy showed pandiverticulosis and two tiny cecal polyps (not removed as she was on Brilinta) and moderate internal hemorrhoids. Patient was told that she has PBC (based on elevated ALP and AMA from last admission) and started on Ursodiol this admission. She did require 1 unit PRBC on 4/7/22. She had no overt GI bleeding. Cardiology was consulted who has stopped patient's Brilinta indefinitely. Only on ASA for now. She has had an ISABELLA and Nephrology has been following. There are concerns for possible HRS and patient is on albumin, midodrine and octreotide. WBC 23.9. Hg 7.8 today. Plt 118. BUN 81 and Cr 2.8 (was 2.6). LFTS about stable. , ,  and TB 2.8.      1. PBC with Liver Cirrhosis  2. Transfused Acute Blood Loss Anemia  3. ISABELLA/ Possible HRS  4. Esophageal Varices  5. PHG  6. Diverticulosis  7. Internal Hemorrhoids  8. Cecal polyps     - Renal following for ISABELLA. Cr is worsening. She is on albumin, midodrine and octreotide with concerns for possible HRS. - Will continue Ursodiol for PBC  - MELD 19  - If no improvement in Cr, can consider transfer to [address] [place] [address] for consideration of both liver transplant.    - Monitor H/H. Transfuse for Hg < 8 given hx of CAD. She has no overt GI bleeding. Hutchinson Manas on hold indefinitely per Cardiology recommendations. Abby Flores MD   [facility]      Subjective:     Patient sitting up in bed. Has complaints of SOA. She is on 4L NC. Renal following for ISABELLA.       Medications:   Current Facility-Administered Medications   Medication Dose Route Frequency Provider Last Rate Last Admin    albuterol-ipratropium (DUO-NEB) 2.5 MG-0.5 MG/3 ML  3 mL Nebulization NOW Durrell Sandifer, MD        0.9% sodium chloride infusion 250 mL  250 mL IntraVENous PRN Durrell Sandifer, MD        0.9% sodium chloride infusion 250 mL  250 mL IntraVENous PRN Durrell Sandifer, MD        0.9% sodium chloride infusion 250 mL  250 mL IntraVENous PRN Boston Bond MD        0.9% sodium chloride infusion  75 mL/hr IntraVENous CONTINUOUS Darrel Abdi NP 75 mL/hr at 04/07/22 1000 75 mL/hr at 04/07/22 1000    ondansetron (ZOFRAN) injection 4 mg  4 mg IntraVENous Q6H PRN Mimi Shore MD   4 mg at 04/07/22 1427    midodrine (PROAMATINE) tablet 5 mg  5 mg Oral TID WITH MEALS Darrel Abdi NP   5 mg at 04/07/22 1646    octreotide (SANDOSTATIN) injection 100 mcg  100 mcg SubCUTAneous TID Gabi Cole NP   100 mcg at 04/07/22 2108    ursodioL (ACTIGALL) capsule 900 mg  900 mg Oral BID WITH MEALS Boston Bond MD   900 mg at 04/07/22 1646    sodium chloride (NS) flush 5-10 mL  5-10 mL IntraVENous Q8H Nieves Gamboa MD   10 mL at 04/08/22 0552    sodium chloride (NS) flush 5-10 mL  5-10 mL IntraVENous PRN Nieves Gamboa MD        0.9% sodium chloride infusion 250 mL  250 mL IntraVENous PRN Nieves Gamboa MD        aspirin delayed-release tablet 81 mg  81 mg Oral DAILY Earl Dickerson MD   81 mg at 04/07/22 0840    atorvastatin (LIPITOR) tablet 80 mg  80 mg Oral QHS Earl Dickerson MD   80 mg at 04/07/22 2108    albuterol (PROVENTIL VENTOLIN) nebulizer solution 2.5 mg  2.5 mg Nebulization Q6H PRN Earl Dickerson MD   2.5 mg at 04/07/22 2330    DULoxetine (CYMBALTA) capsule 30 mg  30 mg Oral DAILY Earl Dickerson MD   30 mg at 04/07/22 0840    ferrous sulfate tablet 325 mg  1 Tablet Oral BID WITH MEALS Earl Dickerson MD   325 mg at 04/07/22 1646    [Held by provider] furosemide (LASIX) tablet 40 mg  40 mg Oral DAILY Earl Dickerson MD        hydrOXYzine pamoate (VISTARIL) capsule 25 mg  25 mg Oral BID PRN Earl Dickerson MD        levothyroxine (SYNTHROID) tablet 175 mcg  175 mcg Oral ACB Earl Dickerson MD   175 mcg at 04/08/22 3481    [Held by provider] metoprolol tartrate (LOPRESSOR) tablet 12.5 mg  12.5 mg Oral BID Linzy Severance, MD        pantoprazole (PROTONIX) tablet 40 mg  40 mg Oral ACB&D Linzy Severance, MD   40 mg at 04/08/22 0552    cefTRIAXone (ROCEPHIN) 1 g in 0.9% sodium chloride (MBP/ADV) 50 mL MBP  1 g IntraVENous Q24H Linzy Severance,  mL/hr at 04/07/22 0839 1 g at 04/07/22 0839    insulin lispro (HUMALOG) injection   SubCUTAneous AC&HS Linzy Severance, MD   2 Units at 04/06/22 2141    sodium chloride (NS) flush 5-40 mL  5-40 mL IntraVENous Q8H Linzy Severance, MD   10 mL at 04/08/22 0553    sodium chloride (NS) flush 5-40 mL  5-40 mL IntraVENous PRN Linzy Severance, MD        acetaminophen (TYLENOL) tablet 650 mg  650 mg Oral Q6H PRN Linzy Severance, MD        Or    acetaminophen (TYLENOL) suppository 650 mg  650 mg Rectal Q6H PRN Linzy Severance, MD        polyethylene glycol [facility]) packet 17 g  17 g Oral DAILY PRN Linzy Severance, MD        bisacodyL (DULCOLAX) suppository 10 mg  10 mg Rectal DAILY PRN Linzy Severance, MD        famotidine (PEPCID) tablet 20 mg  20 mg Oral BID PRN Linzy Severance, MD        alum-mag hydroxide-[facility]) oral suspension 15 mL  15 mL Oral Q6H PRN Linzy Severance, MD        hydrALAZINE (APRESOLINE) 20 mg/mL injection 20 mg  20 mg IntraVENous Q4H PRN Linzy Severance, MD        oxyCODONE IR (ROXICODONE) tablet 5 mg  5 mg Oral Q4H PRN Linzy Severance, MD   5 mg at 04/07/22 0349    guaiFENesin-dextromethorphan (ROBITUSSIN DM) 100-10 mg/5 mL syrup 10 mL  10 mL Oral Q4H PRN Linzy Severance, MD        senna-docusate (PERICOLACE) 8.6-50 mg per tablet 2 Tablet  2 Tablet Oral DAILY PRN Linzy Severance, MD        melatonin tablet 5 mg  5 mg Oral QHS PRN Linzy Severance, MD        prochlorperazine (COMPAZINE) with saline injection 10 mg  10 mg IntraVENous Q6H PRN Linzy Severance, MD   10 mg at 04/07/22 0958    nystatin (MYCOSTATIN) 100,000 unit/gram powder Topical BID Akosua Flores MD   Given at 04/07/22 1845    sucralfate (CARAFATE) tablet 1 g  1 g Oral TID WITH MEALS Angelia Cox MD   1 g at 04/07/22 1646    albuterol (PROVENTIL VENTOLIN) nebulizer solution 2.5 mg  2.5 mg Nebulization Q6H RT Jesse Rubio MD   2.5 mg at 04/08/22 3819       Review of Systems:  ROS was obtained, with pertinent positives as listed above. No chest pain or SOB. Objective:   Vitals:  Visit Vitals  BP (!) 117/57 (BP 1 Location: Left upper arm, BP Patient Position: Sitting)   Pulse (!) 104   Temp 97.6 °F (36.4 °C)   Resp 25   Ht 5' 4\" (1.626 m)   Wt 123.7 kg (272 lb 11.3 oz)   SpO2 95%   BMI 46.81 kg/m²     Intake/Output:  No intake/output data recorded. 04/06 1901 - 04/08 0700  In: 1289.1 [I.V.:997]  Out: [address] [Urine:775]  Exam:  General appearance: alert, cooperative, no distress. Sitting up in bed. Appears chronically ill. Lungs: Diminished in bases. On 4 L NC  Heart: Tachycardic  Abdomen: soft, obese, non-tender.  Bowel sounds normal. No masses, no organomegaly  Extremities: 2+ Edema  Neuro:  alert and oriented x 3    Data Review (Labs):    Recent Labs     04/08/22  0659 04/07/22  1438 04/07/22  0805 04/07/22  0521 04/06/22  1452 04/06/22  0548 04/05/22  1431   WBC 23.9* 16.3* 11.7* 10.2  --  10.5 12.2*   HGB 7.8* 8.2* 7.0* 7.4*  --  8.8* 7.0*   HCT 25.2* 26.2* 22.2* 24.4*  --  28.1* 22.6*   * 110* 101* 95*  --  93* 126*   MCV 91.0 90.0 89.5 91.7  --  87.8 89.3   NA  --   --   --  138  137  --  137 137   K  --   --   --  3.8  3.8  --  4.3 4.7   CL  --   --   --  104  104  --  105 103   CO2  --   --   --  24  24  --  25 26   BUN  --   --   --  82*  80*  --  80* 78*   MG  --   --   --  2.1  --   --   --    PHOS  --   --   --  4.2*  --   --   --    AST  --   --   --  275*  --  263* 287*   ALT  --   --   --  144*  --  151* 159*   INR  --   --   --   --  1.0  --   --          Juan Ramon Huerta MD  [facility]

## 2022-04-08 NOTE — PROGRESS NOTES
Chart reviewed s/p tx to ICU from 5th floor for dialysis and BIPAP needs. Followed by Nephrology, Pulmonary, cardiology, and GI. No gtts at present. NC 6L at present. Post US guided right IJ temp HD catheter placement by IR. D/C POC for STR at [facility] in [facility]. PPD already in place. PT/OT per MD when stable. ? Dialysis slot need. CM will continue to follow. LOS 3 days.

## 2022-04-09 PROBLEM — K74.3 PRIMARY BILIARY CHOLANGITIS (HCC): Status: ACTIVE | Noted: 2022-01-01

## 2022-04-09 NOTE — PROGRESS NOTES
Hospitalist Progress Note   Admit Date:  2022  2:14 PM   Name:  Nereyda Pena   Age:  69 y.o. Sex:  female  :  1955   MRN:  [id number]   Room:      Presenting Complaint: Fatigue    Reason(s) for Admission: ISABELLA (acute kidney injury) [facility]) [N17.9]     Hospital Course & Interval History:   69 y.o. female with medical history of CAD s/p STEMI with OSWALDO to RCA in 2022, DM2, COPD with nocturnal hypoxia, hypothyroidism, and recent diagnosis of cirrhosis who presented with fatigue.  Constant, progressively worsening, since discharge from hospital 3-28.  She has also had some nausea, dry heaves, dark \"mushy\" stools, decreased PO intake for several days. Federico Hightower has been compliant with all meds including iron and has been taking lasix as well despite minimal intake. She was discharged 3-28 after a stay for ISABELLA, cirrhosis, suspected GIB.  PT/OT recommended HH at discharge and she did not want SNF. Federico Hightower is agreeable to SNF now. Pt was admitted on  for ISABELLA and UTI. She completed 3 day course of Rocephin for Klebsiella UTI. ISABELLA was likely multifactorial pre renal azotemia, underlying liver cirrhosis, also concerns for HRS. She was started on Lasix. Nephrology was consulted, Lasix was held and pt was started on gentle mIVF. She also has acute on chronic anemia d/t chronic blood loss. She is on Brilinta/ASA for hx of STEMI in  with OSWALDO. She received PRBC on  and on . Cardiology consulted to evaluate need for holding Brilinta. Recommended to stop Brilinta indefinitely and continue with ASA. GI was also consulted for GIB, hx of cirrhosis and concerns for PBC (Primary Biliary Cholangitis). Note that Viral Hepatitis Panel and Ferritin were normal from 3/24/22. Unclear if she really has autoimmune hepatitis so autoimmune labs added. ASA and AMA came back positive. For the [facility] with cirrhosis, patient was started on Ursodiol at 13-15 mg/kg divided in BID dosing.  She will need a liver transplant evaluation outpatient once stable. She was started on PPI and Octreotide. Transfuse if <8 given CAD hx. Holding off on repeat endoscopy with recent EGD on 3/28/22. Pt started to have worsening SOB on 4/8 after receiving blood transfusion, associated with wheezing. She has chronic hypoxia on 3LO2NC, this was increased to 7LO2NC. CXR obtained revealed worsening pulmonary edema, ABG 7.21/55.9/72/22. 1. She had labored breathing and was placed on BIPAP and moved to the ICU on 4/8. Pulm has been consulted. IR was consulted and pt had central line placed on 4/8. She underwent dialysis overnight. She was started on Solumedrol for COPD and Lasix IV as well. Subjective/24hr Events (04/09/22): Able to be weaned off of BIPAP this AM down to [id number]. Alert and oriented x3, follows commands. No acute complaints. She underwent dialysis overnight and again today. Hgb 7.0 then trended to 6.8 this AM. No fever, chills, chest pain, abdominal pain. ROS:  10 systems reviewed and negative except as noted above. Assessment & Plan: ISABELLA   Likely multifactorial pre renal azotemia, underlying liver cirrhosis, also concerns for HRS. Cr at baseline ~1.5  Renal US with no evidence of obstructive nephropathy  Avoid nephrotoxic meds  Holding home Lisinopril  Accurate I&O's  Cont albumin and Lasix  Central line placed by IR 4/8  S/p dialysis yesterday 4/8, plan for 8h SED today  Cr improving    UTI  UCX grew Klebsiella  ID has seen, stopped Rocephin after 3 day course    Acute on chronic respiratory failure with hypoxia and hypercapnia   Hypoxia, home O2 at night 3L   On 4/8 CXR obtained revealed worsening pulmonary edema, ABG 7.21/55.9/72/22. 1. O2 sat 78% on 3LO2NC and was placed on BIPAP, transferred to ICU on 4/8  Cont Lasix IV BID and midodrine  Accurate I&O's  Dialysis as recommended by Nephro  Repeat TTE pending  Pulmo on board, appreciate recs  Off of BIPAP, now on [id number].     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 S/p transfusion on 4/5, 4/7  Cont ferrous sulfate  GI on board, appreciate recs  Cont PPI IV and octreotide  Transfuse if <8 given CAD hx  Hgb trended down to 6.8 this Am, transfuse 1UPRBC, may need 2nd unit to keep >8    Primary Biliary Cholangitis  Liver cirrhosis secondary to RUDD  Secondary esophageal varices without bleeding  MELD 19 last check  Cont Ursodiol at 13-15 mg/kg divided in BID dosing for PBC  Need a liver transplant evaluation outpatient once stable. Holding off on repeat endoscopy with recent EGD on 3/28/22  GI on board, appreciate recs    History of STEMI in 1/2022 with OSWALDO  Cardiology consulted to evaluate need for holding Brilinta 2/2 GIB. Recommended to stop Brilinta indefinitely and continue with ASA  Holding Lisinopril d/t ISABELLA and metoprolol d/t hypotension  Cont statin  Cardiology on board    COPD  Cont Pulmicort and Symbicort  Cont Solumedrol, can taper to po in AM    Morbid obesity  Snoring, likely JANY  Adds to complexity of care  Needs outpt sleep study    Chronic HFrEF  ECHO on 1/15 showed improved EF 55-65%  Cont Lasix and midodrine  Cardiology on board  Holding Lisinopril d/t ISABELLA and metoprolol d/t hypotension  Repeat TTE pending    Diabetes mellitus  SSI    Hypothyroidism  Cont home Syntrhoid    Anxiety/depression  Cont home Cymbalta      Discharge Planning:      >2 midnights. PT/OT. Pending improvement in hypoxia     Diet:  DIET NPO  DVT PPx: SCD's  Code status: Full Code     I spent 40 minutes of time caring for this patient at bedside or nearby on the unit, more than 50 percent of which was spent on coordination of care and/or counseling regarding the disease process, treatment options, and treatment plan.       Hospital Problems as of 4/9/2022 Date Reviewed: 4/5/2022          Codes Class Noted - Resolved POA    Acute on chronic respiratory failure with hypoxia and hypercapnia (HCC) ICD-10-CM: J96.21, W75.41  ICD-9-CM: 518.84, 786.09, 799.02  4/8/2022 - Present Unknown        * (Principal) ISABELLA (acute kidney injury) ([facility] ICD-10-CM: N17.9  ICD-9-CM: 584.9  4/5/2022 - Present Yes        UTI (urinary tract infection) ICD-10-CM: N39.0  ICD-9-CM: 599.0  4/5/2022 - Present Yes        Iron deficiency anemia secondary to blood loss (chronic) ICD-10-CM: D50.0  ICD-9-CM: 280.0  4/5/2022 - Present Yes        Secondary esophageal varices without bleeding (HCC) (Chronic) ICD-10-CM: I85.10  ICD-9-CM: 456.21  3/25/2022 - Present Yes        Chronic diastolic congestive heart failure (HCC) (Chronic) ICD-10-CM: I50.32  ICD-9-CM: 428.32, 428.0  3/25/2022 - Present Yes        Liver cirrhosis secondary to RUDD ([facility] (Chronic) ICD-10-CM: K75.81, K74.60  ICD-9-CM: 571.8, 571.5  Unknown - Present Yes        History of ST elevation myocardial infarction (STEMI) (Chronic) ICD-10-CM: I25.2  ICD-9-CM: 270  1/9/2022 - Present Yes    Overview Signed 2/11/2022  8:13 AM by Nereida Funez MD     Last Assessment & Plan:   Formatting of this note might be different from the original.  Inferior STEMI with large amount of thrombus.  Total mid occlusion of the RCA   Mild left sided CAD - EF 40-50% pLAD   Inferior wall hypokinesis with MR (moderate by cath)  Aspiration Thrombectomy and OSWALDO - RCA   Denies any ongoing angina.  - ASA, Brillinta, Lisinopril, Metoprolol             Morbid obesity (HCC) (Chronic) ICD-10-CM: E66.01  ICD-9-CM: 278.01  1/14/2010 - Present Yes        Hypoxia, home O2 at night 3L (Chronic) ICD-10-CM: R09.02  ICD-9-CM: 799.02  1/13/2010 - Present Yes        Diabetes mellitus ([address] (Chronic) ICD-10-CM: E11.9  ICD-9-CM: 250.00  1/13/2010 - Present Yes        Snoring, likely JANY (Chronic) ICD-10-CM: R06.83  ICD-9-CM: 786.09  1/13/2010 - Present Yes              Objective:     Patient Vitals for the past 24 hrs:   Temp Pulse Resp BP SpO2   04/09/22 1121 -- -- -- (!) 124/56 --   04/09/22 0939 -- -- -- -- 100 %   04/09/22 0830 -- 96 (!) 33 (!) 111/55 100 %   04/09/22 0815 -- 93 18 (!) 113/56 100 %   04/09/22 0801 -- 95 -- (!) 117/57 --   04/09/22 0800 -- 99 30 (!) 106/53 98 %   04/09/22 0745 -- 96 28 (!) 117/57 95 %   04/09/22 0730 -- 96 16 (!) 110/58 98 %   04/09/22 0727 -- 96 -- -- 100 %   04/09/22 0720 -- -- -- -- 100 %   04/09/22 0715 -- 94 21 (!) 112/58 100 %   04/09/22 0700 97.9 °F (36.6 °C) 91 24 (!) 110/55 100 %   04/09/22 0645 -- 88 18 (!) 103/58 100 %   04/09/22 0458 -- 96 19 -- 100 %   04/09/22 0449 -- 84 14 (!) 106/55 100 %   04/09/22 0433 -- 90 21 (!) 110/58 100 %   04/09/22 0418 -- 95 20 (!) 112/58 99 %   04/09/22 0403 -- 97 23 (!) 105/55 100 %   04/09/22 0354 -- -- -- -- 100 %   04/09/22 0349 -- 91 17 109/60 100 %   04/09/22 0333 -- 97 21 (!) 120/59 100 %   04/09/22 0318 -- 96 28 129/61 100 %   04/09/22 0303 98.6 °F (37 °C) 92 17 129/60 100 %   04/09/22 0248 -- 92 13 118/60 100 %   04/09/22 0218 -- 84 16 (!) 110/53 100 %   04/09/22 0216 -- -- -- -- 100 %   04/09/22 0203 -- 90 (!) 32 (!) 114/56 100 %   04/09/22 0148 -- (!) 111 23 (!) 125/58 100 %   04/09/22 0137 -- 86 20 (!) 95/50 100 %   04/09/22 0133 -- 79 25 (!) 88/47 100 %   04/09/22 0118 -- 79 22 (!) 107/55 100 %   04/09/22 0103 -- 90 24 (!) 113/56 100 %   04/09/22 0048 -- 90 21 (!) 116/54 100 %   04/09/22 0033 -- 88 18 (!) 111/54 100 %   04/09/22 0032 -- 88 16 (!) 111/54 100 %   04/09/22 0018 -- 84 14 (!) 108/55 99 %   04/09/22 0003 -- 84 14 (!) 114/56 99 %   04/08/22 2351 -- 83 16 (!) 111/56 99 %   04/08/22 2348 -- 84 19 (!) 111/56 99 %   04/08/22 2333 -- 85 22 (!) 106/54 100 %   04/08/22 2323 -- 82 14 -- 100 %   04/08/22 2318 -- 84 15 (!) 101/52 100 %   04/08/22 2307 -- -- -- -- 98 %   04/08/22 2304 98 °F (36.7 °C) 93 23 (!) 122/58 97 %   04/08/22 2255 -- 87 20 (!) 122/58 99 %   04/08/22 2248 -- 90 29 (!) 116/55 99 %   04/08/22 2233 -- 91 17 115/63 99 %   04/08/22 2218 -- 91 29 (!) 125/56 100 %   04/08/22 2203 -- 88 25 (!) 118/56 99 %   04/08/22 2152 -- 87 25 (!) 111/55 99 %   04/08/22 2150 98 °F (36.7 °C) 87 26 (!) 111/55 99 %   04/08/22 2148 -- 80 19 (!) 89/47 99 %   04/08/22 2133 -- 80 25 (!) 91/51 98 %   04/08/22 2132 -- 81 18 (!) 91/51 98 %   04/08/22 2118 -- 81 13 (!) 101/55 98 %   04/08/22 2103 -- 91 23 (!) 122/54 98 %   04/08/22 2057 97.6 °F (36.4 °C) 87 18 (!) 104/55 98 %   04/08/22 2048 -- 91 21 (!) 104/55 97 %   04/08/22 2033 -- 90 21 (!) 116/58 97 %   04/08/22 2018 -- 91 24 (!) 122/58 98 %   04/08/22 2003 -- 90 21 (!) 120/58 97 %   04/08/22 1948 -- 90 (!) 32 (!) 125/56 99 %   04/08/22 1933 -- 90 21 (!) 111/55 99 %   04/08/22 1920 97.7 °F (36.5 °C) 90 17 (!) 113/57 99 %   04/08/22 1918 -- 92 22 (!) 113/57 99 %   04/08/22 1904 -- 90 22 (!) 112/58 98 %   04/08/22 1903 97.7 °F (36.5 °C) 90 16 (!) 112/58 98 %   04/08/22 1801 -- 91 19 (!) 122/56 98 %   04/08/22 1746 -- 90 21 (!) 127/57 99 %   04/08/22 1731 -- 92 22 116/60 99 %   04/08/22 1716 -- 96 22 (!) 124/59 99 %   04/08/22 1701 -- 92 14 (!) 112/54 98 %   04/08/22 1646 -- 92 18 (!) 111/53 99 %   04/08/22 1631 -- 97 22 (!) 112/56 99 %   04/08/22 1629 -- 96 -- (!) 109/53 --   04/08/22 1616 -- 97 20 (!) 109/53 99 %   04/08/22 1601 -- 94 13 (!) 109/53 99 %   04/08/22 1600 -- 94 -- -- --   04/08/22 1546 -- 96 15 (!) 114/57 99 %   04/08/22 1531 -- 99 -- 121/60 99 %   04/08/22 1516 -- (!) 120 -- (!) 123/58 99 %   04/08/22 1515 98.6 °F (37 °C) -- -- -- 99 %   04/08/22 1336 -- -- -- -- 93 %   04/08/22 1226 -- (!) 105 22 -- 93 %     Oxygen Therapy  O2 Sat (%): 100 % (04/09/22 0939)  Pulse via Oximetry: 88 beats per minute (04/09/22 0939)  O2 Device: Nasal cannula (04/09/22 0939)  Skin Assessment: Clean, dry, & intact (04/09/22 0303)  Skin Protection for O2 Device: No (04/09/22 0303)  O2 Flow Rate (L/min): 5 l/min (weaned to 4L) (04/09/22 0939)  FIO2 (%): 50 % (Weaned to 40%) (04/09/22 0720)    Estimated body mass index is 46.69 kg/m² as calculated from the following:    Height as of this encounter: 5' 4\" (1.626 m).     Weight as of this encounter: 123.4 kg (272 lb). Intake/Output Summary (Last 24 hours) at 4/9/2022 1152  Last data filed at 4/9/2022 0700  Gross per 24 hour   Intake 420 ml   Output 4170 ml   Net -3750 ml         Physical Exam:     Blood pressure (!) 124/56, pulse 96, temperature 97.9 °F (36.6 °C), resp. rate (!) 33, height 5' 4\" (1.626 m), weight 123.4 kg (272 lb), SpO2 100 %. General:    Well nourished. No overt distress. Obese  Head:  Normocephalic, atraumatic  Eyes:  Sclerae appear normal.  Pupils equally round. ENT:  Nares appear normal, no drainage. Moist oral mucosa  Neck:  No restricted ROM. Trachea midline. CVC access line in neck. CV:   RRR. No m/r/g. No jugular venous distension. Lungs:   Decrease lung sounds at bases. Respirations even, unlabored  Abdomen: Bowel sounds present. Soft, nontender, nondistended. :  Lane in place  Extremities: No cyanosis or clubbing. No edema  Skin:     No rashes and normal coloration. Warm and dry. Neuro:  CN II-XII grossly intact. Sensation intact. A&Ox3  Psych:  Normal mood and affect. I have reviewed ordered lab tests and independently visualized imaging below:    Recent Labs:  Recent Results (from the past 48 hour(s))   CBC WITH AUTOMATED DIFF    Collection Time: 04/07/22  2:38 PM   Result Value Ref Range    WBC 16.3 (H) 4.3 - 11.1 K/uL    RBC 2.91 (L) 4.05 - 5.2 M/uL    HGB 8.2 (L) 11.7 - 15.4 g/dL    HCT 26.2 (L) 35.8 - 46.3 %    MCV 90.0 79.6 - 97.8 FL    MCH 28.2 26.1 - 32.9 PG    MCHC 31.3 (L) 31.4 - 35.0 g/dL    RDW 19.9 (H) 11.9 - 14.6 %    PLATELET 474 (L) 546 - 450 K/uL    MPV 10.3 9.4 - 12.3 FL    ABSOLUTE NRBC 0.00 0.0 - 0.2 K/uL    DF AUTOMATED      NEUTROPHILS 86 (H) 43 - 78 %    LYMPHOCYTES 5 (L) 13 - 44 %    MONOCYTES 8 4.0 - 12.0 %    EOSINOPHILS 0 (L) 0.5 - 7.8 %    BASOPHILS 0 0.0 - 2.0 %    IMMATURE GRANULOCYTES 1 0.0 - 5.0 %    ABS. NEUTROPHILS 14.0 (H) 1.7 - 8.2 K/UL    ABS. LYMPHOCYTES 0.7 0.5 - 4.6 K/UL    ABS.  MONOCYTES 1.3 0.1 - 1.3 K/UL    ABS. EOSINOPHILS 0.0 0.0 - 0.8 K/UL    ABS. BASOPHILS 0.0 0.0 - 0.2 K/UL    ABS. IMM. GRANS. 0.2 0.0 - 0.5 K/UL   GLUCOSE, POC    Collection Time: 04/07/22  3:58 PM   Result Value Ref Range    Glucose (POC) 95 65 - 100 mg/dL    Performed by Radha    GLUCOSE, POC    Collection Time: 04/07/22  8:39 PM   Result Value Ref Range    Glucose (POC) 120 (H) 65 - 100 mg/dL    Performed by HalliePCT    CBC WITH AUTOMATED DIFF    Collection Time: 04/08/22  6:59 AM   Result Value Ref Range    WBC 23.9 (H) 4.3 - 11.1 K/uL    RBC 2.77 (L) 4.05 - 5.2 M/uL    HGB 7.8 (L) 11.7 - 15.4 g/dL    HCT 25.2 (L) 35.8 - 46.3 %    MCV 91.0 79.6 - 97.8 FL    MCH 28.2 26.1 - 32.9 PG    MCHC 31.0 (L) 31.4 - 35.0 g/dL    RDW 20.5 (H) 11.9 - 14.6 %    PLATELET 215 (L) 328 - 450 K/uL    MPV 9.3 (L) 9.4 - 12.3 FL    ABSOLUTE NRBC 0.05 0.0 - 0.2 K/uL    DF AUTOMATED      NEUTROPHILS 87 (H) 43 - 78 %    LYMPHOCYTES 4 (L) 13 - 44 %    MONOCYTES 8 4.0 - 12.0 %    EOSINOPHILS 0 (L) 0.5 - 7.8 %    BASOPHILS 0 0.0 - 2.0 %    IMMATURE GRANULOCYTES 2 0.0 - 5.0 %    ABS. NEUTROPHILS 20.8 (H) 1.7 - 8.2 K/UL    ABS. LYMPHOCYTES 0.8 0.5 - 4.6 K/UL    ABS. MONOCYTES 1.9 (H) 0.1 - 1.3 K/UL    ABS. EOSINOPHILS 0.0 0.0 - 0.8 K/UL    ABS. BASOPHILS 0.0 0.0 - 0.2 K/UL    ABS. IMM. GRANS. 0.4 0.0 - 0.5 K/UL   METABOLIC PANEL, COMPREHENSIVE    Collection Time: 04/08/22  6:59 AM   Result Value Ref Range    Sodium 136 136 - 145 mmol/L    Potassium 4.0 3.5 - 5.1 mmol/L    Chloride 104 98 - 107 mmol/L    CO2 23 21 - 32 mmol/L    Anion gap 9 7 - 16 mmol/L    Glucose 109 (H) 65 - 100 mg/dL    BUN 81 (H) 8 - 23 MG/DL    Creatinine 2.80 (H) 0.6 - 1.0 MG/DL    GFR est AA 22 (L) >60 ml/min/1.73m2    GFR est non-AA 18 (L) >60 ml/min/1.73m2    Calcium 8.9 8.3 - 10.4 MG/DL    Bilirubin, total 2.8 (H) 0.2 - 1.1 MG/DL    ALT (SGPT) 117 (H) 12 - 65 U/L    AST (SGOT) 239 (H) 15 - 37 U/L    Alk.  phosphatase 383 (H) 50 - 136 U/L    Protein, total 7.4 6.3 - 8.2 g/dL    Albumin 2.9 (L) 3.2 - 4.6 g/dL    Globulin 4.5 (H) 2.3 - 3.5 g/dL    A-G Ratio 0.6 (L) 1.2 - 3.5     MAGNESIUM    Collection Time: 04/08/22  6:59 AM   Result Value Ref Range    Magnesium 2.0 1.8 - 2.4 mg/dL   PHOSPHORUS    Collection Time: 04/08/22  6:59 AM   Result Value Ref Range    Phosphorus 4.4 (H) 2.3 - 3.7 MG/DL   GLUCOSE, POC    Collection Time: 04/08/22  8:33 AM   Result Value Ref Range    Glucose (POC) 111 (H) 65 - 100 mg/dL    Performed by Cristhian    BLOOD GAS, ARTERIAL POC    Collection Time: 04/08/22 11:12 AM   Result Value Ref Range    Device: NASAL CANNULA      pH (POC) 7.21 (LL) 7.35 - 7.45      pCO2 (POC) 55.9 (H) 35 - 45 MMHG    pO2 (POC) 72 (L) 75 - 100 MMHG    HCO3 (POC) 22.1 22 - 26 MMOL/L    sO2 (POC) 90.0 (L) 95 - 98 %    Base deficit (POC) 5.8 mmol/L    Allens test (POC) Positive      Site LEFT RADIAL      Specimen type (POC) ARTERIAL      Performed by Darryn     Critical value read back MAYKEL     FIO2, L/min 8     GLUCOSE, POC    Collection Time: 04/08/22 11:24 AM   Result Value Ref Range    Glucose (POC) 155 (H) 65 - 100 mg/dL    Performed by Michelle    NT-PRO BNP    Collection Time: 04/08/22 12:37 PM   Result Value Ref Range    NT pro-BNP 26,492 (H) 5 - 125 PG/ML   C REACTIVE PROTEIN, QT    Collection Time: 04/08/22 12:37 PM   Result Value Ref Range    C-Reactive protein 10.8 (H) 0.0 - 0.9 mg/dL   PROTHROMBIN TIME + INR    Collection Time: 04/08/22  1:14 PM   Result Value Ref Range    Prothrombin time 15.9 (H) 12.6 - 14.5 sec    INR 1.2     GLUCOSE, POC    Collection Time: 04/08/22  4:58 PM   Result Value Ref Range    Glucose (POC) 138 (H) 65 - 100 mg/dL    Performed by Kasie    GLUCOSE, POC    Collection Time: 04/08/22 10:01 PM   Result Value Ref Range    Glucose (POC) 120 (H) 65 - 100 mg/dL    Performed by Nehal Mackay    MAGNESIUM    Collection Time: 04/09/22  3:31 AM   Result Value Ref Range    Magnesium 2.1 1.8 - 2.4 mg/dL   PHOSPHORUS Collection Time: 04/09/22  3:31 AM   Result Value Ref Range    Phosphorus 3.1 2.3 - 3.7 MG/DL   METABOLIC PANEL, COMPREHENSIVE    Collection Time: 04/09/22  3:31 AM   Result Value Ref Range    Sodium 139 136 - 145 mmol/L    Potassium 4.3 3.5 - 5.1 mmol/L    Chloride 104 98 - 107 mmol/L    CO2 24 21 - 32 mmol/L    Anion gap 11 7 - 16 mmol/L    Glucose 133 (H) 65 - 100 mg/dL    BUN 38 (H) 8 - 23 MG/DL    Creatinine 1.60 (H) 0.6 - 1.0 MG/DL    GFR est AA 41 (L) >60 ml/min/1.73m2    GFR est non-AA 34 (L) >60 ml/min/1.73m2    Calcium 8.6 8.3 - 10.4 MG/DL    Bilirubin, total 3.1 (H) 0.2 - 1.1 MG/DL    ALT (SGPT) 117 (H) 12 - 65 U/L    AST (SGOT) 236 (H) 15 - 37 U/L    Alk. phosphatase 354 (H) 50 - 136 U/L    Protein, total 6.9 6.3 - 8.2 g/dL    Albumin 2.6 (L) 3.2 - 4.6 g/dL    Globulin 4.3 (H) 2.3 - 3.5 g/dL    A-G Ratio 0.6 (L) 1.2 - 3.5     PROTHROMBIN TIME + INR    Collection Time: 04/09/22  3:31 AM   Result Value Ref Range    Prothrombin time 16.2 (H) 12.6 - 14.5 sec    INR 1.3     CBC WITH AUTOMATED DIFF    Collection Time: 04/09/22  3:32 AM   Result Value Ref Range    WBC 17.4 (H) 4.3 - 11.1 K/uL    RBC 2.50 (L) 4.05 - 5.2 M/uL    HGB 7.0 (L) 11.7 - 15.4 g/dL    HCT 22.4 (L) 35.8 - 46.3 %    MCV 89.6 79.6 - 97.8 FL    MCH 28.0 26.1 - 32.9 PG    MCHC 31.3 (L) 31.4 - 35.0 g/dL    RDW 20.9 (H) 11.9 - 14.6 %    PLATELET 69 (L) 660 - 450 K/uL    MPV 10.0 9.4 - 12.3 FL    ABSOLUTE NRBC 0.02 0.0 - 0.2 K/uL    DF AUTOMATED      NEUTROPHILS 92 (H) 43 - 78 %    LYMPHOCYTES 3 (L) 13 - 44 %    MONOCYTES 4 4.0 - 12.0 %    EOSINOPHILS 0 (L) 0.5 - 7.8 %    BASOPHILS 0 0.0 - 2.0 %    IMMATURE GRANULOCYTES 1 0.0 - 5.0 %    ABS. NEUTROPHILS 16.0 (H) 1.7 - 8.2 K/UL    ABS. LYMPHOCYTES 0.5 0.5 - 4.6 K/UL    ABS. MONOCYTES 0.7 0.1 - 1.3 K/UL    ABS. EOSINOPHILS 0.0 0.0 - 0.8 K/UL    ABS. BASOPHILS 0.0 0.0 - 0.2 K/UL    ABS. IMM.  GRANS. 0.2 0.0 - 0.5 K/UL   GLUCOSE, POC    Collection Time: 04/09/22  7:50 AM   Result Value Ref Range Glucose (POC) 151 (H) 65 - 100 mg/dL    Performed by Paola    HGB & HCT    Collection Time: 04/09/22  9:24 AM   Result Value Ref Range    HGB 6.8 (LL) 11.7 - 15.4 g/dL    HCT 22.4 (L) 35.8 - 46.3 %   ECHO ADULT COMPLETE    Collection Time: 04/09/22 10:30 AM   Result Value Ref Range    LV EDV A2C 77 mL    LV EDV A4C 87 mL    LV ESV A2C 37 mL    LV ESV A4C 29 mL    IVSd 1.2 (A) 0.6 - 0.9 cm    LVIDd 5.3 3.9 - 5.3 cm    LVIDs 3.7 cm    LVOT Diameter 2.1 cm    LVOT Mean Gradient 4 mmHg    LVOT VTI 30.4 cm    LVOT Peak Velocity 1.5 m/s    LVOT Peak Gradient 9 mmHg    LVPWd 1.2 (A) 0.6 - 0.9 cm    LV E' Lateral Velocity 7 cm/s    LV E' Septal Velocity 6 cm/s    LV Ejection Fraction A2C 53 %    LV Ejection Fraction A4C 67 %    EF BP 61 55 - 100 %    LVOT Area 3.5 cm2    LVOT .2 ml    LA Minor Axis 5.6 cm    LA Major [place] 6.1 cm    LA Area 2C 17.9 cm2    LA Area 4C 23.3 cm2    LA Volume BP 61 (A) 22 - 52 mL    LA Diameter 4.6 cm    AV Mean Velocity 1.2 m/s    AV Mean Gradient 7 mmHg    AV VTI 35.4 cm    AV Peak Velocity 2.0 m/s    AV Peak Gradient 16 mmHg    AV Area by VTI 3.0 cm2    AV Area by Peak Velocity 2.7 cm2    Aortic Root 3.1 cm    Ascending Aorta 3.5 cm    MV Nyquist Velocity 31 cm/s    MR Radius PISA 0.60 cm    MR .0 cm    MV Mean Gradient 12 mmHg    MV VTI 47.8 cm    MV Mean Velocity 1.6 m/s    MR Peak Velocity 4.8 m/s    MR Peak Gradient 92 mmHg    MV Max Velocity 2.3 m/s    MV Peak Gradient 22 mmHg    MV E Wave Deceleration Time 243.0 ms    MV A Velocity 1.78 m/s    MV E Velocity 1.90 m/s    MV EROA PISA 14.6 cm2    MV Area by VTI 2.2 cm2    RVIDd 3.1 cm    TAPSE 2.5 1.7 cm    TR Max Velocity 2.94 m/s    TR Peak Gradient 35 mmHg    Fractional Shortening 2D 30 28 - 44 %    LV ESV Index A4C 13 mL/m2    LV EDV Index A4C 39 mL/m2    LV ESV Index A2C 17 mL/m2    LV EDV Index A2C 35 mL/m2    LVIDd Index 2.38 cm/m2    LVIDs Index 1.66 cm/m2    LV RWT Ratio 0.45     LV Mass 2D 256.6 (A) 67 - 162 g    LV Mass 2D Index 115.1 (A) 43 - 95 g/m2    MV E/A 1.07     E/E' Ratio (Averaged) 29.40     E/E' Lateral 27.14     E/E' Septal 31.67     LA Volume Index BP 27 16 - 34 ml/m2    LVOT Stroke Volume Index 47.2 mL/m2    LA Size Index 2.06 cm/m2    LA/AO Root Ratio 1.48     Ao Root Index 1.39 cm/m2    Ascending Aorta Index 1.57 cm/m2    AV Velocity Ratio 0.75     LVOT:AV VTI Index 0.86     JONATHON/BSA VTI 1.3 cm2/m2    JONATHON/BSA Peak Velocity 1.2 cm2/m2    MR Regurg Volume PISA 2,014.94 mL    MV:LVOT VTI Index 1.57     LA Volume 2C 47 22 - 52 mL    LA Volume Index 2C 21 16 - 34 mL/m2    LA Volume 4C 71 (A) 22 - 52 mL    LA Volume Index 4C 32 16 - 34 mL/m2    Est. RA Pressure 15 mmHg    RVSP 50 mmHg   GLUCOSE, POC    Collection Time: 04/09/22 11:29 AM   Result Value Ref Range    Glucose (POC) 189 (H) 65 - 100 mg/dL    Performed by Paola        All Micro Results     Procedure Component Value Units Date/Time    CULTURE, BLOOD [[id number]] Collected: 04/05/22 1645    Order Status: Completed Specimen: Blood Updated: 04/09/22 0642     Special Requests: --        LEFT  Antecubital       Culture result: NO GROWTH 4 DAYS       CULTURE, BLOOD [[id number]] Collected: 04/05/22 1647    Order Status: Completed Specimen: Blood Updated: 04/09/22 0642     Special Requests: --        RIGHT  ARM       Culture result: NO GROWTH 4 DAYS       CULTURE, URINE [[id number]]  (Abnormal)  (Susceptibility) Collected: 04/05/22 1804    Order Status: Completed Specimen: Urine from Clean catch Updated: 04/08/22 0709     Special Requests: NO SPECIAL REQUESTS        Culture result:       >100,000 COLONIES/mL KLEBSIELLA PNEUMONIAE                  <10,000 COLONIES/mL NORMAL SKIN ROHIT ISOLATED          CULTURE, URINE [[id number]]     Order Status: Canceled Specimen: Urine from Clean catch           Other Studies:  XR CHEST SNGL V    Result Date: 4/8/2022  Portable chest x-ray CLINICAL INDICATION: Evaluate central line placement FINDINGS: Single AP view the chest compared to a similar study from the same day shows a multilumen dialysis catheter in good position with the distal tip over the distal SVC. There is no pneumothorax. Bilateral pulmonary edema persists. Status post placement of a right IJ central venous catheter in good position without pneumothorax. IR INSERT NON TUNL CVC OVER 5 YRS    Result Date: 4/8/2022  Title:  Temporary hemodialysis catheter placement. Indication:  29-year-old obese female with acute kidney injury, COPD, cirrhosis, acute hypoxemic respiratory failure. A temporary hemodialysis catheter is placed for this patient through the right internal jugular vein using ultrasound guidance with maximum sterile barrier appropriately documented  and images documented in the report :  Misael Nye PA-C Supervising Physician: Radha Bell M.D. Consent: Informed consent was obtained from the patient after explanation of benefits and risks (including, but not limited to: Infection, hemorrhage, pneumothorax). The patient's questions were answered to their satisfaction. The patient stated understanding and requested that we proceed. Procedure: This central venous catheter was inserted with all elements of maximal sterile barrier technique and cap and mask and sterile gown and sterile gloves and sterile full-body drape and hand hygiene and 2% chlorhexidine for cutaneous antisepsis and sterile ultrasound gel and sterile ultrasound probe cover. Following routine prep and drape of the RIGHT neck, a local field block with lidocaine was achieved. Ultrasound evaluation of all potential access sites was performed due to lack of a palpable vein. No veins were palpable due to overlying adipose tissue. Using real-time ultrasound guidance, with appropriate image recording and visualization of vascular needle entry, the patent RIGHT internal jugular vein was accessed using micropuncture technique.   A triple-lumen hemodialysis catheter was advanced over the wire. All lumens aspirated easily and were filled with heparinized saline. The catheter was secured with nonabsorbable suture. Sterile dressings were applied. Complications: None. Radiation dose: Fluoroscopy time: 0 seconds. Reference air kerma (mGy): 0 Kerma area product (cGy.cm2):  0 Fluoroscopic images: 0 Contrast:  0 ml. Medications:  Lidocaine Findings:  Patent right internal jugular vein. Technically successful temporary hemodialysis catheter placement.  Plan: Chest x-ray to confirm placement       Current Meds:  Current Facility-Administered Medications   Medication Dose Route Frequency    methylPREDNISolone (PF) (SOLU-MEDROL) injection 20 mg  20 mg IntraVENous Q8H    sodium chloride 3% hypertonic nebulizer soln  4 mL Nebulization BID    guaiFENesin ER (MUCINEX) tablet 1,200 mg  1,200 mg Oral BID    0.9% sodium chloride infusion 250 mL  250 mL IntraVENous PRN    famotidine (PEPCID) tablet 20 mg  20 mg Oral DAILY PRN    furosemide (LASIX) injection 40 mg  40 mg IntraVENous BID    pantoprazole (PROTONIX) 40 mg in 0.9% sodium chloride 10 mL injection  40 mg IntraVENous DAILY    budesonide (PULMICORT) 500 mcg/2 ml nebulizer suspension  500 mcg Nebulization BID RT    midodrine (PROAMATINE) tablet 10 mg  10 mg Oral TID WITH MEALS    lidocaine (XYLOCAINE) 20 mg/mL (2 %) injection  mg   mg IntraDERMal Rad Multiple    heparin (porcine) 1,000 unit/mL injection 5,000 Units  5,000 Units Hemodialysis DIALYSIS PRN    ondansetron (ZOFRAN) injection 4 mg  4 mg IntraVENous Q6H PRN    octreotide (SANDOSTATIN) injection 100 mcg  100 mcg SubCUTAneous TID    ursodioL (ACTIGALL) capsule 900 mg  900 mg Oral BID WITH MEALS    sodium chloride (NS) flush 5-10 mL  5-10 mL IntraVENous Q8H    sodium chloride (NS) flush 5-10 mL  5-10 mL IntraVENous PRN    aspirin delayed-release tablet 81 mg  81 mg Oral DAILY    atorvastatin (LIPITOR) tablet 80 mg  80 mg Oral QHS    albuterol (PROVENTIL VENTOLIN) nebulizer solution 2.5 mg  2.5 mg Nebulization Q6H PRN    DULoxetine (CYMBALTA) capsule 30 mg  30 mg Oral DAILY    ferrous sulfate tablet 325 mg  1 Tablet Oral BID WITH MEALS    hydrOXYzine pamoate (VISTARIL) capsule 25 mg  25 mg Oral BID PRN    levothyroxine (SYNTHROID) tablet 175 mcg  175 mcg Oral ACB    [Held by provider] metoprolol tartrate (LOPRESSOR) tablet 12.5 mg  12.5 mg Oral BID    insulin lispro (HUMALOG) injection   SubCUTAneous AC&HS    sodium chloride (NS) flush 5-40 mL  5-40 mL IntraVENous Q8H    sodium chloride (NS) flush 5-40 mL  5-40 mL IntraVENous PRN    acetaminophen (TYLENOL) tablet 650 mg  650 mg Oral Q6H PRN    Or    acetaminophen (TYLENOL) suppository 650 mg  650 mg Rectal Q6H PRN    polyethylene glycol (MIRALAX) packet 17 g  17 g Oral DAILY PRN    bisacodyL (DULCOLAX) suppository 10 mg  10 mg Rectal DAILY PRN    alum-mag hydroxide-simeth (MYLANTA) oral suspension 15 mL  15 mL Oral Q6H PRN    hydrALAZINE (APRESOLINE) 20 mg/mL injection 20 mg  20 mg IntraVENous Q4H PRN    oxyCODONE IR (ROXICODONE) tablet 5 mg  5 mg Oral Q4H PRN    guaiFENesin-dextromethorphan (ROBITUSSIN DM) 100-10 mg/5 mL syrup 10 mL  10 mL Oral Q4H PRN    senna-docusate (PERICOLACE) 8.6-50 mg per tablet 2 Tablet  2 Tablet Oral DAILY PRN    melatonin tablet 5 mg  5 mg Oral QHS PRN    prochlorperazine (COMPAZINE) with saline injection 10 mg  10 mg IntraVENous Q6H PRN    nystatin (MYCOSTATIN) 100,000 unit/gram powder   Topical BID    sucralfate (CARAFATE) tablet 1 g  1 g Oral TID WITH MEALS    albuterol (PROVENTIL VENTOLIN) nebulizer solution 2.5 mg  2.5 mg Nebulization Q6H RT       Signed: Norm Albert DO    Part of this note may have been written by using a voice dictation software. The note has been proof read but may still contain some grammatical/other typographical errors.

## 2022-04-09 NOTE — PROGRESS NOTES
[facility]   DAILY PROGRESS NOTE    Admit Date:  4/5/2022    CC:  PBC with Cirrhosis, ISABELLA, SOA    Problem List:  Principal Problem:    ISABELLA (acute kidney injury) ([facility] [address] (4/5/2022)    Active Problems:    Hypoxia, home O2 at night 3L (1/13/2010)      Diabetes mellitus ([address] (1/13/2010)      Snoring, likely JANY (1/13/2010)      Morbid obesity ([address] (1/14/2010)      History of ST elevation myocardial infarction (STEMI) (1/9/2022)      Overview: Last Assessment & Plan:       Formatting of this note might be different from the original.      Inferior STEMI with large amount of thrombus. Total mid occlusion of the       RCA       Mild left sided CAD - EF 40-50% pLAD       Inferior wall hypokinesis with MR (moderate by cath)      Aspiration Thrombectomy and OSWALDO - RCA       Denies any ongoing angina.      - ASA, Brillinta, Lisinopril, Metoprolol      Liver cirrhosis secondary to [facility]) ()      Secondary esophageal varices without bleeding ([address] (3/25/2022)      Chronic diastolic congestive heart failure ([address] (3/25/2022)      UTI (urinary tract infection) (4/5/2022)      Iron deficiency anemia secondary to blood loss (chronic) (4/5/2022)      Acute on chronic respiratory failure with hypoxia and hypercapnia ([address] (4/8/2022)        Mrs. Vickey Daniel is a 69 y.o. female with PMH including but not limited to CAD s/p STEMI with OSWALDO to RCA in 1/2022, DM2, COPD with nocturnal hypoxia, hypothyroidism, and recent diagnosis of cirrhosis admitted with dark stool, ISABELLA and nausea.  Was started on rocephin.  LFTs are elevated consistent with labs from 3/28/22 admission.      On arrival, TB 2.9, , , , hgb 8.8, hct 28.1, MCV 28.1, plt 93.  Last CT 3/23/22 with cirrhotic liver, no biliary ductal dilation.  Hgb 8.8 (was 8.0 on d/c 3/28).    Work up for liver with viral hepatitis negative 3/24.  M2 .3 elevated and smooth muscle AB 36 elevated.  Iron studies wnl 3/24/22.  Note that EGD and Colonoscopy were done by Dr. Luke Hernandez on 3/28/22. EGD showed one esophageal varix (not banded as patient was on Brilinta), mild PHG. Colonoscopy showed pandiverticulosis and two tiny cecal polyps (not removed as she was on Brilinta) and moderate internal hemorrhoids.       Patient was told that she has PBC (based on elevated ALP and AMA from last admission) and started on Ursodiol this admission.       She did require 1 unit PRBC on 4/7/22. She had no overt GI bleeding. Cardiology was consulted who has stopped patient's Brilinta indefinitely. Only on ASA for now. She has had an ISABELLA and Nephrology has been following. There are concerns for possible HRS and patient is on albumin, midodrine and octreotide.       Patient transferred to ICU on 4/8/22 for respiratory failure requiring BIPAP. Renal started dialysis. This AM, no longer requiring BIPAP. Back on NC.       1. PBC with Liver Cirrhosis  2. Transfused Acute Blood Loss Anemia  3. ISABELLA/ Possible HRS  4. Esophageal Varices  5. PHG  6. Diverticulosis  7. Internal Hemorrhoids  8. Cecal polyps   9. Acute Respiratory Failure requiring BIPAP overnight     - Renal following for ISABELLA. Got started on HD on 4/8/22. She is on albumin, midodrine and octreotide with concerns for possible HRS. - Will continue Ursodiol for PBC  - MELD 19 on last check  - Once clinically stable, can consider transfer to [address] [place] [address] for consideration of liver transplant.    - Hg did drop to 6.8 this AM from 7. Monitor H/H. Transfuse for Hg < 8 given hx of CAD. She has no overt GI bleeding. Rolley Stair on hold indefinitely per Cardiology recommendations. Will hold off on endoscopy for now given respiratory symptoms, requirement of BIPAP overnight and no overt GI bleeding.    - Will follow     Maria A Jackson MD   [facility]    Subjective:     Patient got transferred to ICU overnight. Required BIPAP. Got started on HD.   Now on NC this AM. Medications:   Current Facility-Administered Medications   Medication Dose Route Frequency Provider Last Rate Last Admin    furosemide (LASIX) injection 40 mg  40 mg IntraVENous BID Natasha JORDAN NP   40 mg at 04/08/22 1726    methylPREDNISolone (PF) (SOLU-MEDROL) injection 40 mg  40 mg IntraVENous Q8H Soha Mims Yunielkaren JORDAN NP   40 mg at 04/08/22 1330    [START ON 4/9/2022] pantoprazole (PROTONIX) 40 mg in 0.9% sodium chloride 10 mL injection  40 mg IntraVENous DAILY Natasha JORDAN NP        budesonide (PULMICORT) 500 mcg/2 ml nebulizer suspension  500 mcg Nebulization BID RT Natasha JORDAN NP   500 mcg at 04/08/22 1944    midodrine (PROAMATINE) tablet 10 mg  10 mg Oral TID WITH MEALS Darrel Abdi NP   10 mg at 04/08/22 1726    ALPRAZolam (XANAX) tablet 0.5 mg  0.5 mg Oral Belkis Russell MD        lidocaine (XYLOCAINE) 20 mg/mL (2 %) injection  mg   mg IntraDERMal Rad Multiple Carey Lyles PA-C        heparin (porcine) 1,000 unit/mL injection 5,000 Units  5,000 Units Hemodialysis DIALYSIS PRN Meryl Enamorado MD   5,000 Units at 04/08/22 1641    0.9% sodium chloride infusion 250 mL  250 mL IntraVENous PRN Frank Dixon NP        0.9% sodium chloride infusion 250 mL  250 mL IntraVENous PRN Natasha JORDAN NP        0.9% sodium chloride infusion 250 mL  250 mL IntraVENous PRN Frank Dixon NP        ondansetron [facility]) injection 4 mg  4 mg IntraVENous Q6H PRN Natasha JORDAN NP   4 mg at 04/08/22 5663    octreotide (SANDOSTATIN) injection 100 mcg  100 mcg SubCUTAneous TID Natasha JORDAN NP   100 mcg at 04/08/22 1726    ursodioL (ACTIGALL) capsule 900 mg  900 mg Oral BID WITH MEALS Natasha JORDAN NP   900 mg at 04/08/22 1726    sodium chloride (NS) flush 5-10 mL  5-10 mL IntraVENous Q8H Natasha Tan A NP   10 mL at 04/08/22 0552    sodium chloride (NS) flush 5-10 mL  5-10 mL IntraVENous PRN Frank Dixon NP        0.9% sodium chloride infusion 250 mL  250 mL IntraVENous PRN Joel Fam, NP        aspirin delayed-release tablet 81 mg  81 mg Oral DAILY She JORDAN NP   81 mg at 04/08/22 0501    atorvastatin (LIPITOR) tablet 80 mg  80 mg Oral QHS She JORDAN NP   80 mg at 04/07/22 2108    albuterol (PROVENTIL VENTOLIN) nebulizer solution 2.5 mg  2.5 mg Nebulization Q6H PRN She JORDAN NP   2.5 mg at 04/07/22 2330    DULoxetine (CYMBALTA) capsule 30 mg  30 mg Oral DAILY She JORDAN NP   30 mg at 04/08/22 0219    ferrous sulfate tablet 325 mg  1 Tablet Oral BID WITH MEALS She JORDAN NP   325 mg at 04/08/22 1726    [Held by provider] furosemide (LASIX) tablet 40 mg  40 mg Oral DAILY Danny Weiner MD        hydrOXYzine pamoate (VISTARIL) capsule 25 mg  25 mg Oral BID PRN Joel Fam, NP        levothyroxine (SYNTHROID) tablet 175 mcg  175 mcg Oral ACB She JORDAN NP   175 mcg at 04/08/22 4227    [Held by provider] metoprolol tartrate (LOPRESSOR) tablet 12.5 mg  12.5 mg Oral BID Danny Weiner MD        insulin lispro (HUMALOG) injection   SubCUTAneous AC&HS She JORDAN NP   2 Units at 04/08/22 1131    sodium chloride (NS) flush 5-40 mL  5-40 mL IntraVENous Q8H She JORDAN NP   10 mL at 04/08/22 0553    sodium chloride (NS) flush 5-40 mL  5-40 mL IntraVENous PRN Joel Fam, NP        acetaminophen (TYLENOL) tablet 650 mg  650 mg Oral Q6H PRN Joel Fam, NP        Or    acetaminophen (TYLENOL) suppository 650 mg  650 mg Rectal Q6H PRN Joel Fam, NP        polyethylene glycol (MIRALAX) packet 17 g  17 g Oral DAILY PRN Joel Fam, NP        bisacodyL (DULCOLAX) suppository 10 mg  10 mg Rectal DAILY PRN Joel Fam, NP        famotidine (PEPCID) tablet 20 mg  20 mg Oral BID PRN Joel Fam, NP        alum-mag hydroxide-simeth (MYLANTA) oral suspension 15 mL  15 mL Oral Q6H PRN Joel Fam, NP        hydrALAZINE (APRESOLINE) 20 mg/mL injection 20 mg  20 mg IntraVENous Q4H PRN Raymond Walker NP        oxyCODONE IR (ROXICODONE) tablet 5 mg  5 mg Oral Q4H PRN Shaka JORDAN NP   5 mg at 04/07/22 0349    guaiFENesin-dextromethorphan (ROBITUSSIN DM) 100-10 mg/5 mL syrup 10 mL  10 mL Oral Q4H PRN Raymond Walker NP        senna-docusate (PERICOLACE) 8.6-50 mg per tablet 2 Tablet  2 Tablet Oral DAILY PRN Raymond Walker NP        melatonin tablet 5 mg  5 mg Oral QHS PRN Raymond Walker NP        prochlorperazine (COMPAZINE) with saline injection 10 mg  10 mg IntraVENous Q6H PRN Shaka JORDAN NP   10 mg at 04/07/22 8423    nystatin (MYCOSTATIN) 100,000 unit/gram powder   Topical BID Shaka JORDAN NP   Given at 04/08/22 1726    sucralfate (CARAFATE) tablet 1 g  1 g Oral TID WITH MEALS Shaka JORDAN NP   1 g at 04/08/22 1726    albuterol (PROVENTIL VENTOLIN) nebulizer solution 2.5 mg  2.5 mg Nebulization Q6H RT Shaka JORDAN NP   2.5 mg at 04/08/22 1944       Review of Systems:  ROS was obtained, with pertinent positives as listed above. Objective:   Vitals:  Visit Vitals  BP (!) 104/55   Pulse 87   Temp 97.6 °F (36.4 °C)   Resp 19   Ht 5' 4\" (1.626 m)   Wt 123.7 kg (272 lb 11.3 oz)   SpO2 99%   BMI 46.81 kg/m²     Intake/Output:  04/08 1901 - 04/09 0700  In: 0   Out: 1477   04/07 0701 - 04/08 1900  In: 1489.1 [I.V.:897]  Out: 2739 [Urine:875]  Exam:  General appearance:  Sitting up in bed. Appears chronically ill. Lungs: Diminished in bases. On NC  Heart: RRR  Abdomen: soft, obese, non-tender.  Bowel sounds normal. No masses, no organomegaly  Extremities: 2+ Edema  Neuro:  alert and oriented x 3    Data Review (Labs):    Recent Labs     04/08/22  1314 04/08/22  0659 04/07/22  1438 04/07/22  0805 04/07/22  0521 04/06/22  1452 04/06/22  0548   WBC  --  23.9* 16.3* 11.7* 10.2  --  10.5   HGB  --  7.8* 8.2* 7.0* 7.4*  --  8.8*   HCT  --  25.2* 26.2* 22.2* 24.4*  --  28.1*   PLT  --  118* 110* 101* 95*  -- 93*   MCV  --  91.0 90.0 89.5 91.7  --  87.8   NA  --  136  --   --  138  137  --  137   K  --  4.0  --   --  3.8  3.8  --  4.3   CL  --  104  --   --  104  104  --  105   CO2  --  23  --   --  24  24  --  25   BUN  --  81*  --   --  82*  80*  --  80*   MG  --  2.0  --   --  2.1  --   --    PHOS  --  4.4*  --   --  4.2*  --   --    AST  --  239*  --   --  275*  --  263*   ALT  --  117*  --   --  144*  --  151*   INR 1.2  --   --   --   --  1.0  --          Emir Nichols MD  [facility]

## 2022-04-09 NOTE — PROGRESS NOTES
Bedside, Verbal and Written shift change report given to [facility] AT [facility], [address] [facility] (oncoming nurse) by Yessi Bass RN (offgoing nurse). Report included the following information SBAR, Kardex, Intake/Output, MAR, Accordion, Recent Results, Cardiac Rhythm NSR to Sinus tach and Alarm Parameters . Pt running CRRT, to be rinsed back at 0030 on 4/9. Pt A&O x4, no gtts running. Pt on 6L high flow nasal cannula.

## 2022-04-09 NOTE — PROGRESS NOTES
Bedside and Verbal shift change report given to RODRI SYED (oncoming nurse) by Helene Bolden RN (offgoing nurse). Report included the following information SBAR, Kardex and ED Summary.

## 2022-04-09 NOTE — PROGRESS NOTES
Discussed pt condition and admission to ICU with pt's daughter and granddaughter. Family had previously been working on [employer] paperwork for daughter, Rolo Burden, but has not completed the forms. Family expressed the need for a family meeting to discuss long term goals of care and potential options for additional home care or potential discharge locations, may benefit from a palliative care consult I will discuss with the physicians and day shift staff.

## 2022-04-09 NOTE — PROGRESS NOTES
Yesika Barroso  Admission Date: 4/5/2022             Renal Daily Progress Note: 4/9/2022    The patient's chart is reviewed and the patient is discussed with the staff. Follow up ISABELLA  Subjective:   Pt seen and examined in room sitting up in bed .  Looks and feels much better    ROS:  General: no fever/chills  CV: no CP  Lung:no cough  GI: no N/V/D  : no dysuria  Ext: + BLE edema       Current Facility-Administered Medications   Medication Dose Route Frequency    methylPREDNISolone (PF) (SOLU-MEDROL) injection 20 mg  20 mg IntraVENous Q8H    sodium chloride 3% hypertonic nebulizer soln  4 mL Nebulization BID    guaiFENesin ER (MUCINEX) tablet 1,200 mg  1,200 mg Oral BID    0.9% sodium chloride infusion 250 mL  250 mL IntraVENous PRN    furosemide (LASIX) injection 40 mg  40 mg IntraVENous BID    pantoprazole (PROTONIX) 40 mg in 0.9% sodium chloride 10 mL injection  40 mg IntraVENous DAILY    budesonide (PULMICORT) 500 mcg/2 ml nebulizer suspension  500 mcg Nebulization BID RT    midodrine (PROAMATINE) tablet 10 mg  10 mg Oral TID WITH MEALS    lidocaine (XYLOCAINE) 20 mg/mL (2 %) injection  mg   mg IntraDERMal Rad Multiple    heparin (porcine) 1,000 unit/mL injection 5,000 Units  5,000 Units Hemodialysis DIALYSIS PRN    0.9% sodium chloride infusion 250 mL  250 mL IntraVENous PRN    0.9% sodium chloride infusion 250 mL  250 mL IntraVENous PRN    0.9% sodium chloride infusion 250 mL  250 mL IntraVENous PRN    ondansetron (ZOFRAN) injection 4 mg  4 mg IntraVENous Q6H PRN    octreotide (SANDOSTATIN) injection 100 mcg  100 mcg SubCUTAneous TID    ursodioL (ACTIGALL) capsule 900 mg  900 mg Oral BID WITH MEALS    sodium chloride (NS) flush 5-10 mL  5-10 mL IntraVENous Q8H    sodium chloride (NS) flush 5-10 mL  5-10 mL IntraVENous PRN    0.9% sodium chloride infusion 250 mL  250 mL IntraVENous PRN    aspirin delayed-release tablet 81 mg  81 mg Oral DAILY    atorvastatin (LIPITOR) tablet 80 mg  80 mg Oral QHS    albuterol (PROVENTIL VENTOLIN) nebulizer solution 2.5 mg  2.5 mg Nebulization Q6H PRN    DULoxetine (CYMBALTA) capsule 30 mg  30 mg Oral DAILY    ferrous sulfate tablet 325 mg  1 Tablet Oral BID WITH MEALS    hydrOXYzine pamoate (VISTARIL) capsule 25 mg  25 mg Oral BID PRN    levothyroxine (SYNTHROID) tablet 175 mcg  175 mcg Oral ACB    [Held by provider] metoprolol tartrate (LOPRESSOR) tablet 12.5 mg  12.5 mg Oral BID    insulin lispro (HUMALOG) injection   SubCUTAneous AC&HS    sodium chloride (NS) flush 5-40 mL  5-40 mL IntraVENous Q8H    sodium chloride (NS) flush 5-40 mL  5-40 mL IntraVENous PRN    acetaminophen (TYLENOL) tablet 650 mg  650 mg Oral Q6H PRN    Or    acetaminophen (TYLENOL) suppository 650 mg  650 mg Rectal Q6H PRN    polyethylene glycol (MIRALAX) packet 17 g  17 g Oral DAILY PRN    bisacodyL (DULCOLAX) suppository 10 mg  10 mg Rectal DAILY PRN    famotidine (PEPCID) tablet 20 mg  20 mg Oral BID PRN    alum-mag hydroxide-simeth (MYLANTA) oral suspension 15 mL  15 mL Oral Q6H PRN    hydrALAZINE (APRESOLINE) 20 mg/mL injection 20 mg  20 mg IntraVENous Q4H PRN    oxyCODONE IR (ROXICODONE) tablet 5 mg  5 mg Oral Q4H PRN    guaiFENesin-dextromethorphan (ROBITUSSIN DM) 100-10 mg/5 mL syrup 10 mL  10 mL Oral Q4H PRN    senna-docusate (PERICOLACE) 8.6-50 mg per tablet 2 Tablet  2 Tablet Oral DAILY PRN    melatonin tablet 5 mg  5 mg Oral QHS PRN    prochlorperazine (COMPAZINE) with saline injection 10 mg  10 mg IntraVENous Q6H PRN    nystatin (MYCOSTATIN) 100,000 unit/gram powder   Topical BID    sucralfate (CARAFATE) tablet 1 g  1 g Oral TID WITH MEALS    albuterol (PROVENTIL VENTOLIN) nebulizer solution 2.5 mg  2.5 mg Nebulization Q6H RT         Objective:     Vitals:    04/09/22 0801 04/09/22 0815 04/09/22 0830 04/09/22 0939   BP: (!) 117/57 (!) 113/56 (!) 111/55    Pulse: 95 93 96    Resp:  18 (!) 33    Temp:       SpO2: 100% 100% 100%   Weight:       Height:         Intake and Output:   04/07 1901 - 04/09 0700  In: 5201 [P.O.:120; I.V.:897]  Out: 4370 [Urine:400]  No intake/output data recorded. Physical Exam:   Constitutional:  the patient is well developed and in no acute distress, alert and oriented   HEENT:  Sclera clear, pupils equal, oral mucosa dry  Lungs: decreased bilaterally  Cardiovascular:  Regular rate, S1, S2, no Rub   Abd/GI: soft and non-tender; with positive bowel sounds. Ext: warm without cyanosis. There is 1+ lower leg edema. Skin:  no jaundice or rashes  Neuro: no gross neuro deficits   Psychiatric: Calm. LAB  Recent Labs     04/09/22  0924 04/09/22  0332 04/09/22  0331 04/08/22  1314 04/08/22  0659 04/07/22  1438 04/07/22  0805 04/07/22  0805 04/07/22  0521 04/06/22  1452   WBC  --  17.4*  --   --  23.9* 16.3*  --  11.7*   < >  --    HGB 6.8* 7.0*  --   --  7.8* 8.2*   < > 7.0*   < >  --    HCT 22.4* 22.4*  --   --  25.2* 26.2*   < > 22.2*   < >  --    PLT  --  69*  --   --  118* 110*  --  101*   < >  --    INR  --   --  1.3 1.2  --   --   --   --   --  1.0    < > = values in this interval not displayed. Recent Labs     04/09/22 0331 04/08/22  0659 04/07/22  0521    136 138  137   K 4.3 4.0 3.8  3.8    104 104  104   CO2 24 23 24  24   * 109* 123*  122*   BUN 38* 81* 82*  80*   CREA 1.60* 2.80* 2.60*  2.50*   MG 2.1 2.0 2.1   PHOS 3.1 4.4* 4.2*   ALB 2.6* 2.9* 2.2*     No results for input(s): PH, PCO2, PO2, HCO3 in the last 72 hours.       Assessment:  (Medical Decision Making)     Hospital Problems  Date Reviewed: 4/5/2022          Codes Class Noted POA    Acute on chronic respiratory failure with hypoxia and hypercapnia (HCC) ICD-10-CM: J96.21, J96.22  ICD-9-CM: 518.84, 786.09, 799.02  4/8/2022 Unknown        * (Principal) ISABELLA (acute kidney injury) ([facility] ICD-10-CM: N17.9  ICD-9-CM: 584.9  4/5/2022 Yes        UTI (urinary tract infection) ICD-10-CM: N39.0  ICD-9-CM: 599.0  4/5/2022 Yes        Iron deficiency anemia secondary to blood loss (chronic) ICD-10-CM: D50.0  ICD-9-CM: 280.0  4/5/2022 Yes        Secondary esophageal varices without bleeding (HCC) (Chronic) ICD-10-CM: I85.10  ICD-9-CM: 456.21  3/25/2022 Yes        Chronic diastolic congestive heart failure (HCC) (Chronic) ICD-10-CM: I50.32  ICD-9-CM: 428.32, 428.0  3/25/2022 Yes        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  Inferior STEMI with large amount of thrombus. Total mid occlusion of the RCA   Mild left sided CAD - EF 40-50% pLAD   Inferior wall hypokinesis with MR (moderate by cath)  Aspiration Thrombectomy and OSWALDO - RCA   Denies any ongoing angina.  - ASA, Brillinta, Lisinopril, Metoprolol             Morbid obesity (HCC) (Chronic) ICD-10-CM: E66.01  ICD-9-CM: 278.01  1/14/2010 Yes        Hypoxia, home O2 at night 3L (Chronic) ICD-10-CM: R09.02  ICD-9-CM: 799.02  1/13/2010 Yes        Diabetes mellitus (HCC) (Chronic) ICD-10-CM: E11.9  ICD-9-CM: 250.00  1/13/2010 Yes        Snoring, likely JANY (Chronic) ICD-10-CM: R06.83  ICD-9-CM: 786.09  1/13/2010 Yes              Plan:  (Medical Decision Making)   1. ISABELLA likely multifactorial pre renal azotemia, underlying liver cirrhosis ? HRS, [place] [address], also with Hx of ECHO 1/15/22 dCHF EF 55-65%, RVSP 33 mmHg. Albumin 1.7, continue albumin, midodrine and octreotide   Renal US with no evidence of obstructive nephropathy  -Dialysis well tolerated yesterday. Will garg 8 hour SED for additional UF today. 2. RUDD Cirrhosis Elevated LFTs, alk phos and bilirubin- GI to evaluate      3. COPD hypoxia-3L O2 NC     4.  Anemia hx of hemorrhoids, s/p PRBC 4/5 per primary  EGD with esophageal varices without bleeding, no banding done, pt on brilinta      5.  DM type II- SSI per Chet Jay MD

## 2022-04-09 NOTE — PROGRESS NOTES
Flower Palomares  Admission Date: 4/5/2022         Daily Progress Note: 4/9/2022    The patient's chart is reviewed and the patient is discussed with the staff. Background: Annika Wagner is a 69 y.o. with h/o CAD s/p STEMI 1/2022, T2DM, COPD with 3L O2, hypothyroidism and recent cirrhosis diagnosis thought 2/2 primary biliary cirrhosis (+ASMA, +AMA). Also has moderate to severe  Former smoker- 37years 1ppd, quit in January 2022. She has decompensated liver disease and ISABELLA and developed respiratory failure requiring BiPAP on 4/8. Subjective:   Got HD access and dialysis overnight with volume removal, net negative 3.9L. Now on nasal cannula no longer requiring BiPAP. Leukocytosis improved.       Current Facility-Administered Medications   Medication Dose Route Frequency    furosemide (LASIX) injection 40 mg  40 mg IntraVENous BID    methylPREDNISolone (PF) (SOLU-MEDROL) injection 40 mg  40 mg IntraVENous Q8H    pantoprazole (PROTONIX) 40 mg in 0.9% sodium chloride 10 mL injection  40 mg IntraVENous DAILY    budesonide (PULMICORT) 500 mcg/2 ml nebulizer suspension  500 mcg Nebulization BID RT    midodrine (PROAMATINE) tablet 10 mg  10 mg Oral TID WITH MEALS    lidocaine (XYLOCAINE) 20 mg/mL (2 %) injection  mg   mg IntraDERMal Rad Multiple    heparin (porcine) 1,000 unit/mL injection 5,000 Units  5,000 Units Hemodialysis DIALYSIS PRN    0.9% sodium chloride infusion 250 mL  250 mL IntraVENous PRN    0.9% sodium chloride infusion 250 mL  250 mL IntraVENous PRN    0.9% sodium chloride infusion 250 mL  250 mL IntraVENous PRN    ondansetron (ZOFRAN) injection 4 mg  4 mg IntraVENous Q6H PRN    octreotide (SANDOSTATIN) injection 100 mcg  100 mcg SubCUTAneous TID    ursodioL (ACTIGALL) capsule 900 mg  900 mg Oral BID WITH MEALS    sodium chloride (NS) flush 5-10 mL  5-10 mL IntraVENous Q8H    sodium chloride (NS) flush 5-10 mL  5-10 mL IntraVENous PRN    0.9% sodium chloride infusion 250 mL  250 mL IntraVENous PRN    aspirin delayed-release tablet 81 mg  81 mg Oral DAILY    atorvastatin (LIPITOR) tablet 80 mg  80 mg Oral QHS    albuterol (PROVENTIL VENTOLIN) nebulizer solution 2.5 mg  2.5 mg Nebulization Q6H PRN    DULoxetine (CYMBALTA) capsule 30 mg  30 mg Oral DAILY    ferrous sulfate tablet 325 mg  1 Tablet Oral BID WITH MEALS    [Held by provider] furosemide (LASIX) tablet 40 mg  40 mg Oral DAILY    hydrOXYzine pamoate (VISTARIL) capsule 25 mg  25 mg Oral BID PRN    levothyroxine (SYNTHROID) tablet 175 mcg  175 mcg Oral ACB    [Held by provider] metoprolol tartrate (LOPRESSOR) tablet 12.5 mg  12.5 mg Oral BID    insulin lispro (HUMALOG) injection   SubCUTAneous AC&HS    sodium chloride (NS) flush 5-40 mL  5-40 mL IntraVENous Q8H    sodium chloride (NS) flush 5-40 mL  5-40 mL IntraVENous PRN    acetaminophen (TYLENOL) tablet 650 mg  650 mg Oral Q6H PRN    Or    acetaminophen (TYLENOL) suppository 650 mg  650 mg Rectal Q6H PRN    polyethylene glycol (MIRALAX) packet 17 g  17 g Oral DAILY PRN    bisacodyL (DULCOLAX) suppository 10 mg  10 mg Rectal DAILY PRN    famotidine (PEPCID) tablet 20 mg  20 mg Oral BID PRN    alum-mag hydroxide-simeth (MYLANTA) oral suspension 15 mL  15 mL Oral Q6H PRN    hydrALAZINE (APRESOLINE) 20 mg/mL injection 20 mg  20 mg IntraVENous Q4H PRN    oxyCODONE IR (ROXICODONE) tablet 5 mg  5 mg Oral Q4H PRN    guaiFENesin-dextromethorphan (ROBITUSSIN DM) 100-10 mg/5 mL syrup 10 mL  10 mL Oral Q4H PRN    senna-docusate (PERICOLACE) 8.6-50 mg per tablet 2 Tablet  2 Tablet Oral DAILY PRN    melatonin tablet 5 mg  5 mg Oral QHS PRN    prochlorperazine (COMPAZINE) with saline injection 10 mg  10 mg IntraVENous Q6H PRN    nystatin (MYCOSTATIN) 100,000 unit/gram powder   Topical BID    sucralfate (CARAFATE) tablet 1 g  1 g Oral TID WITH MEALS    albuterol (PROVENTIL VENTOLIN) nebulizer solution 2.5 mg  2.5 mg Nebulization Q6H RT     Review of Systems  Constitutional: negative for fever, chills, sweats  Cardiovascular: negative for chest pain, palpitations, syncope, edema  Gastrointestinal:  negative for dysphagia, reflux, vomiting, diarrhea, abdominal pain, or melena  Neurologic:  negative for focal weakness, numbness, headache  Objective:     Vitals:    04/09/22 0800 04/09/22 0801 04/09/22 0815 04/09/22 0830   BP: (!) 106/53 (!) 117/57 (!) 113/56 (!) 111/55   Pulse: 99 95 93 96   Resp: 30  18 (!) 33   Temp:       SpO2: 98%  100% 100%   Weight: 272 lb 11.3 oz (123.7 kg)      Height:           Intake/Output Summary (Last 24 hours) at 4/9/2022 2780  Last data filed at 4/9/2022 0700  Gross per 24 hour   Intake 420 ml   Output 4270 ml   Net -3850 ml     Physical Exam:   Constitution:  the patient is well developed and in no acute distress  HEENT:  Sclera clear, pupils equal, oral mucosa moist  Respiratory: few wheezes  Cardiovascular:  RRR with SM  Gastrointestinal: soft and non-tender; with positive bowel sounds. Musculoskeletal: warm without cyanosis. There is 2+ lower extremity edema. Skin:  no jaundice or rashes, no wounds   Neurologic: no gross neuro deficits     Psychiatric:  alert and oriented x 2    CXR:       LAB:  Recent Labs     04/09/22  0332 04/09/22  0331 04/08/22  1314 04/08/22  0659 04/07/22  1438 04/07/22  0521 04/06/22  1452   WBC 17.4*  --   --  23.9* 16.3*   < >  --    HGB 7.0*  --   --  7.8* 8.2*   < >  --    HCT 22.4*  --   --  25.2* 26.2*   < >  --    PLT 69*  --   --  118* 110*   < >  --    INR  --  1.3 1.2  --   --   --  1.0    < > = values in this interval not displayed.      Recent Labs     04/09/22  0331 04/08/22  0659 04/07/22  0521    136 138  137   K 4.3 4.0 3.8  3.8    104 104  104   CO2 24 23 24  24   * 109* 123*  122*   BUN 38* 81* 82*  80*   CREA 1.60* 2.80* 2.60*  2.50*   MG 2.1 2.0 2.1   CA 8.6 8.9 8.9  9.1   PHOS 3.1 4.4* 4.2*   ALB 2.6* 2.9* 2.2*   * 239* 275*   * 117* 144*   * 383* 488*     Recent Labs     04/08/22  1237   BNPNT 26,492*   CRP 10.8*     Recent Labs     04/08/22  1112   PHI 7.21*   PCO2I 55.9*   PO2I 72*   HCO3I 22.1     No results for input(s): SDES, CULT in the last 72 hours. Assessment and Plan:  (Medical Decision Making)   Principal Problem:    ISABELLA (acute kidney injury) ([address] (4/5/2022)    Per nephrology. Suspected to be multifactorial. On midodrine, albumin, octreotide. Could use levophed if desired in ICU. Active Problems:    Hypoxia, home O2 at night 3L (1/13/2010)   worsened due to pulmonary edema. Diabetes mellitus ([address] (1/13/2010)    Little sliding scale use      Snoring, likely JANY (1/13/2010)    BiPAP nightly      Liver cirrhosis secondary to RUDD ([address] or PB      Secondary esophageal varices without bleeding ([address] (3/25/2022)      Acute on chronic respiratory failure with hypoxia and hypercapnia ([address] (4/8/2022)  Continue O2 and volume removal.  Taper steroids which were added for COPD. May be able to stop tomorrow. More than 50% of the time documented was spent in face-to-face contact with the patient and in the care of the patient on the floor/unit where the patient is located.     Jalen Moses MD

## 2022-04-09 NOTE — PROGRESS NOTES
[facility] CARDIOLOGY PROGRESS NOTE           4/9/2022 9:32 AM    Admit Date: 4/5/2022      Subjective:   Reports no chest pain. ROS:  GEN:  No fever or chills  Cardiovascular:  As noted above:no CP or palpitations. Pulmonary:  As noted above:SOB is ok. Neuro:  No new focal motor or sensory loss    Objective:      Vitals:    04/09/22 0800 04/09/22 0801 04/09/22 0815 04/09/22 0830   BP: (!) 106/53 (!) 117/57 (!) 113/56 (!) 111/55   Pulse: 99 95 93 96   Resp: 30  18 (!) 33   Temp:       SpO2: 98%  100% 100%   Weight: 272 lb 11.3 oz (123.7 kg)      Height:           Physical Exam:  General-no distress  Neck- supple, no JVD  CV- regular rate and rhythm with 2/6 BOLIVAR. Lung- clear bilaterally. Diminished BASILAR BS   Abd- soft, nontender, nondistended  Ext- 2+ edema bilaterally. Skin- warm and dry  Psychiatric:  Normal mood and affect. Neurologic:  Alert and oriented X 3      Data Review:   Recent Labs     04/09/22  0332 04/09/22  0331 04/08/22  1314 04/08/22  0659 04/06/22  1452   NA  --  139  --  136  --    K  --  4.3  --  4.0  --    MG  --  2.1  --  2.0  --    BUN  --  38*  --  81*  --    CREA  --  1.60*  --  2.80*  --    GLU  --  133*  --  109*  --    WBC 17.4*  --   --  23.9*  --    HGB 7.0*  --   --  7.8*  --    HCT 22.4*  --   --  25.2*  --    PLT 69*  --   --  118*  --    INR  --  1.3 1.2  --    < >    < > = values in this interval not displayed. TELEMETRY:  NSR    Assessment/Plan:     Principal Problem:    ISABELLA (acute kidney injury) ([facility] (4/5/2022)    Active Problems:    Hypoxia, home O2 at night 3L (1/13/2010)      Diabetes mellitus ([facility] (1/13/2010)      Snoring, likely JANY (1/13/2010)      Morbid obesity ([address] (1/14/2010)      History of ST elevation myocardial infarction (STEMI) (1/9/2022)      Overview: Last Assessment & Plan:       Formatting of this note might be different from the original.      Inferior STEMI with large amount of thrombus.  Total mid occlusion of the RCA       Mild left sided CAD - EF 40-50% pLAD       Inferior wall hypokinesis with MR (moderate by cath)      Aspiration Thrombectomy and OSWALDO - RCA       Denies any ongoing angina.      - ASA, Brillinta, Lisinopril, Metoprolol:Brilinta stopped. Continue ASA.       Liver cirrhosis secondary to RUDD [facility]) ()      Secondary esophageal varices without bleeding ([address] (3/25/2022)      Chronic diastolic congestive heart failure ([address] (3/25/2022)      UTI (urinary tract infection) (4/5/2022)      Iron deficiency anemia secondary to blood loss (chronic) (4/5/2022)      Acute on chronic respiratory failure with hypoxia and hypercapnia ([address] (4/8/2022)                Love Ramírez MD  4/9/2022 9:32 AM

## 2022-04-09 NOTE — DIALYSIS
8 HR SLED initiated using  R CVC. Aspirated and flushed both catheter ports without problem. Machine settings per MD order. 150  DFR  250 UFR  Heparin 1000 unit bolus and 500 units/hr. Reported to primary nurse. Dialysis nurse available as needed.

## 2022-04-10 PROBLEM — I05.8 MITRAL VALVE MASS: Status: ACTIVE | Noted: 2022-01-01

## 2022-04-10 NOTE — PROGRESS NOTES
Discussed use of BiPap with Dr. Dany Meyers and received orders for repeat ABG and H&H. After reviewing results, I was instructed to monitor pt for signs of sleep apnea and continue on 6 L NC instead of BIPAP at night as tolerated by the patient. Dr. Dany Meyers suggested use of Cpap at night if sleep apnea symptoms occur.

## 2022-04-10 NOTE — PROGRESS NOTES
[facility] CARDIOLOGY PROGRESS NOTE           4/10/2022 9:26 AM    Admit Date: 4/5/2022      Subjective:   She reports no chest pain. ROS:  GEN:  No fever or chills  Cardiovascular:  As noted above:no CP or palpitations. Pulmonary:  As noted above:no SOB. Neuro:  No new focal motor or sensory loss    Objective:      Vitals:    04/10/22 0453 04/10/22 0645 04/10/22 0700 04/10/22 0712   BP:  (!) 99/54 (!) 106/55    Pulse: 87 86 92    Resp: 22 20 14    Temp:   97.9 °F (36.6 °C)    SpO2: 99% 96% 97% 97%   Weight:       Height:           Physical Exam:  General-no distress  Neck- supple, no JVD  CV- regular rate and rhythm no MRG  Lung- clear bilaterally  Abd- soft, nontender, obese  Ext- 2+ pedal edema edema bilaterally. Skin- warm and dry  Psychiatric:  Normal mood and affect. Neurologic:  Alert and oriented X 3      Data Review:   Recent Labs     04/10/22  0440 04/09/22 2015 04/09/22  0924 04/09/22  0332 04/09/22  0331 04/08/22  0659     --   --   --  139  --    K 3.8  --   --   --  4.3  --    MG 1.9  --   --   --  2.1  --    BUN 23  --   --   --  38*  --    CREA 1.30*  --   --   --  1.60*  --    *  --   --   --  133*  --    WBC 13.4*  --   --  17.4*  --    < >   HGB 7.3* 8.1*   < > 7.0*  --    < >   HCT 23.6* 25.5*   < > 22.4*  --    < >   PLT 61*  --   --  69*  --    < >   INR 1.2  --   --   --  1.3  --     < > = values in this interval not displayed. TELEMETRY:  NSR with PAC's & PVC's. Assessment/Plan:     Principal Problem:    ISABELLA (acute kidney injury) ([facility] (4/5/2022): Improving. Active Problems:    Hypoxia, home O2 at night 3L (1/13/2010)      History of ST elevation myocardial infarction (STEMI) (1/9/2022)      Overview: Last Assessment & Plan:       Formatting of this note might be different from the original.      Inferior STEMI with large amount of thrombus.  Total mid occlusion of the       RCA       Mild left sided CAD - EF 40-50% pLAD       Inferior wall hypokinesis with MR (moderate by cath)      Aspiration Thrombectomy and OSWALDO - RCA       Denies any ongoing angina.      - ASA, Brillinta, Lisinopril, Metoprolol:Brilinta stopped. Continue ASA      Chronic obstructive pulmonary disease (HCC) ()      Overview: 10 years      Liver cirrhosis secondary to RUDD (HCC) ()      Thrombocytopenia (HCC) ()      Secondary esophageal varices without bleeding ([address] (3/25/2022)      Chronic diastolic congestive heart failure ([address] (3/25/2022)      UTI (urinary tract infection) (4/5/2022)      Iron deficiency anemia secondary to blood loss (chronic) (4/5/2022)      Acute on chronic respiratory failure with hypoxia and hypercapnia ([address] (4/8/2022)      Primary biliary cholangitis ([address] (4/9/2022)      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 At some point RUBEN could provide additional information if varices allow. PAC's/PVC's.:Stable at this point. Continue to observe. Low dose Metoprolol  has been held. Restart as BP allows            Laury Ham MD  4/10/2022 9:26 AM

## 2022-04-10 NOTE — PROGRESS NOTES
Hospitalist Progress Note   Admit Date:  2022  2:14 PM   Name:  Phylicia Jeffers   Age:  69 y.o. Sex:  female  :  1955   MRN:  [id number]   Room:  310/    Presenting Complaint: Fatigue    Reason(s) for Admission: ISABELLA (acute kidney injury) [facility]) [N17.9]     Hospital Course & Interval History:   69 y.o. female with medical history of CAD s/p STEMI with OSWALDO to RCA in 2022, DM2, COPD with nocturnal hypoxia, hypothyroidism, and recent diagnosis of cirrhosis who presented with fatigue.  Constant, progressively worsening, since discharge from hospital 3-28.  She has also had some nausea, dry heaves, dark \"mushy\" stools, decreased PO intake for several days. Humberto Gupta has been compliant with all meds including iron and has been taking lasix as well despite minimal intake. She was discharged 3-28 after a stay for ISABELLA, cirrhosis, suspected GIB.  PT/OT recommended HH at discharge and she did not want SNF. Humberto Gupta is agreeable to SNF now. Pt was admitted on  for ISABELLA and UTI. She completed 3 day course of Rocephin for Klebsiella UTI. ISABELLA was likely multifactorial pre renal azotemia, underlying liver cirrhosis, also concerns for HRS. She was started on Lasix. Nephrology was consulted, Lasix was held and pt was started on gentle mIVF. Pt started to have worsening SOB on  after receiving blood transfusion, associated with wheezing. She has chronic hypoxia on 3LO2NC, this was increased to 7LO2NC. CXR obtained revealed worsening pulmonary edema, ABG 7.21/55.9/72/22. 1. She had labored breathing and was placed on BIPAP and moved to the ICU on . Pulm has been consulted. IR was consulted and pt had central line placed on . She underwent dialysis. She was started on Solumedrol for COPD and Lasix IV as well. She was able to come off of BIPAP and weaned down to NC. She has been off of Solumedrol as well. She also has acute on chronic anemia d/t chronic blood loss.  She is on Brilinta/ASA for hx of STEMI in 1/22 with OSWALDO. She received PRBC on 4/5 and on 4/7. Cardiology consulted to evaluate need for holding Brilinta. Recommended to stop Brilinta indefinitely and continue with ASA. Repeat TTE 4/9 with EF 75%, normal systolic, abnormal diastolic fxn; severe annular calcification of the mitral valve, large structure at posterior mitral valve [id number] could be severe mitral annular calcification, intracardiac mass or extracardiac structure. GI was also consulted for GIB, hx of cirrhosis and concerns for PBC (Primary Biliary Cholangitis). Note that Viral Hepatitis Panel and Ferritin were normal from 3/24/22. Unclear if she really has autoimmune hepatitis so autoimmune labs added. ASA and AMA came back positive. For the [facility] with cirrhosis, patient was started on Ursodiol at 13-15 mg/kg divided in BID dosing. She will need a liver transplant evaluation outpatient once stable. She was started on PPI and Octreotide. Transfuse if <8 given CAD hx. Holding off on repeat endoscopy with recent EGD on 3/28/22. She received 1U PRBC on 4/9 with Hgb 6.8. Subjective/24hr Events (04/10/22): This AM pt stated she has no appetite, her mouth feels dry. OK with ice chips and broth. Stated her breathing is better but \"tired of it all. \"    No fever, chills, chest pain, abdominal pain. ROS:  10 systems reviewed and negative except as noted above. Assessment & Plan: ISABELLA   Likely multifactorial pre renal azotemia, underlying liver cirrhosis, also concerns for HRS. Cr at baseline ~1.5  Renal US with no evidence of obstructive nephropathy  Avoid nephrotoxic meds.  Avoid hypotension  Holding home Lisinopril  Accurate I&O's  Cont albumin and Lasix  Central line placed by IR 4/8  S/p dialysis 4/8,  8h SED 4/9  Cr improving    UTI  UCX grew Klebsiella  ID has seen, stopped Rocephin after 3 day course    Acute on chronic respiratory failure with hypoxia and hypercapnia   Hypoxia, home O2 at night 3L   On 4/8 CXR obtained revealed worsening pulmonary edema, ABG 7.21/55.9/72/22. 1. O2 sat 78% on 3LO2NC and was placed on BIPAP, transferred to ICU on 4/8  Cont Lasix IV BID and midodrine  Accurate I&O's  Dialysis as recommended by Nephro  Repeat TTE 4/9 with EF 85%, normal systolic, abnormal diastolic fxn; severe annular calcification of the mitral valve, large structure at posterior mitral valve [id number] could be severe mitral annular calcification, intracardiac mass or extracardiac structure  Pulmo on board, appreciate recs  Off of BIPAP, now on 6LO2NC, wean further as tolerated. 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 S/p transfusion on 4/5, 4/7  Cont ferrous sulfate  GI on board, appreciate recs  Cont PPI IV and octreotide  Transfuse if <8 given CAD hx  Hgb 6.8 4/9>> s/p 1UPRBC>>Hgb 8.1  Hgb 7.3 this Am 4/10, transfuse 1U PRBC to keep >8    Primary Biliary Cholangitis  Liver cirrhosis secondary to RUDD  Secondary esophageal varices without bleeding  MELD 19 last check  Cont Ursodiol at 13-15 mg/kg divided in BID dosing for PBC  Need a liver transplant evaluation outpatient once stable. Holding off on repeat endoscopy with recent EGD on 3/28/22  GI on board, appreciate recs    History of STEMI in 1/2022 with OSWALDO  Mitral valve mass  Cardiology consulted to evaluate need for holding Brilinta 2/2 GIB. Recommended to stop Brilinta indefinitely and continue with ASA  Repeat TTE 4/9 with EF 38%, normal systolic, abnormal diastolic fxn; severe annular calcification of the mitral valve, large structure at posterior mitral valve [id number] could be severe mitral annular calcification, intracardiac mass or extracardiac structure  Holding Lisinopril d/t ISABELLA and metoprolol d/t hypotension  Cont statin  Cardiology on board, appreciate recs  4/10 D/w Cards Dr. Owen Peacock about TTE findings of mitral valve, no acute intervention in setting of hypoxia, GIB, ISABELLA.     Thrombocytopenia, acute on chronic 4/10  Platelets 69 on 4/9 (baseline ~<130). Hepatitis panel negative, HIV neg. Most likely d/t liver cirrhosis   Check peripheral smear, HIT panel, PT/INR, PTT, fibrinogen  Transfuse if <10K or if <50K in active bleeding    COPD  Cont Pulmicort and albuterol  Off of solumedrol    Morbid obesity  Snoring, likely JANY  Adds to complexity of care  Needs outpt sleep study    Chronic HFrEF  ECHO on 1/15 showed improved EF 55-65%  Cont Lasix and midodrine  Cardiology on board  Holding Lisinopril d/t ISABELLA and metoprolol d/t hypotension  Repeat TTE 4/9 with EF 61%    Diabetes mellitus  SSI    Hypothyroidism  Cont home Syntrhoid    Anxiety/depression  Cont home Cymbalta      Discharge Planning:      >2 midnights. PT/OT. Pending improvement in hypoxia, GI recs. Can transfer to medical floor today. Diet:  DIET NPO  DVT PPx: SCD's  Code status: Full Code     I spent 40 minutes of time caring for this patient at bedside or nearby on the unit, more than 50 percent of which was spent on coordination of care and/or counseling regarding the disease process, treatment options, and treatment plan.       Hospital Problems as of 4/10/2022 Date Reviewed: 4/5/2022          Codes Class Noted - Resolved POA    Primary biliary cholangitis ([facility] [email or web address] 75.) ICD-10-CM: K74.3  ICD-9-CM: 571.6  4/9/2022 - Present Unknown        Acute on chronic respiratory failure with hypoxia and hypercapnia (HCC) ICD-10-CM: J96.21, J96.22  ICD-9-CM: 518.84, 786.09, 799.02  4/8/2022 - Present Unknown        * (Principal) ISABELLA (acute kidney injury) ([facility].) ICD-10-CM: N17.9  ICD-9-CM: 584.9  4/5/2022 - Present Yes        UTI (urinary tract infection) ICD-10-CM: N39.0  ICD-9-CM: 599.0  4/5/2022 - Present Yes        Iron deficiency anemia secondary to blood loss (chronic) ICD-10-CM: D50.0  ICD-9-CM: 280.0  4/5/2022 - Present Yes        Secondary esophageal varices without bleeding (HCC) (Chronic) ICD-10-CM: I85.10  ICD-9-CM: 456.21  3/25/2022 - Present Yes        Chronic diastolic congestive heart failure (HCC) (Chronic) ICD-10-CM: I50.32  ICD-9-CM: 428.32, 428.0  3/25/2022 - Present Yes        Chronic obstructive pulmonary disease (HCC) (Chronic) ICD-10-CM: J44.9  ICD-9-CM: 496  Unknown - Present Yes    Overview Signed 3/24/2022  7:32 PM by Atif Doty DO     10 years             Liver cirrhosis secondary to RUDD ([facility] 75.) (Chronic) ICD-10-CM: K75.81, K74.60  ICD-9-CM: 571.8, 571.5  Unknown - Present Yes        History of ST elevation myocardial infarction (STEMI) (Chronic) ICD-10-CM: I25.2  ICD-9-CM: 937  1/9/2022 - Present Yes    Overview Signed 2/11/2022  8:13 AM by Anthony Segura MD     Last Assessment & Plan:   Formatting of this note might be different from the original.  Inferior STEMI with large amount of thrombus.  Total mid occlusion of the RCA   Mild left sided CAD - EF 40-50% pLAD   Inferior wall hypokinesis with MR (moderate by cath)  Aspiration Thrombectomy and OSWALDO - RCA   Denies any ongoing angina.  - ASA, Brillinta, Lisinopril, Metoprolol             Morbid obesity (HCC) (Chronic) ICD-10-CM: E66.01  ICD-9-CM: 278.01  1/14/2010 - Present Yes        Hypoxia, home O2 at night 3L (Chronic) ICD-10-CM: R09.02  ICD-9-CM: 799.02  1/13/2010 - Present Yes        Diabetes mellitus ([facility] 75.) (Chronic) ICD-10-CM: E11.9  ICD-9-CM: 250.00  1/13/2010 - Present Yes        Hypothyroidism (Chronic) ICD-10-CM: E03.9  ICD-9-CM: 244.9  1/13/2010 - Present Yes        Snoring, likely JANY (Chronic) ICD-10-CM: R06.83  ICD-9-CM: 786.09  1/13/2010 - Present Yes        RESOLVED: COPD exacerbation ([facility] 75.) ICD-10-CM: J44.1  ICD-9-CM: 491.21  1/13/2010 - 4/5/2022 Yes              Objective:     Patient Vitals for the past 24 hrs:   Temp Pulse Resp BP SpO2   04/10/22 0712 -- -- -- -- 97 %   04/10/22 0700 -- 92 14 (!) 106/55 97 %   04/10/22 0645 -- 86 20 (!) 99/54 96 %   04/10/22 0453 -- 87 22 -- 99 %   04/10/22 0448 -- 89 16 (!) 104/57 98 %   04/10/22 0433 -- 92 19 (!) 99/54 99 %   04/10/22 0418 -- 88 20 (!) 100/52 97 %   04/10/22 0404 -- 97 -- (!) 110/53 91 %   04/10/22 0403 -- -- 25 -- --   04/10/22 0348 -- 100 -- (!) 122/58 98 %   04/10/22 0347 -- -- 19 -- --   04/10/22 0333 -- 97 28 114/61 98 %   04/10/22 0332 -- 100 24 -- 98 %   04/10/22 0318 -- 91 11 (!) 106/57 98 %   04/10/22 0304 97.2 °F (36.2 °C) 92 20 (!) 110/57 100 %   04/10/22 0248 -- 89 15 (!) 109/55 100 %   04/10/22 0233 -- 91 12 132/62 98 %   04/10/22 0223 -- 97 20 -- 96 %   04/10/22 0218 -- 98 23 139/62 96 %   04/10/22 0204 -- 97 (!) 51 125/61 (!) 87 %   04/10/22 0148 -- 96 22 133/63 97 %   04/10/22 0133 -- 82 10 (!) 93/52 97 %   04/10/22 0122 -- 83 12 -- 98 %   04/10/22 0118 -- 83 11 (!) 94/51 97 %   04/10/22 0104 -- 83 20 (!) 90/52 95 %   04/10/22 0049 -- 93 30 (!) 107/57 96 %   04/10/22 0033 -- 96 23 (!) 128/59 96 %   04/10/22 0018 -- 99 17 (!) 113/58 94 %   04/10/22 0004 -- 92 -- (!) 115/56 97 %   04/09/22 2348 -- 92 -- (!) 113/57 98 %   04/09/22 2333 -- 100 14 (!) 115/57 98 %   04/09/22 2318 -- 100 18 (!) 113/58 99 %   04/09/22 2304 98 °F (36.7 °C) 94 23 (!) 112/56 98 %   04/09/22 2248 -- (!) 102 18 (!) 130/57 98 %   04/09/22 2233 -- 91 11 (!) 112/55 100 %   04/09/22 2218 -- 100 20 121/61 100 %   04/09/22 2204 -- 94 12 116/64 100 %   04/09/22 2148 -- 93 19 (!) 113/58 100 %   04/09/22 2133 -- 93 27 (!) 117/58 100 %   04/09/22 2118 -- 90 14 (!) 117/59 100 %   04/09/22 2104 -- 92 15 (!) 114/59 100 %   04/09/22 2103 -- 93 12 (!) 114/59 100 %   04/09/22 2048 -- 90 16 (!) 114/57 100 %   04/09/22 2033 -- 95 18 (!) 112/57 100 %   04/09/22 2022 -- -- -- -- 93 %   04/09/22 2021 -- 96 18 (!) 111/55 --   04/09/22 2018 -- 94 14 (!) 111/55 93 %   04/09/22 2004 -- 99 16 (!) 113/59 93 %   04/09/22 1949 -- -- -- -- 100 %   04/09/22 1948 -- 98 16 (!) 113/57 --   04/09/22 1939 -- -- -- -- 94 %   04/09/22 1933 -- (!) 101 22 122/63 93 %   04/09/22 1918 -- 97 17 114/60 96 %   04/09/22 1909 -- 94 21 (!) 114/58 --   04/09/22 1908 -- -- -- -- 92 %   04/09/22 1903 97.6 °F (36.4 °C) 92 25 (!) 114/58 98 %   04/09/22 1830 -- 97 26 (!) 124/59 96 %   04/09/22 1815 -- (!) 104 21 (!) 124/58 97 %   04/09/22 1800 -- 100 20 (!) 115/58 100 %   04/09/22 1754 -- (!) 101 -- 124/63 --   04/09/22 1745 -- (!) 103 25 124/63 99 %   04/09/22 1730 -- 97 19 (!) 115/56 100 %   04/09/22 1723 97.6 °F (36.4 °C) -- 18 (!) 115/56 --   04/09/22 1715 -- 100 18 (!) 120/58 100 %   04/09/22 1708 97.7 °F (36.5 °C) -- -- (!) 120/58 --   04/09/22 1700 -- 97 18 (!) 124/58 99 %   04/09/22 1645 -- 99 26 138/62 99 %   04/09/22 1630 -- 94 23 (!) 128/58 100 %   04/09/22 1626 -- 94 -- (!) 113/55 --   04/09/22 1615 -- 96 17 (!) 113/55 100 %   04/09/22 1600 -- 86 13 (!) 111/52 99 %   04/09/22 1545 -- 95 (!) 35 136/60 99 %   04/09/22 1530 -- 94 25 116/63 --   04/09/22 1520 -- 91 -- 116/63 --   04/09/22 1515 -- 98 18 (!) 115/56 100 %   04/09/22 1500 98 °F (36.7 °C) 96 19 (!) 116/56 90 %   04/09/22 1445 -- (!) 104 27 133/64 91 %   04/09/22 1430 -- 95 27 137/61 97 %   04/09/22 1415 -- 93 19 (!) 126/57 98 %   04/09/22 1400 -- 95 23 (!) 114/57 99 %   04/09/22 1356 -- 93 -- (!) 119/58 --   04/09/22 1345 -- 96 (!) 31 (!) 119/58 100 %   04/09/22 1330 -- 91 21 (!) 114/56 98 %   04/09/22 1315 -- 96 18 (!) 114/59 100 %   04/09/22 1300 -- 93 17 (!) 119/57 100 %   04/09/22 1258 -- 97 -- (!) 115/54 --   04/09/22 1245 -- 94 20 (!) 115/54 99 %   04/09/22 1145 -- 97 20 (!) 116/57 100 %   04/09/22 1130 -- 98 24 (!) 123/59 100 %   04/09/22 1121 -- -- -- (!) 124/56 --   04/09/22 1115 -- 98 22 (!) 124/56 93 %   04/09/22 1100 97.9 °F (36.6 °C) 97 21 (!) 110/57 99 %   04/09/22 1045 -- 97 18 (!) 116/57 100 %   04/09/22 1030 -- 94 18 (!) 113/56 99 %   04/09/22 1015 -- 90 19 (!) 116/56 100 %   04/09/22 1000 -- 92 13 (!) 115/56 100 %   04/09/22 0945 -- 97 19 (!) 119/58 92 %   04/09/22 0939 -- -- -- -- 100 %   04/09/22 0930 -- 95 16 (!) 110/56 100 %   04/09/22 0915 -- 97 17 (!) 108/52 100 %   04/09/22 0900 -- 96 14 (!) 116/58 100 %   04/09/22 0845 -- 93 14 (!) 119/59 100 %   04/09/22 0830 -- 96 (!) 33 (!) 111/55 100 %   04/09/22 0815 -- 93 18 (!) 113/56 100 %   04/09/22 0801 -- 95 -- (!) 117/57 --   04/09/22 0800 -- 99 30 (!) 106/53 98 %   04/09/22 0745 -- 96 28 (!) 117/57 95 %     Oxygen Therapy  O2 Sat (%): 97 % (04/10/22 0712)  Pulse via Oximetry: 98 beats per minute (04/10/22 0712)  O2 Device: Hi flow nasal cannula (04/10/22 9482)  Skin Assessment: Clean, dry, & intact (04/10/22 0304)  Skin Protection for O2 Device: No (04/10/22 0304)  O2 Flow Rate (L/min): 6 l/min (04/10/22 0712)  FIO2 (%): 50 % (Weaned to 40%) (04/09/22 0720)    Estimated body mass index is 43.97 kg/m² as calculated from the following:    Height as of this encounter: 5' 4\" (1.626 m). Weight as of this encounter: 116.2 kg (256 lb 2.8 oz). Intake/Output Summary (Last 24 hours) at 4/10/2022 0740  Last data filed at 4/10/2022 1283  Gross per 24 hour   Intake 491.7 ml   Output 2164 ml   Net -1672.3 ml         Physical Exam:     Blood pressure (!) 106/55, pulse 92, temperature 97.2 °F (36.2 °C), resp. rate 14, height 5' 4\" (1.626 m), weight 116.2 kg (256 lb 2.8 oz), SpO2 97 %. General:    Well nourished. No overt distress. Obese  Head:  Normocephalic, atraumatic  Eyes:  Sclerae appear normal.  Pupils equally round. ENT:  Nares appear normal, no drainage. Moist oral mucosa  Neck:  No restricted ROM. Trachea midline. CVC access line in neck. CV:   RRR. No m/r/g. No jugular venous distension. Lungs:   Decrease lung sounds at bases. Respirations even, unlabored  Abdomen: Bowel sounds present. Soft, nontender, nondistended. :  Lane in place  Extremities: No cyanosis or clubbing. No edema  Skin:     No rashes and normal coloration. Warm and dry. Neuro:  CN II-XII grossly intact. Sensation intact. A&Ox3  Psych:  Normal mood and affect.       I have reviewed ordered lab tests and independently visualized imaging below:    Recent Labs:  Recent Results (from the past 48 hour(s))   GLUCOSE, POC    Collection Time: 04/08/22  8:33 AM   Result Value Ref Range    Glucose (POC) 111 (H) 65 - 100 mg/dL    Performed by Cristhian    BLOOD GAS, ARTERIAL POC    Collection Time: 04/08/22 11:12 AM   Result Value Ref Range    Device: NASAL CANNULA      pH (POC) 7.21 (LL) 7.35 - 7.45      pCO2 (POC) 55.9 (H) 35 - 45 MMHG    pO2 (POC) 72 (L) 75 - 100 MMHG    HCO3 (POC) 22.1 22 - 26 MMOL/L    sO2 (POC) 90.0 (L) 95 - 98 %    Base deficit (POC) 5.8 mmol/L    Allens test (POC) Positive      Site LEFT RADIAL      Specimen type (POC) ARTERIAL      Performed by Darryn     Critical value read back MAYKEL     FIO2, L/min 8     GLUCOSE, POC    Collection Time: 04/08/22 11:24 AM   Result Value Ref Range    Glucose (POC) 155 (H) 65 - 100 mg/dL    Performed by Michelle    NT-PRO BNP    Collection Time: 04/08/22 12:37 PM   Result Value Ref Range    NT pro-BNP 26,492 (H) 5 - 125 PG/ML   C REACTIVE PROTEIN, QT    Collection Time: 04/08/22 12:37 PM   Result Value Ref Range    C-Reactive protein 10.8 (H) 0.0 - 0.9 mg/dL   PROTHROMBIN TIME + INR    Collection Time: 04/08/22  1:14 PM   Result Value Ref Range    Prothrombin time 15.9 (H) 12.6 - 14.5 sec    INR 1.2     GLUCOSE, POC    Collection Time: 04/08/22  4:58 PM   Result Value Ref Range    Glucose (POC) 138 (H) 65 - 100 mg/dL    Performed by Kasie    GLUCOSE, POC    Collection Time: 04/08/22 10:01 PM   Result Value Ref Range    Glucose (POC) 120 (H) 65 - 100 mg/dL    Performed by [facility]    MAGNESIUM    Collection Time: 04/09/22  3:31 AM   Result Value Ref Range    Magnesium 2.1 1.8 - 2.4 mg/dL   PHOSPHORUS    Collection Time: 04/09/22  3:31 AM   Result Value Ref Range    Phosphorus 3.1 2.3 - 3.7 MG/DL   METABOLIC PANEL, COMPREHENSIVE    Collection Time: 04/09/22  3:31 AM   Result Value Ref Range    Sodium 139 136 - 145 mmol/L    Potassium 4.3 3.5 - 5.1 mmol/L    Chloride 104 98 - 107 mmol/L    CO2 24 21 - 32 mmol/L Anion gap 11 7 - 16 mmol/L    Glucose 133 (H) 65 - 100 mg/dL    BUN 38 (H) 8 - 23 MG/DL    Creatinine 1.60 (H) 0.6 - 1.0 MG/DL    GFR est AA 41 (L) >60 ml/min/1.73m2    GFR est non-AA 34 (L) >60 ml/min/1.73m2    Calcium 8.6 8.3 - 10.4 MG/DL    Bilirubin, total 3.1 (H) 0.2 - 1.1 MG/DL    ALT (SGPT) 117 (H) 12 - 65 U/L    AST (SGOT) 236 (H) 15 - 37 U/L    Alk. phosphatase 354 (H) 50 - 136 U/L    Protein, total 6.9 6.3 - 8.2 g/dL    Albumin 2.6 (L) 3.2 - 4.6 g/dL    Globulin 4.3 (H) 2.3 - 3.5 g/dL    A-G Ratio 0.6 (L) 1.2 - 3.5     PROTHROMBIN TIME + INR    Collection Time: 04/09/22  3:31 AM   Result Value Ref Range    Prothrombin time 16.2 (H) 12.6 - 14.5 sec    INR 1.3     CBC WITH AUTOMATED DIFF    Collection Time: 04/09/22  3:32 AM   Result Value Ref Range    WBC 17.4 (H) 4.3 - 11.1 K/uL    RBC 2.50 (L) 4.05 - 5.2 M/uL    HGB 7.0 (L) 11.7 - 15.4 g/dL    HCT 22.4 (L) 35.8 - 46.3 %    MCV 89.6 79.6 - 97.8 FL    MCH 28.0 26.1 - 32.9 PG    MCHC 31.3 (L) 31.4 - 35.0 g/dL    RDW 20.9 (H) 11.9 - 14.6 %    PLATELET 69 (L) 630 - 450 K/uL    MPV 10.0 9.4 - 12.3 FL    ABSOLUTE NRBC 0.02 0.0 - 0.2 K/uL    DF AUTOMATED      NEUTROPHILS 92 (H) 43 - 78 %    LYMPHOCYTES 3 (L) 13 - 44 %    MONOCYTES 4 4.0 - 12.0 %    EOSINOPHILS 0 (L) 0.5 - 7.8 %    BASOPHILS 0 0.0 - 2.0 %    IMMATURE GRANULOCYTES 1 0.0 - 5.0 %    ABS. NEUTROPHILS 16.0 (H) 1.7 - 8.2 K/UL    ABS. LYMPHOCYTES 0.5 0.5 - 4.6 K/UL    ABS. MONOCYTES 0.7 0.1 - 1.3 K/UL    ABS. EOSINOPHILS 0.0 0.0 - 0.8 K/UL    ABS. BASOPHILS 0.0 0.0 - 0.2 K/UL    ABS. IMM.  GRANS. 0.2 0.0 - 0.5 K/UL   GLUCOSE, POC    Collection Time: 04/09/22  7:50 AM   Result Value Ref Range    Glucose (POC) 151 (H) 65 - 100 mg/dL    Performed by Paola    HGB & HCT    Collection Time: 04/09/22  9:24 AM   Result Value Ref Range    HGB 6.8 (LL) 11.7 - 15.4 g/dL    HCT 22.4 (L) 35.8 - 46.3 %   ECHO ADULT COMPLETE    Collection Time: 04/09/22 10:30 AM   Result Value Ref Range    LV EDV A2C 77 mL LV EDV A4C 87 mL    LV ESV A2C 37 mL    LV ESV A4C 29 mL    IVSd 1.2 (A) 0.6 - 0.9 cm    LVIDd 5.3 3.9 - 5.3 cm    LVIDs 3.7 cm    LVOT Diameter 2.1 cm    LVOT Mean Gradient 4 mmHg    LVOT VTI 30.4 cm    LVOT Peak Velocity 1.5 m/s    LVOT Peak Gradient 9 mmHg    LVPWd 1.2 (A) 0.6 - 0.9 cm    LV E' Lateral Velocity 7 cm/s    LV E' Septal Velocity 6 cm/s    LV Ejection Fraction A2C 53 %    LV Ejection Fraction A4C 67 %    EF BP 61 55 - 100 %    LVOT Area 3.5 cm2    LVOT .2 ml    LA Minor Axis 5.6 cm    LA Major Hillister 6.1 cm    LA Area 2C 17.9 cm2    LA Area 4C 23.3 cm2    LA Volume BP 61 (A) 22 - 52 mL    LA Diameter 4.6 cm    AV Mean Velocity 1.2 m/s    AV Mean Gradient 7 mmHg    AV VTI 35.4 cm    AV Peak Velocity 2.0 m/s    AV Peak Gradient 16 mmHg    AV Area by VTI 3.0 cm2    AV Area by Peak Velocity 2.7 cm2    Aortic Root 3.1 cm    Ascending Aorta 3.5 cm    MV Nyquist Velocity 31 cm/s    MR Radius PISA 0.60 cm    MR .0 cm    MV Mean Gradient 12 mmHg    MV VTI 47.8 cm    MV Mean Velocity 1.6 m/s    MR Peak Velocity 4.8 m/s    MR Peak Gradient 92 mmHg    MV Max Velocity 2.3 m/s    MV Peak Gradient 22 mmHg    MV E Wave Deceleration Time 243.0 ms    MV A Velocity 1.78 m/s    MV E Velocity 1.90 m/s    MV EROA PISA 14.6 cm2    MV Area by VTI 2.2 cm2    RVIDd 3.1 cm    TAPSE 2.5 1.7 cm    TR Max Velocity 2.94 m/s    TR Peak Gradient 35 mmHg    Fractional Shortening 2D 30 28 - 44 %    LV ESV Index A4C 13 mL/m2    LV EDV Index A4C 39 mL/m2    LV ESV Index A2C 17 mL/m2    LV EDV Index A2C 35 mL/m2    LVIDd Index 2.38 cm/m2    LVIDs Index 1.66 cm/m2    LV RWT Ratio 0.45     LV Mass 2D 256.6 (A) 67 - 162 g    LV Mass 2D Index 115.1 (A) 43 - 95 g/m2    MV E/A 1.07     E/E' Ratio (Averaged) 29.40     E/E' Lateral 27.14     E/E' Septal 31.67     LA Volume Index BP 27 16 - 34 ml/m2    LVOT Stroke Volume Index 47.2 mL/m2    LA Size Index 2.06 cm/m2    LA/AO Root Ratio 1.48     Ao Root Index 1.39 cm/m2    Ascending Aorta Index 1.57 cm/m2    AV Velocity Ratio 0.75     LVOT:AV VTI Index 0.86     JONATHON/BSA VTI 1.3 cm2/m2    JONATHON/BSA Peak Velocity 1.2 cm2/m2    MR Regurg Volume PISA 2,014.94 mL    MV:LVOT VTI Index 1.57     LA Volume 2C 47 22 - 52 mL    LA Volume Index 2C 21 16 - 34 mL/m2    LA Volume 4C 71 (A) 22 - 52 mL    LA Volume Index 4C 32 16 - 34 mL/m2    Est. RA Pressure 15 mmHg    RVSP 50 mmHg   RBC, ALLOCATE    Collection Time: 04/09/22 11:00 AM   Result Value Ref Range    HISTORY CHECKED?  Historical check performed    GLUCOSE, POC    Collection Time: 04/09/22 11:29 AM   Result Value Ref Range    Glucose (POC) 189 (H) 65 - 100 mg/dL    Performed by Paola    TYPE & SCREEN    Collection Time: 04/09/22  1:48 PM   Result Value Ref Range    Crossmatch Expiration 04/12/2022,2359     ABO/Rh(D) O POSITIVE     Antibody screen NEG     Unit number [id number]     Blood component type  LR     Unit division 00     Status of unit ISSUED     Crossmatch result Compatible    GLUCOSE, POC    Collection Time: 04/09/22  4:20 PM   Result Value Ref Range    Glucose (POC) 124 (H) 65 - 100 mg/dL    Performed by Paola    HGB & HCT    Collection Time: 04/09/22  8:15 PM   Result Value Ref Range    HGB 8.1 (L) 11.7 - 15.4 g/dL    HCT 25.5 (L) 35.8 - 46.3 %   BLOOD GAS, ARTERIAL POC    Collection Time: 04/09/22  8:50 PM   Result Value Ref Range    Device: NASAL CANNULA      pH (POC) 7.43 7.35 - 7.45      pCO2 (POC) 44.8 35 - 45 MMHG    pO2 (POC) 107 (H) 75 - 100 MMHG    HCO3 (POC) 29.4 (H) 22 - 26 MMOL/L    sO2 (POC) 98.2 (H) 95 - 98 %    Base excess (POC) 4.5 mmol/L    Allens test (POC) Positive      Site LEFT RADIAL      Specimen type (POC) ARTERIAL      Performed by Evette     FIO2, L/min 6     GLUCOSE, POC    Collection Time: 04/09/22 10:18 PM   Result Value Ref Range    Glucose (POC) 126 (H) 65 - 100 mg/dL    Performed by Addis Leung    MAGNESIUM    Collection Time: 04/10/22  4:40 AM   Result Value Ref Range    Magnesium 1.9 1.8 - 2.4 mg/dL   PHOSPHORUS    Collection Time: 04/10/22  4:40 AM   Result Value Ref Range    Phosphorus 2.7 2.3 - 3.7 MG/DL   METABOLIC PANEL, COMPREHENSIVE    Collection Time: 04/10/22  4:40 AM   Result Value Ref Range    Sodium 139 136 - 145 mmol/L    Potassium 3.8 3.5 - 5.1 mmol/L    Chloride 103 98 - 107 mmol/L    CO2 26 21 - 32 mmol/L    Anion gap 10 7 - 16 mmol/L    Glucose 155 (H) 65 - 100 mg/dL    BUN 23 8 - 23 MG/DL    Creatinine 1.30 (H) 0.6 - 1.0 MG/DL    GFR est AA 52 (L) >60 ml/min/1.73m2    GFR est non-AA 43 (L) >60 ml/min/1.73m2    Calcium 8.8 8.3 - 10.4 MG/DL    Bilirubin, total 2.5 (H) 0.2 - 1.1 MG/DL    ALT (SGPT) 123 (H) 12 - 65 U/L    AST (SGOT) 255 (H) 15 - 37 U/L    Alk. phosphatase 351 (H) 50 - 136 U/L    Protein, total 6.8 6.3 - 8.2 g/dL    Albumin 2.4 (L) 3.2 - 4.6 g/dL    Globulin 4.4 (H) 2.3 - 3.5 g/dL    A-G Ratio 0.5 (L) 1.2 - 3.5     PROTHROMBIN TIME + INR    Collection Time: 04/10/22  4:40 AM   Result Value Ref Range    Prothrombin time 16.0 (H) 12.6 - 14.5 sec    INR 1.2     CBC WITH AUTOMATED DIFF    Collection Time: 04/10/22  4:40 AM   Result Value Ref Range    WBC 13.4 (H) 4.3 - 11.1 K/uL    RBC 2.57 (L) 4.05 - 5.2 M/uL    HGB 7.3 (L) 11.7 - 15.4 g/dL    HCT 23.6 (L) 35.8 - 46.3 %    MCV 91.8 79.6 - 97.8 FL    MCH 28.4 26.1 - 32.9 PG    MCHC 30.9 (L) 31.4 - 35.0 g/dL    RDW 21.2 (H) 11.9 - 14.6 %    PLATELET 61 (L) 641 - 450 K/uL    MPV 9.6 9.4 - 12.3 FL    ABSOLUTE NRBC 0.00 0.0 - 0.2 K/uL    DF AUTOMATED      NEUTROPHILS 90 (H) 43 - 78 %    LYMPHOCYTES 4 (L) 13 - 44 %    MONOCYTES 6 4.0 - 12.0 %    EOSINOPHILS 0 (L) 0.5 - 7.8 %    BASOPHILS 0 0.0 - 2.0 %    IMMATURE GRANULOCYTES 1 0.0 - 5.0 %    ABS. NEUTROPHILS 12.0 (H) 1.7 - 8.2 K/UL    ABS. LYMPHOCYTES 0.5 0.5 - 4.6 K/UL    ABS. MONOCYTES 0.8 0.1 - 1.3 K/UL    ABS. EOSINOPHILS 0.0 0.0 - 0.8 K/UL    ABS. BASOPHILS 0.0 0.0 - 0.2 K/UL    ABS. IMM.  GRANS. 0.1 0.0 - 0.5 K/UL       All Micro Results Procedure Component Value Units Date/Time    CULTURE, BLOOD [[id number]] Collected: 04/05/22 1645    Order Status: Completed Specimen: Blood Updated: 04/10/22 0658     Special Requests: --        LEFT  Antecubital       Culture result: NO GROWTH 5 DAYS       CULTURE, BLOOD [[id number]] Collected: 04/05/22 1647    Order Status: Completed Specimen: Blood Updated: 04/10/22 0658     Special Requests: --        RIGHT  ARM       Culture result: NO GROWTH 5 DAYS       CULTURE, URINE [[id number]]  (Abnormal)  (Susceptibility) Collected: 04/05/22 1804    Order Status: Completed Specimen: Urine from Clean catch Updated: 04/08/22 0709     Special Requests: NO SPECIAL REQUESTS        Culture result:       >100,000 COLONIES/mL KLEBSIELLA PNEUMONIAE                  <10,000 COLONIES/mL NORMAL SKIN ROHIT ISOLATED          CULTURE, URINE [[id number]]     Order Status: Canceled Specimen: Urine from Clean catch           Other Studies:  ECHO ADULT COMPLETE    Result Date: 4/9/2022    Left Ventricle: Left ventricle size is normal. Mildly increased wall thickness. Normal wall motion. Normal left ventricular systolic function. EF by 2D Simpsons Biplane is 61%. Abnormal diastolic function.   Right Ventricle: Normal systolic function.   Mitral Valve: Moderately calcified leaflet, at the posterior leaflet. Severe annular calcification of the mitral valve. Large echodense structure at the posterior mitral valve annulus measuring 28 x 37 mm, may represent severe mitral annular calcification , intracardiac mass, or extracardiac structure. Mild to moderate stenosis noted.   Left Atrium: Left atrial volume index is normal (16-34 mL/m2).   IVC/SVC: IVC diameter is greater than 21 mm and decreases less than 50% during inspiration; therefore the estimated right atrial pressure is elevated (~15 mmHg).   Transesophageal echocardiogram or cardiac imaging with CT recommended to better assess mitral valve mass.        Current Meds:  Current Facility-Administered Medications   Medication Dose Route Frequency    sodium chloride 3% hypertonic nebulizer soln  4 mL Nebulization BID    guaiFENesin ER (MUCINEX) tablet 1,200 mg  1,200 mg Oral BID    0.9% sodium chloride infusion 250 mL  250 mL IntraVENous PRN    famotidine (PEPCID) tablet 20 mg  20 mg Oral DAILY PRN    furosemide (LASIX) injection 40 mg  40 mg IntraVENous BID    pantoprazole (PROTONIX) 40 mg in 0.9% sodium chloride 10 mL injection  40 mg IntraVENous DAILY    budesonide (PULMICORT) 500 mcg/2 ml nebulizer suspension  500 mcg Nebulization BID RT    midodrine (PROAMATINE) tablet 10 mg  10 mg Oral TID WITH MEALS    lidocaine (XYLOCAINE) 20 mg/mL (2 %) injection  mg   mg IntraDERMal Rad Multiple    heparin (porcine) 1,000 unit/mL injection 5,000 Units  5,000 Units Hemodialysis DIALYSIS PRN    ondansetron (ZOFRAN) injection 4 mg  4 mg IntraVENous Q6H PRN    octreotide (SANDOSTATIN) injection 100 mcg  100 mcg SubCUTAneous TID    ursodioL (ACTIGALL) capsule 900 mg  900 mg Oral BID WITH MEALS    sodium chloride (NS) flush 5-10 mL  5-10 mL IntraVENous Q8H    sodium chloride (NS) flush 5-10 mL  5-10 mL IntraVENous PRN    aspirin delayed-release tablet 81 mg  81 mg Oral DAILY    atorvastatin (LIPITOR) tablet 80 mg  80 mg Oral QHS    albuterol (PROVENTIL VENTOLIN) nebulizer solution 2.5 mg  2.5 mg Nebulization Q6H PRN    DULoxetine (CYMBALTA) capsule 30 mg  30 mg Oral DAILY    ferrous sulfate tablet 325 mg  1 Tablet Oral BID WITH MEALS    hydrOXYzine pamoate (VISTARIL) capsule 25 mg  25 mg Oral BID PRN    levothyroxine (SYNTHROID) tablet 175 mcg  175 mcg Oral ACB    [Held by provider] metoprolol tartrate (LOPRESSOR) tablet 12.5 mg  12.5 mg Oral BID    insulin lispro (HUMALOG) injection   SubCUTAneous AC&HS    sodium chloride (NS) flush 5-40 mL  5-40 mL IntraVENous Q8H    sodium chloride (NS) flush 5-40 mL  5-40 mL IntraVENous PRN    acetaminophen (TYLENOL) tablet 650 mg  650 mg Oral Q6H PRN    Or    acetaminophen (TYLENOL) suppository 650 mg  650 mg Rectal Q6H PRN    polyethylene glycol (MIRALAX) packet 17 g  17 g Oral DAILY PRN    bisacodyL (DULCOLAX) suppository 10 mg  10 mg Rectal DAILY PRN    alum-mag hydroxide-simeth (MYLANTA) oral suspension 15 mL  15 mL Oral Q6H PRN    hydrALAZINE (APRESOLINE) 20 mg/mL injection 20 mg  20 mg IntraVENous Q4H PRN    oxyCODONE IR (ROXICODONE) tablet 5 mg  5 mg Oral Q4H PRN    guaiFENesin-dextromethorphan (ROBITUSSIN DM) 100-10 mg/5 mL syrup 10 mL  10 mL Oral Q4H PRN    senna-docusate (PERICOLACE) 8.6-50 mg per tablet 2 Tablet  2 Tablet Oral DAILY PRN    melatonin tablet 5 mg  5 mg Oral QHS PRN    prochlorperazine (COMPAZINE) with saline injection 10 mg  10 mg IntraVENous Q6H PRN    nystatin (MYCOSTATIN) 100,000 unit/gram powder   Topical BID    sucralfate (CARAFATE) tablet 1 g  1 g Oral TID WITH MEALS    albuterol (PROVENTIL VENTOLIN) nebulizer solution 2.5 mg  2.5 mg Nebulization Q6H RT       Signed: Sandra Lugo DO    Part of this note may have been written by using a voice dictation software. The note has been proof read but may still contain some grammatical/other typographical errors.

## 2022-04-10 NOTE — PROGRESS NOTES
TRANSFER - OUT REPORT:    Verbal report given to [facility], RN(name) on [address]  being transferred to [facility]) for routine progression of care       Report consisted of patients Situation, Background, Assessment and   Recommendations(SBAR). Information from the following report(s) SBAR and Kardex was reviewed with the receiving nurse. Lines:   Peripheral IV 04/05/22 Right;Upper Arm (Active)   Site Assessment Clean, dry, & intact 04/10/22 1500   Phlebitis Assessment 0 04/10/22 0700   Infiltration Assessment 0 04/10/22 0700   Dressing Status Clean, dry, & intact 04/10/22 1500   Dressing Type Transparent;Tape 04/10/22 0700   Hub Color/Line Status Patent; Flushed;Capped 04/10/22 0700   Action Taken Open ports on tubing capped 04/10/22 0700   Alcohol Cap Used Yes 04/10/22 0700        Opportunity for questions and clarification was provided.       Patient transported with:   O2 @ 6 liters

## 2022-04-10 NOTE — PROGRESS NOTES
ACUTE PHYSICAL THERAPY GOALS:  (Developed with and agreed upon by patient and/or caregiver.)  UPDATED 4/10/22  LTG:  (1.)Ms. Diana Vela will move from supine to sit and sit to supine , scoot up and down and roll side to side in bed with  MINIMAL ASSIST  within 7 treatment day(s). (2.)Ms. Diana Vela will transfer from bed to chair and chair to bed with MAXIMUM ASSIST  using the least restrictive device within 7 treatment day(s). (3.)Ms. Diana Vela will be able to propel manual wheelchair for 100 ft with SUPERVISION within 7 treatment days. (4.)Ms. Diana Vela will perform seated static and dynamic balance activities x 25 minutes with STAND BY ASSIST to improve safety within 7 day(s). PHYSICAL THERAPY ASSESSMENT: Re-evaluation and AM PT Treatment Day # 1      Flower Schofield is a 69 y.o. female   PRIMARY DIAGNOSIS: ISABELLA (acute kidney injury) ([facility] [address]  ISABELLA (acute kidney injury) ([facility] [address] [N17.9]       Reason for Referral:    ICD-10: Treatment Diagnosis: Generalized Muscle Weakness (M62.81)  Difficulty in walking, Not elsewhere classified (R26.2)  INPATIENT: Payor: [employer] MEDICARE COMPLETE / Plan: [address] / Product Type: Managed Care Medicare /     ASSESSMENT:     REHAB RECOMMENDATIONS:   Recommendation to date pending progress:  Setting:   Short-term Rehab  Equipment:    To Be Determined     PRIOR LEVEL OF FUNCTION:  (Prior to Hospitalization) INITIAL/CURRENT LEVEL OF FUNCTION:  (Most Recently Demonstrated)   Bed Mobility:   Minimal Assistance  Sit to Stand:   Unable to perform  Transfers:   Unable to perform  Gait/Mobility:   Unable to perform Bed Mobility:   Maximal Assistance x 2  Sit to Stand:   Maximal Assistance x 2 (unable to clear bottom)  Transfers:   Not tested  Gait/Mobility:   Not tested     ASSESSMENT:   Ms. Diana Vela was re-evaluated today in ICU and appearing flat in affect. She continues to present with grossly decreased strength and AROM in B LEs.   Pt required maxA x 2 for supine to sit with good-fair sitting balance. She attempted STS transfer with maxA x 2/RW and unable to clear bottom. She did not appear to activate either quad on stand attempt today even though she has 3+/5 knee ext. Pt assisted back to bed with maxA x 2 and placed in upright position. She expressed that she feel depressed and \"doesn't want to go on. \"  Pt given encouragement and could benefit from STR if agreeable. SUBJECTIVE:   Ms. Joe Vences states, \"My  helps\"    SOCIAL HISTORY/LIVING ENVIRONMENT: lives with \"friend\", daughter, son in law, son, daughter in law and granddaughter who all assist in her care for bathing/dressing and cooking.  DME=rollator, w/c, cane, BSC but has been bed bound since 3/28 discharge from hospital when she was ambulating with her rollator with assistance  Home Environment: Other (comment) (mobile home)  One/Two Story Residence: One story  Living Alone: No  Support Systems: Child(malia),Other Family Member(s)  OBJECTIVE:     PAIN: VITAL SIGNS: LINES/DRAINS:   Pre Treatment: Pain Screen  Pain Scale 1: Numeric (0 - 10)  Pain Intensity 1: 0  Post Treatment: 0   IV and Purewick  O2 Device: Hi flow nasal cannula     GROSS EVALUATION:  B LE Within Functional Limits Abnormal/ Functional Abnormal/ Non-Functional (see comments) Not Tested Comments:   AROM [] [x] [] []    PROM [] [] [] []    Strength [] [] [x] [] Grossly decreased    Balance [] [x] [] [] Fair+ seated balance   Posture [] [x] [] [] Rounded shoulders   Sensation [] [] [] []    Coordination [] [] [] []    Tone [] [] [] []    Edema [] [] [] []    Activity Tolerance [] [x] [] [] Generalized weakness and fatigue with mobility     [] [] [] []      COGNITION/  PERCEPTION: Intact Impaired   (see comments) Comments:   Orientation [x] []    Vision [] []    Hearing [x] []    Command Following [x] []    Safety Awareness [x] []     [] []      MOBILITY: I Mod I S SBA CGA Min Mod Max Total  NT x2 Comments:   Bed Mobility    Rolling [] [] [] [] [] [] [] [x] [] [] [x]    Supine to Sit [] [] [] [] [] [] [] [x] [] [] [x]    Scooting [] [] [] [] [] [] [] [x] [] [] [x]    Sit to Supine [] [] [] [] [] [] [] [x] [] [] [x]    Transfers    Sit to Stand [] [] [] [] [] [] [] [x] [] [] [x] Unable to clear bottom   Bed to Chair [] [] [] [] [] [] [] [] [] [] []    Stand to Sit [] [] [] [] [] [] [] [] [] [] []    I=Independent, Mod I=Modified Independent, S=Supervision, SBA=Standby Assistance, CGA=Contact Guard Assistance,   Min=Minimal Assistance, Mod=Moderate Assistance, Max=Maximal Assistance, Total=Total Assistance, NT=Not Tested  GAIT: I Mod I S SBA CGA Min Mod Max Total  NT x2 Comments:   Level of Assistance [] [] [] [] [] [] [] [] [] [x] []    Distance     DME N/A    Gait Quality     Weightbearing Status N/A     I=Independent, Mod I=Modified Independent, S=Supervision, SBA=Standby Assistance, CGA=Contact Guard Assistance,   Min=Minimal Assistance, Mod=Moderate Assistance, Max=Maximal Assistance, Total=Total Assistance, NT=Not Tested    [address] [facility] [address] [address] [facility]       How much difficulty does the patient currently have. .. Unable A Lot A Little None   1. Turning over in bed (including adjusting bedclothes, sheets and blankets)? [] 1   [x] 2   [] 3   [] 4   2. Sitting down on and standing up from a chair with arms ( e.g., wheelchair, bedside commode, etc.)   [x] 1   [] 2   [] 3   [] 4   3. Moving from lying on back to sitting on the side of the bed? [] 1   [x] 2   [] 3   [] 4   How much help from another person does the patient currently need. .. Total A Lot A Little None   4. Moving to and from a bed to a chair (including a wheelchair)? [x] 1   [] 2   [] 3   [] 4   5. Need to walk in hospital room? [x] 1   [] 2   [] 3   [] 4   6. Climbing 3-5 steps with a railing? [x] 1   [] 2   [] 3   [] 4   © 2007, Trustees of [address] [place] [address] [address] [address], under license to [facility].  All rights reserved     Score: Initial: 9 Most Recent: 8 (Date: 4/10/22 )    Interpretation of Tool:  Represents activities that are increasingly more difficult (i.e. Bed mobility, Transfers, Gait). PLAN:   FREQUENCY/DURATION: PT Plan of Care: 3 times/week for duration of hospital stay or until stated goals are met, whichever comes first.    PROBLEM LIST:   (Skilled intervention is medically necessary to address:)  1. Decreased ADL/Functional Activities  2. Decreased Activity Tolerance  3. Decreased AROM/PROM  4. Decreased Balance  5. Decreased Gait Ability  6. Decreased Strength  7. Decreased Transfer Abilities   INTERVENTIONS PLANNED:   (Benefits and precautions of physical therapy have been discussed with the patient.)  1. Therapeutic Activity  2. Therapeutic Exercise/HEP  3. Neuromuscular Re-education  4. Gait Training  5. Education     TREATMENT:     Re-eval    TREATMENT:   ($$ Therapeutic Activity: 23-37 mins    )  Co-Treatment PT/OT necessary due to patient's decreased overall endurance/tolerance levels, as well as need for high level skilled assistance to complete functional transfers/mobility and functional tasks  Therapeutic Activity (23 Minutes): Therapeutic activity included Rolling, Supine to Sit, Sit to Supine, Scooting, Lateral Scooting, Sitting balance  and Standing balance to improve functional Mobility, Strength and Activity tolerance.     TREATMENT GRID:  N/A    AFTER TREATMENT POSITION/PRECAUTIONS:  Bed, Needs within reach and RN notified    INTERDISCIPLINARY COLLABORATION:  RN/PCT, PT/PTA and OT/CHASE    TOTAL TREATMENT DURATION:  PT Patient Time In/Time Out  Time In: 6109  Time Out: Via Myles Coy PT, DPT

## 2022-04-10 NOTE — PROGRESS NOTES
Hourly rounds performed this shift. Bed lowered and locked. Call light within reach. All needs met at this time. Bedside shift report given to oncoming nurse.

## 2022-04-10 NOTE — PROGRESS NOTES
Bedside, Verbal and Written shift change report given to [place], [facility] (oncoming nurse) by Kd slaughter RN (offgoing nurse). Report included the following information SBAR, Kardex, Intake/Output, MAR, Accordion, Recent Results, Cardiac Rhythm NSR to ST and Alarm Parameters . Pt running dialysis. 1 unit prb infusing, no other gtts. Dual skin assessment performed, blanchable reddness to sacrum noted. Pt on 6 L high flow nasal cannula with sat in the high 90's.

## 2022-04-10 NOTE — PROGRESS NOTES
04/10/22 1653   Dual Skin Pressure Injury Assessment   Dual Skin Pressure Injury Assessment X   Second Care Provider (Based on [address] [place] [address]) Sunshine Carlson RN   Skin Integumentary   Skin Integumentary (WDL) X    Pressure  Injury Documentation Pressure Injury Noted-See Wound LDA to Document  (blachable open area to sacrum)   Skin Integrity Excoriation; Wound (add Wound LDA)   Skin Color Ecchymosis (comment)   Wound Prevention and Protection Methods   Orientation of Wound Prevention Posterior   Location of Wound Prevention Sacrum/Coccyx   Dressing Present  Intact, not due to be changed   Dressing Status Intact   Wound Offloading (Prevention Methods) Foam silicone;Elevate heels;Repositioning;Pillows; Turning;Bed, pressure reduction mattress

## 2022-04-10 NOTE — PROGRESS NOTES
[facility]   DAILY PROGRESS NOTE    Admit Date:  4/5/2022    CC:  PBC with Cirrhosis, ISABELLA, SOA    Problem List:  Principal Problem:    ISABELLA (acute kidney injury) ([address] (4/5/2022)    Active Problems:    Hypoxia, home O2 at night 3L (1/13/2010)      Diabetes mellitus ([address] (1/13/2010)      Hypothyroidism (1/13/2010)      Snoring, likely JANY (1/13/2010)      Morbid obesity ([address] (1/14/2010)      History of ST elevation myocardial infarction (STEMI) (1/9/2022)      Overview: Last Assessment & Plan:       Formatting of this note might be different from the original.      Inferior STEMI with large amount of thrombus. Total mid occlusion of the       RCA       Mild left sided CAD - EF 40-50% pLAD       Inferior wall hypokinesis with MR (moderate by cath)      Aspiration Thrombectomy and OSWALDO - RCA       Denies any ongoing angina.      - ASA, Brillinta, Lisinopril, Metoprolol      Chronic obstructive pulmonary disease (HCC) ()      Overview: 10 years      Liver cirrhosis secondary to RUDD (HCC) ()      Thrombocytopenia (HCC) ()      Secondary esophageal varices without bleeding ([address] (3/25/2022)      Chronic diastolic congestive heart failure ([address] (3/25/2022)      UTI (urinary tract infection) (4/5/2022)      Iron deficiency anemia secondary to blood loss (chronic) (4/5/2022)      Acute on chronic respiratory failure with hypoxia and hypercapnia ([address] (4/8/2022)      Primary biliary cholangitis ([address] (4/9/2022)      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 Betty Gamboa is R 50 y.o. female with PMH including but not limited to CAD s/p STEMI with OSWALDO to RCA in 1/2022, DM2, COPD with nocturnal hypoxia, hypothyroidism, and recent diagnosis of cirrhosis admitted with dark stool, ISABELLA and nausea.  Was started on rocephin.  LFTs are elevated consistent with labs from 3/28/22 admission.      On arrival, TB 2.9, , , , hgb 8.8, hct 28.1, MCV 28.1, plt 93.  Last CT 3/23/22 with cirrhotic liver, no biliary ductal dilation.  Hgb 8.8 (was 8.0 on d/c 3/28).    Work up for liver with viral hepatitis negative 3/24.  M2 .3 elevated and smooth muscle AB 36 elevated.  Iron studies wnl 3/24/22. Note that EGD and Colonoscopy were done by Dr. Kaylynn London on 3/28/22. Teresa Nunnery showed one esophageal varix (not banded as patient was on Brilinta), mild PHG.  Colonoscopy showed pandiverticulosis and two tiny cecal polyps (not removed as she was on Brilinta) and moderate internal hemorrhoids.       Patient was told that she has PBC (based on elevated ALP and AMA from last admission) and started on Ursodiol this admission.      Note that she also had a positive ASMA last admission. WILY is negative this admission. LKM pending. IgG 1838.      She did require 1 unit PRBC on 4/7/22.  She had no overt GI bleeding.  Cardiology was consulted who has stopped patient's Brilinta indefinitely.  Only on ASA for now. Baljit Henson has had an ISABELLA and Nephrology has been following. Vick Mccrary are concerns for possible HRS and patient is on albumin, midodrine and octreotide.       Patient transferred to ICU on 4/8/22 for respiratory failure requiring BIPAP. Renal started dialysis.      This AM, no longer requiring BIPAP. Still in ICU. Is on 6L NC.       1. PBC with Liver Cirrhosis  2. Transfused Acute Blood Loss Anemia  3. ISABELLA/ Possible HRS  4. Esophageal Varices  5. PHG  6. Diverticulosis  7. Internal Hemorrhoids  8. Cecal polyps   9. Acute Respiratory Failure requiring BIPAP overnight     - Renal following for ISABELLA. Got started on HD on 4/8/22. She is on albumin, midodrine and octreotide with concerns for possible HRS.  Cr down to 1.3 this AM.  - Will continue Ursodiol for PBC  - Unclear if she has AIH based on a positive ASMA from last admission and elevated IgG. WILY negative. LKM pending. May need to consider liver biopsy once stable. - MELD calculated to be 25 today  - Once clinically stable, will need Liver Transplant evaluation.   This can be done as outpatient.     - Hg 7.3 this AM. Transfuse for Hg < 8 given hx of CAD.  She has no overt GI bleeding.  Brilintia on hold indefinitely per Cardiology recommendations.  Will hold off on endoscopy for now given respiratory symptoms and no overt GI bleeding.    - Will follow     Tabby Powers MD   [facility]    Subjective:     Patient feeling some better. Breathing more at ease. Is on 6L NC. She has no GI bleeding.       Medications:   Current Facility-Administered Medications   Medication Dose Route Frequency Provider Last Rate Last Admin    0.9% sodium chloride infusion 250 mL  250 mL IntraVENous PRN Kilgore, Thu, DO        sodium chloride 3% hypertonic nebulizer soln  4 mL Nebulization BID Sandie Evangelista MD   4 mL at 04/09/22 4567    guaiFENesin ER (MUCINEX) tablet 1,200 mg  1,200 mg Oral BID Sandie Evangelista MD   1,200 mg at 04/09/22 1706    0.9% sodium chloride infusion 250 mL  250 mL IntraVENous PRN Kilgore, Thu, DO        famotidine (PEPCID) tablet 20 mg  20 mg Oral DAILY PRN Kilgore, Thu, DO        furosemide (LASIX) injection 40 mg  40 mg IntraVENous BID Kathrin Forester A, NP   40 mg at 04/09/22 1706    pantoprazole (PROTONIX) 40 mg in 0.9% sodium chloride 10 mL injection  40 mg IntraVENous DAILY Kathrin Forester A, NP   40 mg at 04/09/22 0801    budesonide (PULMICORT) 500 mcg/2 ml nebulizer suspension  500 mcg Nebulization BID RT Kathrin Forester A, NP   500 mcg at 04/10/22 0710    midodrine (PROAMATINE) tablet 10 mg  10 mg Oral TID WITH MEALS Darrel Abdi, NP   10 mg at 04/09/22 1622    lidocaine (XYLOCAINE) 20 mg/mL (2 %) injection  mg   mg IntraDERMal Rad Multiple Carey Lyles PA-C        heparin (porcine) 1,000 unit/mL injection 5,000 Units  5,000 Units Hemodialysis DIALYSIS PRN Janae Worrell MD   5,000 Units at 04/09/22 1257    ondansetron (ZOFRAN) injection 4 mg  4 mg IntraVENous Q6H PRN Kathrin Forester A, NP   4 mg at 04/08/22 0938    octreotide (SANDOSTATIN) injection 100 mcg  100 mcg SubCUTAneous TID Melita JORDAN NP   100 mcg at 04/09/22 2235    ursodioL (ACTIGALL) capsule 900 mg  900 mg Oral BID WITH MEALS Melita JORDAN NP   900 mg at 04/09/22 1622    sodium chloride (NS) flush 5-10 mL  5-10 mL IntraVENous Q8H PrasannaMahsaraina JORDAN NP   10 mL at 04/10/22 0516    sodium chloride (NS) flush 5-10 mL  5-10 mL IntraVENous PRN Duane Burows, NP        aspirin delayed-release tablet 81 mg  81 mg Oral DAILY Melita JORDAN NP   81 mg at 04/09/22 0801    atorvastatin (LIPITOR) tablet 80 mg  80 mg Oral QHS Melita JORDAN NP   80 mg at 04/07/22 2108    albuterol (PROVENTIL VENTOLIN) nebulizer solution 2.5 mg  2.5 mg Nebulization Q6H PRN Melita JORDAN NP   2.5 mg at 04/07/22 2330    DULoxetine (CYMBALTA) capsule 30 mg  30 mg Oral DAILY Melita JORDAN NP   30 mg at 04/09/22 0801    ferrous sulfate tablet 325 mg  1 Tablet Oral BID WITH MEALS Melita JORDAN NP   325 mg at 04/09/22 1623    hydrOXYzine pamoate (VISTARIL) capsule 25 mg  25 mg Oral BID PRN Duane Burows, NP        levothyroxine (SYNTHROID) tablet 175 mcg  175 mcg Oral ACB Melita JORDAN NP   175 mcg at 04/08/22 9575    [Held by provider] metoprolol tartrate (LOPRESSOR) tablet 12.5 mg  12.5 mg Oral BID Toro Vaz MD        insulin lispro (HUMALOG) injection   SubCUTAneous AC&HS Melita JORDAN NP   2 Units at 04/09/22 1130    sodium chloride (NS) flush 5-40 mL  5-40 mL IntraVENous Q8H Melita JORDAN, NP   10 mL at 04/10/22 0517    sodium chloride (NS) flush 5-40 mL  5-40 mL IntraVENous PRN Duane Burows, NP        acetaminophen (TYLENOL) tablet 650 mg  650 mg Oral Q6H PRN Duane Burows, NP        Or    acetaminophen (TYLENOL) suppository 650 mg  650 mg Rectal Q6H PRN Duane Burows, NP        polyethylene glycol (MIRALAX) packet 17 g  17 g Oral DAILY PRN Duane Burows, NP        bisacodyL (DULCOLAX) suppository 10 mg  10 mg Rectal DAILY PRN Fred Felling, NP        alum-mag hydroxide-[facility]) oral suspension 15 mL  15 mL Oral Q6H PRN Fred Felling, NP        hydrALAZINE (APRESOLINE) 20 mg/mL injection 20 mg  20 mg IntraVENous Q4H PRN Fred Felling, NP        oxyCODONE IR (ROXICODONE) tablet 5 mg  5 mg Oral Q4H PRN Kathrin Forester A, NP   5 mg at 04/09/22 1354    guaiFENesin-dextromethorphan (ROBITUSSIN DM) 100-10 mg/5 mL syrup 10 mL  10 mL Oral Q4H PRN Fred Felling, NP        senna-docusate (PERICOLACE) 8.6-50 mg per tablet 2 Tablet  2 Tablet Oral DAILY PRN Fred Felling, NP        melatonin tablet 5 mg  5 mg Oral QHS PRN Fred Felling, NP        prochlorperazine (COMPAZINE) with saline injection 10 mg  10 mg IntraVENous Q6H PRN Kathrin Forester A, NP   10 mg at 04/07/22 4192    nystatin (MYCOSTATIN) 100,000 unit/gram powder   Topical BID Kathrin Forester A, NP   Given at 04/09/22 1706    sucralfate (CARAFATE) tablet 1 g  1 g Oral TID WITH MEALS Ktahrin Forester A, NP   1 g at 04/09/22 1622    albuterol (PROVENTIL VENTOLIN) nebulizer solution 2.5 mg  2.5 mg Nebulization Q6H RT Kathrin Forester A, NP   2.5 mg at 04/10/22 1536       Review of Systems:  ROS was obtained, with pertinent positives as listed above. Objective:   Vitals:  Visit Vitals  BP (!) 106/55 (BP 1 Location: Left upper arm, BP Patient Position: At rest)   Pulse 92   Temp 97.9 °F (36.6 °C)   Resp 14   Ht 5' 4\" (1.626 m)   Wt 116.2 kg (256 lb 2.8 oz)   SpO2 97%   BMI 43.97 kg/m²     Intake/Output:  No intake/output data recorded. 04/08 1901 - 04/10 0700  In: 611.7 [P.O.:120]  Out: 5516 [Urine:170]  Exam:  General appearance:  Sitting up in bed.  Appears chronically ill.    Lungs: Diminished in bases.  On 6L NC  Heart: RRR  Abdomen: soft, obese, non-tender.  Bowel sounds normal.   Extremities: 2+ Edema  Neuro:  alert and oriented x 3    Data Review (Labs):    Recent Labs     04/10/22  0440 04/09/22 2015 04/09/22  2618 04/09/22  337 04/09/22  0331 04/08/22  1314 04/08/22  0659 04/07/22  1438   WBC 13.4*  --   --  17.4*  --   --  23.9* 16.3*   HGB 7.3* 8.1* 6.8* 7.0*  --   --  7.8* 8.2*   HCT 23.6* 25.5* 22.4* 22.4*  --   --  25.2* 26.2*   PLT 61*  --   --  69*  --   --  118* 110*   MCV 91.8  --   --  89.6  --   --  91.0 90.0     --   --   --  139  --  136  --    K 3.8  --   --   --  4.3  --  4.0  --      --   --   --  104  --  104  --    CO2 26  --   --   --  24  --  23  --    BUN 23  --   --   --  38*  --  81*  --    MG 1.9  --   --   --  2.1  --  2.0  --    PHOS 2.7  --   --   --  3.1  --  4.4*  --    *  --   --   --  236*  --  239*  --    *  --   --   --  117*  --  117*  --    INR 1.2  --   --   --  1.3 1.2  --   --    APTT 39.1*  --   --   --   --   --   --   --          Magalys Knapp MD  [facility]

## 2022-04-10 NOTE — PROGRESS NOTES
ACUTE OT GOALS:  (Developed with and agreed upon by patient and/or caregiver.)  1. Patient will complete upper body bathing and dressing with SET UP and adaptive equipment as needed. 2. Patient will complete bed mobility for positioning and hygiene with MIN A.    3. Patient will tolerate 30 minutes of OT treatment with 2-3 rest breaks to increase activity tolerance for ADLs. 4. Patient will tolerate standing for 30 seconds to improve standing tolerance for ADL. 5. Patient will complete transfers to the chair or BSC with MOD A X 2 and adaptive equipment as needed. 6. Patient will complete 10 minutes of therapeutic exercises to improve strength for ADL/functional transfers. 7. Patient will complete functional activity seated EOB with good sitting balance to increase participation in ADL/IADL.      Timeframe: 7 visits     GOALS ADJUSTED AT RE-EVALUATION 4/10/22    OCCUPATIONAL THERAPY ASSESSMENT: Daily Note and Re-evaluation OT Treatment Day # 1    Flower Clayton is a 69 y.o. female   PRIMARY DIAGNOSIS: ISABELLA (acute kidney injury) ([facility]  ISABELLA (acute kidney injury) ([facility] [N17.9]       Reason for Referral:    ICD-10: Treatment Diagnosis: Generalized Muscle Weakness (M62.81)  Other lack of cordination (R27.8)  INPATIENT: Payor: AARP MEDICARE COMPLETE / Plan: Vielka Henry / Product Type: [employer] Medicare /   ASSESSMENT:     REHAB RECOMMENDATIONS:   Recommendation to date pending progress:  Setting:   Short-term Rehab if pt agreeable   Equipment:    To Be Determined     PRIOR LEVEL OF FUNCTION:  (Prior to Hospitalization)  INITIAL/CURRENT LEVEL OF FUNCTION:  (Based on today's evaluation)   Bathing:   Maximal Assistance  Dressing:   Maximal Assistance  Feeding/Grooming:   Standby Assistance  Toileting:   Maximal Assistance  Functional Mobility:   Unable to perform Bathing:   Maximal Assistance  Dressing:   Maximal Assistance  Feeding/Grooming:   Set Up  Toileting:   Total Assistance  Functional Mobility:   Maximal Assistance - Total Assistance x2     ASSESSMENT:  Ms. Aniceto Bermeo presents to the hospital with ISABELLA. Pt seen for re-evaluation today, 4/10/22 and is resting in ICU on 6L HF NC. Pt was supine in the bed and reports she went home after last admission and has been bed bound since March 28th. Pt reports inability to stand or take care of herself at home. Pt has multiple family members living with her and has been max to total care with ADL at home. Today pt overall max-total A x2 for bed mobility and attempt to stand today with RW. Pt with good sitting balance EOB to complete grooming ADL with set up. Pt expressed depressive behaviors during treatment and required mod encouragement to complete functional transfers and ADLs. Pt goals adjusted for re-evaluation for continuation of care. Pt is currently functioning below baseline and will benefit from OT services to address stated goals and plan of care.       SUBJECTIVE:   Ms. Aniceto Bermeo states, \"I can't live like this\"    SOCIAL HISTORY/LIVING ENVIRONMENT: Lives with a large amount of family members in mobile home; 4-5 RAVIN; Pt on 3 L of O2 at home; Pt has not mobilized since going home and has been bed bound and needing max-total assistance for all ADL; Pt has WC, cane, BSC  Home Environment: Other (comment) (mobile home)  One/Two Story Residence: One story  Living Alone: No  Support Systems: Child(malia),Other Family Member(s)    OBJECTIVE:     PAIN: VITAL SIGNS: LINES/DRAINS:   Pre Treatment: Pain Screen  Pain Scale 1: Numeric (0 - 10)  Pain Intensity 1: 0  Post Treatment: 0 Vital Signs  O2 Sat (%): 95 %  O2 Device: Hi flow nasal cannula  O2 Flow Rate (L/min): 6 l/min Continuous Pulse Oximetry and Lane Catheter  O2 Device: Hi flow nasal cannula     GROSS EVALUATION:   Within Functional Limits Abnormal/ Functional Abnormal/ Non-Functional (see comments) Not Tested Comments:   AROM [] [x] [] [] Generally decreased in shoulders   PROM [] [] [] [x]    Strength [] [x] [] [] Decreased in B UES   Balance [] [x] [] [] Good sitting; poor standing    Posture [x] [] [] []    Sensation [x] [] [] []    Coordination [] [x] [] []    Tone [] [] [] [x]    Edema [] [x] [] [] B LEs   Activity Tolerance [] [x] [] [] 6L HF NC, fatigued after sitting EOB    [] [] [] []      COGNITION/  PERCEPTION: Intact Impaired   (see comments) Comments:   Orientation [x] []    Vision [x] []    Hearing [x] []    Judgment/ Insight [x] []    Attention [x] []    Memory [x] []    Command Following [x] []    Emotional Regulation [x] []     [] []      ACTIVITIES OF DAILY LIVING: I Mod I S SBA CGA Min Mod Max Total NT Comments   BASIC ADLs:              Bathing/ Showering [] [] [] [] [] [] [] [] [] [x]    Toileting [] [] [] [] [] [] [] [] [] [x]    Dressing [] [] [] [] [] [] [] [] [x] [] Donning socks in supine   Feeding [] [] [x] [] [] [] [] [] [] [] Using mouth swab in supine and EOB   Grooming [] [] [x] [] [] [] [] [] [] [] Combing hair EOB   Personal Device Care [] [] [] [] [] [] [] [] [] [x]    Functional Mobility [] [] [] [] [] [] [] [x] [x] [] X 2 RW   I=Independent, Mod I=Modified Independent, S=Supervision, SBA=Standby Assistance, CGA=Contact Guard Assistance,   Min=Minimal Assistance, Mod=Moderate Assistance, Max=Maximal Assistance, Total=Total Assistance, NT=Not Tested    MOBILITY: I Mod I S SBA CGA Min Mod Max Total  NT x2 Comments:   Supine to sit [] [] [] [] [] [] [] [x] [x] [] [x]    Sit to supine [] [] [] [] [] [] [] [] [x] [] [x]    Sit to stand [] [] [] [] [] [] [] [x] [] [] [x] RW--unable to complete full stand   Bed to chair [] [] [] [] [] [] [] [] [] [x] []    I=Independent, Mod I=Modified Independent, S=Supervision, SBA=Standby Assistance, CGA=Contact Guard Assistance,   Min=Minimal Assistance, Mod=Moderate Assistance, Max=Maximal Assistance, Total=Total Assistance, NT=Not Tested    [address] [facility] [address] [address] AM-PAC 6 Clicks   Daily Activity Inpatient Short Form        How much help from another person does the patient currently need. .. Total A Lot A Little None   1. Putting on and taking off regular lower body clothing? [x] 1   [] 2   [] 3   [] 4   2. Bathing (including washing, rinsing, drying)? [] 1   [x] 2   [] 3   [] 4   3. Toileting, which includes using toilet, bedpan or urinal?   [x] 1   [] 2   [] 3   [] 4   4. Putting on and taking off regular upper body clothing? [] 1   [x] 2   [] 3   [] 4   5. Taking care of personal grooming such as brushing teeth? [] 1   [x] 2   [] 3   [] 4   6. Eating meals? [] 1   [] 2   [x] 3   [] 4   © 2007, Trustees of [facility] [employer], under license to [employer]. All rights reserved     Score:  Initial: 11 Most Recent: 11 (Date: 4/10/22 )   Interpretation of Tool:  Represents activities that are increasingly more difficult (i.e. Bed mobility, Transfers, Gait). PLAN:   FREQUENCY/DURATION: OT Plan of Care: 3 times/week for duration of hospital stay or until stated goals are met, whichever comes first.    PROBLEM LIST:   (Skilled intervention is medically necessary to address:)  1. Decreased ADL/Functional Activities  2. Decreased Activity Tolerance  3. Decreased AROM/PROM  4. Decreased Balance  5. Decreased Coordination  6. Decreased Gait Ability  7. Decreased Strength  8. Decreased Transfer Abilities   INTERVENTIONS PLANNED:   (Benefits and precautions of occupational therapy have been discussed with the patient.)  1. Self Care Training  2. Therapeutic Activity  3. Therapeutic Exercise/HEP  4. Neuromuscular Re-education  5.  Education     TREATMENT:     EVALUATION: Re-Evaluation : (Untimed Charge)    TREATMENT:   ($$ Self Care/Home Management: 8-22 mins$$ Neuromuscular Re-Education: 8-22 mins   )  Co-Treatment PT/OT necessary due to patient's decreased overall endurance/tolerance levels, as well as need for high level skilled assistance to complete functional transfers/mobility and functional tasks  Self Care (12 Minutes): Self care including Lower Body Dressing, Grooming and functional transfers in prep for ADLs to increase independence and decrease level of assistance required. Neuromuscular Re-education (12 Minutes): Neuromuscular Re-education included Balance Training, Coordination training, Functional mobility with facilitation, Postural training, Sitting balance training and Standing balance training to improve Balance, Coordination, Functional Mobility and Postural Control.     TREATMENT GRID:  N/A    AFTER TREATMENT POSITION/PRECAUTIONS:  Bed, Needs within reach and RN notified    INTERDISCIPLINARY COLLABORATION:  RN/PCT, PT/PTA and OT/CHASE    TOTAL TREATMENT DURATION:  OT Patient Time In/Time Out  Time In: 7411  Time Out: 262 Jessica Hoover, OT

## 2022-04-10 NOTE — PROGRESS NOTES
TRANSFER - IN REPORT:    Verbal report received from [facility], [address] [place] [address] on Nereyda Pena  being received from ICU for routine progression of care      Report consisted of patients Situation, Background, Assessment and   Recommendations(SBAR). Information from the following report(s) SBAR was reviewed with the receiving nurse. Opportunity for questions and clarification was provided. Assessment completed upon patients arrival to unit and care assumed.

## 2022-04-10 NOTE — PROGRESS NOTES
Bedside and Verbal shift change report given to RODRI SYED (oncoming nurse) by Naya Jett RN (offgoing nurse). Report included the following information SBAR, Kardex and ED Summary.

## 2022-04-10 NOTE — PROGRESS NOTES
[facility] [place]  Admission Date: 4/5/2022         Daily Progress Note: 4/10/2022    The patient's chart is reviewed and the patient is discussed with the staff. Background: David Hernandez is a 69 y.o. with h/o CAD s/p STEMI 1/2022, T2DM, COPD with 3L O2, hypothyroidism and recent cirrhosis diagnosis thought 2/2 primary biliary cirrhosis (highly positive +AMA). Also has moderate to severe MR and portal hypertensive gastropathy. Former smoker- 37years 1ppd, quit in January 2022. She has decompensated liver disease and ISABELLA and developed respiratory failure requiring BiPAP on 4/8. She is being treated for HRS    Subjective:   1.5L net negative. On 6lpm with O2 sat of 95%. Hgb low at 7.3 this morning and 1U PRBCs ordered. Leukocytosis improved. Not requiring any vasoactive drips.     Current Facility-Administered Medications   Medication Dose Route Frequency    0.9% sodium chloride infusion 250 mL  250 mL IntraVENous PRN    sodium chloride 3% hypertonic nebulizer soln  4 mL Nebulization BID    guaiFENesin ER (MUCINEX) tablet 1,200 mg  1,200 mg Oral BID    0.9% sodium chloride infusion 250 mL  250 mL IntraVENous PRN    famotidine (PEPCID) tablet 20 mg  20 mg Oral DAILY PRN    furosemide (LASIX) injection 40 mg  40 mg IntraVENous BID    pantoprazole (PROTONIX) 40 mg in 0.9% sodium chloride 10 mL injection  40 mg IntraVENous DAILY    budesonide (PULMICORT) 500 mcg/2 ml nebulizer suspension  500 mcg Nebulization BID RT    midodrine (PROAMATINE) tablet 10 mg  10 mg Oral TID WITH MEALS    lidocaine (XYLOCAINE) 20 mg/mL (2 %) injection  mg   mg IntraDERMal Rad Multiple    heparin (porcine) 1,000 unit/mL injection 5,000 Units  5,000 Units Hemodialysis DIALYSIS PRN    ondansetron (ZOFRAN) injection 4 mg  4 mg IntraVENous Q6H PRN    octreotide (SANDOSTATIN) injection 100 mcg  100 mcg SubCUTAneous TID    ursodioL (ACTIGALL) capsule 900 mg  900 mg Oral BID WITH MEALS    sodium chloride (NS) flush 5-10 mL  5-10 mL IntraVENous Q8H    sodium chloride (NS) flush 5-10 mL  5-10 mL IntraVENous PRN    aspirin delayed-release tablet 81 mg  81 mg Oral DAILY    atorvastatin (LIPITOR) tablet 80 mg  80 mg Oral QHS    albuterol (PROVENTIL VENTOLIN) nebulizer solution 2.5 mg  2.5 mg Nebulization Q6H PRN    DULoxetine (CYMBALTA) capsule 30 mg  30 mg Oral DAILY    ferrous sulfate tablet 325 mg  1 Tablet Oral BID WITH MEALS    hydrOXYzine pamoate (VISTARIL) capsule 25 mg  25 mg Oral BID PRN    levothyroxine (SYNTHROID) tablet 175 mcg  175 mcg Oral ACB    [Held by provider] metoprolol tartrate (LOPRESSOR) tablet 12.5 mg  12.5 mg Oral BID    insulin lispro (HUMALOG) injection   SubCUTAneous AC&HS    sodium chloride (NS) flush 5-40 mL  5-40 mL IntraVENous Q8H    sodium chloride (NS) flush 5-40 mL  5-40 mL IntraVENous PRN    acetaminophen (TYLENOL) tablet 650 mg  650 mg Oral Q6H PRN    Or    acetaminophen (TYLENOL) suppository 650 mg  650 mg Rectal Q6H PRN    polyethylene glycol (MIRALAX) packet 17 g  17 g Oral DAILY PRN    bisacodyL (DULCOLAX) suppository 10 mg  10 mg Rectal DAILY PRN    alum-mag hydroxide-simeth (MYLANTA) oral suspension 15 mL  15 mL Oral Q6H PRN    hydrALAZINE (APRESOLINE) 20 mg/mL injection 20 mg  20 mg IntraVENous Q4H PRN    oxyCODONE IR (ROXICODONE) tablet 5 mg  5 mg Oral Q4H PRN    guaiFENesin-dextromethorphan (ROBITUSSIN DM) 100-10 mg/5 mL syrup 10 mL  10 mL Oral Q4H PRN    senna-docusate (PERICOLACE) 8.6-50 mg per tablet 2 Tablet  2 Tablet Oral DAILY PRN    melatonin tablet 5 mg  5 mg Oral QHS PRN    prochlorperazine (COMPAZINE) with saline injection 10 mg  10 mg IntraVENous Q6H PRN    nystatin (MYCOSTATIN) 100,000 unit/gram powder   Topical BID    sucralfate (CARAFATE) tablet 1 g  1 g Oral TID WITH MEALS    albuterol (PROVENTIL VENTOLIN) nebulizer solution 2.5 mg  2.5 mg Nebulization Q6H RT     Review of Systems  Constitutional: negative for fever, chills, sweats  Cardiovascular: negative for chest pain, palpitations, syncope, edema  Gastrointestinal:  negative for dysphagia, reflux, vomiting, diarrhea, abdominal pain, or melena  Neurologic:  negative for focal weakness, numbness, headache  Objective:     Vitals:    04/10/22 0645 04/10/22 0700 04/10/22 0712 04/10/22 0812   BP: (!) 99/54 (!) 106/55     Pulse: 86 92     Resp: 20 14     Temp:  97.9 °F (36.6 °C)     SpO2: 96% 97% 97% 95%   Weight:       Height:           Intake/Output Summary (Last 24 hours) at 4/10/2022 0959  Last data filed at 4/10/2022 3278  Gross per 24 hour   Intake 491.7 ml   Output 2164 ml   Net -1672.3 ml     Physical Exam:   Constitution:  the patient is well developed and in no acute distress  HEENT:  Sclera clear, pupils equal, oral mucosa moist  Respiratory: wheezes improved  Cardiovascular:  RRR with SM  Gastrointestinal: soft and non-tender; with positive bowel sounds. Musculoskeletal: warm without cyanosis. There is 2+ lower extremity edema. Skin:  no jaundice or rashes, no wounds   Neurologic: no gross neuro deficits     Psychiatric:  alert and oriented x 2    CXR:       LAB:  Recent Labs     04/10/22  0440 04/09/22  2015 04/09/22  0924 04/09/22 0332 04/09/22 0332 04/09/22  0331 04/08/22  1314 04/08/22  0659 04/08/22  0659   WBC 13.4*  --   --   --  17.4*  --   --   --  23.9*   HGB 7.3* 8.1* 6.8*   < > 7.0*  --   --    < > 7.8*   HCT 23.6* 25.5* 22.4*   < > 22.4*  --   --    < > 25.2*   PLT 61*  --   --   --  69*  --   --   --  118*   INR 1.2  --   --   --   --  1.3 1.2  --   --     < > = values in this interval not displayed.      Recent Labs     04/10/22  0440 04/09/22  0331 04/08/22  0659    139 136   K 3.8 4.3 4.0    104 104   CO2 26 24 23   * 133* 109*   BUN 23 38* 81*   CREA 1.30* 1.60* 2.80*   MG 1.9 2.1 2.0   CA 8.8 8.6 8.9   PHOS 2.7 3.1 4.4*   ALB 2.4* 2.6* 2.9*   * 236* 239*   * 117* 117*   * 354* 383*     Recent Labs 04/08/22  1237   BNPNT 26,492*   CRP 10.8*     Recent Labs     04/09/22 2050 04/08/22  1112   PHI 7.43 7.21*   PCO2I 44.8 55.9*   PO2I 107* 72*   HCO3I 29.4* 22.1     No results for input(s): SDES, CULT in the last 72 hours. Assessment and Plan:  (Medical Decision Making)   Principal Problem:    ISABELLA (acute kidney injury) ([facility] (4/5/2022)    Per nephrology. Suspected to be multifactorial. On midodrine, albumin, octreotide. Could use levophed if desired in ICU. Active Problems:    Hypoxia, home O2 at night 3L (1/13/2010)   worsened due to pulmonary edema. Diabetes mellitus ([address] (1/13/2010)    Little sliding scale use      Snoring, likely JANY (1/13/2010)  Can try CPAP at night. Liver cirrhosis secondary to RUDD ([facility] [address] or PB      Secondary esophageal varices without bleeding ([facility] (3/25/2022)      Acute on chronic respiratory failure with hypoxia and hypercapnia (HCC) (4/8/2022)  Continue O2 and volume removal. Agree with discontinuatino of steroids. More than 50% of the time documented was spent in face-to-face contact with the patient and in the care of the patient on the floor/unit where the patient is located.     Kia Alarcon MD DISPLAY PLAN FREE TEXT DISPLAY PLAN FREE TEXT DISPLAY PLAN FREE TEXT DISPLAY PLAN FREE TEXT DISPLAY PLAN FREE TEXT DISPLAY PLAN FREE TEXT DISPLAY PLAN FREE TEXT DISPLAY PLAN FREE TEXT

## 2022-04-10 NOTE — PROGRESS NOTES
[address]  Admission Date: 4/5/2022             Renal Daily Progress Note: 4/10/2022    The patient's chart is reviewed and the patient is discussed with the staff. Follow up ISABELLA  Subjective:   Pt seen and examined in room sitting up in bed .  Feels poorly but no specific complaints    ROS:  General: no fever/chills  CV: no CP  Lung:no cough  GI: no N/V/D  : no dysuria  Ext: + BLE edema       Current Facility-Administered Medications   Medication Dose Route Frequency    0.9% sodium chloride infusion 250 mL  250 mL IntraVENous PRN    sodium chloride 3% hypertonic nebulizer soln  4 mL Nebulization BID    guaiFENesin ER (MUCINEX) tablet 1,200 mg  1,200 mg Oral BID    0.9% sodium chloride infusion 250 mL  250 mL IntraVENous PRN    famotidine (PEPCID) tablet 20 mg  20 mg Oral DAILY PRN    furosemide (LASIX) injection 40 mg  40 mg IntraVENous BID    pantoprazole (PROTONIX) 40 mg in 0.9% sodium chloride 10 mL injection  40 mg IntraVENous DAILY    budesonide (PULMICORT) 500 mcg/2 ml nebulizer suspension  500 mcg Nebulization BID RT    midodrine (PROAMATINE) tablet 10 mg  10 mg Oral TID WITH MEALS    lidocaine (XYLOCAINE) 20 mg/mL (2 %) injection  mg   mg IntraDERMal Rad Multiple    heparin (porcine) 1,000 unit/mL injection 5,000 Units  5,000 Units Hemodialysis DIALYSIS PRN    ondansetron (ZOFRAN) injection 4 mg  4 mg IntraVENous Q6H PRN    octreotide (SANDOSTATIN) injection 100 mcg  100 mcg SubCUTAneous TID    ursodioL (ACTIGALL) capsule 900 mg  900 mg Oral BID WITH MEALS    sodium chloride (NS) flush 5-10 mL  5-10 mL IntraVENous Q8H    sodium chloride (NS) flush 5-10 mL  5-10 mL IntraVENous PRN    aspirin delayed-release tablet 81 mg  81 mg Oral DAILY    atorvastatin (LIPITOR) tablet 80 mg  80 mg Oral QHS    albuterol (PROVENTIL VENTOLIN) nebulizer solution 2.5 mg  2.5 mg Nebulization Q6H PRN    DULoxetine (CYMBALTA) capsule 30 mg  30 mg Oral DAILY    ferrous sulfate tablet 325 mg  1 Tablet Oral BID WITH MEALS    hydrOXYzine pamoate (VISTARIL) capsule 25 mg  25 mg Oral BID PRN    levothyroxine (SYNTHROID) tablet 175 mcg  175 mcg Oral ACB    [Held by provider] metoprolol tartrate (LOPRESSOR) tablet 12.5 mg  12.5 mg Oral BID    insulin lispro (HUMALOG) injection   SubCUTAneous AC&HS    sodium chloride (NS) flush 5-40 mL  5-40 mL IntraVENous Q8H    sodium chloride (NS) flush 5-40 mL  5-40 mL IntraVENous PRN    acetaminophen (TYLENOL) tablet 650 mg  650 mg Oral Q6H PRN    Or    acetaminophen (TYLENOL) suppository 650 mg  650 mg Rectal Q6H PRN    polyethylene glycol (MIRALAX) packet 17 g  17 g Oral DAILY PRN    bisacodyL (DULCOLAX) suppository 10 mg  10 mg Rectal DAILY PRN    alum-mag hydroxide-simeth (MYLANTA) oral suspension 15 mL  15 mL Oral Q6H PRN    hydrALAZINE (APRESOLINE) 20 mg/mL injection 20 mg  20 mg IntraVENous Q4H PRN    oxyCODONE IR (ROXICODONE) tablet 5 mg  5 mg Oral Q4H PRN    guaiFENesin-dextromethorphan (ROBITUSSIN DM) 100-10 mg/5 mL syrup 10 mL  10 mL Oral Q4H PRN    senna-docusate (PERICOLACE) 8.6-50 mg per tablet 2 Tablet  2 Tablet Oral DAILY PRN    melatonin tablet 5 mg  5 mg Oral QHS PRN    prochlorperazine (COMPAZINE) with saline injection 10 mg  10 mg IntraVENous Q6H PRN    nystatin (MYCOSTATIN) 100,000 unit/gram powder   Topical BID    sucralfate (CARAFATE) tablet 1 g  1 g Oral TID WITH MEALS    albuterol (PROVENTIL VENTOLIN) nebulizer solution 2.5 mg  2.5 mg Nebulization Q6H RT         Objective:     Vitals:    04/10/22 0453 04/10/22 0645 04/10/22 0700 04/10/22 0712   BP:  (!) 99/54 (!) 106/55    Pulse: 87 86 92    Resp: 22 20 14    Temp:   97.9 °F (36.6 °C)    SpO2: 99% 96% 97% 97%   Weight:       Height:         Intake and Output:   04/08 1901 - 04/10 0700  In: 611.7 [P.O.:120]  Out: 5516 [Urine:170]  No intake/output data recorded.     Physical Exam:   Constitutional:  the patient is well developed and in no acute distress, alert and oriented   HEENT:  Sclera clear, pupils equal, oral mucosa dry  Lungs: decreased bilaterally  Cardiovascular:  Regular rate, S1, S2, no Rub   Abd/GI: soft and non-tender; with positive bowel sounds. Ext: warm without cyanosis. There is 1+ lower leg edema. Skin:  no jaundice or rashes  Neuro: no gross neuro deficits   Psychiatric: Calm. LAB  Recent Labs     04/10/22  0440 04/09/22  2015 04/09/22  0924 04/09/22  0332 04/09/22  0331 04/08/22  1314 04/08/22  0659 04/08/22  0659 04/07/22  1438 04/07/22  1438   WBC 13.4*  --   --  17.4*  --   --   --  23.9*  --  16.3*   HGB 7.3* 8.1* 6.8* 7.0*  --   --    < > 7.8*   < > 8.2*   HCT 23.6* 25.5* 22.4* 22.4*  --   --    < > 25.2*   < > 26.2*   PLT 61*  --   --  69*  --   --   --  118*  --  110*   INR 1.2  --   --   --  1.3 1.2  --   --   --   --     < > = values in this interval not displayed. Recent Labs     04/10/22  0440 04/09/22  0331 04/08/22  0659    139 136   K 3.8 4.3 4.0    104 104   CO2 26 24 23   * 133* 109*   BUN 23 38* 81*   CREA 1.30* 1.60* 2.80*   MG 1.9 2.1 2.0   PHOS 2.7 3.1 4.4*   ALB 2.4* 2.6* 2.9*     No results for input(s): PH, PCO2, PO2, HCO3 in the last 72 hours.       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  ICD-9-CM: 424.0  4/10/2022 Unknown        Primary biliary cholangitis ([facility] ICD-10-CM: K74.3  ICD-9-CM: 571.6  4/9/2022 Unknown        Acute on chronic respiratory failure with hypoxia and hypercapnia (HCC) ICD-10-CM: J96.21, J96.22  ICD-9-CM: 518.84, 786.09, 799.02  4/8/2022 Unknown        * (Principal) ISABELLA (acute kidney injury) ([facility] ICD-10-CM: N17.9  ICD-9-CM: 584.9  4/5/2022 Yes        UTI (urinary tract infection) ICD-10-CM: N39.0  ICD-9-CM: 599.0  4/5/2022 Yes        Iron deficiency anemia secondary to blood loss (chronic) ICD-10-CM: D50.0  ICD-9-CM: 280.0  4/5/2022 Yes Secondary esophageal varices without bleeding (HCC) (Chronic) ICD-10-CM: I85.10  ICD-9-CM: 456.21  3/25/2022 Yes        Chronic diastolic congestive heart failure (HCC) (Chronic) ICD-10-CM: I50.32  ICD-9-CM: 428.32, 428.0  3/25/2022 Yes        Chronic obstructive pulmonary disease (HCC) (Chronic) ICD-10-CM: J44.9  ICD-9-CM: 496  Unknown Yes    Overview Signed 3/24/2022  7:32 PM by Tian Hutchison DO     10 years             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 (Chronic) ICD-10-CM: K75.81, K74.60  ICD-9-CM: 571.8, 571.5  Unknown Yes        Thrombocytopenia ([facility] (Chronic) ICD-10-CM: D69.6  ICD-9-CM: 287.5  Unknown Yes        History of ST elevation myocardial infarction (STEMI) (Chronic) ICD-10-CM: I25.2  ICD-9-CM: 968  1/9/2022 Yes    Overview Signed 2/11/2022  8:13 AM by Amelie Johnson MD     Last Assessment & Plan:   Formatting of this note might be different from the original.  Inferior STEMI with large amount of thrombus. Total mid occlusion of the RCA   Mild left sided CAD - EF 40-50% pLAD   Inferior wall hypokinesis with MR (moderate by cath)  Aspiration Thrombectomy and OSWALDO - RCA   Denies any ongoing angina.  - ASA, Brillinta, Lisinopril, Metoprolol             Morbid obesity (HCC) (Chronic) ICD-10-CM: E66.01  ICD-9-CM: 278.01  1/14/2010 Yes        Hypoxia, home O2 at night 3L (Chronic) ICD-10-CM: R09.02  ICD-9-CM: 799.02  1/13/2010 Yes        Diabetes mellitus (HCC) (Chronic) ICD-10-CM: E11.9  ICD-9-CM: 250.00  1/13/2010 Yes        Hypothyroidism (Chronic) ICD-10-CM: E03.9  ICD-9-CM: 244.9  1/13/2010 Yes        Snoring, likely JANY (Chronic) ICD-10-CM: R06.83  ICD-9-CM: 786.09  1/13/2010 Yes              Plan:  (Medical Decision Making)   1. ISABELLA likely multifactorial pre renal azotemia, underlying liver cirrhosis ? HRS, [place] [address], also with Hx of ECHO 1/15/22 dCHF EF 55-65%, RVSP 33 mmHg.  Albumin 1.7, continue albumin, midodrine and octreotide   Renal US with no evidence of obstructive nephropathy  -HD tomorrow, likely going to the floor. She is now oliguric. 2. RUDD Cirrhosis Elevated LFTs, alk phos and bilirubin- GI to evaluate      3. COPD hypoxia-3L O2 NC     4. Anemia hx of hemorrhoids, s/p PRBC 4/5 per primary  EGD with esophageal varices without bleeding, no banding done, pt on brilinta      5.  DM type II- SSI per Shae Guillory MD

## 2022-04-11 NOTE — PROGRESS NOTES
Hourly rounds completed for this shift. No new needs expressed at this time. Bed locked and in lowest position. Call light within reach.  Will monitor and give report to oncoming nurse

## 2022-04-11 NOTE — HOSPICE
[facility]    Referral received from [facility], states patient wants to go home with hospice and requests we coordinate with her daughter Anson Felix. Liaison spoke with Anson Felix by phone this afternoon. She is not at the hospital today, is at home preparing for her mother to return and actually states that her mother asked family not to visit today. Arranged for [facility] liaison to meet with Wytammy Christopherne at patient's bedside for home hospice consultation tomorrow Tuesday 4/12/22 between 9-9:30 am.  WOLFGANG Petty updated.     Thank you for this referral,  Pancho Jiménez, RN, BSN  [facility]  [phone]

## 2022-04-11 NOTE — PROGRESS NOTES
attempted initial visit. Patient appeared to be sleeping comfortably and did not wake to  calling name.  provided prayer in accordance with her listed [employer] rolly.   Signed by  Leighann Mckeon M.Div.

## 2022-04-11 NOTE — PROGRESS NOTES
Date of Outreach Update:  Flower Grant was seen and assessed. MEWS Score: 2 (04/11/22 0512)  Vitals:    04/11/22 0129 04/11/22 0512 04/11/22 0800 04/11/22 0806   BP:  126/72 124/76    Pulse:  (!) 102 96    Resp:  18 18    Temp:  98.7 °F (37.1 °C) 98.3 °F (36.8 °C)    SpO2: 94% 100% 99% 99%   Weight:       Height:             Pain Assessment  Pain Intensity 1: 0 (04/10/22 7549)  Pain Location 1: Back  Pain Intervention(s) 1: Medication (see MAR)  Patient Stated Pain Goal: 0      Previous Outreach assessment has been reviewed. There have been no significant clinical changes since the completion of the last dated Outreach assessment. Patient alert and oriented. Reports feeling weak. Refused BiPAP overnight. Patient states \"I'm just ready to die. \" Offered encouragement and told patient that if she truly felt this way, she needed to discuss with her attending physician. VS, labs, and progress notes reviewed. Will continue to follow up per outreach protocol.     Signed By:   Tl Conn RN

## 2022-04-11 NOTE — PROGRESS NOTES
Occupational Therapy Note:    Attempted to see pt for OT treatment session--Pt is now comfort measures and returning home with hospice services. Will d/c OT orders--please re-consult if there are any changes in pt's plan of care.     Thank you,  Tori Caldwell, OTR/L

## 2022-04-11 NOTE — PROGRESS NOTES
Problem: Pressure Injury - Risk of  Goal: *Prevention of pressure injury  Description: Document Taqueria Scale and appropriate interventions in the flowsheet. Outcome: Progressing Towards Goal  Note: Pressure Injury Interventions:  Sensory Interventions: Assess need for specialty bed,Float heels    Moisture Interventions: Absorbent underpads    Activity Interventions: Assess need for specialty bed,PT/OT evaluation    Mobility Interventions: Assess need for specialty bed,PT/OT evaluation    Nutrition Interventions: Document food/fluid/supplement intake    Friction and Shear Interventions: Foam dressings/transparent film/skin sealants,HOB 30 degrees or less,Apply protective barrier, creams and emollients                Problem: Patient Education: Go to Patient Education Activity  Goal: Patient/Family Education  Outcome: Progressing Towards Goal     Problem: Falls - Risk of  Goal: *Absence of Falls  Description: Document Jerry Fall Risk and appropriate interventions in the flowsheet.   Outcome: Progressing Towards Goal  Note: Fall Risk Interventions:  Mobility Interventions: OT consult for ADLs,PT Consult for mobility concerns         Medication Interventions: Patient to call before getting OOB,Teach patient to arise slowly    Elimination Interventions: Call light in reach              Problem: Patient Education: Go to Patient Education Activity  Goal: Patient/Family Education  Outcome: Progressing Towards Goal     Problem: Patient Education: Go to Patient Education Activity  Goal: Patient/Family Education  Outcome: Progressing Towards Goal     Problem: Patient Education: Go to Patient Education Activity  Goal: Patient/Family Education  Outcome: Progressing Towards Goal     Problem: General Medical Care Plan  Goal: *Vital signs within specified parameters  Outcome: Progressing Towards Goal  Goal: *Skin integrity maintained  Outcome: Progressing Towards Goal  Goal: *Fluid volume balance  Outcome: Progressing Towards Goal     Problem: Fluid Volume - Risk of, Imbalanced  Goal: *Balanced intake and output  Outcome: Progressing Towards Goal

## 2022-04-11 NOTE — PROGRESS NOTES
Hospitalist Progress Note   Admit Date:  2022  2:14 PM   Name:  Stephanie Hwang   Age:  69 y.o. Sex:  female  :  1955   MRN:  [id number]   Room:  [facility], [facility]/01    Presenting Complaint: Fatigue    Reason(s) for Admission: ISABELLA (acute kidney injury) [facility]) [N17.9]     Hospital Course & Interval History:   69 y.o. female with medical history of CAD s/p STEMI with OSWALDO to RCA in 2022, DM2, COPD with nocturnal hypoxia, hypothyroidism, and recent diagnosis of cirrhosis who presented with fatigue.  Constant, progressively worsening, since discharge from hospital 3-28.  She has also had some nausea, dry heaves, dark \"mushy\" stools, decreased PO intake for several days. Patricia William has been compliant with all meds including iron and has been taking lasix as well despite minimal intake. She was discharged 3-28 after a stay for ISABELLA, cirrhosis, suspected GIB.  PT/OT recommended HH at discharge and she did not want SNF. Patricia William is agreeable to SNF now. Pt was admitted on  for ISABELLA and UTI. She completed 3 day course of Rocephin for Klebsiella UTI. ISABELLA was likely multifactorial pre renal azotemia, underlying liver cirrhosis, also concerns for HRS. She was started on Lasix. Nephrology was consulted, Lasix was held and pt was started on gentle mIVF. Pt started to have worsening SOB on  after receiving blood transfusion, associated with wheezing. She has chronic hypoxia on 3LO2NC, this was increased to 7LO2NC. CXR obtained revealed worsening pulmonary edema, ABG 7.21/55.9/72/22. 1. She had labored breathing and was placed on BIPAP and moved to the ICU on . Pulm has been consulted. IR was consulted and pt had central line placed on . She underwent dialysis. She was started on Solumedrol for COPD and Lasix IV as well. She was able to come off of BIPAP and weaned down to NC. She has been off of Solumedrol as well. She also has acute on chronic anemia d/t chronic blood loss.  She is on Brilinta/ASA for hx of STEMI in 1/22 with OSWALDO. She received PRBC on 4/5 and on 4/7. Cardiology consulted to evaluate need for holding Brilinta. Recommended to stop Brilinta indefinitely and continue with ASA. Repeat TTE 4/9 with EF 35%, normal systolic, abnormal diastolic fxn; severe annular calcification of the mitral valve, large structure at posterior mitral valve [id number] could be severe mitral annular calcification, intracardiac mass or extracardiac structure. GI was also consulted for GIB, hx of cirrhosis and concerns for PBC (Primary Biliary Cholangitis). Note that Viral Hepatitis Panel and Ferritin were normal from 3/24/22. Unclear if she really has autoimmune hepatitis so autoimmune labs added. ASA and AMA came back positive. For the [facility] with cirrhosis, patient was started on Ursodiol at 13-15 mg/kg divided in BID dosing. She will need a liver transplant evaluation outpatient once stable. She was started on PPI and Octreotide. Transfuse if <8 given CAD hx. Holding off on repeat endoscopy with recent EGD on 3/28/22. She received 1U PRBC on 4/9 with Hgb 6.8. Subjective/24hr Events (04/11/22): Patient seen and examined. Patient stating \"I am giving up I do not want to do anymore medical treatment\" patient's daughter on phone at bedside discussed hospice and palliative care. ROS:  10 systems reviewed and negative except as noted above. 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   Per patient wishes will initiate comfort care and consult hospice and transition to hospice. Expect rapid decline given refusal for dialysis. Likely can transition to home with hospice versus hospice house in the next 24 hours. Discharge Planning:      >2 midnights. PT/OT. Pending improvement in hypoxia, GI recs. Can transfer to medical floor today. Diet:  ADULT DIET Full Liquid;  Low Fat/Low Chol/High Fiber/2 gm Na; GI [place] (GERD/Peptic Ulcer)  ADULT ORAL NUTRITION SUPPLEMENT Lunch, Dinner; Renal Supplement  DVT PPx: SCD's  Code status: DNR           Hospital Problems as of 4/11/2022 Date Reviewed: 4/5/2022          Codes Class Noted - Resolved POA    Mitral valve mass ICD-10-CM: I05.8  ICD-9-CM: 424.0  4/10/2022 - Present Unknown        Primary biliary cholangitis ([facility] ICD-10-CM: K74.3  ICD-9-CM: 571.6  4/9/2022 - Present Unknown        Acute on chronic respiratory failure with hypoxia and hypercapnia (HCC) ICD-10-CM: J96.21, J96.22  ICD-9-CM: 518.84, 786.09, 799.02  4/8/2022 - Present Unknown        * (Principal) ISABELLA (acute kidney injury) ([facility] ICD-10-CM: N17.9  ICD-9-CM: 584.9  4/5/2022 - Present Yes        UTI (urinary tract infection) ICD-10-CM: N39.0  ICD-9-CM: 599.0  4/5/2022 - Present Yes        Iron deficiency anemia secondary to blood loss (chronic) ICD-10-CM: D50.0  ICD-9-CM: 280.0  4/5/2022 - Present Yes        Secondary esophageal varices without bleeding ([facility] (Chronic) ICD-10-CM: I85.10  ICD-9-CM: 456.21  3/25/2022 - Present Yes        Chronic diastolic congestive heart failure (HCC) (Chronic) ICD-10-CM: I50.32  ICD-9-CM: 428.32, 428.0  3/25/2022 - Present Yes        Chronic obstructive pulmonary disease (HCC) (Chronic) ICD-10-CM: J44.9  ICD-9-CM: 496  Unknown - Present Yes    Overview Signed 3/24/2022  7:32 PM by Joseph Simon DO     10 years             Liver cirrhosis secondary to RUDD ([facility] (Chronic) ICD-10-CM: K75.81, K74.60  ICD-9-CM: 571.8, 571.5  Unknown - Present Yes Thrombocytopenia (HCC) (Chronic) ICD-10-CM: D69.6  ICD-9-CM: 287.5  Unknown - Present Yes        History of ST elevation myocardial infarction (STEMI) (Chronic) ICD-10-CM: I25.2  ICD-9-CM: 773  1/9/2022 - Present Yes    Overview Signed 2/11/2022  8:13 AM by Juan Bo MD     Last Assessment & Plan:   Formatting of this note might be different from the original.  Inferior STEMI with large amount of thrombus.  Total mid occlusion of the RCA   Mild left sided CAD - EF 40-50% pLAD   Inferior wall hypokinesis with MR (moderate by cath)  Aspiration Thrombectomy and OSWALDO - RCA   Denies any ongoing angina.  - ASA, Brillinta, Lisinopril, Metoprolol             Morbid obesity (HCC) (Chronic) ICD-10-CM: E66.01  ICD-9-CM: 278.01  1/14/2010 - Present Yes        Hypoxia, home O2 at night 3L (Chronic) ICD-10-CM: R09.02  ICD-9-CM: 799.02  1/13/2010 - Present Yes        Diabetes mellitus ([facility] (Chronic) ICD-10-CM: E11.9  ICD-9-CM: 250.00  1/13/2010 - Present Yes        Hypothyroidism (Chronic) ICD-10-CM: E03.9  ICD-9-CM: 244.9  1/13/2010 - Present Yes        Snoring, likely JANY (Chronic) ICD-10-CM: R06.83  ICD-9-CM: 786.09  1/13/2010 - Present Yes        RESOLVED: COPD exacerbation ([facility] ICD-10-CM: J44.1  ICD-9-CM: 491.21  1/13/2010 - 4/5/2022 Yes              Objective:     Patient Vitals for the past 24 hrs:   Temp Pulse Resp BP SpO2   04/11/22 1143 97.8 °F (36.6 °C) (!) 102 20 124/67 96 %   04/11/22 1102 98.7 °F (37.1 °C) 100 18 122/69 97 %   04/11/22 0806 -- -- -- -- 99 %   04/11/22 0800 98.3 °F (36.8 °C) 96 18 124/76 99 %   04/11/22 0512 98.7 °F (37.1 °C) (!) 102 18 126/72 100 %   04/11/22 0129 -- -- -- -- 94 %   04/11/22 0049 97.8 °F (36.6 °C) (!) 117 18 128/81 91 %   04/10/22 2043 -- -- -- -- 97 %   04/10/22 1859 98.2 °F (36.8 °C) 95 20 127/74 99 %   04/10/22 1646 97.8 °F (36.6 °C) 93 20 122/69 100 %   04/10/22 1621 -- 99 19 -- 95 %   04/10/22 1615 -- 98 (!) 35 127/66 94 %   04/10/22 1600 -- 90 28 117/86 97 %   04/10/22 1545 -- 87 14 119/61 97 %   04/10/22 1530 -- 92 23 (!) 116/59 (!) 86 %   04/10/22 1515 -- 95 28 124/60 95 %     Oxygen Therapy  O2 Sat (%): 96 % (04/11/22 1143)  Pulse via Oximetry: 76 beats per minute (04/11/22 0806)  O2 Device: Hi flow nasal cannula (04/11/22 0806)  Skin Assessment: Clean, dry, & intact (04/11/22 0129)  Skin Protection for O2 Device: No (04/10/22 0700)  O2 Flow Rate (L/min): 4 l/min (04/11/22 0806)  FIO2 (%): 50 % (Weaned to 40%) (04/09/22 0720)    Estimated body mass index is 44.63 kg/m² as calculated from the following:    Height as of this encounter: 5' 4\" (1.626 m). Weight as of this encounter: 117.9 kg (260 lb). Intake/Output Summary (Last 24 hours) at 4/11/2022 1514  Last data filed at 4/11/2022 1441  Gross per 24 hour   Intake 60 ml   Output --   Net 60 ml         Physical Exam:     Blood pressure 124/67, pulse (!) 102, temperature 97.8 °F (36.6 °C), resp. rate 20, height 5' 4\" (1.626 m), weight 117.9 kg (260 lb), SpO2 96 %. General:    Well nourished. No overt distress. Obese  Head:  Normocephalic, atraumatic  Eyes:  Sclerae appear normal.  Pupils equally round. ENT:  Nares appear normal, no drainage. Moist oral mucosa  Neck:  No restricted ROM. Trachea midline. CVC access line in neck. CV:   RRR. No m/r/g. No jugular venous distension. Lungs:   Decrease lung sounds at bases. Respirations even, unlabored  Abdomen: Bowel sounds present. Soft, nontender, nondistended. :  Lane in place  Extremities: No cyanosis or clubbing. No edema  Skin:     No rashes and normal coloration. Warm and dry. Neuro:  CN II-XII grossly intact. Sensation intact. A&Ox3  Psych:  Normal mood and affect.       I have reviewed ordered lab tests and independently visualized imaging below:    Recent Labs:  Recent Results (from the past 48 hour(s))   GLUCOSE, POC    Collection Time: 04/09/22  4:20 PM   Result Value Ref Range    Glucose (POC) 124 (H) 65 - 100 mg/dL    Performed by Paola    HGB & HCT    Collection Time: 04/09/22  8:15 PM   Result Value Ref Range    HGB 8.1 (L) 11.7 - 15.4 g/dL    HCT 25.5 (L) 35.8 - 46.3 %   BLOOD GAS, ARTERIAL POC    Collection Time: 04/09/22  8:50 PM   Result Value Ref Range    Device: NASAL CANNULA      pH (POC) 7.43 7.35 - 7.45      pCO2 (POC) 44.8 35 - 45 MMHG    pO2 (POC) 107 (H) 75 - 100 MMHG    HCO3 (POC) 29.4 (H) 22 - 26 MMOL/L    sO2 (POC) 98.2 (H) 95 - 98 %    Base excess (POC) 4.5 mmol/L    Allens test (POC) Positive      Site LEFT RADIAL      Specimen type (POC) ARTERIAL      Performed by NedRUST     FIO2, L/min 6     GLUCOSE, POC    Collection Time: 04/09/22 10:18 PM   Result Value Ref Range    Glucose (POC) 126 (H) 65 - 100 mg/dL    Performed by [facility]    MAGNESIUM    Collection Time: 04/10/22  4:40 AM   Result Value Ref Range    Magnesium 1.9 1.8 - 2.4 mg/dL   PHOSPHORUS    Collection Time: 04/10/22  4:40 AM   Result Value Ref Range    Phosphorus 2.7 2.3 - 3.7 MG/DL   METABOLIC PANEL, COMPREHENSIVE    Collection Time: 04/10/22  4:40 AM   Result Value Ref Range    Sodium 139 136 - 145 mmol/L    Potassium 3.8 3.5 - 5.1 mmol/L    Chloride 103 98 - 107 mmol/L    CO2 26 21 - 32 mmol/L    Anion gap 10 7 - 16 mmol/L    Glucose 155 (H) 65 - 100 mg/dL    BUN 23 8 - 23 MG/DL    Creatinine 1.30 (H) 0.6 - 1.0 MG/DL    GFR est AA 52 (L) >60 ml/min/1.73m2    GFR est non-AA 43 (L) >60 ml/min/1.73m2    Calcium 8.8 8.3 - 10.4 MG/DL    Bilirubin, total 2.5 (H) 0.2 - 1.1 MG/DL    ALT (SGPT) 123 (H) 12 - 65 U/L    AST (SGOT) 255 (H) 15 - 37 U/L    Alk.  phosphatase 351 (H) 50 - 136 U/L    Protein, total 6.8 6.3 - 8.2 g/dL    Albumin 2.4 (L) 3.2 - 4.6 g/dL    Globulin 4.4 (H) 2.3 - 3.5 g/dL    A-G Ratio 0.5 (L) 1.2 - 3.5     PROTHROMBIN TIME + INR    Collection Time: 04/10/22  4:40 AM   Result Value Ref Range    Prothrombin time 16.0 (H) 12.6 - 14.5 sec    INR 1.2     CBC WITH AUTOMATED DIFF    Collection Time: 04/10/22  4:40 AM Result Value Ref Range    WBC 13.4 (H) 4.3 - 11.1 K/uL    RBC 2.57 (L) 4.05 - 5.2 M/uL    HGB 7.3 (L) 11.7 - 15.4 g/dL    HCT 23.6 (L) 35.8 - 46.3 %    MCV 91.8 79.6 - 97.8 FL    MCH 28.4 26.1 - 32.9 PG    MCHC 30.9 (L) 31.4 - 35.0 g/dL    RDW 21.2 (H) 11.9 - 14.6 %    PLATELET 61 (L) 525 - 450 K/uL    MPV 9.6 9.4 - 12.3 FL    ABSOLUTE NRBC 0.00 0.0 - 0.2 K/uL    DF AUTOMATED      NEUTROPHILS 90 (H) 43 - 78 %    LYMPHOCYTES 4 (L) 13 - 44 %    MONOCYTES 6 4.0 - 12.0 %    EOSINOPHILS 0 (L) 0.5 - 7.8 %    BASOPHILS 0 0.0 - 2.0 %    IMMATURE GRANULOCYTES 1 0.0 - 5.0 %    ABS. NEUTROPHILS 12.0 (H) 1.7 - 8.2 K/UL    ABS. LYMPHOCYTES 0.5 0.5 - 4.6 K/UL    ABS. MONOCYTES 0.8 0.1 - 1.3 K/UL    ABS. EOSINOPHILS 0.0 0.0 - 0.8 K/UL    ABS. BASOPHILS 0.0 0.0 - 0.2 K/UL    ABS. IMM. GRANS. 0.1 0.0 - 0.5 K/UL   PTT    Collection Time: 04/10/22  4:40 AM   Result Value Ref Range    aPTT 39.1 (H) 24.1 - 35.1 SEC   FIBRINOGEN    Collection Time: 04/10/22  4:40 AM   Result Value Ref Range    Fibrinogen 264 190 - 501 mg/dL   FERRITIN    Collection Time: 04/10/22  4:40 AM   Result Value Ref Range    Ferritin 471 (H) 8 - 388 NG/ML   TRANSFERRIN SATURATION    Collection Time: 04/10/22  4:40 AM   Result Value Ref Range    Iron 49 35 - 150 ug/dL    TIBC 189 (L) 250 - 450 ug/dL    Transferrin Saturation 26 >20 %   RBC, ALLOCATE    Collection Time: 04/10/22  8:15 AM   Result Value Ref Range    HISTORY CHECKED?  Historical check performed    GLUCOSE, POC    Collection Time: 04/10/22  8:54 AM   Result Value Ref Range    Glucose (POC) 162 (H) 65 - 100 mg/dL    Performed by Paola    GLUCOSE, POC    Collection Time: 04/10/22 12:21 PM   Result Value Ref Range    Glucose (POC) 187 (H) 65 - 100 mg/dL    Performed by Paola    HGB & HCT    Collection Time: 04/10/22  3:45 PM   Result Value Ref Range    HGB 8.7 (L) 11.7 - 15.4 g/dL    HCT 28.2 (L) 35.8 - 46.3 %   GLUCOSE, POC    Collection Time: 04/10/22  4:43 PM   Result Value Ref Range    Glucose (POC) 179 (H) 65 - 100 mg/dL    Performed by Akilah Oliveros    GLUCOSE, POC    Collection Time: 04/10/22  8:59 PM   Result Value Ref Range    Glucose (POC) 204 (H) 65 - 100 mg/dL    Performed by Phillip Spear    MAGNESIUM    Collection Time: 04/11/22  4:28 AM   Result Value Ref Range    Magnesium 2.2 1.8 - 2.4 mg/dL   PHOSPHORUS    Collection Time: 04/11/22  4:28 AM   Result Value Ref Range    Phosphorus 3.4 2.3 - 3.7 MG/DL   PROTHROMBIN TIME + INR    Collection Time: 04/11/22  4:28 AM   Result Value Ref Range    Prothrombin time 15.4 (H) 12.6 - 14.5 sec    INR 1.2     CBC WITH AUTOMATED DIFF    Collection Time: 04/11/22  4:28 AM   Result Value Ref Range    WBC 18.1 (H) 4.3 - 11.1 K/uL    RBC 3.27 (L) 4.05 - 5.2 M/uL    HGB 9.2 (L) 11.7 - 15.4 g/dL    HCT 29.8 (L) 35.8 - 46.3 %    MCV 91.1 79.6 - 97.8 FL    MCH 28.1 26.1 - 32.9 PG    MCHC 30.9 (L) 31.4 - 35.0 g/dL    RDW 21.5 (H) 11.9 - 14.6 %    PLATELET 95 (L) 599 - 450 K/uL    MPV 10.0 9.4 - 12.3 FL    ABSOLUTE NRBC 0.04 0.0 - 0.2 K/uL    DF AUTOMATED      NEUTROPHILS 88 (H) 43 - 78 %    LYMPHOCYTES 5 (L) 13 - 44 %    MONOCYTES 6 4.0 - 12.0 %    EOSINOPHILS 0 (L) 0.5 - 7.8 %    BASOPHILS 0 0.0 - 2.0 %    IMMATURE GRANULOCYTES 1 0.0 - 5.0 %    ABS. NEUTROPHILS 16.0 (H) 1.7 - 8.2 K/UL    ABS. LYMPHOCYTES 0.8 0.5 - 4.6 K/UL    ABS. MONOCYTES 1.2 0.1 - 1.3 K/UL    ABS. EOSINOPHILS 0.0 0.0 - 0.8 K/UL    ABS. BASOPHILS 0.0 0.0 - 0.2 K/UL    ABS. IMM.  GRANS. 0.2 0.0 - 0.5 K/UL   METABOLIC PANEL, COMPREHENSIVE    Collection Time: 04/11/22  4:28 AM   Result Value Ref Range    Sodium 136 136 - 145 mmol/L    Potassium 3.9 3.5 - 5.1 mmol/L    Chloride 100 98 - 107 mmol/L    CO2 29 21 - 32 mmol/L    Anion gap 7 7 - 16 mmol/L    Glucose 148 (H) 65 - 100 mg/dL    BUN 37 (H) 8 - 23 MG/DL    Creatinine 2.20 (H) 0.6 - 1.0 MG/DL    GFR est AA 28 (L) >60 ml/min/1.73m2    GFR est non-AA 24 (L) >60 ml/min/1.73m2    Calcium 9.4 8.3 - 10.4 MG/DL    Bilirubin, total 2.5 (H) 0.2 - 1.1 MG/DL    ALT (SGPT) 146 (H) 12 - 65 U/L    AST (SGOT) 305 (H) 15 - 37 U/L    Alk. phosphatase 363 (H) 50 - 136 U/L    Protein, total 7.6 6.3 - 8.2 g/dL    Albumin 2.6 (L) 3.2 - 4.6 g/dL    Globulin 5.0 (H) 2.3 - 3.5 g/dL    A-G Ratio 0.5 (L) 1.2 - 3.5     PATHOLOGIST REVIEW SMEARS    Collection Time: 04/11/22  4:28 AM   Result Value Ref Range    PATHOLOGIST REVIEW PENDING    GLUCOSE, POC    Collection Time: 04/11/22  7:05 AM   Result Value Ref Range    Glucose (POC) 135 (H) 65 - 100 mg/dL    Performed by elarma    GLUCOSE, POC    Collection Time: 04/11/22 10:54 AM   Result Value Ref Range    Glucose (POC) 135 (H) 65 - 100 mg/dL    Performed by elarma        All Micro Results     Procedure Component Value Units Date/Time    CULTURE, BLOOD [[id number]] Collected: 04/05/22 1645    Order Status: Completed Specimen: Blood Updated: 04/10/22 0658     Special Requests: --        LEFT  Antecubital       Culture result: NO GROWTH 5 DAYS       CULTURE, BLOOD [[id number]] Collected: 04/05/22 1647    Order Status: Completed Specimen: Blood Updated: 04/10/22 0658     Special Requests: --        RIGHT  ARM       Culture result: NO GROWTH 5 DAYS       CULTURE, URINE [[id number]]  (Abnormal)  (Susceptibility) Collected: 04/05/22 1804    Order Status: Completed Specimen: Urine from Clean catch Updated: 04/08/22 0709     Special Requests: NO SPECIAL REQUESTS        Culture result:       >100,000 COLONIES/mL KLEBSIELLA PNEUMONIAE                  <10,000 COLONIES/mL NORMAL SKIN ROHIT ISOLATED          CULTURE, URINE [[id number]]     Order Status: Canceled Specimen: Urine from Clean catch           Other Studies:  No results found.     Current Meds:  Current Facility-Administered Medications   Medication Dose Route Frequency    sodium chloride 3% hypertonic nebulizer soln  4 mL Nebulization BID RT    HYDROmorphone (DILAUDID) injection 0.5 mg  0.5 mg IntraVENous Q3H PRN    LORazepam (ATIVAN) injection 1 mg 1 mg IntraVENous Q2H PRN    0.9% sodium chloride infusion 250 mL  250 mL IntraVENous PRN    guaiFENesin ER (MUCINEX) tablet 1,200 mg  1,200 mg Oral BID    0.9% sodium chloride infusion 250 mL  250 mL IntraVENous PRN    famotidine (PEPCID) tablet 20 mg  20 mg Oral DAILY PRN    budesonide (PULMICORT) 500 mcg/2 ml nebulizer suspension  500 mcg Nebulization BID RT    lidocaine (XYLOCAINE) 20 mg/mL (2 %) injection  mg   mg IntraDERMal Rad Multiple    heparin (porcine) 1,000 unit/mL injection 5,000 Units  5,000 Units Hemodialysis DIALYSIS PRN    ondansetron (ZOFRAN) injection 4 mg  4 mg IntraVENous Q6H PRN    sodium chloride (NS) flush 5-10 mL  5-10 mL IntraVENous Q8H    sodium chloride (NS) flush 5-10 mL  5-10 mL IntraVENous PRN    albuterol (PROVENTIL VENTOLIN) nebulizer solution 2.5 mg  2.5 mg Nebulization Q6H PRN    hydrOXYzine pamoate (VISTARIL) capsule 25 mg  25 mg Oral BID PRN    sodium chloride (NS) flush 5-40 mL  5-40 mL IntraVENous Q8H    sodium chloride (NS) flush 5-40 mL  5-40 mL IntraVENous PRN    acetaminophen (TYLENOL) tablet 650 mg  650 mg Oral Q6H PRN    Or    acetaminophen (TYLENOL) suppository 650 mg  650 mg Rectal Q6H PRN    polyethylene glycol (MIRALAX) packet 17 g  17 g Oral DAILY PRN    bisacodyL (DULCOLAX) suppository 10 mg  10 mg Rectal DAILY PRN    alum-mag hydroxide-simeth (MYLANTA) oral suspension 15 mL  15 mL Oral Q6H PRN    hydrALAZINE (APRESOLINE) 20 mg/mL injection 20 mg  20 mg IntraVENous Q4H PRN    oxyCODONE IR (ROXICODONE) tablet 5 mg  5 mg Oral Q4H PRN    guaiFENesin-dextromethorphan (ROBITUSSIN DM) 100-10 mg/5 mL syrup 10 mL  10 mL Oral Q4H PRN    senna-docusate (PERICOLACE) 8.6-50 mg per tablet 2 Tablet  2 Tablet Oral DAILY PRN    melatonin tablet 5 mg  5 mg Oral QHS PRN    prochlorperazine (COMPAZINE) with saline injection 10 mg  10 mg IntraVENous Q6H PRN    nystatin (MYCOSTATIN) 100,000 unit/gram powder   Topical BID    albuterol (PROVENTIL VENTOLIN) nebulizer solution 2.5 mg  2.5 mg Nebulization Q6H RT       Signed:  Katia Alfonso DO    Part of this note may have been written by using a voice dictation software. The note has been proof read but may still contain some grammatical/other typographical errors.

## 2022-04-11 NOTE — PROGRESS NOTES
[facility] Progress Note         Admit Date:  4/5/2022  Today's Date:  4/11/2022    CC:  PBC with Cirrhosis, ISABELLA, SOA    Subjective:     Patient feels ok. No s/sx of GIB.      Medications:   Current Facility-Administered Medications   Medication Dose Route Frequency    sodium chloride 3% hypertonic nebulizer soln  4 mL Nebulization BID RT    0.9% sodium chloride infusion 250 mL  250 mL IntraVENous PRN    guaiFENesin ER (MUCINEX) tablet 1,200 mg  1,200 mg Oral BID    0.9% sodium chloride infusion 250 mL  250 mL IntraVENous PRN    famotidine (PEPCID) tablet 20 mg  20 mg Oral DAILY PRN    furosemide (LASIX) injection 40 mg  40 mg IntraVENous BID    pantoprazole (PROTONIX) 40 mg in 0.9% sodium chloride 10 mL injection  40 mg IntraVENous DAILY    budesonide (PULMICORT) 500 mcg/2 ml nebulizer suspension  500 mcg Nebulization BID RT    midodrine (PROAMATINE) tablet 10 mg  10 mg Oral TID WITH MEALS    lidocaine (XYLOCAINE) 20 mg/mL (2 %) injection  mg   mg IntraDERMal Rad Multiple    heparin (porcine) 1,000 unit/mL injection 5,000 Units  5,000 Units Hemodialysis DIALYSIS PRN    ondansetron (ZOFRAN) injection 4 mg  4 mg IntraVENous Q6H PRN    octreotide (SANDOSTATIN) injection 100 mcg  100 mcg SubCUTAneous TID    ursodioL (ACTIGALL) capsule 900 mg  900 mg Oral BID WITH MEALS    sodium chloride (NS) flush 5-10 mL  5-10 mL IntraVENous Q8H    sodium chloride (NS) flush 5-10 mL  5-10 mL IntraVENous PRN    aspirin delayed-release tablet 81 mg  81 mg Oral DAILY    atorvastatin (LIPITOR) tablet 80 mg  80 mg Oral QHS    albuterol (PROVENTIL VENTOLIN) nebulizer solution 2.5 mg  2.5 mg Nebulization Q6H PRN    DULoxetine (CYMBALTA) capsule 30 mg  30 mg Oral DAILY    ferrous sulfate tablet 325 mg  1 Tablet Oral BID WITH MEALS    hydrOXYzine pamoate (VISTARIL) capsule 25 mg  25 mg Oral BID PRN    levothyroxine (SYNTHROID) tablet 175 mcg  175 mcg Oral ACB    [Held by provider] metoprolol tartrate (LOPRESSOR) tablet 12.5 mg  12.5 mg Oral BID    insulin lispro (HUMALOG) injection   SubCUTAneous AC&HS    sodium chloride (NS) flush 5-40 mL  5-40 mL IntraVENous Q8H    sodium chloride (NS) flush 5-40 mL  5-40 mL IntraVENous PRN    acetaminophen (TYLENOL) tablet 650 mg  650 mg Oral Q6H PRN    Or    acetaminophen (TYLENOL) suppository 650 mg  650 mg Rectal Q6H PRN    polyethylene glycol (MIRALAX) packet 17 g  17 g Oral DAILY PRN    bisacodyL (DULCOLAX) suppository 10 mg  10 mg Rectal DAILY PRN    alum-mag hydroxide-simeth (MYLANTA) oral suspension 15 mL  15 mL Oral Q6H PRN    hydrALAZINE (APRESOLINE) 20 mg/mL injection 20 mg  20 mg IntraVENous Q4H PRN    oxyCODONE IR (ROXICODONE) tablet 5 mg  5 mg Oral Q4H PRN    guaiFENesin-dextromethorphan (ROBITUSSIN DM) 100-10 mg/5 mL syrup 10 mL  10 mL Oral Q4H PRN    senna-docusate (PERICOLACE) 8.6-50 mg per tablet 2 Tablet  2 Tablet Oral DAILY PRN    melatonin tablet 5 mg  5 mg Oral QHS PRN    prochlorperazine (COMPAZINE) with saline injection 10 mg  10 mg IntraVENous Q6H PRN    nystatin (MYCOSTATIN) 100,000 unit/gram powder   Topical BID    sucralfate (CARAFATE) tablet 1 g  1 g Oral TID WITH MEALS    albuterol (PROVENTIL VENTOLIN) nebulizer solution 2.5 mg  2.5 mg Nebulization Q6H RT       Review of Systems:  ROS was obtained, with pertinent positives as listed above. No chest pain or SOB. Diet:      Objective:   Vitals:  Visit Vitals  /76 (BP 1 Location: Left arm, BP Patient Position: Lying)   Pulse 96   Temp 98.3 °F (36.8 °C)   Resp 18   Ht 5' 4\" (1.626 m)   Wt 117.9 kg (260 lb)   SpO2 99%   BMI 44.63 kg/m²     Intake/Output:  04/11 0701 - 04/11 1900  In: 30 [P.O.:30]  Out: -   04/09 1901 - 04/11 0700  In: 1131.7 [P.O.:240]  Out: 917 [Urine:70]  Exam:  General appearance: alert, cooperative, no distress  Lungs: clear to auscultation bilaterally anteriorly  Heart: regular rate and rhythm  Abdomen: soft, non-tender.  Bowel sounds normal. No masses, no organomegaly  Extremities: extremities normal, atraumatic, no cyanosis or edema  Neuro:  alert and oriented    Data Review (Labs):    Recent Labs     04/11/22  0428 04/10/22  1545 04/10/22  0440 04/09/22 2015 04/09/22 0924 04/09/22 0332 04/09/22 0331 04/08/22  1314   WBC 18.1*  --  13.4*  --   --  17.4*  --   --    HGB 9.2* 8.7* 7.3* 8.1* 6.8* 7.0*  --   --    HCT 29.8* 28.2* 23.6* 25.5* 22.4* 22.4*  --   --    PLT 95*  --  61*  --   --  69*  --   --    MCV 91.1  --  91.8  --   --  89.6  --   --      --  139  --   --   --  139  --    K 3.9  --  3.8  --   --   --  4.3  --      --  103  --   --   --  104  --    CO2 29  --  26  --   --   --  24  --    BUN 37*  --  23  --   --   --  38*  --    CREA 2.20*  --  1.30*  --   --   --  1.60*  --    CA 9.4  --  8.8  --   --   --  8.6  --    MG 2.2  --  1.9  --   --   --  2.1  --    *  --  155*  --   --   --  133*  --    *  --  351*  --   --   --  354*  --    *  --  255*  --   --   --  236*  --    *  --  123*  --   --   --  117*  --    TBILI 2.5*  --  2.5*  --   --   --  3.1*  --    ALB 2.6*  --  2.4*  --   --   --  2.6*  --    TP 7.6  --  6.8  --   --   --  6.9  --    PTP 15.4*  --  16.0*  --   --   --  16.2* 15.9*   INR 1.2  --  1.2  --   --   --  1.3 1.2   APTT  --   --  39.1*  --   --   --   --   --        Assessment:     Principal Problem:  ISABELLA (acute kidney injury) ([facility] (4/5/2022)    Active Problems:  Hypoxia, home O2 at night 3L (1/13/2010)  Diabetes mellitus (HCC) (1/13/2010)  Hypothyroidism (1/13/2010)  Snoring, likely JANY (1/13/2010)  Morbid obesity ([facility] (1/14/2010)  History of ST elevation myocardial infarction (STEMI) (1/9/2022)      Overview: Last Assessment & Plan:       Formatting of this note might be different from the original.      Inferior STEMI with large amount of thrombus.  Total mid occlusion of the       RCA       Mild left sided CAD - EF 40-50% pLAD       Inferior wall hypokinesis with MR (moderate by cath)      Aspiration Thrombectomy and OSWALDO - RCA       Denies any ongoing angina.      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 female with PMH including but not limited to CAD s/p STEMI with OSWALDO to RCA in 1/2022, DM2, COPD with nocturnal hypoxia, hypothyroidism, and recent diagnosis of cirrhosis admitted with dark stool, ISABELLA and nausea.  Was started on rocephin.  LFTs are elevated consistent with labs from 3/28/22 admission.      On arrival, TB 2.9, , , , hgb 8.8, hct 28.1, MCV 28.1, plt 93.  Last CT 3/23/22 with cirrhotic liver, no biliary ductal dilation.  Hgb 8.8 (was 8.0 on d/c 3/28).    Work up for liver with viral hepatitis negative 3/24.  M2 .3 elevated and smooth muscle AB 36 elevated.  Iron studies wnl 3/24/22. Note that EGD and Colonoscopy were done by Dr. Prince Michael on 3/28/22. Karolynn Jelani showed one esophageal varix (not banded as patient was on Brilinta), mild PHG.  Colonoscopy showed pandiverticulosis and two tiny cecal polyps (not removed as she was on Brilinta) and moderate internal hemorrhoids.       Patient was told that she has PBC (based on elevated ALP and AMA from last admission) and started on Ursodiol this admission.       Note that she also had a positive ASMA last admission. WILY is negative this admission. LKM pending. IgG 1838.      She did require 1 unit PRBC on 4/7/22.  She had no overt GI bleeding.  Cardiology was consulted who has stopped patient's Brilinta indefinitely.  Only on ASA for now. Lizeth Jensen has had an ISABELLA and Nephrology has been following. Alize Donis are concerns for possible HRS and patient is on albumin, midodrine and octreotide.       Patient transferred to ICU on 4/8/22 for respiratory failure requiring BIPAP.  Renal started dialysis. Plan:     Hospitalist to place patient on Hospice. Signing-off. Pls call if we can be of any further assistance.        Shauna Blackwood PA-C  [facility]

## 2022-04-11 NOTE — PROGRESS NOTES
Patient was found with BIPAP off after respiratory place patient on it. Patient stated that she did not want to continue to wearing it. Patient is currently on 6L HFNC with a saturation of 94%. No other complication noted.

## 2022-04-11 NOTE — PROGRESS NOTES
4/11/2022 10:22 AM    Admit Date: 4/5/2022    Admit Diagnosis: ISABELLA (acute kidney injury) ([facility] [N17.9]      Subjective:   No cp and sob worse- arf with diuresis      Objective:      Visit Vitals  /76 (BP 1 Location: Left arm, BP Patient Position: Lying)   Pulse 96   Temp 98.3 °F (36.8 °C)   Resp 18   Ht 5' 4\" (1.626 m)   Wt 260 lb (117.9 kg)   SpO2 99%   BMI 44.63 kg/m²       Physical Exam:  Shivam Lites, Well Nourished, No Acute Distress, Alert & Oriented x 3, appropriate mood. Neck- supple, no JVD  CV- regular rate and rhythm no MRG  Lung- clear bilaterally  Abd- soft, nontender, nondistended  Ext- no edema bilaterally. Skin- warm and dry        Data Review:   Recent Labs     04/11/22  0428      K 3.9   BUN 37*   CREA 2.20*   WBC 18.1*   HGB 9.2*   HCT 29.8*   PLT 95*   INR 1.2       Assessment/Plan:     Principal Problem:    ISABELLA (acute kidney injury) ([address] (4/5/2022)    Active Problems:    Hypoxia, home O2 at night 3L (1/13/2010)      Diabetes mellitus ([address] (1/13/2010)      Hypothyroidism (1/13/2010)      Snoring, likely JANY (1/13/2010)      Morbid obesity ([address] (1/14/2010)      History of ST elevation myocardial infarction (STEMI) (1/9/2022)Stable. Continue current medical therapy. doing well on asa only- will sign off      Overview: Last Assessment & Plan:       Formatting of this note might be different from the original.      Inferior STEMI with large amount of thrombus.  Total mid occlusion of the       RCA       Mild left sided CAD - EF 40-50% pLAD       Inferior wall hypokinesis with MR (moderate by cath)      Aspiration Thrombectomy and OSWALDO - RCA       Denies any ongoing angina.      - ASA, Brillinta, Lisinopril, Metoprolol      Chronic obstructive pulmonary disease (HCC) ()      Overview: 10 years      Liver cirrhosis secondary to RUDD (HCC) ()      Thrombocytopenia (HCC) ()      Secondary esophageal varices without bleeding ([address] (3/25/2022)      Chronic diastolic congestive heart failure ([facility] (3/25/2022)      UTI (urinary tract infection) (4/5/2022)      Iron deficiency anemia secondary to blood loss (chronic) (4/5/2022)      Acute on chronic respiratory failure with hypoxia and hypercapnia ([facility] (4/8/2022)      Primary biliary cholangitis ([facility] [address] [address] (4/9/2022)      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

## 2022-04-11 NOTE — PROGRESS NOTES
Date of Outreach Update:  Flower Bills was seen and assessed. MEWS Score: 3 (04/11/22 0049)  Vitals:    04/10/22 1859 04/10/22 2043 04/11/22 0049 04/11/22 0129   BP: 127/74  128/81    Pulse: 95  (!) 117    Resp:   18    Temp: 98.2 °F (36.8 °C)  97.8 °F (36.6 °C)    SpO2: 99% 97% 91% 94%   Weight:       Height:             Pain Assessment  Pain Intensity 1: 0 (04/10/22 2349)  Pain Location 1: Back  Pain Intervention(s) 1: Medication (see MAR)  Patient Stated Pain Goal: 0      Previous Outreach assessment has been reviewed. AOx4, VSS, pt on HFNC, refusing to wear bipap tonight. Chart reviewed, no concerns at this time. Will continue to follow up per outreach protocol.     Signed By:   Paulo Joya    April 11, 2022 4:58 AM

## 2022-04-11 NOTE — PROGRESS NOTES
Physical Therapy Note:    Attempted to see pt for PT treatment session. Pt is now comfort measures and returning home with hospice services. Will d/c PT orders--please re-consult if there are any changes in pt's plan of care.     Thank you,  Ernesto Marinelli PT, DPT, CSCS

## 2022-04-11 NOTE — CONSULTS
Palliative Care    Patient: Micheal Meek MRN: [id number]  SSN: [id number]    YOB: 1955  Age: 69 y.o. Sex: female       Date of Request: 4/11/2022  Date of Consult:  4/11/2022  Reason for Consult:  pain and symptom management and goals of care  Requesting Physician: Dr. Author Barton     Assessment/Plan:     Principal Diagnosis:     Dyspnea  R06.00    Additional Diagnoses:   · Debility, Unspecified  R53.81  · Frailty  R54  · Counseling, Encounter for Medical Advice  Z71.9  · Encounter for Palliative Care  Z51.5    Palliative Performance Scale (PPS):       Medical Decision Making:   Reviewed and summarized labs and imaging from admission. Met with pt at bedside. Reviewed ongoing care and treatment plans. Pt states wishes to stop HD and aggressive treatment plans. We discussed hospice and comfort measures. Pt agrees with this level of care. She states she just wants to go to sleep. Discussed code status, pt agrees with DNR. Stop lab draws and unnecessary meds  Dilaudid 0.5 mg iv q 3 hr prn pain  Ativan 1 mg iv q 2 hr prn anxiety  DNR ordered  Consult hospice. Expect quick decline as pt no longer pursuing dialysis  Comfort measures while hospitalized. I spent greater than 25 min on advanced care planning. Will discuss findings with members of the interdisciplinary team.      Thank you for this referral.         Subjective:     History obtained from:  Patient and Chart    Chief Complaint: dyspnea  History of Present Illness:  69 y.o. female with medical history of CAD s/p STEMI with OSWALDO to RCA in 1/2022, DM2, COPD with nocturnal hypoxia, hypothyroidism, and recent diagnosis of cirrhosis who presented with fatigue.  Constant, progressively worsening, since discharge from hospital 3-28.  She has also had some nausea, dry heaves, dark \"mushy\" stools, decreased PO intake for several days  Pt was admitted on 4/5 for ISABELLA and UTI. She completed 3 day course of Rocephin for Klebsiella UTI. ISABELLA was likely multifactorial pre renal azotemia, underlying liver cirrhosis, also concerns for HRS. She was started on Lasix. Nephrology was consulted, Lasix was held and pt was started on gentle mIVF.       She also has acute on chronic anemia d/t chronic blood loss. She is on Brilinta/ASA for hx of STEMI in 1/22 with OSWALDO. She received PRBC on 4/5 and on 4/7. Cardiology consulted to evaluate need for holding Brilinta. Recommended to stop Brilinta indefinitely and continue with ASA.      GI was also consulted for GIB, hx of cirrhosis and concerns for PBC (Primary Biliary Cholangitis). Note that Viral Hepatitis Panel and Ferritin were normal from 3/24/22. Unclear if she really has autoimmune hepatitis so autoimmune labs added. ASA and AMA came back positive. For the PBC with cirrhosis, patient was started on Ursodiol at 13-15 mg/kg divided in BID dosing. She will need a liver transplant evaluation outpatient once stable. She was started on PPI and Octreotide. Transfuse if <8 given CAD hx. Holding off on repeat endoscopy with recent EGD on 3/28/22.      Pt started to have worsening SOB on 4/8 after receiving blood transfusion, associated with wheezing. She has chronic hypoxia on 3LO2NC, this was increased to 7LO2NC. CXR obtained revealed worsening pulmonary edema, ABG 7.21/55.9/72/22. 1. She had labored breathing and was placed on BIPAP and moved to the ICU on 4/8. Pulm has been consulted. IR was consulted and pt had central line placed on 4/8. She underwent dialysis overnight. She was started on Solumedrol for COPD and Lasix IV as well. Advance Directive: Yes       Code Status:  DNR            Health Care Power of : Yes - Copy of [address] on file.     Past Medical History:   Diagnosis Date    Asthma     20 yrs    Atherosclerosis of coronary artery 1/12/2022    Chronic diastolic congestive heart failure ([address] 3/25/2022    Cirrhosis of liver (HCC)     COPD     10 years    Diabetes ([facility] 2005    Endocrine disease 1977    hypothyroid    Primary biliary cholangitis ([facility] 4/9/2022    Thyroid disease     UTI (urinary tract infection) 4/5/2022      Past Surgical History:   Procedure Laterality Date    COLONOSCOPY N/A 3/28/2022    COLONOSCOPY performed by Maykel Estrada MD at [address] [place] [address], [facility] HX COLONOSCOPY  2022    HX ENDOSCOPY  2022    [facility]  [facility]    IR INSERT NON TUNL CVC OVER 5 YRS  4/8/2022     Family History   Problem Relation Age of Onset    Heart Attack Father     No Known Problems Maternal Grandmother     No Known Problems Maternal Grandfather     No Known Problems Paternal Grandmother     No Known Problems Paternal Grandfather       Social History     Tobacco Use    Smoking status: Former Smoker     Packs/day: 1.00     Years: 30.00     Pack years: 30.00     Types: Cigarettes    Smokeless tobacco: Never Used   Substance Use Topics    Alcohol use: No     Prior to Admission medications    Medication Sig Start Date End Date Taking? Authorizing Provider   ferrous sulfate (IRON) 325 mg (65 mg iron) EC tablet Take 1 Tablet by mouth Daily (before breakfast). Indications: anemia from inadequate iron 3/28/22  Yes Yamileth Richey MD   pantoprazole (PROTONIX) 40 mg tablet Take 1 Tablet by mouth Before breakfast and dinner. Before breakfast 3/28/22  Yes Yamileth Richey MD   albuterol (PROVENTIL VENTOLIN) 2.5 mg /3 mL (0.083 %) nebu INHALE 1 VIAL VIA NEBULIZER EVERY 6 HOURS AS NEEDED FOR WHEEZING OR SHORTNESS OF BREATH 3/21/22  Yes Harmeet GIL PA-C   metoprolol tartrate (LOPRESSOR) 25 mg tablet Take 0.5 Tablets by mouth two (2) times a day. 3/14/22  Yes Juanita Chicas MD   furosemide (LASIX) 40 mg tablet Take 1 Tablet by mouth daily. 3/14/22  Yes Juanita Chicas MD   sucralfate (CARAFATE) 100 mg/mL suspension Take 10 mL by mouth three (3) times daily.  3/14/22  Yes Karen Posadas MD   ondansetron [facility] ODT) 4 mg disintegrating tablet Take 1 Tablet by mouth every eight (8) hours as needed for Nausea or Vomiting. 3/14/22  Yes Karen Posadas MD   ascorbic acid (VITAMIN C) 500 mg chewable tablet Take 500 mg by mouth. Yes Provider, Historical   Brilinta 90 mg tablet Take 90 mg by mouth every twelve (12) hours. 1/12/22  Yes Provider, Historical   atorvastatin (LIPITOR) 80 mg tablet Take 80 mg by mouth nightly. 1/12/22  Yes Provider, Historical   albuterol (PROVENTIL HFA, VENTOLIN HFA, PROAIR HFA) 90 mcg/actuation inhaler Take 2 Puffs by inhalation every four (4) hours as needed for Wheezing or Shortness of Breath. 1/14/22  Yes Karen Posadas MD   metFORMIN (GLUCOPHAGE) 1,000 mg tablet Take 1 Tablet by mouth two (2) times daily (with meals). 11/16/21  Yes Karen Posadas MD   levothyroxine (SYNTHROID) 175 mcg tablet Take 1 Tablet by mouth Daily (before breakfast). 10/22/21  Yes Karen Posadas MD   DULoxetine (CYMBALTA) 60 mg capsule Take 1 Capsule by mouth daily. 10/22/21  Yes Karen Posadas MD   aspirin delayed-release 81 mg tablet Take  by mouth daily. Yes Provider, Historical   hydrOXYzine pamoate (VISTARIL) 25 mg capsule Take 1 Capsule by mouth two (2) times daily as needed for Anxiety. Patient not taking: Reported on 4/6/2022 2/11/22   Karen Posadas MD   nitroglycerin (NITROSTAT) 0.4 mg SL tablet 0.4 mg by SubLINGual route. Patient not taking: Reported on 4/6/2022 1/12/22   Provider, Historical   fluticasone propionate (FLONASE) 50 mcg/actuation nasal spray 2 Sprays by Both Nostrils route daily. Patient not taking: Reported on 4/6/2022 2/4/22   Karen Posadas MD   guaiFENesin ER [facility]) 600 mg ER tablet Take 1 Tablet by mouth two (2) times a day.   Patient not taking: Reported on 4/5/2022 2/4/22   Karen Posadas MD       Allergies   Allergen Reactions    No Known Drug Allergies Not Reported This Time        Comprehensive review of systems completed and negative with exception of noted above     Objective:     Visit Vitals  /69   Pulse 100   Temp 98.7 °F (37.1 °C)   Resp 18   Ht 5' 4\" (1.626 m)   Wt 260 lb (117.9 kg)   SpO2 97%   BMI 44.63 kg/m²        Physical Exam:    General:  Cooperative. No acute distress. Eyes:  Conjunctivae/corneas clear    Nose: Nares normal. Septum midline. Neck: Supple, symmetrical, trachea midline, no JVD   Lungs:   Clear to auscultation bilaterally, unlabored   Heart:  Regular rate and rhythm, no murmur    Abdomen:   Soft, non-tender, non-distended. Positive bowel sounds   Extremities: Normal, atraumatic, 4+ pitting edema   Skin: Skin color, texture, turgor normal. No rash or lesions.    Neurologic: Nonfocal   Psych: Alert and oriented      Assessment:     Hospital Problems  Date Reviewed: 4/5/2022          Codes Class Noted POA    Mitral valve mass ICD-10-CM: I05.8  ICD-9-CM: 424.0  4/10/2022 Unknown        Primary biliary cholangitis ([facility] ICD-10-CM: K74.3  ICD-9-CM: 571.6  4/9/2022 Unknown        Acute on chronic respiratory failure with hypoxia and hypercapnia (HCC) ICD-10-CM: J96.21, J96.22  ICD-9-CM: 518.84, 786.09, 799.02  4/8/2022 Unknown        * (Principal) ISABELLA (acute kidney injury) ([facility] ICD-10-CM: N17.9  ICD-9-CM: 584.9  4/5/2022 Yes        UTI (urinary tract infection) ICD-10-CM: N39.0  ICD-9-CM: 599.0  4/5/2022 Yes        Iron deficiency anemia secondary to blood loss (chronic) ICD-10-CM: D50.0  ICD-9-CM: 280.0  4/5/2022 Yes        Secondary esophageal varices without bleeding (HCC) (Chronic) ICD-10-CM: I85.10  ICD-9-CM: 456.21  3/25/2022 Yes        Chronic diastolic congestive heart failure (HCC) (Chronic) ICD-10-CM: I50.32  ICD-9-CM: 428.32, 428.0  3/25/2022 Yes        Chronic obstructive pulmonary disease (HCC) (Chronic) ICD-10-CM: J44.9  ICD-9-CM: 496  Unknown Yes    Overview Signed 3/24/2022  7:32 PM by Johnna James DO     10 years             Liver cirrhosis secondary to RUDD ([facility] (Chronic) ICD-10-CM: K75.81, K74.60  ICD-9-CM: 571.8, 571.5  Unknown Yes        Thrombocytopenia (HCC) (Chronic) ICD-10-CM: D69.6  ICD-9-CM: 287.5  Unknown Yes        History of ST elevation myocardial infarction (STEMI) (Chronic) ICD-10-CM: I25.2  ICD-9-CM: 410  1/9/2022 Yes    Overview Signed 2/11/2022  8:13 AM by Calderon Valladares MD     Last Assessment & Plan:   Formatting of this note might be different from the original.  Inferior STEMI with large amount of thrombus.  Total mid occlusion of the RCA   Mild left sided CAD - EF 40-50% pLAD   Inferior wall hypokinesis with MR (moderate by cath)  Aspiration Thrombectomy and OSWALDO - RCA   Denies any ongoing angina.  - ASA, Brillinta, Lisinopril, Metoprolol             Morbid obesity (HCC) (Chronic) ICD-10-CM: E66.01  ICD-9-CM: 278.01  1/14/2010 Yes        Hypoxia, home O2 at night 3L (Chronic) ICD-10-CM: R09.02  ICD-9-CM: 799.02  1/13/2010 Yes        Diabetes mellitus (HCC) (Chronic) ICD-10-CM: E11.9  ICD-9-CM: 250.00  1/13/2010 Yes        Hypothyroidism (Chronic) ICD-10-CM: E03.9  ICD-9-CM: 244.9  1/13/2010 Yes        Snoring, likely JANY (Chronic) ICD-10-CM: R06.83  ICD-9-CM: 786.09  1/13/2010 Yes              Signed By: Merritt Damian MD     April 11, 2022

## 2022-04-11 NOTE — PROGRESS NOTES
CM reviewed patient's chart for continued stay. LOS 6 days. Patient transferred from ICU 4/10 to room 633. Patient now DNR, with consult placed for Hospice Care. CM met with patient to discuss discharge planning. Patient resting at this time and requested SW CM speak with her daughter Chad Grullon [phone]. Patient is agreeable to Hospice Referral and would like to return home with hospice care. CM spoke with patient's daughter Chad Grullon who reports patient will have 24/7 care upon discharge and is requesting a hospital bed. Patient's HCPOA is her \"\" Dave Diana per daughter. Referral sent to [facility]. [address] notified. CM will remain available to assist as needed.

## 2022-04-12 NOTE — DISCHARGE SUMMARY
Hospitalist Discharge Summary   Admit Date:  2022  2:14 PM   DC Note date: 2022  Name:  Carlos Carcamo   Age:  69 y.o.   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  ICD-9-CM: 424.0  4/10/2022 - Present Unknown        Primary biliary cholangitis ([facility] ICD-10-CM: K74.3  ICD-9-CM: 571.6  2022 - Present Unknown        Acute on chronic respiratory failure with hypoxia and hypercapnia ([facility] ICD-10-CM: J96.21, J96.22  ICD-9-CM: 518.84, 786.09, 799.02  2022 - Present Unknown        * (Principal) ISABELLA (acute kidney injury) ([facility] ICD-10-CM: N17.9  ICD-9-CM: 584.9  2022 - Present Yes        UTI (urinary tract infection) ICD-10-CM: N39.0  ICD-9-CM: 599.0  2022 - Present Yes        Iron deficiency anemia secondary to blood loss (chronic) ICD-10-CM: D50.0  ICD-9-CM: 280.0  2022 - Present Yes        Secondary esophageal varices without bleeding (HCC) (Chronic) ICD-10-CM: I85.10  ICD-9-CM: 456.21  3/25/2022 - Present Yes        Chronic diastolic congestive heart failure (HCC) (Chronic) ICD-10-CM: I50.32  ICD-9-CM: 428.32, 428.0  3/25/2022 - Present Yes        Chronic obstructive pulmonary disease (HCC) (Chronic) ICD-10-CM: J44.9  ICD-9-CM: 496  Unknown - Present Yes    Overview Signed 3/24/2022  7:32 PM by Jose Raul Ochoa DO     10 years             Liver cirrhosis secondary to RUDD ([facility] (Chronic) ICD-10-CM: K75.81, K74.60  ICD-9-CM: 571.8, 571.5  Unknown - Present Yes        Thrombocytopenia (HCC) (Chronic) ICD-10-CM: D69.6  ICD-9-CM: 287.5  Unknown - Present Yes        History of ST elevation myocardial infarction (STEMI) (Chronic) ICD-10-CM: I25.2  ICD-9-CM: 579  1/9/2022 - Present Yes    Overview Signed 2/11/2022  8:13 AM by Amelie Johnson MD     Last Assessment & Plan:   Formatting of this note might be different from the original.  Inferior STEMI with large amount of thrombus. Total mid occlusion of the RCA   Mild left sided CAD - EF 40-50% pLAD   Inferior wall hypokinesis with MR (moderate by cath)  Aspiration Thrombectomy and OSWALDO - RCA   Denies any ongoing angina.  - ASA, Brillinta, Lisinopril, Metoprolol             Morbid obesity (HCC) (Chronic) ICD-10-CM: E66.01  ICD-9-CM: 278.01  1/14/2010 - Present Yes        Hypoxia, home O2 at night 3L (Chronic) ICD-10-CM: R09.02  ICD-9-CM: 799.02  1/13/2010 - Present Yes        Diabetes mellitus ([facility] (Chronic) ICD-10-CM: E11.9  ICD-9-CM: 250.00  1/13/2010 - Present Yes        Hypothyroidism (Chronic) ICD-10-CM: E03.9  ICD-9-CM: 244.9  1/13/2010 - Present Yes        Snoring, likely JANY (Chronic) ICD-10-CM: R06.83  ICD-9-CM: 786.09  1/13/2010 - Present Yes        RESOLVED: COPD exacerbation ([facility] ICD-10-CM: J44.1  ICD-9-CM: 491.21  1/13/2010 - 4/5/2022 Yes            Did Patient have Sepsis (YES OR NO): no    Hospital Course:  69 y.o. female with medical history of CAD s/p STEMI with OSWALDO to RCA in 1/2022, DM2, COPD with nocturnal hypoxia, hypothyroidism, and recent diagnosis of cirrhosis who presented with fatigue.  Constant, progressively worsening, since discharge from hospital 3-28.  She has also had some nausea, dry heaves, dark \"mushy\" stools, decreased PO intake for several days. Jovani Fitzgerald has been compliant with all meds including iron and has been taking lasix as well despite minimal intake. She was discharged 3-28 after a stay for ISABELLA, cirrhosis, suspected GIB.  PT/OT recommended HH at discharge and she did not want SNF. Jovani Fitzgerald is agreeable to SNF now.     Pt was admitted on 4/5 for ISABELLA and UTI. She completed 3 day course of Rocephin for Klebsiella UTI.  ISABELLA was likely multifactorial pre renal azotemia, underlying liver cirrhosis, also concerns for HRS. She was started on Lasix. Nephrology was consulted, Lasix was held and pt was started on gentle mIVF. Pt started to have worsening SOB on 4/8 after receiving blood transfusion, associated with wheezing. She has chronic hypoxia on 3LO2NC, this was increased to 7LO2NC. CXR obtained revealed worsening pulmonary edema, ABG 7.21/55.9/72/22. 1. She had labored breathing and was placed on BIPAP and moved to the ICU on 4/8. Pulm has been consulted. IR was consulted and pt had central line placed on 4/8. She underwent dialysis. She was started on Solumedrol for COPD and Lasix IV as well. She was able to come off of BIPAP and weaned down to NC. She has been off of Solumedrol as well.      She also has acute on chronic anemia d/t chronic blood loss. She is on Brilinta/ASA for hx of STEMI in 1/22 with OSWALDO. She received PRBC on 4/5 and on 4/7. Cardiology consulted to evaluate need for holding Brilinta. Recommended to stop Brilinta indefinitely and continue with ASA. Repeat TTE 4/9 with EF 54%, normal systolic, abnormal diastolic fxn; severe annular calcification of the mitral valve, large structure at posterior mitral valve [id number] could be severe mitral annular calcification, intracardiac mass or extracardiac structure.      GI was also consulted for GIB, hx of cirrhosis and concerns for PBC (Primary Biliary Cholangitis). Note that Viral Hepatitis Panel and Ferritin were normal from 3/24/22. Unclear if she really has autoimmune hepatitis so autoimmune labs added. ASA and AMA came back positive. For the PBC with cirrhosis, patient was started on Ursodiol at 13-15 mg/kg divided in BID dosing. She will need a liver transplant evaluation outpatient once stable. She was started on PPI and Octreotide. Transfuse if <8 given CAD hx. Holding off on repeat endoscopy with recent EGD on 3/28/22. She received 1U PRBC on 4/9 with Hgb 6.8.           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 Will defer further medical recommendations to receiving hospice facility. Disposition: Home Hospice  Diet: ADULT DIET Full Liquid; Low Fat/Low Chol/High Fiber/2 gm Na; GI [facility] (GERD/Peptic Ulcer)  ADULT ORAL NUTRITION SUPPLEMENT Lunch, Dinner; Renal Supplement  Code Status: DNR    Follow Up Orders: Follow-up Appointments   Procedures    FOLLOW UP VISIT Appointment in: Other (Specify) ASAP with hematology for anemia monitoring. She cannot come off her ASA/brilinta and may need outpatient transfusions on regular basis. ASAP with hematology for anemia monitoring. She cannot come off her ASA/brilinta and may need outpatient transfusions on regular basis. Standing Status:   Standing     Number of Occurrences:   1     Order Specific Question:   Appointment in     Answer: Other (Specify)       Follow-up Information     Follow up With Specialties Details Why Contact Info    [address] [facility]  LEFT MESSAGE FOR APPT. [address] Mara Phelps MD Family Medicine   [facility] [address] [place] [address] [address]  [facility] [address]  [phone]            Follow up labs/diagnostics (ultimately defer to outpatient provider):  none    Time spent in patient discharge and coordination 39 minutes.     Plan was discussed with patient and multiple family members at bedside. All questions answered. Patient was stable at time of discharge. Instructions given to call a physician or return if any concerns. Discharge Info:   Current Discharge Medication List      START taking these medications    Details   oxyCODONE IR (ROXICODONE) 5 mg immediate release tablet Take 1 Tablet by mouth every four (4) hours as needed for Pain for up to 3 days. Max Daily Amount: 30 mg.  Qty: 18 Tablet, Refills: 0  Start date: 4/12/2022, End date: 4/15/2022    Associated Diagnoses: End of life care         CONTINUE these medications which have CHANGED    Details   ondansetron (ZOFRAN ODT) 4 mg disintegrating tablet Take 1 Tablet by mouth every eight (8) hours as needed for Nausea or Vomiting for up to 3 days. Qty: 8 Tablet, Refills: 0  Start date: 4/12/2022, End date: 4/15/2022         CONTINUE these medications which have NOT CHANGED    Details   albuterol (PROVENTIL VENTOLIN) 2.5 mg /3 mL (0.083 %) nebu INHALE 1 VIAL VIA NEBULIZER EVERY 6 HOURS AS NEEDED FOR WHEEZING OR SHORTNESS OF BREATH  Qty: 300 mL, Refills: 1      metoprolol tartrate (LOPRESSOR) 25 mg tablet Take 0.5 Tablets by mouth two (2) times a day. Qty: 90 Tablet, Refills: 1      furosemide (LASIX) 40 mg tablet Take 1 Tablet by mouth daily. Qty: 30 Tablet, Refills: 5      albuterol (PROVENTIL HFA, VENTOLIN HFA, PROAIR HFA) 90 mcg/actuation inhaler Take 2 Puffs by inhalation every four (4) hours as needed for Wheezing or Shortness of Breath. Qty: 18 g, Refills: 2      DULoxetine (CYMBALTA) 60 mg capsule Take 1 Capsule by mouth daily. Qty: 30 Capsule, Refills: 5    Associated Diagnoses: MAURICIO (generalized anxiety disorder)      fluticasone propionate (FLONASE) 50 mcg/actuation nasal spray 2 Sprays by Both Nostrils route daily. Qty: 1 Each, Refills: 0      guaiFENesin ER (MUCINEX) 600 mg ER tablet Take 1 Tablet by mouth two (2) times a day.   Qty: 20 Tablet, Refills: 0         STOP taking these medications ferrous sulfate (IRON) 325 mg (65 mg iron) EC tablet Comments:   Reason for Stopping:         pantoprazole (PROTONIX) 40 mg tablet Comments:   Reason for Stopping:         sucralfate (CARAFATE) 100 mg/mL suspension Comments:   Reason for Stopping:         ascorbic acid (VITAMIN C) 500 mg chewable tablet Comments:   Reason for Stopping:         Brilinta 90 mg tablet Comments:   Reason for Stopping:         atorvastatin (LIPITOR) 80 mg tablet Comments:   Reason for Stopping:         metFORMIN (GLUCOPHAGE) 1,000 mg tablet Comments:   Reason for Stopping:         levothyroxine (SYNTHROID) 175 mcg tablet Comments:   Reason for Stopping:         aspirin delayed-release 81 mg tablet Comments:   Reason for Stopping:         hydrOXYzine pamoate (VISTARIL) 25 mg capsule Comments:   Reason for Stopping:         nitroglycerin (NITROSTAT) 0.4 mg SL tablet Comments:   Reason for Stopping:               Procedures done this admission:  * No surgery found *    Consults this admission:  IP CONSULT TO NEPHROLOGY  IP CONSULT TO GASTROENTEROLOGY  IP CONSULT TO INFECTIOUS DISEASES  IP CONSULT TO PALLIATIVE CARE - PROVIDER  IP CONSULT TO CARDIOLOGY  IP CONSULT TO PULMONOLOGY    Echocardiogram/EKG results:  Results from Hospital Encounter encounter on 04/05/22    ECHO ADULT COMPLETE    Interpretation Summary    Left Ventricle: Left ventricle size is normal. Mildly increased wall thickness. Normal wall motion. Normal left ventricular systolic function. EF by 2D Simpsons Biplane is 61%. Abnormal diastolic function.   Right Ventricle: Normal systolic function.   Mitral Valve: Moderately calcified leaflet, at the posterior leaflet. Severe annular calcification of the mitral valve. Large echodense structure at the posterior mitral valve annulus measuring 28 x 37 mm, may represent severe mitral annular calcification , intracardiac mass, or extracardiac structure. Mild to moderate stenosis noted.     Left Atrium: Left atrial volume index is normal (16-34 mL/m2).   IVC/SVC: IVC diameter is greater than 21 mm and decreases less than 50% during inspiration; therefore the estimated right atrial pressure is elevated (~15 mmHg).   Transesophageal echocardiogram or cardiac imaging with CT recommended to better assess mitral valve mass. EKG Results     Procedure 720 Value Units Date/Time    EKG 12 LEAD INITIAL [[id number]]     Order Status: Completed           Diagnostic Imaging/Tests:   US RETROPERITONEUM COMP    Result Date: 4/6/2022  EXAM:  US RETROPERITONEUM COMP INDICATION:  ISABELLA AND TRANSAMANITIS COMPARISON: None. TECHNIQUE: Real-time sonography of the kidneys, retroperitoneum and bladder was performed with multiple static images obtained. FINDINGS: The kidneys have normal echogenicity with no mass, stone or hydronephrosis. The right kidney measures 10.9 cm and the left kidney measures 11.6 cm in length. The spleen is enlarged measuring 19.3 cm. The aorta tapers normally. The proximal iliac arteries are normal.  The IVC is normal. No retroperitoneal mass is identified. The urinary bladder is normal.     1. Splenomegaly. 2. No additional abnormality. XR CHEST PORT    Result Date: 4/5/2022  CHEST X-RAY, 1 VIEW INDICATION: Sepsis COMPARISON: Chest x-ray 3/24/2022 TECHNIQUE: Single AP view of the chest was obtained. FINDINGS: Lungs: Stable chronic appearing interstitial opacities in both lungs. Cardiomediastinal silhouette: Enlarged. Pleura: No pneumothorax or pleural effusion. Osseous structures: Normal.     No radiographic evidence of acute cardiopulmonary disease.        All Micro Results     Procedure Component Value Units Date/Time    CULTURE, BLOOD [[id number]] Collected: 04/05/22 1645    Order Status: Completed Specimen: Blood Updated: 04/10/22 0658     Special Requests: --        LEFT  Antecubital       Culture result: NO GROWTH 5 DAYS       CULTURE, BLOOD [[id number]] Collected: 04/05/22 1647    Order Status: Completed Specimen: Blood Updated: 04/10/22 0658     Special Requests: --        RIGHT  ARM       Culture result: NO GROWTH 5 DAYS       CULTURE, URINE [[id number]]  (Abnormal)  (Susceptibility) Collected: 04/05/22 1804    Order Status: Completed Specimen: Urine from Clean catch Updated: 04/08/22 0709     Special Requests: NO SPECIAL REQUESTS        Culture result:       >100,000 COLONIES/mL KLEBSIELLA PNEUMONIAE                  <10,000 COLONIES/mL NORMAL SKIN ROHIT ISOLATED          CULTURE, URINE [[id number]]     Order Status: Canceled Specimen: Urine from Clean catch           Labs: Results:       BMP, Mg, Phos Recent Labs     04/11/22  0428 04/10/22  0440    139   K 3.9 3.8    103   CO2 29 26   AGAP 7 10   BUN 37* 23   CREA 2.20* 1.30*   CA 9.4 8.8   * 155*   MG 2.2 1.9   PHOS 3.4 2.7      CBC Recent Labs     04/11/22  0428 04/10/22  1545 04/10/22  0440   WBC 18.1*  --  13.4*   RBC 3.27*  --  2.57*   HGB 9.2* 8.7* 7.3*   HCT 29.8* 28.2* 23.6*   PLT 95*  --  61*   GRANS 88*  --  90*   LYMPH 5*  --  4*   EOS 0*  --  0*   MONOS 6  --  6   BASOS 0  --  0   IG 1  --  1   ANEU 16.0*  --  12.0*   ABL 0.8  --  0.5   BONG 0.0  --  0.0   ABM 1.2  --  0.8   ABB 0.0  --  0.0   AIG 0.2  --  0.1      LFT Recent Labs     04/11/22  0428 04/10/22  0440   * 123*   * 351*   TP 7.6 6.8   ALB 2.6* 2.4*   GLOB 5.0* 4.4*   AGRAT 0.5* 0.5*      Cardiac Testing No results found for: BNPP, BNP, CPK, RCK1, RCK2, RCK3, RCK4, CKMB, CKNDX, CKND1, TROPT, TROIQ   Coagulation Tests Lab Results   Component Value Date/Time    Prothrombin time 15.4 (H) 04/11/2022 04:28 AM    Prothrombin time 16.0 (H) 04/10/2022 04:40 AM    Prothrombin time 16.2 (H) 04/09/2022 03:31 AM    INR 1.2 04/11/2022 04:28 AM    INR 1.2 04/10/2022 04:40 AM    INR 1.3 04/09/2022 03:31 AM    aPTT 39.1 (H) 04/10/2022 04:40 AM      A1c Lab Results   Component Value Date/Time    Hemoglobin A1c 6.0 03/27/2022 05:42 AM    Hemoglobin A1c 7.5 (H) 10/22/2021 02:39 PM Hemoglobin A1c 5.8 (H) 05/03/2021 11:41 AM    Hemoglobin A1c, External 6.7 03/03/2020 12:00 AM      Lipid Panel Lab Results   Component Value Date/Time    Cholesterol, total 161 05/03/2021 11:41 AM    HDL Cholesterol 66 05/03/2021 11:41 AM    LDL, calculated 82 05/03/2021 11:41 AM    LDL, calculated 114 (H) 12/04/2019 02:24 PM    VLDL, calculated 13 05/03/2021 11:41 AM    VLDL, calculated 22 12/04/2019 02:24 PM    Triglyceride 68 05/03/2021 11:41 AM      Thyroid Panel Lab Results   Component Value Date/Time    TSH 3.750 (H) 03/26/2022 03:32 AM    TSH 2.820 10/22/2021 02:39 PM    T4, Free 1.57 10/22/2021 02:39 PM    T4, Free 1.25 05/03/2021 11:41 AM        Most Recent UA Lab Results   Component Value Date/Time    Color ORANGE 04/05/2022 03:24 PM    Appearance TURBID 04/05/2022 03:24 PM    pH (UA) 6.0 04/05/2022 03:24 PM    Protein 30 (A) 04/05/2022 03:24 PM    Glucose Negative 04/05/2022 03:24 PM    Ketone Negative 04/05/2022 03:24 PM    Bilirubin SMALL AMOUNT (A) 04/05/2022 03:24 PM    Blood LARGE (A) 04/05/2022 03:24 PM    Urobilinogen 1.0 04/05/2022 03:24 PM    Nitrites Negative 04/05/2022 03:24 PM    Leukocyte Esterase TRACE (A) 04/05/2022 03:24 PM    WBC 20-50 04/05/2022 03:24 PM    WBC 10-20 04/05/2022 03:24 PM    RBC 20-50 04/05/2022 03:24 PM    RBC 3-5 04/05/2022 03:24 PM    Epithelial cells 0-3 04/05/2022 03:24 PM    Epithelial cells 0-3 04/05/2022 03:24 PM    Bacteria 4+ (H) 04/05/2022 03:24 PM    Bacteria 4+ (H) 04/05/2022 03:24 PM    Casts 10-20 04/05/2022 03:24 PM    Casts HYALINE 04/05/2022 03:24 PM    Crystals, urine 0 04/05/2022 03:24 PM    Mucus 0 04/05/2022 03:24 PM    Other observations DUPLICATE REQUEST 15/43/4925 03:24 PM    Other observations RESULTS VERIFIED MANUALLY 04/05/2022 03:24 PM          All Labs from Last 24 Hrs:  No results found for this or any previous visit (from the past 24 hour(s)).     Current Med List in Hospital:   Current Facility-Administered Medications   Medication Dose Route Frequency    ondansetron (ZOFRAN ODT) tablet 8 mg  8 mg Oral Q8H PRN    HYDROmorphone (DILAUDID) injection 0.5 mg  0.5 mg IntraVENous Q3H PRN    LORazepam (ATIVAN) injection 1 mg  1 mg IntraVENous Q2H PRN    0.9% sodium chloride infusion 250 mL  250 mL IntraVENous PRN    guaiFENesin ER (MUCINEX) tablet 1,200 mg  1,200 mg Oral BID    0.9% sodium chloride infusion 250 mL  250 mL IntraVENous PRN    famotidine (PEPCID) tablet 20 mg  20 mg Oral DAILY PRN    lidocaine (XYLOCAINE) 20 mg/mL (2 %) injection  mg   mg IntraDERMal Rad Multiple    heparin (porcine) 1,000 unit/mL injection 5,000 Units  5,000 Units Hemodialysis DIALYSIS PRN    ondansetron (ZOFRAN) injection 4 mg  4 mg IntraVENous Q6H PRN    sodium chloride (NS) flush 5-10 mL  5-10 mL IntraVENous Q8H    sodium chloride (NS) flush 5-10 mL  5-10 mL IntraVENous PRN    albuterol (PROVENTIL VENTOLIN) nebulizer solution 2.5 mg  2.5 mg Nebulization Q6H PRN    hydrOXYzine pamoate (VISTARIL) capsule 25 mg  25 mg Oral BID PRN    sodium chloride (NS) flush 5-40 mL  5-40 mL IntraVENous Q8H    sodium chloride (NS) flush 5-40 mL  5-40 mL IntraVENous PRN    acetaminophen (TYLENOL) tablet 650 mg  650 mg Oral Q6H PRN    Or    acetaminophen (TYLENOL) suppository 650 mg  650 mg Rectal Q6H PRN    polyethylene glycol (MIRALAX) packet 17 g  17 g Oral DAILY PRN    bisacodyL (DULCOLAX) suppository 10 mg  10 mg Rectal DAILY PRN    alum-mag hydroxide-simeth (MYLANTA) oral suspension 15 mL  15 mL Oral Q6H PRN    hydrALAZINE (APRESOLINE) 20 mg/mL injection 20 mg  20 mg IntraVENous Q4H PRN    oxyCODONE IR (ROXICODONE) tablet 5 mg  5 mg Oral Q4H PRN    guaiFENesin-dextromethorphan (ROBITUSSIN DM) 100-10 mg/5 mL syrup 10 mL  10 mL Oral Q4H PRN    senna-docusate (PERICOLACE) 8.6-50 mg per tablet 2 Tablet  2 Tablet Oral DAILY PRN    melatonin tablet 5 mg  5 mg Oral QHS PRN    prochlorperazine (COMPAZINE) with saline injection 10 mg  10 mg IntraVENous Q6H PRN    nystatin (MYCOSTATIN) 100,000 unit/gram powder   Topical BID       Allergies   Allergen Reactions    No Known Drug Allergies Not Reported This Time     Immunization History   Administered Date(s) Administered    COVID-19, Moderna Booster, PF, 0.25mL Dose 11/18/2021    COVID-19, Moderna, Primary or Immunocompromised Series, MRNA, PF, 100mcg/0.5mL 04/09/2021, 05/09/2021    Influenza High Dose Vaccine PF 12/21/2018    Influenza Vaccine 10/23/2012, 11/06/2015    Influenza Vaccine (Quad) PF (>6 Mo Flulaval, Fluarix, and >3 Yrs Afluria, Fluzone 32402) 09/25/2017    Influenza Vaccine (Tri) Adjuvanted (>65 Yrs FLUAD TRI 85121) 12/04/2019    Influenza Vaccine Split 01/16/2010    Influenza, Quadrivalent, Adjuvanted (>65 Yrs FLUAD QUAD 76035) 10/22/2021    Pneumococcal Conjugate (PCV-13) 12/21/2018    Pneumococcal Polysaccharide (PPSV-23) 11/06/2012    TB Skin Test (PPD) Intradermal 03/24/2022, 04/05/2022       Recent Vital Data:  Patient Vitals for the past 24 hrs:   Temp Pulse Resp BP SpO2   04/12/22 0753 98 °F (36.7 °C) 91 16 132/70 98 %   04/12/22 0558 -- -- -- 117/61 96 %   04/11/22 1918 97.7 °F (36.5 °C) 92 18 118/64 100 %   04/11/22 1910 -- 93 22 129/66 100 %     Oxygen Therapy  O2 Sat (%): 98 % (04/12/22 0753)  Pulse via Oximetry: 76 beats per minute (04/11/22 0806)  O2 Device: Hi flow nasal cannula (04/11/22 2349)  Skin Assessment: Clean, dry, & intact (04/11/22 2349)  Skin Protection for O2 Device: No (04/10/22 0700)  O2 Flow Rate (L/min): 4 l/min (04/11/22 2349)  FIO2 (%): 50 % (Weaned to 40%) (04/09/22 0720)    Estimated body mass index is 45.85 kg/m² as calculated from the following:    Height as of this encounter: 5' 4\" (1.626 m). Weight as of this encounter: 121.2 kg (267 lb 1.6 oz). Intake/Output Summary (Last 24 hours) at 4/12/2022 1350  Last data filed at 4/11/2022 1826  Gross per 24 hour   Intake 60 ml   Output --   Net 60 ml         Physical Exam:    General:    Well nourished. No overt distress  Head:  Normocephalic, atraumatic  Eyes:  Sclerae appear normal.  Pupils equally round. HENT:  Nares appear normal, no drainage. Moist mucous membranes  Neck:  No restricted ROM. Trachea midline  CV:   RRR. No m/r/g. No JVD  Lungs:   Coarse breath sounds bilaterally  Abdomen:   Soft, nontender, nondistended. Extremities: Warm and dry. No cyanosis or clubbing. No edema. Skin:     No rashes. Normal coloration  Neuro:  CN II-XII grossly intact. Psych:  Normal mood and affect. Signed:  Tameka Cadena DO    Part of this note may have been written by using a voice dictation software. The note has been proof read but may still contain some grammatical/other typographical errors.

## 2022-04-12 NOTE — PROGRESS NOTES
Patient for discharge today. Patient to return home with [facility] via EMS- ([address]) @ 1430. Script provided for Zofran, as requested by Hospice Staff. No additional CM needs identified or expressed at this time. CM remains available. Care Management Interventions  PCP Verified by CM: Yes  Mode of Transport at Discharge: BLS [address] )  Transition of Care Consult (CM Consult): Discharge [address]: Yes  Discharge Durable Medical Equipment: No  Physical Therapy Consult: Yes  Occupational Therapy Consult: Yes  Speech Therapy Consult: No  Support Systems: Child(malia),Other Family Member(s)  The Plan for Transition of Care is Related to the Following Treatment Goals : Home with Hospice for End of Life Care.   The Patient and/or Patient Representative was Provided with a Choice of Provider and Agrees with the Discharge Plan?: Yes  Name of the Patient Representative Who was Provided with a Choice of Provider and Agrees with the Discharge Plan: Patient/ Patient's daughter Dakacy Flies Provided?: No  Discharge Location  Patient Expects to be Discharged to[de-identified] Home with hospice

## 2022-04-12 NOTE — PROGRESS NOTES
Advance Care Planning   Advance Care Planning Inpatient Note  [address] [facility] Department    Today's Date: 4/12/2022  Unit: SFD 6 MED SURG    Received request from patient and family. Upon review of chart and communication with care team, patient's decision making abilities are not in question. Patient and Child/children was/were present in the room during visit. Goals of ACP Conversation:  Discuss Advance Care planning documents  Facilitate a discussion related to patient's goals of care as they align with the patient's values and beliefs    Health Care Decision Makers:      Primary Decision Maker: Francisco Hay - [facility] - [phone]    Secondary Decision Maker: Milton Haque - [phone]    Supplemental (Other) Decision Maker: Shaheed Perez - [phone]      Summary:  Completed [address] (Patient Wishes) on file:  Healthcare Power of /Advance Directive appointment of Health care agent     Assessment:    Patient is expected to go on Hospice and requests to have her HCPOA updated before discharge from hospital.  Family was at bedside and supportive.     Interventions:  Provided education on documents for clarity and greater understanding  Assisted in the completion of documents according to patient's wishes at this time  Encouraged ongoing ACP conversation with future decision makers and loved ones    Care Preferences Communicated:  No    Outcomes/Plan:  New Advance Directive completed  Returned original document(s) to patient, as well as copies for distribution to appointed agents  Copy of Advance Directive given to staff to scan into medical record  Teach Back Method used to verify the patient/Healthcare Decision Maker's understanding of key information in the advance directive documents    Chaplain Nish on 4/12/2022 at 11:02 AM

## 2022-04-12 NOTE — PROGRESS NOTES
Hourly rounds completed for this shift. Patient's VSS. Patient responds to my voice. Bed locked and in lowest position. Call light within reach. Patient door open to monitor. Will give report to oncoming shift.

## 2022-04-12 NOTE — PROGRESS NOTES
Pt being discharged home with hospice. HD catheter removed. CDI at time of discharge. PIV removed. Lane remained in place. All belongings sent with patient. No other needs at this time.

## 2022-04-12 NOTE — ACP (ADVANCE CARE PLANNING)
Advance Care Planning   Advance Care Planning Inpatient Note  [facility] Care Department    Today's Date: 4/12/2022  Unit: SFD 6 MED SURG    Received request from patient and family. Upon review of chart and communication with care team, patient's decision making abilities are not in question. Patient and Child/children was/were present in the room during visit. Goals of ACP Conversation:  Discuss Advance Care planning documents  Facilitate a discussion related to patient's goals of care as they align with the patient's values and beliefs    Health Care Decision Makers:      Primary Decision Maker: Virginia Cheney - Daughter - [phone]    Secondary Decision Maker: Roxana Sloan - [phone]    Supplemental (Other) Decision Maker: Avril Bender - [phone]      Summary:  Completed [address] (Patient Wishes) on file:  Healthcare Power of /Advance Directive appointment of Health care agent     Assessment:    Patient is expected to go on Hospice and requests to have her HCPOA updated before discharge from hospital.  Family was at bedside and supportive.     Interventions:  Provided education on documents for clarity and greater understanding  Assisted in the completion of documents according to patient's wishes at this time  Encouraged ongoing ACP conversation with future decision makers and loved ones    Care Preferences Communicated:  No    Outcomes/Plan:  New Advance Directive completed  Returned original document(s) to patient, as well as copies for distribution to appointed agents  Copy of Advance Directive given to staff to scan into medical record  Teach Back Method used to verify the patient/Healthcare Decision Maker's understanding of key information in the advance directive documents    Christian Severin, Chaplain on 4/12/2022 at 11:02 AM

## 2022-04-12 NOTE — HOSPICE
Mrs Rocio Dang has been approved for [address] in her home. Her significant other, Wilmer Lester, her daughter Freda Bishop, and several adult grandchildren are willing to care for her physical needs in the home. DME (bariatric bed, bedside table, O2 concentrator) to be delivered at 2pm. Transport is arranged via [employer] at 2:30pm.     *Nursing: please leave jones catheter in at discharge. *Attending provider: please send script at d/c for Zofran.     Thank you for this referral,   Kameron Fischer RN  [facility]

## 2022-04-12 NOTE — PROGRESS NOTES
Problem: Pressure Injury - Risk of  Goal: *Prevention of pressure injury  Description: Document Taqueria Scale and appropriate interventions in the flowsheet. Outcome: Progressing Towards Goal  Note: Pressure Injury Interventions:  Sensory Interventions: Assess need for specialty bed    Moisture Interventions: Absorbent underpads    Activity Interventions: Assess need for specialty bed    Mobility Interventions: Assess need for specialty bed    Nutrition Interventions: Document food/fluid/supplement intake    Friction and Shear Interventions: Apply protective barrier, creams and emollients                Problem: Patient Education: Go to Patient Education Activity  Goal: Patient/Family Education  Outcome: Progressing Towards Goal     Problem: Falls - Risk of  Goal: *Absence of Falls  Description: Document Patrice Ralcurts Fall Risk and appropriate interventions in the flowsheet.   Outcome: Progressing Towards Goal  Note: Fall Risk Interventions:  Mobility Interventions: Patient to call before getting OOB         Medication Interventions: Teach patient to arise slowly    Elimination Interventions: Call light in reach              Problem: Patient Education: Go to Patient Education Activity  Goal: Patient/Family Education  Outcome: Progressing Towards Goal     Problem: Patient Education: Go to Patient Education Activity  Goal: Patient/Family Education  Outcome: Progressing Towards Goal     Problem: Patient Education: Go to Patient Education Activity  Goal: Patient/Family Education  Outcome: Progressing Towards Goal     Problem: General Medical Care Plan  Goal: *Vital signs within specified parameters  Outcome: Progressing Towards Goal  Goal: *Skin integrity maintained  Outcome: Progressing Towards Goal  Goal: *Fluid volume balance  Outcome: Progressing Towards Goal     Problem: Fluid Volume - Risk of, Imbalanced  Goal: *Balanced intake and output  Outcome: Progressing Towards Goal

## 2022-04-13 NOTE — HOSPICE
Wilma Davis, 69year old female admitted to in-home hospice services on 4-12-22 for a diagnosis of Metabolic Encephalopathy. Left arm Mac is 34 cm. PPS 30%. Pt lives with her daughter & grand-daughter. Pt has been declining over the past month. Pt is newly bedbound. Pt is o2 dependent. Pt is dependent on others for all of her ADLS. pts appetite has decreased to only 4 bites per day. Pt is a&ox3. pts dtr, Yuridia Pendleton signed hospice consents due to pts lethargy, weakness, pain and sob today. Pt is a DNR. pt has a new stage 1 sacral decubitus. Pt states that her pain has increased in the past week along with her sob. .  A hospital bed, table, Oxygen and a nebulizer were ordered today from [facility]. Pt owns a BSC and wheelchair. A comfort Randall, oxycodone 5 mg tabs, zofran 4 mg, & senna were ordered from [employer] to be delivered. 2XL briefs, wipes, chux, zinc cream & mouth swabs were ordered from [employer] to be delivered. RN educated Flower and daughter, Yuridia Pendleton, on hospice process and philosophy, medication management, pain control, skin care and protection, fall precautions, anxiety strategies, home safety, and social distancing due to COVID-19. Pt history, assessment, meds and status reviewed with patient's hospice attending MD, Dr. Terrie Dahl. Flower and daughter, Yuridia Pendleton, were notified of volunteer services but pending at this time due to COVID-19; plans to reassess volunteer needs once volunteer services become available. Patient is appropriate for hospice services at this time. HHA services were accepted. SW and [place] services accepted. Caregiver made aware that an RN will be completing a follow-up visit within 24 hours. Handwritten medication list, [facility] book and magnet with [facility] phone number left in home. RN educated Caregiver to call [facility] 24/7 phone line with any changes, concerns, or falls with verbalized understanding.

## 2022-04-13 NOTE — HOSPICE
Ms. Lexy Rodriguez is a 69  female who was admitted to [address] [place] [address] on 22 with a primary diagnosis of metabolic encephalopathy. Pt is a DNR code status. Initial SWA was completed on 22 - SW was greeted at the home by Pt.'s daughter Genevieve Carr and son Louie Freeman and invited in to visit. Pt was lying in her hospital bed on her side and was sleeping throughout the visit (we didn't attempt to wake her since she appeared to be resting comfortably after not getting much rest during the recent hospitalization per family). SW met with Genevieve Carr and Louie Freeman in the kitchen area to complete the psychosocial assessment, etc. Genevieve Carr and Louie Freeman openly discussed their mother's medical history, recent hospitalizations and the decision to focus on comfort care only going forward. Social history: Pt.'s 2nd   in 0. Pt.'s \"\", Bernabe Braakat currently lives with her along with Pt.'s daughter Genevieve Carr [facility]) and son Louie Freeman who moved in with her approx. one month due to her declining health. Genevieve Carr is the identified primary caregiver. 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 due to medical reasons. Pt is of \"Jewish\" rolly - no current Sabianism affiliation. Pt.'s family will be using [employer] of [place] at the time of Pt.'s passing. Hospice services, contact information and role of SW reviewed with Genevieve Carr and Louie Freeman and all questions answered. Plan of care developed and agreed upon with Amanueldanny Carr and Louie Freeman as well. SW provided emotional support for Genevieve Carr and Louie Freeman via active listening along with validation of their feelings/concerns, education (provided them with a copy of the [facility] booklet) and reassurance. Genevieve Carr and Louie Freeman are aware that volunteer services are currently not available due the COVID-19 pandemic and will be re-evaluated at a later time. SW will plan to visit weekly due to Pt.'s rapid decline and prognosis.  SW will provide emotional support along with continued assessment of psychosocial and bereavement concerns and needs. Visit discussed with [facility] primary nurse, Rizwana Harris.

## 2022-04-16 NOTE — HOSPICE
PRN VISIT. PT APPEARS TO BE ACTIVE. PT UNRESPONSIVE. PT APPEARS JAUNDICED. BP DROPPING. PT APPEARS COMFORTABLE. INSTRUCTED TO CALL WITH CONCERNS VOICED UNDERSTANDING. WILL SCHEDULE RN FOR WEEKEND.

## 2022-04-16 NOTE — HOSPICE
PT IN BED. PT UNRESPONSIVE TO VERBA;L STIMULI. DTR STATES PT HAD A BAD NIGHT WITH PAIN AND PT WAS EXHAUSTED. DID NOT WAKE PT INTENTIONALLY., BUT PT DID NOT WAKE WHEN SPOKEN TO. SPOKE TO PROVIDER, OXYCODONE INCREASED AND ORDERED IN LIQUID AS PT UNABLE TO SWALLOW. INSTRUCTED TO CRUSH OXY IR UNTIL LIQUID ARRIVES. PT WAS ALSO VOMITING FREQUENTLY. INSTRUCTED TO USE HALDOL FROM [facility] FOR NAUSEA. PT APPEARS TO BE IN LAST DAYS OF LIFE. WILL RETURN TOMORROW. MEDS AND SUPPLIES ORDERED AS NEEDED. INSTRUCTED TO CALL HOSPICE WITH CONCERN VOICEDUNDERSTANDING.

## 2022-04-16 NOTE — HOSPICE
FAMILY AT BEDSIDE. NO RESP OR PULSE. AUSCULATATED FOR 2 MIN. 19 Kelsey Díaz TEAM NOTIFIED. [address] AND ENCLARA NOTIFIED. FAMILY GRIEVING APPROPRIATELY. BODY REMOVED BY [employer].

## 2022-04-16 NOTE — HOSPICE
PRN VISIT. PT SLIGHTLY MORE ALERT, BUT VERT LETHARGIC. PT NOT ABLE TO COMMUNCATE. PT APPEARS COMFORTABLE AS FAR AS PAIN, BUT PT VOMITTING FREQUENTLY DURING VISIT. HALDOL ADMINISTERED. WILL RETURN TOMORROW.

## 2022-04-17 VITALS — DIASTOLIC BLOOD PRESSURE: 70 MMHG | SYSTOLIC BLOOD PRESSURE: 120 MMHG | RESPIRATION RATE: 20 BRPM

## 2022-04-18 NOTE — HOSPICE
Prn visit this morning for emesis, gurgling and agitation. Pt  in moribund state. Active. 02 in place at 2L/m via nasal cannula. Will need daily visits and cna visits until pt passes. Annie Topete to see or today, family request. informed family that pt is nearing her end of life. they are tearful but accepting of situation. no emesis this visit. pt was re-situated in bed with help from family. Advised dtr to give roxanol instead of oxycodone. family will call when pt passes.

## 2022-04-20 LAB
HEPARIN INDUCED PLT,XHIPA: NEGATIVE
HIT INTERPRETATION,XINTPR: NEGATIVE
HIT PROFILE,XHITT: NORMAL
OPTICAL DENSITY READ,XHITAO: 0.06 ABS

## 2025-03-24 NOTE — ED NOTES
Nurse to nurse report given to Neelima Paulino Render Risk Assessment In Note?: no Comment: Recommended 6 month FBSE or sooner if concerns (due to hx of SCC). 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 Chinchilla (9/2024). Comment: Lesions of pts concern. Discussed nature and etiology. Reassured benign. Discussed cosmetic extraction and pricing. Pt agreed to removal. 6 total lesions extracted via snip scissors; $150. Aftercare reviewed. Cosmetic sheet given. Comment: Chronic, flaring. Discussed nature and etiology of condition. Pt deferred treatment. Comment: Previously treated, resolved.

## (undated) DEVICE — KENDALL RADIOLUCENT FOAM MONITORING ELECTRODE RECTANGULAR SHAPE: Brand: KENDALL

## (undated) DEVICE — SYR 3ML LL TIP 1/10ML GRAD --

## (undated) DEVICE — CONNECTOR TBNG OD5-7MM O2 END DISP

## (undated) DEVICE — BLOCK BITE AD 60FR W/ VELC STRP ADDRESSES MOST PT AND

## (undated) DEVICE — SYR 5ML 1/5 GRAD LL NSAF LF --

## (undated) DEVICE — CANNULA NSL ORAL AD FOR CAPNOFLEX CO2 O2 AIRLFE

## (undated) DEVICE — MASK O2 O2 LN L7FT CO2 LN L10FT FEM BG 750ML M CONC ADPT

## (undated) DEVICE — NDL PRT INJ NSAF BLNT 18GX1.5 --